# Patient Record
Sex: MALE | Race: WHITE | NOT HISPANIC OR LATINO
[De-identification: names, ages, dates, MRNs, and addresses within clinical notes are randomized per-mention and may not be internally consistent; named-entity substitution may affect disease eponyms.]

---

## 2017-01-26 ENCOUNTER — CHARTING TRANS (OUTPATIENT)
Dept: OTHER | Age: 77
End: 2017-01-26

## 2017-02-07 ENCOUNTER — CHARTING TRANS (OUTPATIENT)
Dept: OTHER | Age: 77
End: 2017-02-07

## 2017-02-24 ENCOUNTER — CHARTING TRANS (OUTPATIENT)
Dept: OTHER | Age: 77
End: 2017-02-24

## 2017-03-24 ENCOUNTER — CHARTING TRANS (OUTPATIENT)
Dept: OTHER | Age: 77
End: 2017-03-24

## 2017-04-12 ENCOUNTER — CHARTING TRANS (OUTPATIENT)
Dept: OTHER | Age: 77
End: 2017-04-12

## 2017-05-12 ENCOUNTER — CHARTING TRANS (OUTPATIENT)
Dept: OTHER | Age: 77
End: 2017-05-12

## 2017-06-12 ENCOUNTER — CHARTING TRANS (OUTPATIENT)
Dept: OTHER | Age: 77
End: 2017-06-12

## 2017-07-12 ENCOUNTER — CHARTING TRANS (OUTPATIENT)
Dept: OTHER | Age: 77
End: 2017-07-12

## 2017-08-14 ENCOUNTER — CHARTING TRANS (OUTPATIENT)
Dept: OTHER | Age: 77
End: 2017-08-14

## 2017-09-18 ENCOUNTER — CHARTING TRANS (OUTPATIENT)
Dept: OTHER | Age: 77
End: 2017-09-18

## 2017-10-24 ENCOUNTER — CHARTING TRANS (OUTPATIENT)
Dept: OTHER | Age: 77
End: 2017-10-24

## 2017-12-01 ENCOUNTER — CHARTING TRANS (OUTPATIENT)
Dept: OTHER | Age: 77
End: 2017-12-01

## 2018-01-04 ENCOUNTER — CHARTING TRANS (OUTPATIENT)
Dept: OTHER | Age: 78
End: 2018-01-04

## 2018-02-05 ENCOUNTER — CHARTING TRANS (OUTPATIENT)
Dept: OTHER | Age: 78
End: 2018-02-05

## 2018-03-06 ENCOUNTER — CHARTING TRANS (OUTPATIENT)
Dept: OTHER | Age: 78
End: 2018-03-06

## 2018-04-16 ENCOUNTER — CHARTING TRANS (OUTPATIENT)
Dept: OTHER | Age: 78
End: 2018-04-16

## 2018-05-04 ENCOUNTER — CHARTING TRANS (OUTPATIENT)
Dept: OTHER | Age: 78
End: 2018-05-04

## 2018-06-06 ENCOUNTER — CHARTING TRANS (OUTPATIENT)
Dept: OTHER | Age: 78
End: 2018-06-06

## 2018-07-05 ENCOUNTER — CHARTING TRANS (OUTPATIENT)
Dept: OTHER | Age: 78
End: 2018-07-05

## 2018-08-02 ENCOUNTER — CHARTING TRANS (OUTPATIENT)
Dept: OTHER | Age: 78
End: 2018-08-02

## 2018-09-06 ENCOUNTER — CHARTING TRANS (OUTPATIENT)
Dept: OTHER | Age: 78
End: 2018-09-06

## 2018-10-04 ENCOUNTER — CHARTING TRANS (OUTPATIENT)
Dept: OTHER | Age: 78
End: 2018-10-04

## 2018-11-01 ENCOUNTER — CHARTING TRANS (OUTPATIENT)
Dept: OTHER | Age: 78
End: 2018-11-01

## 2018-11-05 VITALS
BODY MASS INDEX: 26.88 KG/M2 | RESPIRATION RATE: 19 BRPM | HEIGHT: 71 IN | SYSTOLIC BLOOD PRESSURE: 135 MMHG | WEIGHT: 192 LBS | DIASTOLIC BLOOD PRESSURE: 70 MMHG

## 2018-11-30 ENCOUNTER — CHARTING TRANS (OUTPATIENT)
Dept: OTHER | Age: 78
End: 2018-11-30

## 2018-12-31 ENCOUNTER — NURSE ONLY (OUTPATIENT)
Dept: FAMILY MEDICINE | Age: 78
End: 2018-12-31

## 2018-12-31 DIAGNOSIS — Z23 IMMUNIZATION DUE: Primary | ICD-10-CM

## 2018-12-31 PROCEDURE — 96372 THER/PROPH/DIAG INJ SC/IM: CPT

## 2019-01-01 ENCOUNTER — EXTERNAL RECORD (OUTPATIENT)
Dept: HEALTH INFORMATION MANAGEMENT | Facility: OTHER | Age: 79
End: 2019-01-01

## 2019-01-31 ENCOUNTER — TELEPHONE (OUTPATIENT)
Dept: FAMILY MEDICINE | Age: 79
End: 2019-01-31

## 2019-01-31 ENCOUNTER — APPOINTMENT (OUTPATIENT)
Dept: FAMILY MEDICINE | Age: 79
End: 2019-01-31

## 2019-02-04 ENCOUNTER — NURSE ONLY (OUTPATIENT)
Dept: FAMILY MEDICINE | Age: 79
End: 2019-02-04

## 2019-02-04 DIAGNOSIS — E29.1 MALE HYPOGONADISM: Primary | ICD-10-CM

## 2019-02-04 PROCEDURE — 96372 THER/PROPH/DIAG INJ SC/IM: CPT

## 2019-02-04 RX ORDER — TESTOSTERONE CYPIONATE 200 MG/ML
100 INJECTION, SOLUTION INTRAMUSCULAR ONCE
Status: COMPLETED | OUTPATIENT
Start: 2019-02-04 | End: 2019-02-04

## 2019-02-04 RX ADMIN — TESTOSTERONE CYPIONATE 100 MG: 200 INJECTION, SOLUTION INTRAMUSCULAR at 14:10

## 2019-03-05 ENCOUNTER — OFFICE VISIT (OUTPATIENT)
Dept: FAMILY MEDICINE | Age: 79
End: 2019-03-05

## 2019-03-05 DIAGNOSIS — Z00.00 ENCOUNTER FOR GENERAL ADULT MEDICAL EXAMINATION W/O ABNORMAL FINDINGS: Primary | ICD-10-CM

## 2019-03-05 PROCEDURE — 96372 THER/PROPH/DIAG INJ SC/IM: CPT | Performed by: FAMILY MEDICINE

## 2019-04-05 ENCOUNTER — NURSE ONLY (OUTPATIENT)
Dept: FAMILY MEDICINE | Age: 79
End: 2019-04-05

## 2019-04-05 DIAGNOSIS — E29.1 MALE HYPOGONADISM: Primary | ICD-10-CM

## 2019-04-05 PROCEDURE — 96372 THER/PROPH/DIAG INJ SC/IM: CPT

## 2019-04-05 RX ORDER — TESTOSTERONE CYPIONATE 200 MG/ML
100 INJECTION, SOLUTION INTRAMUSCULAR ONCE
Status: COMPLETED | OUTPATIENT
Start: 2019-04-05 | End: 2019-04-05

## 2019-04-05 RX ADMIN — TESTOSTERONE CYPIONATE 100 MG: 200 INJECTION, SOLUTION INTRAMUSCULAR at 13:46

## 2019-05-03 ENCOUNTER — OFFICE VISIT (OUTPATIENT)
Dept: FAMILY MEDICINE | Age: 79
End: 2019-05-03

## 2019-05-03 DIAGNOSIS — E78.5 HYPERLIPIDEMIA, UNSPECIFIED HYPERLIPIDEMIA TYPE: Primary | ICD-10-CM

## 2019-05-03 PROCEDURE — 96372 THER/PROPH/DIAG INJ SC/IM: CPT | Performed by: FAMILY MEDICINE

## 2019-06-03 ENCOUNTER — NURSE ONLY (OUTPATIENT)
Dept: FAMILY MEDICINE | Age: 79
End: 2019-06-03

## 2019-06-03 DIAGNOSIS — E29.1 MALE HYPOGONADISM: Primary | ICD-10-CM

## 2019-06-03 PROCEDURE — 96372 THER/PROPH/DIAG INJ SC/IM: CPT

## 2019-06-03 RX ORDER — TESTOSTERONE CYPIONATE 200 MG/ML
100 INJECTION, SOLUTION INTRAMUSCULAR ONCE
Status: COMPLETED | OUTPATIENT
Start: 2019-06-03 | End: 2019-06-03

## 2019-06-03 RX ADMIN — TESTOSTERONE CYPIONATE 100 MG: 200 INJECTION, SOLUTION INTRAMUSCULAR at 13:40

## 2019-07-03 ENCOUNTER — OFFICE VISIT (OUTPATIENT)
Dept: FAMILY MEDICINE | Age: 79
End: 2019-07-03

## 2019-07-03 DIAGNOSIS — E29.1 MALE HYPOGONADISM: Primary | ICD-10-CM

## 2019-07-03 PROCEDURE — 96372 THER/PROPH/DIAG INJ SC/IM: CPT

## 2019-07-24 ENCOUNTER — IMAGING SERVICES (OUTPATIENT)
Dept: GENERAL RADIOLOGY | Age: 79
End: 2019-07-24

## 2019-07-24 DIAGNOSIS — D75.1 OTHER ERYTHROCYTOSIS: ICD-10-CM

## 2019-07-24 PROCEDURE — 71046 X-RAY EXAM CHEST 2 VIEWS: CPT | Performed by: PEDIATRICS

## 2019-08-02 ENCOUNTER — NURSE ONLY (OUTPATIENT)
Dept: FAMILY MEDICINE | Age: 79
End: 2019-08-02

## 2019-08-02 DIAGNOSIS — E29.1 MALE HYPOGONADISM: Primary | ICD-10-CM

## 2019-08-02 PROCEDURE — 96372 THER/PROPH/DIAG INJ SC/IM: CPT

## 2019-08-02 RX ORDER — TESTOSTERONE CYPIONATE 200 MG/ML
100 INJECTION, SOLUTION INTRAMUSCULAR ONCE
Status: COMPLETED | OUTPATIENT
Start: 2019-08-02 | End: 2019-08-02

## 2019-08-02 RX ADMIN — TESTOSTERONE CYPIONATE 100 MG: 200 INJECTION, SOLUTION INTRAMUSCULAR at 13:27

## 2019-09-03 ENCOUNTER — NURSE ONLY (OUTPATIENT)
Dept: FAMILY MEDICINE | Age: 79
End: 2019-09-03

## 2019-09-03 DIAGNOSIS — E29.1 MALE HYPOGONADISM: Primary | ICD-10-CM

## 2019-09-03 PROCEDURE — 96372 THER/PROPH/DIAG INJ SC/IM: CPT

## 2019-09-03 RX ORDER — TESTOSTERONE CYPIONATE 200 MG/ML
100 INJECTION, SOLUTION INTRAMUSCULAR ONCE
Status: COMPLETED | OUTPATIENT
Start: 2019-09-03 | End: 2019-09-03

## 2019-09-03 RX ADMIN — TESTOSTERONE CYPIONATE 100 MG: 200 INJECTION, SOLUTION INTRAMUSCULAR at 13:34

## 2019-10-03 ENCOUNTER — NURSE ONLY (OUTPATIENT)
Dept: FAMILY MEDICINE | Age: 79
End: 2019-10-03

## 2019-10-03 DIAGNOSIS — E29.1 MALE HYPOGONADISM: Primary | ICD-10-CM

## 2019-10-03 PROCEDURE — 96372 THER/PROPH/DIAG INJ SC/IM: CPT

## 2019-10-03 RX ORDER — TESTOSTERONE CYPIONATE 200 MG/ML
100 INJECTION, SOLUTION INTRAMUSCULAR ONCE
Status: COMPLETED | OUTPATIENT
Start: 2019-10-03 | End: 2019-10-03

## 2019-10-03 RX ADMIN — TESTOSTERONE CYPIONATE 100 MG: 200 INJECTION, SOLUTION INTRAMUSCULAR at 13:31

## 2019-11-04 ENCOUNTER — NURSE ONLY (OUTPATIENT)
Dept: FAMILY MEDICINE | Age: 79
End: 2019-11-04

## 2019-11-04 DIAGNOSIS — E29.1 MALE HYPOGONADISM: Primary | ICD-10-CM

## 2019-11-04 DIAGNOSIS — Z23 NEED FOR IMMUNIZATION AGAINST INFLUENZA: ICD-10-CM

## 2019-11-04 PROCEDURE — 90662 IIV NO PRSV INCREASED AG IM: CPT

## 2019-11-04 PROCEDURE — 96372 THER/PROPH/DIAG INJ SC/IM: CPT

## 2019-11-04 PROCEDURE — G0008 ADMIN INFLUENZA VIRUS VAC: HCPCS

## 2019-11-04 RX ORDER — TESTOSTERONE CYPIONATE 200 MG/ML
100 INJECTION, SOLUTION INTRAMUSCULAR ONCE
Status: COMPLETED | OUTPATIENT
Start: 2019-11-04 | End: 2019-11-04

## 2019-11-04 RX ADMIN — TESTOSTERONE CYPIONATE 100 MG: 200 INJECTION, SOLUTION INTRAMUSCULAR at 13:35

## 2019-12-04 ENCOUNTER — NURSE ONLY (OUTPATIENT)
Dept: FAMILY MEDICINE | Age: 79
End: 2019-12-04

## 2019-12-04 DIAGNOSIS — E29.1 MALE HYPOGONADISM: ICD-10-CM

## 2019-12-04 PROCEDURE — 96372 THER/PROPH/DIAG INJ SC/IM: CPT | Performed by: NURSE PRACTITIONER

## 2020-01-03 ENCOUNTER — NURSE ONLY (OUTPATIENT)
Dept: FAMILY MEDICINE | Age: 80
End: 2020-01-03

## 2020-01-03 DIAGNOSIS — E29.1 MALE HYPOGONADISM: Primary | ICD-10-CM

## 2020-01-03 PROCEDURE — 96372 THER/PROPH/DIAG INJ SC/IM: CPT | Performed by: FAMILY MEDICINE

## 2020-01-03 RX ORDER — TESTOSTERONE CYPIONATE 200 MG/ML
200 INJECTION, SOLUTION INTRAMUSCULAR ONCE
Status: COMPLETED | OUTPATIENT
Start: 2020-01-03 | End: 2020-01-03

## 2020-01-03 RX ADMIN — TESTOSTERONE CYPIONATE 200 MG: 200 INJECTION, SOLUTION INTRAMUSCULAR at 15:03

## 2020-02-03 ENCOUNTER — NURSE ONLY (OUTPATIENT)
Dept: FAMILY MEDICINE | Age: 80
End: 2020-02-03

## 2020-02-03 DIAGNOSIS — E29.1 MALE HYPOGONADISM: ICD-10-CM

## 2020-02-03 PROCEDURE — 96372 THER/PROPH/DIAG INJ SC/IM: CPT | Performed by: NURSE PRACTITIONER

## 2020-03-03 ENCOUNTER — NURSE ONLY (OUTPATIENT)
Dept: FAMILY MEDICINE | Age: 80
End: 2020-03-03

## 2020-03-03 DIAGNOSIS — E29.1 MALE HYPOGONADISM: Primary | ICD-10-CM

## 2020-03-03 PROCEDURE — 96372 THER/PROPH/DIAG INJ SC/IM: CPT | Performed by: NURSE PRACTITIONER

## 2020-03-03 RX ORDER — TESTOSTERONE CYPIONATE 200 MG/ML
100 INJECTION, SOLUTION INTRAMUSCULAR ONCE
Status: COMPLETED | OUTPATIENT
Start: 2020-03-03 | End: 2020-03-03

## 2020-03-03 RX ADMIN — TESTOSTERONE CYPIONATE 100 MG: 200 INJECTION, SOLUTION INTRAMUSCULAR at 13:35

## 2020-04-03 ENCOUNTER — NURSE ONLY (OUTPATIENT)
Dept: FAMILY MEDICINE | Age: 80
End: 2020-04-03

## 2020-04-03 DIAGNOSIS — E29.1 MALE HYPOGONADISM: Primary | ICD-10-CM

## 2020-04-03 PROCEDURE — 96372 THER/PROPH/DIAG INJ SC/IM: CPT | Performed by: NURSE PRACTITIONER

## 2020-04-03 RX ORDER — TESTOSTERONE CYPIONATE 200 MG/ML
100 INJECTION, SOLUTION INTRAMUSCULAR ONCE
Status: COMPLETED | OUTPATIENT
Start: 2020-04-03 | End: 2020-04-03

## 2020-04-03 RX ADMIN — TESTOSTERONE CYPIONATE 100 MG: 200 INJECTION, SOLUTION INTRAMUSCULAR at 13:34

## 2020-05-04 ENCOUNTER — NURSE ONLY (OUTPATIENT)
Dept: FAMILY MEDICINE | Age: 80
End: 2020-05-04

## 2020-05-04 DIAGNOSIS — E29.1 MALE HYPOGONADISM: Primary | ICD-10-CM

## 2020-05-04 PROCEDURE — 96372 THER/PROPH/DIAG INJ SC/IM: CPT

## 2020-05-04 RX ORDER — TESTOSTERONE CYPIONATE 200 MG/ML
100 INJECTION, SOLUTION INTRAMUSCULAR ONCE
Status: COMPLETED | OUTPATIENT
Start: 2020-05-04 | End: 2020-05-04

## 2020-05-04 RX ADMIN — TESTOSTERONE CYPIONATE 100 MG: 200 INJECTION, SOLUTION INTRAMUSCULAR at 13:34

## 2020-06-04 ENCOUNTER — NURSE ONLY (OUTPATIENT)
Dept: FAMILY MEDICINE | Age: 80
End: 2020-06-04

## 2020-06-04 DIAGNOSIS — E29.1 MALE HYPOGONADISM: Primary | ICD-10-CM

## 2020-06-04 PROCEDURE — 96372 THER/PROPH/DIAG INJ SC/IM: CPT

## 2020-06-04 RX ORDER — TESTOSTERONE CYPIONATE 200 MG/ML
100 INJECTION, SOLUTION INTRAMUSCULAR ONCE
Status: COMPLETED | OUTPATIENT
Start: 2020-06-04 | End: 2020-06-04

## 2020-06-04 RX ADMIN — TESTOSTERONE CYPIONATE 100 MG: 200 INJECTION, SOLUTION INTRAMUSCULAR at 13:26

## 2020-07-02 ENCOUNTER — APPOINTMENT (OUTPATIENT)
Dept: FAMILY MEDICINE | Age: 80
End: 2020-07-02

## 2020-10-02 ENCOUNTER — LAB (OUTPATIENT)
Dept: LAB | Facility: HOSPITAL | Age: 80
End: 2020-10-02

## 2020-10-02 ENCOUNTER — CONSULT (OUTPATIENT)
Dept: ONCOLOGY | Facility: CLINIC | Age: 80
End: 2020-10-02

## 2020-10-02 VITALS
OXYGEN SATURATION: 98 % | TEMPERATURE: 97.9 F | BODY MASS INDEX: 28.63 KG/M2 | SYSTOLIC BLOOD PRESSURE: 116 MMHG | RESPIRATION RATE: 16 BRPM | DIASTOLIC BLOOD PRESSURE: 80 MMHG | HEIGHT: 70 IN | HEART RATE: 64 BPM | WEIGHT: 200 LBS

## 2020-10-02 DIAGNOSIS — D64.9 FATIGUE ASSOCIATED WITH ANEMIA: ICD-10-CM

## 2020-10-02 DIAGNOSIS — D75.1 POLYCYTHEMIA: ICD-10-CM

## 2020-10-02 DIAGNOSIS — D75.1 POLYCYTHEMIA: Primary | ICD-10-CM

## 2020-10-02 LAB
ALBUMIN SERPL-MCNC: 4.5 G/DL (ref 3.5–5.2)
ALBUMIN/GLOB SERPL: 1.6 G/DL
ALP SERPL-CCNC: 120 U/L (ref 39–117)
ALT SERPL W P-5'-P-CCNC: 24 U/L (ref 1–41)
ANION GAP SERPL CALCULATED.3IONS-SCNC: 8 MMOL/L (ref 5–15)
AST SERPL-CCNC: 19 U/L (ref 1–40)
BASOPHILS # BLD AUTO: 0.05 10*3/MM3 (ref 0–0.2)
BASOPHILS NFR BLD AUTO: 0.6 % (ref 0–1.5)
BILIRUB SERPL-MCNC: 0.5 MG/DL (ref 0–1.2)
BUN SERPL-MCNC: 28 MG/DL (ref 8–23)
BUN/CREAT SERPL: 21.2 (ref 7–25)
CALCIUM SPEC-SCNC: 9.8 MG/DL (ref 8.6–10.5)
CHLORIDE SERPL-SCNC: 106 MMOL/L (ref 98–107)
CO2 SERPL-SCNC: 28 MMOL/L (ref 22–29)
CREAT SERPL-MCNC: 1.32 MG/DL (ref 0.76–1.27)
CYTOLOGIST CVX/VAG CYTO: NORMAL
DEPRECATED RDW RBC AUTO: 47.1 FL (ref 37–54)
EOSINOPHIL # BLD AUTO: 0.23 10*3/MM3 (ref 0–0.4)
EOSINOPHIL NFR BLD AUTO: 2.9 % (ref 0.3–6.2)
ERYTHROCYTE [DISTWIDTH] IN BLOOD BY AUTOMATED COUNT: 13 % (ref 12.3–15.4)
FERRITIN SERPL-MCNC: 263.4 NG/ML (ref 30–400)
GFR SERPL CREATININE-BSD FRML MDRD: 52 ML/MIN/1.73
GLOBULIN UR ELPH-MCNC: 2.8 GM/DL
GLUCOSE SERPL-MCNC: 115 MG/DL (ref 65–99)
HCT VFR BLD AUTO: 48.1 % (ref 37.5–51)
HGB BLD-MCNC: 15.7 G/DL (ref 13–17.7)
IMM GRANULOCYTES # BLD AUTO: 0.04 10*3/MM3 (ref 0–0.05)
IMM GRANULOCYTES NFR BLD AUTO: 0.5 % (ref 0–0.5)
IRON 24H UR-MRATE: 80 MCG/DL (ref 59–158)
IRON SATN MFR SERPL: 20 % (ref 20–50)
LYMPHOCYTES # BLD AUTO: 1.76 10*3/MM3 (ref 0.7–3.1)
LYMPHOCYTES NFR BLD AUTO: 22.6 % (ref 19.6–45.3)
MCH RBC QN AUTO: 32.5 PG (ref 26.6–33)
MCHC RBC AUTO-ENTMCNC: 32.6 G/DL (ref 31.5–35.7)
MCV RBC AUTO: 99.6 FL (ref 79–97)
MONOCYTES # BLD AUTO: 0.6 10*3/MM3 (ref 0.1–0.9)
MONOCYTES NFR BLD AUTO: 7.7 % (ref 5–12)
NEUTROPHILS NFR BLD AUTO: 5.12 10*3/MM3 (ref 1.7–7)
NEUTROPHILS NFR BLD AUTO: 65.7 % (ref 42.7–76)
NRBC BLD AUTO-RTO: 0 /100 WBC (ref 0–0.2)
PATH INTERP BLD-IMP: NORMAL
PLATELET # BLD AUTO: 164 10*3/MM3 (ref 140–450)
PMV BLD AUTO: 10.5 FL (ref 6–12)
POTASSIUM SERPL-SCNC: 4 MMOL/L (ref 3.5–5.2)
PROT SERPL-MCNC: 7.3 G/DL (ref 6–8.5)
RBC # BLD AUTO: 4.83 10*6/MM3 (ref 4.14–5.8)
SODIUM SERPL-SCNC: 142 MMOL/L (ref 136–145)
TIBC SERPL-MCNC: 408 MCG/DL (ref 298–536)
TRANSFERRIN SERPL-MCNC: 274 MG/DL (ref 200–360)
WBC # BLD AUTO: 7.8 10*3/MM3 (ref 3.4–10.8)

## 2020-10-02 PROCEDURE — 80053 COMPREHEN METABOLIC PANEL: CPT

## 2020-10-02 PROCEDURE — 99215 OFFICE O/P EST HI 40 MIN: CPT | Performed by: INTERNAL MEDICINE

## 2020-10-02 PROCEDURE — 82728 ASSAY OF FERRITIN: CPT

## 2020-10-02 PROCEDURE — 83540 ASSAY OF IRON: CPT

## 2020-10-02 PROCEDURE — 85025 COMPLETE CBC W/AUTO DIFF WBC: CPT

## 2020-10-02 PROCEDURE — 36415 COLL VENOUS BLD VENIPUNCTURE: CPT

## 2020-10-02 PROCEDURE — 84466 ASSAY OF TRANSFERRIN: CPT

## 2020-10-02 PROCEDURE — 85060 BLOOD SMEAR INTERPRETATION: CPT

## 2020-10-02 RX ORDER — SILDENAFIL 100 MG/1
100 TABLET, FILM COATED ORAL DAILY PRN
COMMUNITY
End: 2020-10-05

## 2020-10-02 RX ORDER — LOSARTAN POTASSIUM 100 MG/1
100 TABLET ORAL DAILY
COMMUNITY
End: 2020-10-05 | Stop reason: SDUPTHER

## 2020-10-02 RX ORDER — ALLOPURINOL 300 MG/1
300 TABLET ORAL DAILY
COMMUNITY
End: 2020-12-11 | Stop reason: SDUPTHER

## 2020-10-02 RX ORDER — HYDROCHLOROTHIAZIDE 25 MG/1
25 TABLET ORAL DAILY
COMMUNITY
End: 2021-01-13 | Stop reason: ALTCHOICE

## 2020-10-02 RX ORDER — ATORVASTATIN CALCIUM 20 MG/1
20 TABLET, FILM COATED ORAL DAILY
COMMUNITY
End: 2020-12-29 | Stop reason: SDUPTHER

## 2020-10-05 ENCOUNTER — OFFICE VISIT (OUTPATIENT)
Dept: FAMILY MEDICINE CLINIC | Facility: CLINIC | Age: 80
End: 2020-10-05

## 2020-10-05 VITALS
OXYGEN SATURATION: 98 % | HEIGHT: 70 IN | DIASTOLIC BLOOD PRESSURE: 82 MMHG | HEART RATE: 74 BPM | RESPIRATION RATE: 16 BRPM | SYSTOLIC BLOOD PRESSURE: 130 MMHG | WEIGHT: 200 LBS | TEMPERATURE: 98.6 F | BODY MASS INDEX: 28.63 KG/M2

## 2020-10-05 DIAGNOSIS — I10 ESSENTIAL HYPERTENSION: ICD-10-CM

## 2020-10-05 DIAGNOSIS — R53.83 FATIGUE, UNSPECIFIED TYPE: Primary | ICD-10-CM

## 2020-10-05 DIAGNOSIS — E78.2 MIXED HYPERLIPIDEMIA: ICD-10-CM

## 2020-10-05 DIAGNOSIS — D45 POLYCYTHEMIA VERA (HCC): ICD-10-CM

## 2020-10-05 DIAGNOSIS — M1A.9XX0 CHRONIC GOUT WITHOUT TOPHUS, UNSPECIFIED CAUSE, UNSPECIFIED SITE: ICD-10-CM

## 2020-10-05 PROCEDURE — 99214 OFFICE O/P EST MOD 30 MIN: CPT | Performed by: FAMILY MEDICINE

## 2020-10-05 RX ORDER — LOSARTAN POTASSIUM 100 MG/1
100 TABLET ORAL DAILY
Qty: 90 TABLET | Refills: 3 | Status: SHIPPED | OUTPATIENT
Start: 2020-10-05 | End: 2021-11-01

## 2020-10-05 RX ORDER — ACETAMINOPHEN 500 MG
500 TABLET ORAL DAILY PRN
COMMUNITY

## 2020-10-06 LAB
FOLATE SERPL-MCNC: 9.01 NG/ML (ref 4.78–24.2)
T4 FREE SERPL-MCNC: 0.98 NG/DL (ref 0.93–1.7)
TSH SERPL DL<=0.005 MIU/L-ACNC: 2.35 UIU/ML (ref 0.27–4.2)
URATE SERPL-MCNC: 3.4 MG/DL (ref 3.4–7)
VIT B12 SERPL-MCNC: 453 PG/ML (ref 211–946)

## 2020-10-09 LAB — REF LAB TEST METHOD: NORMAL

## 2020-10-13 ENCOUNTER — OFFICE VISIT (OUTPATIENT)
Dept: FAMILY MEDICINE CLINIC | Facility: CLINIC | Age: 80
End: 2020-10-13

## 2020-10-13 VITALS
BODY MASS INDEX: 28.35 KG/M2 | SYSTOLIC BLOOD PRESSURE: 130 MMHG | HEART RATE: 69 BPM | RESPIRATION RATE: 16 BRPM | TEMPERATURE: 97.3 F | HEIGHT: 70 IN | OXYGEN SATURATION: 98 % | DIASTOLIC BLOOD PRESSURE: 76 MMHG | WEIGHT: 198 LBS

## 2020-10-13 DIAGNOSIS — R52 PAIN: Primary | ICD-10-CM

## 2020-10-13 DIAGNOSIS — Z00.00 ANNUAL PHYSICAL EXAM: ICD-10-CM

## 2020-10-13 DIAGNOSIS — E78.2 MIXED HYPERLIPIDEMIA: ICD-10-CM

## 2020-10-13 DIAGNOSIS — Z23 NEED FOR PROPHYLACTIC VACCINATION AND INOCULATION AGAINST INFLUENZA: ICD-10-CM

## 2020-10-13 DIAGNOSIS — I10 ESSENTIAL HYPERTENSION: ICD-10-CM

## 2020-10-13 DIAGNOSIS — R53.83 FATIGUE, UNSPECIFIED TYPE: ICD-10-CM

## 2020-10-13 DIAGNOSIS — Z12.12 ENCOUNTER FOR COLORECTAL CANCER SCREENING: ICD-10-CM

## 2020-10-13 DIAGNOSIS — Z12.11 ENCOUNTER FOR COLORECTAL CANCER SCREENING: ICD-10-CM

## 2020-10-13 DIAGNOSIS — Z12.5 SPECIAL SCREENING FOR MALIGNANT NEOPLASM OF PROSTATE: ICD-10-CM

## 2020-10-13 PROBLEM — D45 POLYCYTHEMIA VERA (HCC): Status: ACTIVE | Noted: 2020-10-13

## 2020-10-13 PROBLEM — Z86.0101 HX OF ADENOMATOUS COLONIC POLYPS: Status: ACTIVE | Noted: 2020-10-13

## 2020-10-13 PROBLEM — Z86.010 HX OF ADENOMATOUS COLONIC POLYPS: Status: ACTIVE | Noted: 2020-10-13

## 2020-10-13 LAB
BILIRUB BLD-MCNC: NEGATIVE MG/DL
CLARITY, POC: CLEAR
COLOR UR: NORMAL
GLUCOSE UR STRIP-MCNC: NEGATIVE MG/DL
KETONES UR QL: NEGATIVE
LEUKOCYTE EST, POC: NEGATIVE
NITRITE UR-MCNC: NEGATIVE MG/ML
PH UR: 5 [PH] (ref 5–8)
PROT UR STRIP-MCNC: NEGATIVE MG/DL
RBC # UR STRIP: NEGATIVE /UL
SP GR UR: 1.02 (ref 1–1.03)
UROBILINOGEN UR QL: NORMAL

## 2020-10-13 PROCEDURE — 81003 URINALYSIS AUTO W/O SCOPE: CPT | Performed by: FAMILY MEDICINE

## 2020-10-13 PROCEDURE — 96160 PT-FOCUSED HLTH RISK ASSMT: CPT | Performed by: FAMILY MEDICINE

## 2020-10-13 PROCEDURE — G0008 ADMIN INFLUENZA VIRUS VAC: HCPCS | Performed by: FAMILY MEDICINE

## 2020-10-13 PROCEDURE — G0439 PPPS, SUBSEQ VISIT: HCPCS | Performed by: FAMILY MEDICINE

## 2020-10-13 PROCEDURE — 90694 VACC AIIV4 NO PRSRV 0.5ML IM: CPT | Performed by: FAMILY MEDICINE

## 2020-10-13 PROCEDURE — G0404 EKG TRACING FOR INITIAL PREV: HCPCS | Performed by: FAMILY MEDICINE

## 2020-10-13 PROCEDURE — G0102 PROSTATE CA SCREENING; DRE: HCPCS | Performed by: FAMILY MEDICINE

## 2020-10-13 NOTE — PATIENT INSTRUCTIONS
Exercising to Stay Healthy  To become healthy and stay healthy, it is recommended that you do moderate-intensity and vigorous-intensity exercise. You can tell that you are exercising at a moderate intensity if your heart starts beating faster and you start breathing faster but can still hold a conversation. You can tell that you are exercising at a vigorous intensity if you are breathing much harder and faster and cannot hold a conversation while exercising.  Exercising regularly is important. It has many health benefits, such as:  · Improving overall fitness, flexibility, and endurance.  · Increasing bone density.  · Helping with weight control.  · Decreasing body fat.  · Increasing muscle strength.  · Reducing stress and tension.  · Improving overall health.  How often should I exercise?  Choose an activity that you enjoy, and set realistic goals. Your health care provider can help you make an activity plan that works for you.  Exercise regularly as told by your health care provider. This may include:  · Doing strength training two times a week, such as:  ? Lifting weights.  ? Using resistance bands.  ? Push-ups.  ? Sit-ups.  ? Yoga.  · Doing a certain intensity of exercise for a given amount of time. Choose from these options:  ? A total of 150 minutes of moderate-intensity exercise every week.  ? A total of 75 minutes of vigorous-intensity exercise every week.  ? A mix of moderate-intensity and vigorous-intensity exercise every week.  Children, pregnant women, people who have not exercised regularly, people who are overweight, and older adults may need to talk with a health care provider about what activities are safe to do. If you have a medical condition, be sure to talk with your health care provider before you start a new exercise program.  What are some exercise ideas?  Moderate-intensity exercise ideas include:  · Walking 1 mile (1.6 km) in about 15  minutes.  · Biking.  · Hiking.  · Golfing.  · Dancing.  · Water aerobics.  Vigorous-intensity exercise ideas include:  · Walking 4.5 miles (7.2 km) or more in about 1 hour.  · Jogging or running 5 miles (8 km) in about 1 hour.  · Biking 10 miles (16.1 km) or more in about 1 hour.  · Lap swimming.  · Roller-skating or in-line skating.  · Cross-country skiing.  · Vigorous competitive sports, such as football, basketball, and soccer.  · Jumping rope.  · Aerobic dancing.  What are some everyday activities that can help me to get exercise?  · Yard work, such as:  ? Pushing a .  ? Raking and bagging leaves.  · Washing your car.  · Pushing a stroller.  · Shoveling snow.  · Gardening.  · Washing windows or floors.  How can I be more active in my day-to-day activities?  · Use stairs instead of an elevator.  · Take a walk during your lunch break.  · If you drive, park your car farther away from your work or school.  · If you take public transportation, get off one stop early and walk the rest of the way.  · Stand up or walk around during all of your indoor phone calls.  · Get up, stretch, and walk around every 30 minutes throughout the day.  · Enjoy exercise with a friend. Support to continue exercising will help you keep a regular routine of activity.  What guidelines can I follow while exercising?  · Before you start a new exercise program, talk with your health care provider.  · Do not exercise so much that you hurt yourself, feel dizzy, or get very short of breath.  · Wear comfortable clothes and wear shoes with good support.  · Drink plenty of water while you exercise to prevent dehydration or heat stroke.  · Work out until your breathing and your heartbeat get faster.  Where to find more information  · U.[place]. Department of Health and Human Services: www.hhs.gov  · Centers for Disease Control and Prevention (CDC): www.cdc.gov  Summary  · Exercising regularly is important. It will improve your overall fitness,  flexibility, and endurance.  · Regular exercise also will improve your overall health. It can help you control your weight, reduce stress, and improve your bone density.  · Do not exercise so much that you hurt yourself, feel dizzy, or get very short of breath.  · Before you start a new exercise program, talk with your health care provider.  This information is not intended to replace advice given to you by your health care provider. Make sure you discuss any questions you have with your health care provider.  Document Released: 01/20/2012 Document Revised: 11/30/2018 Document Reviewed: 11/08/2018  Elsevier Patient Education © 2020 [employer]      Fall Prevention in the Home, Adult  Falls can cause injuries and can affect people from all age groups. There are many simple things that you can do to make your home safe and to help prevent falls. Ask for help when making these changes, if needed.  What actions can I take to prevent falls?  General instructions  · Use good lighting in all rooms. Replace any light bulbs that burn out.  · Turn on lights if it is dark. Use night-lights.  · Place frequently used items in easy-to-reach places. Lower the shelves around your home if necessary.  · Set up furniture so that there are clear paths around it. Avoid moving your furniture around.  · Remove throw rugs and other tripping hazards from the floor.  · Avoid walking on wet floors.  · Fix any uneven floor surfaces.  · Add color or contrast paint or tape to grab bars and handrails in your home. Place contrasting color strips on the first and last steps of stairways.  · When you use a stepladder, make sure that it is completely opened and that the sides are firmly locked. Have someone hold the ladder while you are using it. Do not climb a closed stepladder.  · Be aware of any and all pets.  What can I do in the bathroom?         · Keep the floor dry. Immediately clean up any water that spills onto the floor.  · Remove soap  buildup in the tub or shower on a regular basis.  · Use non-skid mats or decals on the floor of the tub or shower.  · Attach bath mats securely with double-sided, non-slip rug tape.  · If you need to sit down while you are in the shower, use a plastic, non-slip stool.  · Install grab bars by the toilet and in the tub and shower. Do not use towel bars as grab bars.  What can I do in the bedroom?  · Make sure that a bedside light is easy to reach.  · Do not use oversized bedding that drapes onto the floor.  · Have a firm chair that has side arms to use for getting dressed.  What can I do in the kitchen?  · Clean up any spills right away.  · If you need to reach for something above you, use a sturdy step stool that has a grab bar.  · Keep electrical cables out of the way.  · Do not use floor polish or wax that makes floors slippery. If you must use wax, make sure that it is non-skid floor wax.  What can I do in the stairways?  · Do not leave any items on the stairs.  · Make sure that you have a light switch at the top of the stairs and the bottom of the stairs. Have them installed if you do not have them.  · Make sure that there are handrails on both sides of the stairs. Fix handrails that are broken or loose. Make sure that handrails are as long as the stairways.  · Install non-slip stair treads on all stairs in your home.  · Avoid having throw rugs at the top or bottom of stairways, or secure the rugs with carpet tape to prevent them from moving.  · Choose a carpet design that does not hide the edge of steps on the stairway.  · Check any carpeting to make sure that it is firmly attached to the stairs. Fix any carpet that is loose or worn.  What can I do on the outside of my home?  · Use bright outdoor lighting.  · Regularly repair the edges of walkways and driveways and fix any cracks.  · Remove high doorway thresholds.  · Trim any shrubbery on the main path into your home.  · Regularly check that handrails are  securely fastened and in good repair. Both sides of any steps should have handrails.  · Install guardrails along the edges of any raised decks or porches.  · Clear walkways of debris and clutter, including tools and rocks.  · Have leaves, snow, and ice cleared regularly.  · Use sand or salt on walkways during winter months.  · In the garage, clean up any spills right away, including grease or oil spills.  What other actions can I take?  · Wear closed-toe shoes that fit well and support your feet. Wear shoes that have rubber soles or low heels.  · Use mobility aids as needed, such as canes, walkers, scooters, and crutches.  · Review your medicines with your health care provider. Some medicines can cause dizziness or changes in blood pressure, which increase your risk of falling.  Talk with your health care provider about other ways that you can decrease your risk of falls. This may include working with a physical therapist or  to improve your strength, balance, and endurance.  Where to find more information  · Centers for Disease Control and Prevention, STEAD[employer]: https://www.cdc.gov  · National [place] on Aging: https://af0cbem.rashel.nih.gov  Contact a health care provider if:  · You are afraid of falling at home.  · You feel weak, drowsy, or dizzy at home.  · You fall at home.  Summary  · There are many simple things that you can do to make your home safe and to help prevent falls.  · Ways to make your home safe include removing tripping hazards and installing grab bars in the bathroom.  · Ask for help when making these changes in your home.  This information is not intended to replace advice given to you by your health care provider. Make sure you discuss any questions you have with your health care provider.  Document Released: 12/08/2003 Document Revised: 11/30/2018 Document Reviewed: 08/02/2018  [employer] Patient Education © 2020 [employer]    Medicare Wellness  Personal Prevention Plan of Service      Date of Office Visit:  10/13/2020  Encounter Provider:  Devon Vazquez MD  Place of Service:  [facility]  Patient Name: Jeff Alatorre  :  1940    As part of the Medicare Wellness portion of your visit today, we are providing you with this personalized preventive plan of services (PPPS). This plan is based upon recommendations of the [place] Preventive Services Task Force (USPSTF) and the Advisory Committee on Immunization Practices (ACIP).    This lists the preventive care services that should be considered, and provides dates of when you are due. Items listed as completed are up-to-date and do not require any further intervention.    Health Maintenance   Topic Date Due   • TDAP/TD VACCINES (1 - Tdap) 1959   • ZOSTER VACCINE (1 of 2) 1990   • Pneumococcal Vaccine 65+ (1 of 1 - PPSV23) 2005   • LIPID PANEL  10/02/2020   • ANNUAL WELLNESS VISIT  10/13/2021   • INFLUENZA VACCINE  Completed       No orders of the defined types were placed in this encounter.      Return in 3 months (on 2021).

## 2020-10-13 NOTE — PROGRESS NOTES
The ABCs of the Annual Wellness Visit  Subsequent Medicare Wellness Visit    Chief Complaint   Patient presents with   • Medicare Wellness-subsequent     Fasting       Subjective   History of Present Illness:  Jeff Alatorre is a 80 y.o. male who presents for a Subsequent Medicare Wellness Visit.    HEALTH RISK ASSESSMENT    Recent Hospitalizations:  No hospitalization(s) within the last year.    Current Medical Providers:  Patient Care Team:  Devon Vazquez MD as PCP - General (Family Medicine)    Smoking Status:  Social History     Tobacco Use   Smoking Status Former Smoker   • Packs/day: 1.00   • Years: 46.00   • Pack years: 46.00   • Types: Cigarettes   • Quit date:    • Years since quittin.7   Smokeless Tobacco Never Used       Alcohol Consumption:  Social History     Substance and Sexual Activity   Alcohol Use Not Currently       Depression Screen:   PHQ-2/PHQ-9 Depression Screening 10/13/2020   Little interest or pleasure in doing things 0   Feeling down, depressed, or hopeless 0   Total Score 0       Fall Risk Screen:  MARIELY Fall Risk Assessment was completed, and patient is at LOW risk for falls.Assessment completed on:10/5/2020    Health Habits and Functional and Cognitive Screening:  Functional & Cognitive Status 10/13/2020   Do you have difficulty preparing food and eating? No   Do you have difficulty bathing yourself, getting dressed or grooming yourself? No   Do you have difficulty using the toilet? No   Do you have difficulty moving around from place to place? No   Do you have trouble with steps or getting out of a bed or a chair? No   Current Diet Well Balanced Diet   Dental Exam Up to date   Eye Exam Up to date   Exercise (times per week) 7 times per week   Current Exercise Activities Include Walking   Do you need help using the phone?  No   Are you deaf or do you have serious difficulty hearing?  No   Do you need help with transportation? No   Do you need help shopping? No   Do  you need help preparing meals?  No   Do you need help with housework?  No   Do you need help with laundry? No   Do you need help taking your medications? No   Do you need help managing money? No   Do you ever drive or ride in a car without wearing a seat belt? No   Have you felt unusual stress, anger or loneliness in the last month? No   Who do you live with? Child   If you need help, do you have trouble finding someone available to you? No   Have you been bothered in the last four weeks by sexual problems? No   Do you have difficulty concentrating, remembering or making decisions? No         Does the patient have evidence of cognitive impairment? Yes    Asprin use counseling:Taking ASA appropriately as indicated    Age-appropriate Screening Schedule:  Refer to the list below for future screening recommendations based on patient's age, sex and/or medical conditions. Orders for these recommended tests are listed in the plan section. The patient has been provided with a written plan.    Health Maintenance   Topic Date Due   • TDAP/TD VACCINES (1 - Tdap) 02/25/1959   • ZOSTER VACCINE (1 of 2) 02/25/1990   • LIPID PANEL  10/02/2020   • COLONOSCOPY  06/26/2021   • INFLUENZA VACCINE  Completed          The following portions of the patient's history were reviewed and updated as appropriate: allergies, current medications, past family history, past medical history, past social history, past surgical history and problem list.    Outpatient Medications Prior to Visit   Medication Sig Dispense Refill   • acetaminophen (TYLENOL) 500 MG tablet Take 500 mg by mouth Daily As Needed for Mild Pain .     • allopurinol (ZYLOPRIM) 300 MG tablet Take 300 mg by mouth Daily.     • atorvastatin (LIPITOR) 20 MG tablet Take 20 mg by mouth Daily.     • hydroCHLOROthiazide (HYDRODIURIL) 25 MG tablet Take 25 mg by mouth Daily.     • losartan (COZAAR) 100 MG tablet Take 1 tablet by mouth Daily. 90 tablet 3   • TESTOSTERONE IM Inject 2,000 mL  "into the appropriate muscle as directed by prescriber.       No facility-administered medications prior to visit.        Patient Active Problem List   Diagnosis   • Hx of adenomatous colonic polyps   • Polycythemia vera (CMS/HCC)       Advanced Care Planning:  ACP discussion was declined by the patient. Patient has an advance directive in EMR which is still valid.     Review of Systems   Respiratory: Negative for shortness of breath.    Cardiovascular: Negative for chest pain and leg swelling.   Gastrointestinal:        History of adenomatous colonic polyps   Musculoskeletal: Positive for arthralgias.   Neurological: Negative for light-headedness and headaches.   All other systems reviewed and are negative.      Compared to one year ago, the patient feels his physical health is worse.  Compared to one year ago, the patient feels his mental health is worse.    Reviewed chart for potential of high risk medication in the elderly: yes  Reviewed chart for potential of harmful drug interactions in the elderly:yes    Objective         Vitals:    10/13/20 0857   BP: 130/76   BP Location: Left arm   Patient Position: Sitting   Cuff Size: Adult   Pulse: 69   Resp: 16   Temp: 97.3 °F (36.3 °C)   TempSrc: Temporal   SpO2: 98%   Weight: 89.8 kg (198 lb)   Height: 177.8 cm (70\")   PainSc: 0-No pain       Body mass index is 28.41 kg/m².  Discussed the patient's BMI with him. The BMI is above average; BMI management plan is completed.    Physical Exam  Vitals signs and nursing note reviewed.   Constitutional:       Appearance: Normal appearance.   HENT:      Right Ear: Tympanic membrane and ear canal normal.      Left Ear: Tympanic membrane and ear canal normal.   Eyes:      Extraocular Movements: Extraocular movements intact.      Pupils: Pupils are equal, round, and reactive to light.   Neck:      Vascular: No carotid bruit.   Cardiovascular:      Rate and Rhythm: Normal rate and regular rhythm.      Pulses: Normal pulses.      " Heart sounds: Normal heart sounds.   Pulmonary:      Effort: Pulmonary effort is normal.      Breath sounds: Normal breath sounds.   Abdominal:      General: Abdomen is flat.      Palpations: Abdomen is soft. There is no mass.      Tenderness: There is no abdominal tenderness.   Genitourinary:     Penis: Normal.       Scrotum/Testes: Normal.      Prostate: Normal.      Rectum: Normal.      Comments: No testicular qviylz-zrjixqbw-xv inguinal hernias or adenopathy-prostate 2+ no nodules  Musculoskeletal:      Right lower leg: No edema.      Left lower leg: No edema.   Lymphadenopathy:      Cervical: No cervical adenopathy.   Skin:     General: Skin is warm and dry.      Capillary Refill: Capillary refill takes less than 2 seconds.   Neurological:      General: No focal deficit present.      Mental Status: He is alert and oriented to person, place, and time.   Psychiatric:         Mood and Affect: Mood normal.         Behavior: Behavior normal.         Thought Content: Thought content normal.         Judgment: Judgment normal.               Assessment/Plan   Medicare Risks and Personalized Health Plan  CMS Preventative Services Quick Reference  Colon Cancer Screening    The above risks/problems have been discussed with the patient.  Pertinent information has been shared with the patient in the After Visit Summary.  Follow up plans and orders are seen below in the Assessment/Plan Section.    Diagnoses and all orders for this visit:    1. Pain (Primary)  -     Cancel: Vitamin B12  -     Cancel: Folate    2. Essential hypertension  -     POC Urinalysis Dipstick, Multipro  -     ECG 12 Lead  -     Ambulatory Referral to Cardiology    3. Fatigue, unspecified type    4. Mixed hyperlipidemia  -     Lipid Panel    5. Need for prophylactic vaccination and inoculation against influenza  -     Fluad Quad >65 years    6. Encounter for colorectal cancer screening  -     Cologuard - Stool, Per Rectum; Future    7. Special screening  for malignant neoplasm of prostate  -     PSA Screen    8. Annual physical exam      Follow Up:  Return in 3 months (on 1/13/2021).     An After Visit Summary and PPPS were given to the patient.       Plan above plus cardiology consult-EKG abnormal over 19 safety

## 2020-10-14 LAB
CHOLEST SERPL-MCNC: 130 MG/DL (ref 0–200)
HDLC SERPL-MCNC: 39 MG/DL (ref 40–60)
LDLC SERPL CALC-MCNC: 65 MG/DL (ref 0–100)
PSA SERPL-MCNC: 1.5 NG/ML (ref 0–4)
TRIGL SERPL-MCNC: 149 MG/DL (ref 0–150)
VLDLC SERPL CALC-MCNC: 26 MG/DL (ref 5–40)

## 2020-12-11 RX ORDER — ALLOPURINOL 300 MG/1
300 TABLET ORAL DAILY
Qty: 90 TABLET | Refills: 2 | Status: SHIPPED | OUTPATIENT
Start: 2020-12-11 | End: 2021-03-11

## 2020-12-11 NOTE — TELEPHONE ENCOUNTER
Caller: Jeff Alatorre    Relationship: Self    Medication needed:   Requested Prescriptions     Pending Prescriptions Disp Refills   • allopurinol (ZYLOPRIM) 300 MG tablet 90 tablet 0     Sig: Take 1 tablet by mouth Daily for 90 doses.       When do you need the refill by: 12/12/2020    Does the patient have less than a 3 day supply:  [] Yes  [x] No    What is the patient's preferred pharmacy:    [facility] [place], [place] - [address] [facility] [phone] [facility] [phone]   [phone]

## 2020-12-29 RX ORDER — ATORVASTATIN CALCIUM 20 MG/1
20 TABLET, FILM COATED ORAL NIGHTLY
Qty: 90 TABLET | Refills: 2 | Status: SHIPPED | OUTPATIENT
Start: 2020-12-29 | End: 2021-10-13

## 2020-12-29 NOTE — TELEPHONE ENCOUNTER
Caller: Jeff Alatorre    Relationship: Self    Best call back number: [phone]     Medication needed:   Requested Prescriptions     Pending Prescriptions Disp Refills   • atorvastatin (LIPITOR) 20 MG tablet        Sig: Take 1 tablet by mouth Daily.       When do you need the refill by: WITHIN THE NEXT 7 DAYS     What details did the patient provide when requesting the medication: WRITTEN BY A DIFFERENT DR.    Does the patient have less than a 3 day supply:  [x] Yes  [] No    What is the patient's preferred pharmacy: [facility] [place], [place] - [address] [facility] [phone] [facility] [phone]          ”

## 2021-01-13 ENCOUNTER — OFFICE VISIT (OUTPATIENT)
Dept: FAMILY MEDICINE CLINIC | Facility: CLINIC | Age: 81
End: 2021-01-13

## 2021-01-13 VITALS
HEART RATE: 58 BPM | HEIGHT: 70 IN | OXYGEN SATURATION: 99 % | TEMPERATURE: 97.9 F | DIASTOLIC BLOOD PRESSURE: 72 MMHG | BODY MASS INDEX: 29.55 KG/M2 | SYSTOLIC BLOOD PRESSURE: 136 MMHG | WEIGHT: 206.4 LBS | RESPIRATION RATE: 16 BRPM

## 2021-01-13 DIAGNOSIS — D45 POLYCYTHEMIA VERA (HCC): ICD-10-CM

## 2021-01-13 DIAGNOSIS — E78.2 MIXED HYPERLIPIDEMIA: Primary | ICD-10-CM

## 2021-01-13 DIAGNOSIS — E29.1 HYPOGONADISM IN MALE: ICD-10-CM

## 2021-01-13 PROCEDURE — 99214 OFFICE O/P EST MOD 30 MIN: CPT | Performed by: FAMILY MEDICINE

## 2021-01-13 RX ORDER — AMLODIPINE BESYLATE 5 MG/1
5 TABLET ORAL DAILY
COMMUNITY
Start: 2020-12-09

## 2021-01-13 RX ORDER — SILDENAFIL 100 MG/1
100 TABLET, FILM COATED ORAL DAILY PRN
COMMUNITY
End: 2021-03-22

## 2021-01-13 NOTE — PROGRESS NOTES
Subjective   Jeff Alatorre is a 80 y.o. male.     80-year-old male with hyperlipidemia and history of hypertension and vascular disease    Hyperlipidemia  This is a chronic problem. The current episode started more than 1 year ago. The problem is controlled. Recent lipid tests were reviewed and are normal. There are no known factors aggravating his hyperlipidemia. Pertinent negatives include no chest pain, myalgias or shortness of breath. Current antihyperlipidemic treatment includes diet change and statins. The current treatment provides moderate improvement of lipids. Compliance problems include adherence to diet.  Risk factors for coronary artery disease include dyslipidemia, family history, male sex and hypertension.     Vitals:    01/13/21 0850   BP: 136/72   Pulse: 58   Resp: 16   Temp: 97.9 °F (36.6 °C)   SpO2: 99%       The following portions of the patient's history were reviewed and updated as appropriate: allergies, current medications, past family history, past medical history, past social history, past surgical history and problem list.    Review of Systems   Respiratory: Negative for shortness of breath.    Cardiovascular: Negative for chest pain and leg swelling.   Genitourinary: Negative for difficulty urinating.   Musculoskeletal: Negative for myalgias.   Hematological:        Secondary polycythemia-has been on testosterone injections in the past by another physician       Objective   Physical Exam  Vitals signs and nursing note reviewed.   Constitutional:       Appearance: Normal appearance.   Neck:      Vascular: No carotid bruit.   Cardiovascular:      Rate and Rhythm: Normal rate and regular rhythm.      Pulses: Normal pulses.      Heart sounds: Normal heart sounds.   Pulmonary:      Effort: Pulmonary effort is normal.      Breath sounds: Normal breath sounds.   Abdominal:      General: Abdomen is flat.      Palpations: Abdomen is soft.   Musculoskeletal:      Right lower leg: No edema.       Left lower leg: No edema.   Neurological:      General: No focal deficit present.      Mental Status: He is alert and oriented to person, place, and time.   Psychiatric:         Mood and Affect: Mood normal.         Behavior: Behavior normal.         Thought Content: Thought content normal.         Judgment: Judgment normal.         Patient's Body mass index is 29.62 kg/m². BMI is above normal parameters. Recommendations include: exercise counseling.      Assessment/Plan   Patient Active Problem List   Diagnosis   • Hx of adenomatous colonic polyps   • Polycythemia vera (CMS/HCC)     Diagnoses and all orders for this visit:    1. Mixed hyperlipidemia (Primary)  -     Lipid Panel  -     Comprehensive Metabolic Panel    2. Polycythemia vera (CMS/HCC)    3. Hypogonadism in male    Other orders  -     Cancel: Testosterone       Return if symptoms worsen or fail to improve, for Annual physical.       Plan-COVID-19 shot-follow-up with hematologist      Electronically signed by Devon Vazquez MD 01/13/2021

## 2021-01-14 LAB
ALBUMIN SERPL-MCNC: 4.4 G/DL (ref 3.5–5.2)
ALBUMIN/GLOB SERPL: 1.8 G/DL
ALP SERPL-CCNC: 119 U/L (ref 39–117)
ALT SERPL-CCNC: 21 U/L (ref 1–41)
AST SERPL-CCNC: 22 U/L (ref 1–40)
BILIRUB SERPL-MCNC: 0.7 MG/DL (ref 0–1.2)
BUN SERPL-MCNC: 26 MG/DL (ref 8–23)
BUN/CREAT SERPL: 20.6 (ref 7–25)
CALCIUM SERPL-MCNC: 9.3 MG/DL (ref 8.6–10.5)
CHLORIDE SERPL-SCNC: 105 MMOL/L (ref 98–107)
CHOLEST SERPL-MCNC: 121 MG/DL (ref 0–200)
CO2 SERPL-SCNC: 24.8 MMOL/L (ref 22–29)
CREAT SERPL-MCNC: 1.26 MG/DL (ref 0.76–1.27)
GLOBULIN SER CALC-MCNC: 2.4 GM/DL
GLUCOSE SERPL-MCNC: 112 MG/DL (ref 65–99)
HDLC SERPL-MCNC: 39 MG/DL (ref 40–60)
LDLC SERPL CALC-MCNC: 60 MG/DL (ref 0–100)
POTASSIUM SERPL-SCNC: 4.4 MMOL/L (ref 3.5–5.2)
PROT SERPL-MCNC: 6.8 G/DL (ref 6–8.5)
SODIUM SERPL-SCNC: 140 MMOL/L (ref 136–145)
TRIGL SERPL-MCNC: 124 MG/DL (ref 0–150)
VLDLC SERPL CALC-MCNC: 22 MG/DL (ref 5–40)

## 2021-03-22 ENCOUNTER — OFFICE VISIT (OUTPATIENT)
Dept: ONCOLOGY | Facility: CLINIC | Age: 81
End: 2021-03-22

## 2021-03-22 ENCOUNTER — LAB (OUTPATIENT)
Dept: LAB | Facility: HOSPITAL | Age: 81
End: 2021-03-22

## 2021-03-22 VITALS
OXYGEN SATURATION: 99 % | WEIGHT: 209.1 LBS | SYSTOLIC BLOOD PRESSURE: 126 MMHG | HEART RATE: 68 BPM | BODY MASS INDEX: 29.94 KG/M2 | TEMPERATURE: 97.7 F | DIASTOLIC BLOOD PRESSURE: 76 MMHG | RESPIRATION RATE: 16 BRPM | HEIGHT: 70 IN

## 2021-03-22 DIAGNOSIS — N19 RENAL FAILURE, UNSPECIFIED CHRONICITY: ICD-10-CM

## 2021-03-22 DIAGNOSIS — D75.1 POLYCYTHEMIA: ICD-10-CM

## 2021-03-22 DIAGNOSIS — N18.9 CHRONIC KIDNEY DISEASE, UNSPECIFIED CKD STAGE: ICD-10-CM

## 2021-03-22 DIAGNOSIS — D75.1 POLYCYTHEMIA: Primary | ICD-10-CM

## 2021-03-22 LAB
ALBUMIN SERPL-MCNC: 4 G/DL (ref 3.5–5.2)
ALBUMIN/GLOB SERPL: 1.5 G/DL
ALP SERPL-CCNC: 136 U/L (ref 39–117)
ALT SERPL W P-5'-P-CCNC: 20 U/L (ref 1–41)
ANION GAP SERPL CALCULATED.3IONS-SCNC: 7 MMOL/L (ref 5–15)
AST SERPL-CCNC: 18 U/L (ref 1–40)
BASOPHILS # BLD AUTO: 0.04 10*3/MM3 (ref 0–0.2)
BASOPHILS NFR BLD AUTO: 0.6 % (ref 0–1.5)
BILIRUB SERPL-MCNC: 0.4 MG/DL (ref 0–1.2)
BUN SERPL-MCNC: 23 MG/DL (ref 8–23)
BUN/CREAT SERPL: 16.1 (ref 7–25)
CALCIUM SPEC-SCNC: 9.2 MG/DL (ref 8.6–10.5)
CHLORIDE SERPL-SCNC: 105 MMOL/L (ref 98–107)
CO2 SERPL-SCNC: 29 MMOL/L (ref 22–29)
CREAT SERPL-MCNC: 1.43 MG/DL (ref 0.76–1.27)
DEPRECATED RDW RBC AUTO: 46 FL (ref 37–54)
EOSINOPHIL # BLD AUTO: 0.18 10*3/MM3 (ref 0–0.4)
EOSINOPHIL NFR BLD AUTO: 2.6 % (ref 0.3–6.2)
ERYTHROCYTE [DISTWIDTH] IN BLOOD BY AUTOMATED COUNT: 12.7 % (ref 12.3–15.4)
FERRITIN SERPL-MCNC: 294.9 NG/ML (ref 30–400)
GFR SERPL CREATININE-BSD FRML MDRD: 47 ML/MIN/1.73
GLOBULIN UR ELPH-MCNC: 2.7 GM/DL
GLUCOSE SERPL-MCNC: 114 MG/DL (ref 65–99)
HCT VFR BLD AUTO: 41.8 % (ref 37.5–51)
HGB BLD-MCNC: 13.8 G/DL (ref 13–17.7)
IMM GRANULOCYTES # BLD AUTO: 0.02 10*3/MM3 (ref 0–0.05)
IMM GRANULOCYTES NFR BLD AUTO: 0.3 % (ref 0–0.5)
IRON 24H UR-MRATE: 86 MCG/DL (ref 59–158)
IRON SATN MFR SERPL: 24 % (ref 20–50)
LYMPHOCYTES # BLD AUTO: 1.43 10*3/MM3 (ref 0.7–3.1)
LYMPHOCYTES NFR BLD AUTO: 20.6 % (ref 19.6–45.3)
MCH RBC QN AUTO: 32.7 PG (ref 26.6–33)
MCHC RBC AUTO-ENTMCNC: 33 G/DL (ref 31.5–35.7)
MCV RBC AUTO: 99.1 FL (ref 79–97)
MONOCYTES # BLD AUTO: 0.63 10*3/MM3 (ref 0.1–0.9)
MONOCYTES NFR BLD AUTO: 9.1 % (ref 5–12)
NEUTROPHILS NFR BLD AUTO: 4.65 10*3/MM3 (ref 1.7–7)
NEUTROPHILS NFR BLD AUTO: 66.8 % (ref 42.7–76)
NRBC BLD AUTO-RTO: 0 /100 WBC (ref 0–0.2)
PLATELET # BLD AUTO: 167 10*3/MM3 (ref 140–450)
PMV BLD AUTO: 10.4 FL (ref 6–12)
POTASSIUM SERPL-SCNC: 4 MMOL/L (ref 3.5–5.2)
PROT SERPL-MCNC: 6.7 G/DL (ref 6–8.5)
RBC # BLD AUTO: 4.22 10*6/MM3 (ref 4.14–5.8)
SODIUM SERPL-SCNC: 141 MMOL/L (ref 136–145)
TIBC SERPL-MCNC: 353 MCG/DL (ref 298–536)
TRANSFERRIN SERPL-MCNC: 237 MG/DL (ref 200–360)
WBC # BLD AUTO: 6.95 10*3/MM3 (ref 3.4–10.8)

## 2021-03-22 PROCEDURE — 80053 COMPREHEN METABOLIC PANEL: CPT

## 2021-03-22 PROCEDURE — 84466 ASSAY OF TRANSFERRIN: CPT

## 2021-03-22 PROCEDURE — 83540 ASSAY OF IRON: CPT

## 2021-03-22 PROCEDURE — 85025 COMPLETE CBC W/AUTO DIFF WBC: CPT

## 2021-03-22 PROCEDURE — 36415 COLL VENOUS BLD VENIPUNCTURE: CPT

## 2021-03-22 PROCEDURE — 99212 OFFICE O/P EST SF 10 MIN: CPT | Performed by: INTERNAL MEDICINE

## 2021-03-22 PROCEDURE — 82728 ASSAY OF FERRITIN: CPT

## 2021-03-22 RX ORDER — ASPIRIN 81 MG/1
81 TABLET ORAL DAILY
COMMUNITY
End: 2023-03-14 | Stop reason: HOSPADM

## 2021-03-22 RX ORDER — CALCIUM CARBONATE/VITAMIN D3 500-10/5ML
LIQUID (ML) ORAL
COMMUNITY
End: 2021-10-15

## 2021-04-12 ENCOUNTER — OFFICE VISIT (OUTPATIENT)
Dept: FAMILY MEDICINE CLINIC | Facility: CLINIC | Age: 81
End: 2021-04-12

## 2021-04-12 VITALS
TEMPERATURE: 97.7 F | DIASTOLIC BLOOD PRESSURE: 82 MMHG | BODY MASS INDEX: 29.78 KG/M2 | RESPIRATION RATE: 16 BRPM | SYSTOLIC BLOOD PRESSURE: 132 MMHG | HEART RATE: 52 BPM | OXYGEN SATURATION: 98 % | HEIGHT: 70 IN | WEIGHT: 208 LBS

## 2021-04-12 DIAGNOSIS — D45 POLYCYTHEMIA VERA (HCC): ICD-10-CM

## 2021-04-12 DIAGNOSIS — R73.9 ELEVATED BLOOD SUGAR: ICD-10-CM

## 2021-04-12 DIAGNOSIS — E78.2 MIXED HYPERLIPIDEMIA: Primary | ICD-10-CM

## 2021-04-12 PROCEDURE — 99214 OFFICE O/P EST MOD 30 MIN: CPT | Performed by: FAMILY MEDICINE

## 2021-04-12 RX ORDER — ALLOPURINOL 300 MG/1
300 TABLET ORAL DAILY
COMMUNITY
Start: 2021-03-13 | End: 2021-09-27 | Stop reason: SDUPTHER

## 2021-04-12 NOTE — PROGRESS NOTES
Subjective   Jeff Alatorre is a 81 y.o. male.     81-year-old male with hyperlipidemia    Hyperlipidemia  This is a chronic problem. The current episode started more than 1 year ago. The problem is controlled. Recent lipid tests were reviewed and are normal. There are no known factors aggravating his hyperlipidemia. Pertinent negatives include no chest pain or shortness of breath. Current antihyperlipidemic treatment includes diet change and statins. The current treatment provides moderate improvement of lipids. Compliance problems include adherence to diet and adherence to exercise.  Risk factors for coronary artery disease include dyslipidemia, hypertension, male sex and a sedentary lifestyle.     Vitals:    04/12/21 0838   BP: 132/82   Pulse: 52   Resp: 16   Temp: 97.7 °F (36.5 °C)   SpO2: 98%       The following portions of the patient's history were reviewed and updated as appropriate: allergies, current medications, past family history, past medical history, past social history, past surgical history and problem list.    Review of Systems   Respiratory: Negative for shortness of breath.    Cardiovascular: Negative for chest pain and leg swelling.   Endocrine: Negative for polydipsia, polyphagia and polyuria.   Musculoskeletal: Positive for arthralgias.        Carpal metacarpal joint pathology-first ray-bilateral-       Objective   Physical Exam  Vitals and nursing note reviewed.   Constitutional:       Appearance: Normal appearance.   Eyes:      Extraocular Movements: Extraocular movements intact.      Pupils: Pupils are equal, round, and reactive to light.   Cardiovascular:      Rate and Rhythm: Normal rate and regular rhythm.      Pulses: Normal pulses.      Heart sounds: Normal heart sounds.   Pulmonary:      Effort: Pulmonary effort is normal.      Breath sounds: Normal breath sounds.   Abdominal:      Palpations: Abdomen is soft.   Musculoskeletal:      Right lower leg: No edema.      Left lower leg: No  edema.   Skin:     General: Skin is warm and dry.   Neurological:      General: No focal deficit present.      Mental Status: He is alert and oriented to person, place, and time.   Psychiatric:         Mood and Affect: Mood normal.         Behavior: Behavior normal.         Thought Content: Thought content normal.         Judgment: Judgment normal.         Patient's Body mass index is 29.84 kg/m². BMI is above normal parameters. Recommendations include: exercise counseling.      No Known Allergies     Current Outpatient Medications on File Prior to Visit   Medication Sig Dispense Refill   • acetaminophen (TYLENOL) 500 MG tablet Take 500 mg by mouth Daily As Needed for Mild Pain .     • allopurinol (ZYLOPRIM) 300 MG tablet Take 300 mg by mouth Daily.     • amLODIPine (NORVASC) 5 MG tablet Take 5 mg by mouth Daily.     • aspirin 81 MG EC tablet Take 81 mg by mouth Daily.     • atorvastatin (LIPITOR) 20 MG tablet Take 1 tablet by mouth Every Night. 90 tablet 2   • losartan (COZAAR) 100 MG tablet Take 1 tablet by mouth Daily. 90 tablet 3   • vitamin D3 125 MCG (5000 UT) capsule capsule Take 5,000 Units by mouth Daily.     • Zinc 30 MG capsule Take  by mouth.       No current facility-administered medications on file prior to visit.          Assessment/Plan   Patient Active Problem List   Diagnosis   • Hx of adenomatous colonic polyps   • Polycythemia vera (CMS/HCC)     Diagnoses and all orders for this visit:    1. Mixed hyperlipidemia (Primary)  -     Comprehensive Metabolic Panel  -     Lipid Panel    2. Polycythemia vera (CMS/HCC)  -     CBC & Differential    3. Elevated blood sugar  -     Hemoglobin A1c       Return if symptoms worsen or fail to improve, for Annual physical.         Plan above-had Covid shots-some splints and tape        Electronically signed by Devon Vazquez MD 04/12/2021

## 2021-04-13 LAB
ALBUMIN SERPL-MCNC: 4.5 G/DL (ref 3.5–5.2)
ALBUMIN/GLOB SERPL: 2.1 G/DL
ALP SERPL-CCNC: 135 U/L (ref 39–117)
ALT SERPL-CCNC: 24 U/L (ref 1–41)
AST SERPL-CCNC: 23 U/L (ref 1–40)
BASOPHILS # BLD AUTO: 0.06 10*3/MM3 (ref 0–0.2)
BASOPHILS NFR BLD AUTO: 0.8 % (ref 0–1.5)
BILIRUB SERPL-MCNC: 0.5 MG/DL (ref 0–1.2)
BUN SERPL-MCNC: 27 MG/DL (ref 8–23)
BUN/CREAT SERPL: 22.7 (ref 7–25)
CALCIUM SERPL-MCNC: 9.9 MG/DL (ref 8.6–10.5)
CHLORIDE SERPL-SCNC: 107 MMOL/L (ref 98–107)
CHOLEST SERPL-MCNC: 104 MG/DL (ref 0–200)
CO2 SERPL-SCNC: 26.6 MMOL/L (ref 22–29)
CREAT SERPL-MCNC: 1.19 MG/DL (ref 0.76–1.27)
EOSINOPHIL # BLD AUTO: 0.25 10*3/MM3 (ref 0–0.4)
EOSINOPHIL NFR BLD AUTO: 3.5 % (ref 0.3–6.2)
ERYTHROCYTE [DISTWIDTH] IN BLOOD BY AUTOMATED COUNT: 12.6 % (ref 12.3–15.4)
GLOBULIN SER CALC-MCNC: 2.1 GM/DL
GLUCOSE SERPL-MCNC: 117 MG/DL (ref 65–99)
HBA1C MFR BLD: 6.4 % (ref 4.8–5.6)
HCT VFR BLD AUTO: 44.3 % (ref 37.5–51)
HDLC SERPL-MCNC: 38 MG/DL (ref 40–60)
HGB BLD-MCNC: 14.6 G/DL (ref 13–17.7)
IMM GRANULOCYTES # BLD AUTO: 0.03 10*3/MM3 (ref 0–0.05)
IMM GRANULOCYTES NFR BLD AUTO: 0.4 % (ref 0–0.5)
LDLC SERPL CALC-MCNC: 48 MG/DL (ref 0–100)
LYMPHOCYTES # BLD AUTO: 1.58 10*3/MM3 (ref 0.7–3.1)
LYMPHOCYTES NFR BLD AUTO: 22.3 % (ref 19.6–45.3)
MCH RBC QN AUTO: 32.4 PG (ref 26.6–33)
MCHC RBC AUTO-ENTMCNC: 33 G/DL (ref 31.5–35.7)
MCV RBC AUTO: 98.4 FL (ref 79–97)
MONOCYTES # BLD AUTO: 0.57 10*3/MM3 (ref 0.1–0.9)
MONOCYTES NFR BLD AUTO: 8.1 % (ref 5–12)
NEUTROPHILS # BLD AUTO: 4.58 10*3/MM3 (ref 1.7–7)
NEUTROPHILS NFR BLD AUTO: 64.9 % (ref 42.7–76)
NRBC BLD AUTO-RTO: 0 /100 WBC (ref 0–0.2)
PLATELET # BLD AUTO: 192 10*3/MM3 (ref 140–450)
POTASSIUM SERPL-SCNC: 5.2 MMOL/L (ref 3.5–5.2)
PROT SERPL-MCNC: 6.6 G/DL (ref 6–8.5)
RBC # BLD AUTO: 4.5 10*6/MM3 (ref 4.14–5.8)
SODIUM SERPL-SCNC: 143 MMOL/L (ref 136–145)
TRIGL SERPL-MCNC: 90 MG/DL (ref 0–150)
VLDLC SERPL CALC-MCNC: 18 MG/DL (ref 5–40)
WBC # BLD AUTO: 7.07 10*3/MM3 (ref 3.4–10.8)

## 2021-06-07 ENCOUNTER — TELEPHONE (OUTPATIENT)
Dept: FAMILY MEDICINE CLINIC | Facility: CLINIC | Age: 81
End: 2021-06-07

## 2021-06-07 DIAGNOSIS — Z12.11 ENCOUNTER FOR SCREENING COLONOSCOPY: Primary | ICD-10-CM

## 2021-06-07 NOTE — TELEPHONE ENCOUNTER
PT RECEIVED LETTER IN MAIL ADVISING HE IS DUE COLONOSCOPY.  OK FOR REFERRAL OR WAIT AND ADDRESS DURING CPE APPT THIS October? IF OK, FOR REFERRAL,  DO YOU HAVE A PHYSICIAN PREFERENCE OR WHO TO REFER TO?  MENTIONED [facility] OR WOULD BE OK FOR [facility].  PT STATES HE IS NO HURRY :)

## 2021-07-06 NOTE — H&P (VIEW-ONLY)
Chief Complaint   Patient presents with   • Colon Cancer Screening     last colon done in [place]-- 06/26/2018- no polyps   • GI Problem     pt has questions about a cologuard test he had done about 4 months ago       PCP: Devon Vazquez MD  REFER: Devon Vazquez,*    Subjective     HPI    Jeff Alatorre is a 81 y.o. male who presents to office for preventative maintenance.  There is  a personal history of colon polyps.  There is not a history of colon cancer.  He does not have complaints of nausea/vomiting, change in bowels, weight loss, no BRBPR, no melena.  There is not a family history of colon cancer.  There is not a family history of colon polyps.  His last colonoscopy-2013 .  Bowels do move on regular basis.  Negative cologuard 10/2020 testing.     CScope (Dr Alatorre) 2018-no polyps   CScope (Dr Alatorre) 2013- 5 tubular adenoma polyps removed         ADDENDUM 7/16/21  Records received from previous GI  Pathology from colonoscopy 2018 showed tubular adenoma polyp removed.      Past Medical History:   Diagnosis Date   • Cancer (CMS/HCC)    • Chronic kidney disease    • Erectile dysfunction    • Gout    • HTN (hypertension)    • Hx of adenomatous colonic polyps 10/13/2020   • Hypercholesteremia    • Low testosterone    • Polycythemia vera (CMS/HCC) 10/13/2020     Past Surgical History:   Procedure Laterality Date   • CAROTID ENDARTERECTOMY Right    • CATARACT EXTRACTION, BILATERAL     • HEMORROIDECTOMY       Outpatient Medications Marked as Taking for the 7/7/21 encounter (Office Visit) with Brandon Mcnamara APRN   Medication Sig Dispense Refill   • acetaminophen (TYLENOL) 500 MG tablet Take 500 mg by mouth Daily As Needed for Mild Pain .     • allopurinol (ZYLOPRIM) 300 MG tablet Take 300 mg by mouth Daily.     • amLODIPine (NORVASC) 5 MG tablet Take 5 mg by mouth Daily.     • aspirin 81 MG EC tablet Take 81 mg by mouth Daily.     • atorvastatin (LIPITOR) 20 MG tablet Take 1 tablet by mouth  Every Night. 90 tablet 2   • losartan (COZAAR) 100 MG tablet Take 1 tablet by mouth Daily. 90 tablet 3   • vitamin D3 125 MCG (5000 UT) capsule capsule Take 5,000 Units by mouth Daily.     • Zinc 30 MG capsule Take  by mouth.       No Known Allergies  Social History     Socioeconomic History   • Marital status:      Spouse name: Not on file   • Number of children: Not on file   • Years of education: Not on file   • Highest education level: Not on file   Tobacco Use   • Smoking status: Former Smoker     Packs/day: 1.00     Years: 46.00     Pack years: 46.00     Types: Cigarettes     Quit date:      Years since quittin.5   • Smokeless tobacco: Never Used   Substance and Sexual Activity   • Alcohol use: Not Currently   • Drug use: Not Currently   • Sexual activity: Defer     Review of Systems   Constitutional: Negative for unexpected weight change.   Respiratory: Negative for shortness of breath.    Cardiovascular: Negative for chest pain.   Gastrointestinal: Negative for abdominal pain and anal bleeding.     Objective   Vitals:    21 0929   BP: 122/60   Pulse: 70   Temp: 97.3 °F (36.3 °C)   SpO2: 98%     Physical Exam  Constitutional:       Appearance: Normal appearance. He is well-developed.   Eyes:      General: No scleral icterus.  Cardiovascular:      Rate and Rhythm: Regular rhythm.      Heart sounds: Normal heart sounds. No murmur heard.     Pulmonary:      Effort: Pulmonary effort is normal. No accessory muscle usage.      Breath sounds: Normal breath sounds.   Abdominal:      General: Bowel sounds are normal. There is no distension.      Palpations: Abdomen is soft. There is no mass.      Tenderness: There is no abdominal tenderness. There is no guarding or rebound.   Skin:     General: Skin is warm and dry.      Coloration: Skin is not jaundiced.   Neurological:      Mental Status: He is alert.   Psychiatric:         Behavior: Behavior is cooperative.       Imaging Results (Most Recent)      None        Body mass index is 29.99 kg/m².    Assessment/Plan   Diagnoses and all orders for this visit:    1. History of adenomatous polyp of colon (Primary)      * Surgery not found *    No plans on colonoscopy due to negative cologuard and negative colonoscopy 2018 as well as lack of GI related symptoms.  Due to multiple tubular adenoma polyps on colonoscopy from 2013 would recommend repeat colonoscopy in 2023.  If he wishes to pursue colonoscopy will verify with insurance procedure will be covered due to negative cologuard testing less than one year ago.  I did explain cologuard testing is not 100% accurate.   Advised some polyps can develop into a cancer and these could be removed during colonoscopy. Pt voiced understanding and continued to decline evaluation.  We will obtain colonoscopy from 2018 for review.          Brandon Mcnamara, BRYANT  07/07/21        There are no Patient Instructions on file for this visit.

## 2021-07-06 NOTE — PROGRESS NOTES
Chief Complaint   Patient presents with   • Colon Cancer Screening     last colon done in [place]-- 06/26/2018- no polyps   • GI Problem     pt has questions about a cologuard test he had done about 4 months ago       PCP: Devon Vazquez MD  REFER: Devon Vazquez,*    Subjective     HPI    Jeff Alatorre is a 81 y.o. male who presents to office for preventative maintenance.  There is  a personal history of colon polyps.  There is not a history of colon cancer.  He does not have complaints of nausea/vomiting, change in bowels, weight loss, no BRBPR, no melena.  There is not a family history of colon cancer.  There is not a family history of colon polyps.  His last colonoscopy-2013 .  Bowels do move on regular basis.  Negative cologuard 10/2020 testing.     CScope (Dr Alatorre) 2018-no polyps   CScope (Dr Alatorre) 2013- 5 tubular adenoma polyps removed         ADDENDUM 7/16/21  Records received from previous GI  Pathology from colonoscopy 2018 showed tubular adenoma polyp removed.      Past Medical History:   Diagnosis Date   • Cancer (CMS/HCC)    • Chronic kidney disease    • Erectile dysfunction    • Gout    • HTN (hypertension)    • Hx of adenomatous colonic polyps 10/13/2020   • Hypercholesteremia    • Low testosterone    • Polycythemia vera (CMS/HCC) 10/13/2020     Past Surgical History:   Procedure Laterality Date   • CAROTID ENDARTERECTOMY Right    • CATARACT EXTRACTION, BILATERAL     • HEMORROIDECTOMY       Outpatient Medications Marked as Taking for the 7/7/21 encounter (Office Visit) with Brandon Mcnamara APRN   Medication Sig Dispense Refill   • acetaminophen (TYLENOL) 500 MG tablet Take 500 mg by mouth Daily As Needed for Mild Pain .     • allopurinol (ZYLOPRIM) 300 MG tablet Take 300 mg by mouth Daily.     • amLODIPine (NORVASC) 5 MG tablet Take 5 mg by mouth Daily.     • aspirin 81 MG EC tablet Take 81 mg by mouth Daily.     • atorvastatin (LIPITOR) 20 MG tablet Take 1 tablet by mouth  Every Night. 90 tablet 2   • losartan (COZAAR) 100 MG tablet Take 1 tablet by mouth Daily. 90 tablet 3   • vitamin D3 125 MCG (5000 UT) capsule capsule Take 5,000 Units by mouth Daily.     • Zinc 30 MG capsule Take  by mouth.       No Known Allergies  Social History     Socioeconomic History   • Marital status:      Spouse name: Not on file   • Number of children: Not on file   • Years of education: Not on file   • Highest education level: Not on file   Tobacco Use   • Smoking status: Former Smoker     Packs/day: 1.00     Years: 46.00     Pack years: 46.00     Types: Cigarettes     Quit date:      Years since quittin.5   • Smokeless tobacco: Never Used   Substance and Sexual Activity   • Alcohol use: Not Currently   • Drug use: Not Currently   • Sexual activity: Defer     Review of Systems   Constitutional: Negative for unexpected weight change.   Respiratory: Negative for shortness of breath.    Cardiovascular: Negative for chest pain.   Gastrointestinal: Negative for abdominal pain and anal bleeding.     Objective   Vitals:    21 0929   BP: 122/60   Pulse: 70   Temp: 97.3 °F (36.3 °C)   SpO2: 98%     Physical Exam  Constitutional:       Appearance: Normal appearance. He is well-developed.   Eyes:      General: No scleral icterus.  Cardiovascular:      Rate and Rhythm: Regular rhythm.      Heart sounds: Normal heart sounds. No murmur heard.     Pulmonary:      Effort: Pulmonary effort is normal. No accessory muscle usage.      Breath sounds: Normal breath sounds.   Abdominal:      General: Bowel sounds are normal. There is no distension.      Palpations: Abdomen is soft. There is no mass.      Tenderness: There is no abdominal tenderness. There is no guarding or rebound.   Skin:     General: Skin is warm and dry.      Coloration: Skin is not jaundiced.   Neurological:      Mental Status: He is alert.   Psychiatric:         Behavior: Behavior is cooperative.       Imaging Results (Most Recent)      None        Body mass index is 29.99 kg/m².    Assessment/Plan   Diagnoses and all orders for this visit:    1. History of adenomatous polyp of colon (Primary)      * Surgery not found *    No plans on colonoscopy due to negative cologuard and negative colonoscopy 2018 as well as lack of GI related symptoms.  Due to multiple tubular adenoma polyps on colonoscopy from 2013 would recommend repeat colonoscopy in 2023.  If he wishes to pursue colonoscopy will verify with insurance procedure will be covered due to negative cologuard testing less than one year ago.  I did explain cologuard testing is not 100% accurate.   Advised some polyps can develop into a cancer and these could be removed during colonoscopy. Pt voiced understanding and continued to decline evaluation.  We will obtain colonoscopy from 2018 for review.          Brandon Mcnaamra, BRYANT  07/07/21        There are no Patient Instructions on file for this visit.

## 2021-07-07 ENCOUNTER — TELEPHONE (OUTPATIENT)
Dept: GASTROENTEROLOGY | Facility: CLINIC | Age: 81
End: 2021-07-07

## 2021-07-07 ENCOUNTER — OFFICE VISIT (OUTPATIENT)
Dept: GASTROENTEROLOGY | Facility: CLINIC | Age: 81
End: 2021-07-07

## 2021-07-07 VITALS
OXYGEN SATURATION: 98 % | WEIGHT: 209 LBS | SYSTOLIC BLOOD PRESSURE: 122 MMHG | HEART RATE: 70 BPM | BODY MASS INDEX: 29.92 KG/M2 | HEIGHT: 70 IN | TEMPERATURE: 97.3 F | DIASTOLIC BLOOD PRESSURE: 60 MMHG

## 2021-07-07 DIAGNOSIS — Z86.010 HISTORY OF ADENOMATOUS POLYP OF COLON: Primary | ICD-10-CM

## 2021-07-07 PROCEDURE — S0260 H&P FOR SURGERY: HCPCS | Performed by: NURSE PRACTITIONER

## 2021-07-15 ENCOUNTER — TELEPHONE (OUTPATIENT)
Dept: GASTROENTEROLOGY | Facility: CLINIC | Age: 81
End: 2021-07-15

## 2021-07-15 ENCOUNTER — PREP FOR SURGERY (OUTPATIENT)
Dept: OTHER | Facility: HOSPITAL | Age: 81
End: 2021-07-15

## 2021-07-15 DIAGNOSIS — D36.9 ADENOMATOUS POLYPS: Primary | ICD-10-CM

## 2021-07-15 RX ORDER — SODIUM, POTASSIUM,MAG SULFATES 17.5-3.13G
SOLUTION, RECONSTITUTED, ORAL ORAL
Qty: 177 ML | Refills: 0 | Status: ON HOLD | OUTPATIENT
Start: 2021-07-15 | End: 2021-07-27

## 2021-07-15 NOTE — TELEPHONE ENCOUNTER
Talked to him just now  Due to having 2 polyps back in 6/2018 he is due for another c-scope  Please call  and schedule

## 2021-07-16 ENCOUNTER — TELEPHONE (OUTPATIENT)
Dept: GASTROENTEROLOGY | Facility: CLINIC | Age: 81
End: 2021-07-16

## 2021-07-16 PROBLEM — D36.9 ADENOMATOUS POLYPS: Status: ACTIVE | Noted: 2021-07-16

## 2021-07-16 NOTE — TELEPHONE ENCOUNTER
Dr Alatorre reviewed path from colonoscopy 2018    Despite negative cologard Dr Alatorre recommends proceeding with colonoscopy due to strong history of polyps    He discussed this with Jeff Alatorre on phone, Jeff Alatorre agreeable to proceed with colonoscopy       Order has been entered

## 2021-07-27 ENCOUNTER — TELEPHONE (OUTPATIENT)
Dept: GASTROENTEROLOGY | Facility: CLINIC | Age: 81
End: 2021-07-27

## 2021-07-27 ENCOUNTER — ANESTHESIA EVENT (OUTPATIENT)
Dept: GASTROENTEROLOGY | Facility: HOSPITAL | Age: 81
End: 2021-07-27

## 2021-07-27 ENCOUNTER — HOSPITAL ENCOUNTER (OUTPATIENT)
Facility: HOSPITAL | Age: 81
Setting detail: HOSPITAL OUTPATIENT SURGERY
Discharge: HOME | End: 2021-07-27
Attending: INTERNAL MEDICINE | Admitting: INTERNAL MEDICINE

## 2021-07-27 ENCOUNTER — ANESTHESIA (OUTPATIENT)
Dept: GASTROENTEROLOGY | Facility: HOSPITAL | Age: 81
End: 2021-07-27

## 2021-07-27 VITALS
WEIGHT: 206 LBS | OXYGEN SATURATION: 95 % | TEMPERATURE: 97.6 F | HEIGHT: 70 IN | RESPIRATION RATE: 19 BRPM | SYSTOLIC BLOOD PRESSURE: 125 MMHG | BODY MASS INDEX: 29.49 KG/M2 | DIASTOLIC BLOOD PRESSURE: 67 MMHG | HEART RATE: 70 BPM

## 2021-07-27 PROCEDURE — 25010000002 PROPOFOL 10 MG/ML EMULSION: Performed by: NURSE ANESTHETIST, CERTIFIED REGISTERED

## 2021-07-27 PROCEDURE — G0105 COLORECTAL SCRN; HI RISK IND: HCPCS | Performed by: INTERNAL MEDICINE

## 2021-07-27 RX ORDER — PROPOFOL 10 MG/ML
VIAL (ML) INTRAVENOUS AS NEEDED
Status: DISCONTINUED | OUTPATIENT
Start: 2021-07-27 | End: 2021-07-27 | Stop reason: SURG

## 2021-07-27 RX ORDER — SODIUM CHLORIDE 0.9 % (FLUSH) 0.9 %
10 SYRINGE (ML) INJECTION AS NEEDED
Status: DISCONTINUED | OUTPATIENT
Start: 2021-07-27 | End: 2021-07-27 | Stop reason: HOSPADM

## 2021-07-27 RX ORDER — LIDOCAINE HYDROCHLORIDE 10 MG/ML
0.5 INJECTION, SOLUTION EPIDURAL; INFILTRATION; INTRACAUDAL; PERINEURAL ONCE AS NEEDED
Status: DISCONTINUED | OUTPATIENT
Start: 2021-07-27 | End: 2021-07-27 | Stop reason: HOSPADM

## 2021-07-27 RX ORDER — LIDOCAINE HYDROCHLORIDE 20 MG/ML
INJECTION, SOLUTION EPIDURAL; INFILTRATION; INTRACAUDAL; PERINEURAL AS NEEDED
Status: DISCONTINUED | OUTPATIENT
Start: 2021-07-27 | End: 2021-07-27 | Stop reason: SURG

## 2021-07-27 RX ORDER — SODIUM CHLORIDE 9 MG/ML
500 INJECTION, SOLUTION INTRAVENOUS CONTINUOUS PRN
Status: DISCONTINUED | OUTPATIENT
Start: 2021-07-27 | End: 2021-07-27 | Stop reason: HOSPADM

## 2021-07-27 RX ADMIN — LIDOCAINE HYDROCHLORIDE 60 MG: 20 INJECTION, SOLUTION EPIDURAL; INFILTRATION; INTRACAUDAL; PERINEURAL at 11:58

## 2021-07-27 RX ADMIN — PROPOFOL 100 MG: 10 INJECTION, EMULSION INTRAVENOUS at 11:58

## 2021-07-27 RX ADMIN — SODIUM CHLORIDE 500 ML: 9 INJECTION, SOLUTION INTRAVENOUS at 10:09

## 2021-07-27 NOTE — ANESTHESIA PREPROCEDURE EVALUATION
Anesthesia Evaluation     Patient summary reviewed   no history of anesthetic complications:  NPO Solid Status: > 8 hours             Airway   Mallampati: II  Dental      Pulmonary - negative pulmonary ROS   Cardiovascular   Exercise tolerance: excellent (>7 METS)    (+) hypertension, hyperlipidemia,       Neuro/Psych- negative ROS  GI/Hepatic/Renal/Endo - negative ROS     Musculoskeletal     Abdominal    Substance History      OB/GYN          Other                        Anesthesia Plan    ASA 2     MAC       Anesthetic plan, all risks, benefits, and alternatives have been provided, discussed and informed consent has been obtained with: patient.

## 2021-07-27 NOTE — ANESTHESIA POSTPROCEDURE EVALUATION
"Patient: Jeff Alatorre    Procedure Summary     Date: 07/27/21 Room / Location: [facility] ENDOSCOPY 4 / [facility]    Anesthesia Start: 1155 Anesthesia Stop: 1210    Procedure: COLONOSCOPY WITH ANESTHESIA (N/A ) Diagnosis:       Adenomatous polyps      (Adenomatous polyps [D36.9])    Surgeons: Luciano Alatorre DO Provider: Brandon George CRNA    Anesthesia Type: MAC ASA Status: 2          Anesthesia Type: MAC    Vitals  Vitals Value Taken Time   /76 07/27/21 1225   Temp     Pulse 61 07/27/21 1226   Resp 19 07/27/21 1220   SpO2 96 % 07/27/21 1226   Vitals shown include unvalidated device data.        Post Anesthesia Care and Evaluation    Patient location during evaluation: PACU  Patient participation: complete - patient participated  Level of consciousness: awake and alert  Pain management: adequate  Airway patency: patent  Anesthetic complications: No anesthetic complications    Cardiovascular status: acceptable  Respiratory status: acceptable  Hydration status: acceptable    Comments: Blood pressure 125/67, pulse 70, temperature 97.6 °F (36.4 °C), temperature source Temporal, resp. rate 19, height 177.8 cm (70\"), weight 93.4 kg (206 lb), SpO2 95 %.    Pt discharged from PACU based on rc score >8      "

## 2021-09-27 ENCOUNTER — TELEPHONE (OUTPATIENT)
Dept: FAMILY MEDICINE CLINIC | Facility: CLINIC | Age: 81
End: 2021-09-27

## 2021-09-27 RX ORDER — ALLOPURINOL 300 MG/1
300 TABLET ORAL DAILY
Qty: 90 TABLET | Refills: 0 | Status: SHIPPED | OUTPATIENT
Start: 2021-09-27 | End: 2022-03-28

## 2021-09-27 NOTE — TELEPHONE ENCOUNTER
Caller: Jeff Alatorre    Relationship: Self      Medication requested (name and dosage):     allopurinol (ZYLOPRIM) 300 MG tablet    Pharmacy where request should be sent:     [facility] - [address] [phone] [facility] [phone]      Additional details provided by patient:    OUT    Best call back number:     [phone]    Does the patient have less than a 3 day supply:  [x] Yes  [] No    Ratna Dominguez Rep   09/27/21 10:45 CDT

## 2021-09-27 NOTE — TELEPHONE ENCOUNTER
Rx Refill Note  Requested Prescriptions     Pending Prescriptions Disp Refills   • allopurinol (ZYLOPRIM) 300 MG tablet       Sig: Take 1 tablet by mouth Daily.      Last office visit with prescribing clinician: 4/12/2021      Next office visit with prescribing clinician: 10/15/2021   CPE done 10/2020    Is Refill Pharmacy correct?: Yes    Kristina Ornelas MA  09/27/21, 10:51 CDT

## 2021-10-13 RX ORDER — ATORVASTATIN CALCIUM 20 MG/1
TABLET, FILM COATED ORAL
Qty: 90 TABLET | Refills: 0 | Status: SHIPPED | OUTPATIENT
Start: 2021-10-13 | End: 2022-01-20

## 2021-10-13 NOTE — TELEPHONE ENCOUNTER
Rx Refill Note  Requested Prescriptions     Pending Prescriptions Disp Refills   • atorvastatin (LIPITOR) 20 MG tablet [Pharmacy Med Name: ATORVASTATIN CALCIUM 20MG TABS] 90 tablet 2     Sig: TAKE ONE TABLET BY MOUTH NIGHTLY      Last office visit with prescribing clinician: 4/12/21  Next office visit with prescribing clinician: 10/15/2021          {TIP  Is Refill Pharmacy correct?:23}  Kristina Coombs MA  10/13/21, 11:40 CDT

## 2021-10-15 ENCOUNTER — OFFICE VISIT (OUTPATIENT)
Dept: FAMILY MEDICINE CLINIC | Facility: CLINIC | Age: 81
End: 2021-10-15

## 2021-10-15 VITALS
OXYGEN SATURATION: 98 % | HEART RATE: 65 BPM | DIASTOLIC BLOOD PRESSURE: 80 MMHG | WEIGHT: 211 LBS | BODY MASS INDEX: 31.25 KG/M2 | TEMPERATURE: 97.5 F | SYSTOLIC BLOOD PRESSURE: 131 MMHG | HEIGHT: 69 IN

## 2021-10-15 DIAGNOSIS — R73.9 ELEVATED BLOOD SUGAR: ICD-10-CM

## 2021-10-15 DIAGNOSIS — E55.9 VITAMIN D DEFICIENCY: ICD-10-CM

## 2021-10-15 DIAGNOSIS — Z00.00 MEDICARE ANNUAL WELLNESS VISIT, SUBSEQUENT: Primary | ICD-10-CM

## 2021-10-15 DIAGNOSIS — Z23 NEED FOR INFLUENZA VACCINATION: ICD-10-CM

## 2021-10-15 DIAGNOSIS — D45 POLYCYTHEMIA VERA (HCC): ICD-10-CM

## 2021-10-15 DIAGNOSIS — Z13.220 LIPID SCREENING: ICD-10-CM

## 2021-10-15 DIAGNOSIS — E78.2 MIXED HYPERLIPIDEMIA: ICD-10-CM

## 2021-10-15 DIAGNOSIS — Z12.5 PROSTATE CANCER SCREENING: ICD-10-CM

## 2021-10-15 DIAGNOSIS — D36.9 ADENOMATOUS POLYPS: ICD-10-CM

## 2021-10-15 DIAGNOSIS — R53.83 FATIGUE, UNSPECIFIED TYPE: ICD-10-CM

## 2021-10-15 DIAGNOSIS — M10.09 IDIOPATHIC GOUT OF MULTIPLE SITES, UNSPECIFIED CHRONICITY: ICD-10-CM

## 2021-10-15 LAB
BILIRUB BLD-MCNC: NEGATIVE MG/DL
CLARITY, POC: CLEAR
COLOR UR: YELLOW
GLUCOSE UR STRIP-MCNC: NEGATIVE MG/DL
KETONES UR QL: NEGATIVE
LEUKOCYTE EST, POC: NEGATIVE
NITRITE UR-MCNC: NEGATIVE MG/ML
PH UR: 5.5 [PH] (ref 5–8)
PROT UR STRIP-MCNC: NEGATIVE MG/DL
RBC # UR STRIP: NEGATIVE /UL
SP GR UR: 1.02 (ref 1–1.03)
UROBILINOGEN UR QL: NORMAL

## 2021-10-15 PROCEDURE — 81003 URINALYSIS AUTO W/O SCOPE: CPT | Performed by: NURSE PRACTITIONER

## 2021-10-15 PROCEDURE — 1170F FXNL STATUS ASSESSED: CPT | Performed by: NURSE PRACTITIONER

## 2021-10-15 PROCEDURE — 96160 PT-FOCUSED HLTH RISK ASSMT: CPT | Performed by: NURSE PRACTITIONER

## 2021-10-15 PROCEDURE — 1126F AMNT PAIN NOTED NONE PRSNT: CPT | Performed by: NURSE PRACTITIONER

## 2021-10-15 PROCEDURE — G0008 ADMIN INFLUENZA VIRUS VAC: HCPCS | Performed by: NURSE PRACTITIONER

## 2021-10-15 PROCEDURE — 90662 IIV NO PRSV INCREASED AG IM: CPT | Performed by: NURSE PRACTITIONER

## 2021-10-15 PROCEDURE — G0439 PPPS, SUBSEQ VISIT: HCPCS | Performed by: NURSE PRACTITIONER

## 2021-10-15 PROCEDURE — 1160F RVW MEDS BY RX/DR IN RCRD: CPT | Performed by: NURSE PRACTITIONER

## 2021-10-15 NOTE — PATIENT INSTRUCTIONS
Medicare Wellness  Personal Prevention Plan of Service     Date of Office Visit:  10/15/2021  Encounter Provider:  BRYANT Ravi  Place of Service:  [facility]  Patient Name: Jeff Alatorre  :  1940    As part of the Medicare Wellness portion of your visit today, we are providing you with this personalized preventive plan of services (PPPS). This plan is based upon recommendations of the [place] Preventive Services Task Force (USPSTF) and the Advisory Committee on Immunization Practices (ACIP).    This lists the preventive care services that should be considered, and provides dates of when you are due. Items listed as completed are up-to-date and do not require any further intervention.    Health Maintenance   Topic Date Due   • URINE MICROALBUMIN  Never done   • Pneumococcal Vaccine 65+ (1 of 2 - PPSV23) Never done   • DIABETIC EYE EXAM  Never done   • INFLUENZA VACCINE  2021   • HEMOGLOBIN A1C  10/12/2021   • ZOSTER VACCINE (1 of 2) 2022 (Originally 1990)   • TDAP/TD VACCINES (1 - Tdap) 2022 (Originally 1959)   • LIPID PANEL  2022   • ANNUAL WELLNESS VISIT  10/15/2022   • COLORECTAL CANCER SCREENING  2024   • COVID-19 Vaccine  Completed       Orders Placed This Encounter   Procedures   • Fluzone High-Dose 65+yrs (5100-0279)   • Microalbumin / Creatinine Urine Ratio - Urine, Clean Catch     Order Specific Question:   Release to patient     Answer:   Immediate   • Uric Acid     Order Specific Question:   Release to patient     Answer:   Immediate   • TSH     Order Specific Question:   Release to patient     Answer:   Immediate   • T4, free     Order Specific Question:   Release to patient     Answer:   Immediate   • Comprehensive Metabolic Panel     Order Specific Question:   Release to patient     Answer:   Immediate   • Lipid Panel   • Vitamin D 25 Hydroxy     Order Specific Question:   Release to patient     Answer:    Immediate   • PSA Screen     Order Specific Question:   Release to patient     Answer:   Immediate   • Hemoglobin A1c     Order Specific Question:   Release to patient     Answer:   Immediate   • POC Urinalysis Dipstick, Multipro     Order Specific Question:   Release to patient     Answer:   Immediate   • CBC & Differential     Order Specific Question:   Manual Differential     Answer:   No       No follow-ups on file.      Advance Care Planning and Advance Directives     You make decisions on a daily basis - decisions about where you want to live, your career, your home, your life. Perhaps one of the most important decisions you face is your choice for future medical care. Take time to talk with your family and your healthcare team and start planning today.  Advance Care Planning is a process that can help you:  · Understand possible future healthcare decisions in light of your own experiences  · Reflect on those decision in light of your goals and values  · Discuss your decisions with those closest to you and the healthcare professionals that care for you  · Make a plan by creating a document that reflects your wishes    Surrogate Decision Maker  In the event of a medical emergency, which has left you unable to communicate or to make your own decisions, you would need someone to make decisions for you.  It is important to discuss your preferences for medical treatment with this person while you are in good health.     Qualities of a surrogate decision maker:  • Willing to take on this role and responsibility  • Knows what you want for future medical care  • Willing to follow your wishes even if they don't agree with them  • Able to make difficult medical decisions under stressful circumstances    Advance Directives  These are legal documents you can create that will guide your healthcare team and decision maker(s) when needed. These documents can be stored in the electronic medical record.    · Living Will - a  legal document to guide your care if you have a terminal condition or a serious illness and are unable to communicate. States vary by statute in document names/types, but most forms may include one or more of the following:        -  Directions regarding life-prolonging treatments        -  Directions regarding artificially provided nutrition/hydration        -  Choosing a healthcare decision maker        -  Direction regarding organ/tissue donation    · Durable Power of  for Healthcare - this document names an -in-fact to make medical decisions for you, but it may also allow this person to make personal and financial decisions for you. Please seek the advice of an  if you need this type of document.    **Advance Directives are not required and no one may discriminate against you if you do not sign one.    Medical Orders  Many states allow specific forms/orders signed by your physician to record your wishes for medical treatment in your current state of health. This form, signed in personal communication with your physician, addresses resuscitation and other medical interventions that you may or may not want.      For more information or to schedule a time with a [facility] Advance Care Planning Facilitator contact: [facility]/ACP or call [phone] and someone will contact you directly.    Obesity, Adult  Obesity is having too much body fat. Being obese means that your weight is more than what is healthy for you.  BMI is a number that explains how much body fat you have. If you have a BMI of 30 or more, you are obese. Obesity is often caused by eating or drinking more calories than your body uses. Changing your lifestyle can help you lose weight.  Obesity can cause serious health problems, such as:  · Stroke.  · Coronary artery disease (CAD).  · Type 2 diabetes.  · Some types of cancer, including cancers of the colon, breast, uterus, and gallbladder.  · Osteoarthritis.  · High  blood pressure (hypertension).  · High cholesterol.  · Sleep apnea.  · Gallbladder stones.  · Infertility problems.  What are the causes?  · Eating meals each day that are high in calories, sugar, and fat.  · Being born with genes that may make you more likely to become obese.  · Having a medical condition that causes obesity.  · Taking certain medicines.  · Sitting a lot (having a sedentary lifestyle).  · Not getting enough sleep.  · Drinking a lot of drinks that have sugar in them.  What increases the risk?  · Having a family history of obesity.  · Being an  woman.  · Being a  man.  · Living in an area with limited access to:  ? [place], recreation centers, or sidewalks.  ? Healthy food choices, such as grocery stores and farmers' markets.  What are the signs or symptoms?  The main sign is having too much body fat.  How is this treated?  · Treatment for this condition often includes changing your lifestyle. Treatment may include:  ? Changing your diet. This may include making a healthy meal plan.  ? Exercise. This may include activity that causes your heart to beat faster (aerobic exercise) and strength training. Work with your doctor to design a program that works for you.  ? Medicine to help you lose weight. This may be used if you are not able to lose 1 pound a week after 6 weeks of healthy eating and more exercise.  ? Treating conditions that cause the obesity.  ? Surgery. Options may include gastric banding and gastric bypass. This may be done if:  § Other treatments have not helped to improve your condition.  § You have a BMI of 40 or higher.  § You have life-threatening health problems related to obesity.  Follow these instructions at home:  Eating and drinking    · Follow advice from your doctor about what to eat and drink. Your doctor may tell you to:  ? Limit fast food, sweets, and processed snack foods.  ? Choose low-fat options. For example, choose low-fat milk instead of whole  milk.  ? Eat 5 or more servings of fruits or vegetables each day.  ? Eat at home more often. This gives you more control over what you eat.  ? Choose healthy foods when you eat out.  ? Learn to read food labels. This will help you learn how much food is in 1 serving.  ? Keep low-fat snacks available.  ? Avoid drinks that have a lot of sugar in them. These include soda, fruit juice, iced tea with sugar, and flavored milk.  · Drink enough water to keep your pee (urine) pale yellow.  · Do not go on fad diets.    Physical activity  · Exercise often, as told by your doctor. Most adults should get up to 150 minutes of moderate-intensity exercise every week.Ask your doctor:  ? What types of exercise are safe for you.  ? How often you should exercise.  · Warm up and stretch before being active.  · Do slow stretching after being active (cool down).  · Rest between times of being active.  Lifestyle  · Work with your doctor and a food expert (dietitian) to set a weight-loss goal that is best for you.  · Limit your screen time.  · Find ways to reward yourself that do not involve food.  · Do not drink alcohol if:  ? Your doctor tells you not to drink.  ? You are pregnant, may be pregnant, or are planning to become pregnant.  · If you drink alcohol:  ? Limit how much you use to:  § 0-1 drink a day for women.  § 0-2 drinks a day for men.  ? Be aware of how much alcohol is in your drink. In the [place] one drink equals one 12 oz bottle of beer (355 mL), one 5 oz glass of wine (148 mL), or one 1½ oz glass of hard liquor (44 mL).  General instructions  · Keep a weight-loss journal. This can help you keep track of:  ? The food that you eat.  ? How much exercise you get.  · Take over-the-counter and prescription medicines only as told by your doctor.  · Take vitamins and supplements only as told by your doctor.  · Think about joining a support group.  · Keep all follow-up visits as told by your doctor. This is important.  Contact a  doctor if:  · You cannot meet your weight loss goal after you have changed your diet and lifestyle for 6 weeks.  Get help right away if you:  · Are having trouble breathing.  · Are having thoughts of harming yourself.  Summary  · Obesity is having too much body fat.  · Being obese means that your weight is more than what is healthy for you.  · Work with your doctor to set a weight-loss goal.  · Get regular exercise as told by your doctor.  This information is not intended to replace advice given to you by your health care provider. Make sure you discuss any questions you have with your health care provider.  Document Revised: 08/22/2019 Document Reviewed: 08/22/2019  Elsevier Patient Education © 2021 [employer]

## 2021-10-15 NOTE — PROGRESS NOTES
The ABCs of the Annual Wellness Visit  Subsequent Medicare Wellness Visit    Chief Complaint   Patient presents with   • Medicare Wellness-subsequent     fasting      Subjective    History of Present Illness:  Jeff Alatorre is a 81 y.o. male who presents for a Subsequent Medicare Wellness Visit.    The following portions of the patient's history were reviewed and   updated as appropriate: allergies, current medications, past family history, past medical history, past social history, past surgical history and problem list.    Compared to one year ago, the patient feels his physical   health is the same.    Compared to one year ago, the patient feels his mental   health is the same.    Recent Hospitalizations:  This patient has had a [facility] admission record on file within the last 365 days.    Current Medical Providers:  Patient Care Team:  Devon Vazquez MD as PCP - General (Family Medicine)    Outpatient Medications Prior to Visit   Medication Sig Dispense Refill   • acetaminophen (TYLENOL) 500 MG tablet Take 500 mg by mouth Daily As Needed for Mild Pain .     • allopurinol (ZYLOPRIM) 300 MG tablet Take 1 tablet by mouth Daily. 90 tablet 0   • amLODIPine (NORVASC) 5 MG tablet Take 5 mg by mouth Daily.     • aspirin 81 MG EC tablet Take 81 mg by mouth Daily.     • atorvastatin (LIPITOR) 20 MG tablet TAKE ONE TABLET BY MOUTH NIGHTLY 90 tablet 0   • losartan (COZAAR) 100 MG tablet Take 1 tablet by mouth Daily. 90 tablet 3   • vitamin D3 125 MCG (5000 UT) capsule capsule Take 5,000 Units by mouth Daily.     • Zinc 30 MG capsule Take  by mouth.       No facility-administered medications prior to visit.       No opioid medication identified on active medication list. I have reviewed chart for other potential  high risk medication/s and harmful drug interactions in the elderly.          Aspirin is on active medication list. Aspirin use is indicated based on review of current medical condition/s. Pros  "and cons of this therapy have been discussed today. Benefits of this medication outweigh potential harm.  Patient has been encouraged to continue taking this medication.  .      Patient Active Problem List   Diagnosis   • Hx of adenomatous colonic polyps   • Polycythemia vera (HCC)   • Adenomatous polyps   • Idiopathic gout of multiple sites     Advance Care Planning  Advance Directive is not on file.  ACP discussion was held with the patient during this visit. Patient has an advance directive (not in EMR), copy requested.    Review of Systems   Constitutional: Negative for fever.        Decreased stamina   Respiratory: Negative for shortness of breath.    Cardiovascular: Negative for chest pain.   Gastrointestinal: Negative for constipation, diarrhea, nausea and vomiting.   Musculoskeletal: Positive for myalgias.   Neurological: Negative for dizziness and light-headedness.        Objective    Vitals:    10/15/21 0821   BP: 131/80   BP Location: Left arm   Patient Position: Sitting   Cuff Size: Adult   Pulse: 65   Temp: 97.5 °F (36.4 °C)   TempSrc: Temporal   SpO2: 98%   Weight: 95.7 kg (211 lb)   Height: 175.3 cm (69\")   PainSc: 0-No pain     BMI Readings from Last 1 Encounters:   10/15/21 31.16 kg/m²   BMI is above normal parameters. Recommendations include: educational material    Does the patient have evidence of cognitive impairment? No, None    Physical Exam  Vitals reviewed.   Constitutional:       Appearance: He is well-developed.   HENT:      Right Ear: Tympanic membrane, ear canal and external ear normal.      Left Ear: Tympanic membrane, ear canal and external ear normal.   Neck:      Vascular: No carotid bruit.   Cardiovascular:      Rate and Rhythm: Normal rate and regular rhythm.      Heart sounds: Normal heart sounds.   Pulmonary:      Effort: Pulmonary effort is normal.      Breath sounds: Normal breath sounds.   Abdominal:      General: Abdomen is flat. Bowel sounds are normal.      Palpations: " Abdomen is soft.   Genitourinary:     Penis: Normal.       Testes: Normal.      Prostate: Normal.      Rectum: Normal.   Skin:     Comments: Suspicious skin lesion on the top of his head that has been present for several months.  Lesion is erythematous and scabbed.     Neurological:      Mental Status: He is alert and oriented to person, place, and time.   Psychiatric:         Behavior: Behavior normal.                 HEALTH RISK ASSESSMENT    Smoking Status:  Social History     Tobacco Use   Smoking Status Former Smoker   • Packs/day: 1.00   • Years: 46.00   • Pack years: 46.00   • Types: Cigarettes   • Quit date:    • Years since quittin.8   Smokeless Tobacco Never Used     Alcohol Consumption:  Social History     Substance and Sexual Activity   Alcohol Use Not Currently   • Alcohol/week: 0.0 standard drinks     Fall Risk Screen:    MARIELY Fall Risk Assessment was completed, and patient is at LOW risk for falls.Assessment completed on:10/15/2021    Depression Screening:  PHQ-2/PHQ-9 Depression Screening 10/15/2021   Little interest or pleasure in doing things 0   Feeling down, depressed, or hopeless 0   Total Score 0       Health Habits and Functional and Cognitive Screening:  Functional & Cognitive Status 10/15/2021   Do you have difficulty preparing food and eating? No   Do you have difficulty bathing yourself, getting dressed or grooming yourself? No   Do you have difficulty using the toilet? No   Do you have difficulty moving around from place to place? No   Do you have trouble with steps or getting out of a bed or a chair? No   Current Diet Unhealthy Diet   Dental Exam Not up to date   Eye Exam Not up to date   Exercise (times per week) 7 times per week   Current Exercises Include Other        Exercise Comment golf, working around gun range   Current Exercise Activities Include -   Do you need help using the phone?  No   Are you deaf or do you have serious difficulty hearing?  No   Do you need help  with transportation? No   Do you need help shopping? No   Do you need help preparing meals?  No   Do you need help with housework?  No   Do you need help with laundry? No   Do you need help taking your medications? No   Do you need help managing money? No   Do you ever drive or ride in a car without wearing a seat belt? No   Have you felt unusual stress, anger or loneliness in the last month? No   Who do you live with? Child   If you need help, do you have trouble finding someone available to you? No   Have you been bothered in the last four weeks by sexual problems? No   Do you have difficulty concentrating, remembering or making decisions? Yes       Age-appropriate Screening Schedule:  Refer to the list below for future screening recommendations based on patient's age, sex and/or medical conditions. Orders for these recommended tests are listed in the plan section. The patient has been provided with a written plan.    Health Maintenance   Topic Date Due   • URINE MICROALBUMIN  Never done   • DIABETIC EYE EXAM  Never done   • HEMOGLOBIN A1C  10/12/2021   • ZOSTER VACCINE (1 of 2) 04/12/2022 (Originally 2/25/1990)   • TDAP/TD VACCINES (1 - Tdap) 04/18/2022 (Originally [birth year]/1959)   • LIPID PANEL  04/12/2022   • INFLUENZA VACCINE  Completed              Assessment/Plan   CMS Preventative Services Quick Reference  Risk Factors Identified During Encounter  Cardiovascular Disease  Immunizations Discussed/Encouraged (specific Immunizations; Influenza  Obesity/Overweight   The above risks/problems have been discussed with the patient.  Follow up actions/plans if indicated are seen below in the Assessment/Plan Section.  Pertinent information has been shared with the patient in the After Visit Summary.    Diagnoses and all orders for this visit:    1. Medicare annual wellness visit, subsequent (Primary)  -     Microalbumin / Creatinine Urine Ratio - Urine, Clean Catch  -     Fluzone High-Dose 65+yrs (4127-6182)  -     Uric  Acid  -     CBC & Differential  -     TSH  -     T4, free  -     Comprehensive Metabolic Panel  -     Lipid Panel  -     POC Urinalysis Dipstick, Multipro  -     Vitamin D 25 Hydroxy  -     PSA Screen  -     Hemoglobin A1c    2. Elevated blood sugar  -     Microalbumin / Creatinine Urine Ratio - Urine, Clean Catch  -     Comprehensive Metabolic Panel  -     Lipid Panel  -     Hemoglobin A1c    3. Need for influenza vaccination  -     Fluzone High-Dose 65+yrs (1347-4553)    4. Polycythemia vera (HCC)  -     CBC & Differential  -     Comprehensive Metabolic Panel    5. Adenomatous polyps    6. Fatigue, unspecified type  -     CBC & Differential  -     TSH  -     T4, free  -     Comprehensive Metabolic Panel  -     POC Urinalysis Dipstick, Multipro    7. Vitamin D deficiency  -     Vitamin D 25 Hydroxy    8. Idiopathic gout of multiple sites, unspecified chronicity  -     Uric Acid  -     Comprehensive Metabolic Panel    9. Prostate cancer screening  -     PSA Screen    10. Lipid screening  -     Lipid Panel    11. Mixed hyperlipidemia  -     Lipid Panel        Follow Up:   Return in about 1 week (around 10/22/2021) for skin lesion removal.     An After Visit Summary and PPPS were made available to the patient.      Caitie Isidro, APRN 10/15/21

## 2021-10-16 LAB
25(OH)D3+25(OH)D2 SERPL-MCNC: 65.9 NG/ML
ALBUMIN SERPL-MCNC: 4.5 G/DL (ref 3.5–5.2)
ALBUMIN/CREAT UR: 12 MG/G CREAT (ref 0–29)
ALBUMIN/GLOB SERPL: 2.1 G/DL
ALP SERPL-CCNC: 131 U/L (ref 39–117)
ALT SERPL-CCNC: 17 U/L (ref 1–41)
AST SERPL-CCNC: 16 U/L (ref 1–40)
BASOPHILS # BLD AUTO: 0.07 10*3/MM3 (ref 0–0.2)
BASOPHILS NFR BLD AUTO: 1.1 % (ref 0–1.5)
BILIRUB SERPL-MCNC: 0.5 MG/DL (ref 0–1.2)
BUN SERPL-MCNC: 21 MG/DL (ref 8–23)
BUN/CREAT SERPL: 17.4 (ref 7–25)
CALCIUM SERPL-MCNC: 9.6 MG/DL (ref 8.6–10.5)
CHLORIDE SERPL-SCNC: 105 MMOL/L (ref 98–107)
CHOLEST SERPL-MCNC: 112 MG/DL (ref 0–200)
CO2 SERPL-SCNC: 26.2 MMOL/L (ref 22–29)
CREAT SERPL-MCNC: 1.21 MG/DL (ref 0.76–1.27)
CREAT UR-MCNC: 166.6 MG/DL
EOSINOPHIL # BLD AUTO: 0.25 10*3/MM3 (ref 0–0.4)
EOSINOPHIL NFR BLD AUTO: 3.9 % (ref 0.3–6.2)
ERYTHROCYTE [DISTWIDTH] IN BLOOD BY AUTOMATED COUNT: 12.9 % (ref 12.3–15.4)
GLOBULIN SER CALC-MCNC: 2.1 GM/DL
GLUCOSE SERPL-MCNC: 122 MG/DL (ref 65–99)
HBA1C MFR BLD: 6.3 % (ref 4.8–5.6)
HCT VFR BLD AUTO: 41.7 % (ref 37.5–51)
HDLC SERPL-MCNC: 34 MG/DL (ref 40–60)
HGB BLD-MCNC: 14.2 G/DL (ref 13–17.7)
IMM GRANULOCYTES # BLD AUTO: 0.03 10*3/MM3 (ref 0–0.05)
IMM GRANULOCYTES NFR BLD AUTO: 0.5 % (ref 0–0.5)
LDLC SERPL CALC-MCNC: 61 MG/DL (ref 0–100)
LYMPHOCYTES # BLD AUTO: 1.6 10*3/MM3 (ref 0.7–3.1)
LYMPHOCYTES NFR BLD AUTO: 24.8 % (ref 19.6–45.3)
MCH RBC QN AUTO: 33.5 PG (ref 26.6–33)
MCHC RBC AUTO-ENTMCNC: 34.1 G/DL (ref 31.5–35.7)
MCV RBC AUTO: 98.3 FL (ref 79–97)
MICROALBUMIN UR-MCNC: 19.4 UG/ML
MONOCYTES # BLD AUTO: 0.62 10*3/MM3 (ref 0.1–0.9)
MONOCYTES NFR BLD AUTO: 9.6 % (ref 5–12)
NEUTROPHILS # BLD AUTO: 3.89 10*3/MM3 (ref 1.7–7)
NEUTROPHILS NFR BLD AUTO: 60.1 % (ref 42.7–76)
NRBC BLD AUTO-RTO: 0 /100 WBC (ref 0–0.2)
PLATELET # BLD AUTO: 184 10*3/MM3 (ref 140–450)
POTASSIUM SERPL-SCNC: 4.5 MMOL/L (ref 3.5–5.2)
PROT SERPL-MCNC: 6.6 G/DL (ref 6–8.5)
PSA SERPL-MCNC: 1.24 NG/ML (ref 0–4)
RBC # BLD AUTO: 4.24 10*6/MM3 (ref 4.14–5.8)
SODIUM SERPL-SCNC: 142 MMOL/L (ref 136–145)
T4 FREE SERPL-MCNC: 1.09 NG/DL (ref 0.93–1.7)
TRIGL SERPL-MCNC: 87 MG/DL (ref 0–150)
TSH SERPL DL<=0.005 MIU/L-ACNC: 2.02 UIU/ML (ref 0.27–4.2)
URATE SERPL-MCNC: 3.4 MG/DL (ref 3.4–7)
VLDLC SERPL CALC-MCNC: 17 MG/DL (ref 5–40)
WBC # BLD AUTO: 6.46 10*3/MM3 (ref 3.4–10.8)

## 2021-10-18 ENCOUNTER — OFFICE VISIT (OUTPATIENT)
Dept: FAMILY MEDICINE CLINIC | Facility: CLINIC | Age: 81
End: 2021-10-18

## 2021-10-18 VITALS
BODY MASS INDEX: 32.49 KG/M2 | DIASTOLIC BLOOD PRESSURE: 79 MMHG | SYSTOLIC BLOOD PRESSURE: 124 MMHG | HEIGHT: 68 IN | WEIGHT: 214.4 LBS | OXYGEN SATURATION: 98 % | HEART RATE: 63 BPM | TEMPERATURE: 98.6 F

## 2021-10-18 DIAGNOSIS — L98.9 SKIN LESION OF SCALP: Primary | ICD-10-CM

## 2021-10-18 PROCEDURE — 17000 DESTRUCT PREMALG LESION: CPT | Performed by: NURSE PRACTITIONER

## 2021-10-25 DIAGNOSIS — C44.42 SQUAMOUS CELL CARCINOMA OF SCALP: Primary | ICD-10-CM

## 2021-10-25 LAB
CONV .: NORMAL
DX ICD CODE: NORMAL
PATH REPORT.FINAL DX SPEC: NORMAL
PATH REPORT.GROSS SPEC: NORMAL
PATH REPORT.SITE OF ORIGIN SPEC: NORMAL
PATHOLOGIST NAME: NORMAL
PAYMENT PROCEDURE: NORMAL

## 2021-10-25 NOTE — PROGRESS NOTES
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 He needs to follow up with dermatology as this type sometimes requires more extensive removal and can spread to other areas.

## 2021-10-26 ENCOUNTER — LAB (OUTPATIENT)
Dept: LAB | Facility: HOSPITAL | Age: 81
End: 2021-10-26

## 2021-10-26 ENCOUNTER — OFFICE VISIT (OUTPATIENT)
Dept: ONCOLOGY | Facility: CLINIC | Age: 81
End: 2021-10-26

## 2021-10-26 VITALS
TEMPERATURE: 97.5 F | HEIGHT: 68 IN | RESPIRATION RATE: 16 BRPM | BODY MASS INDEX: 31.72 KG/M2 | WEIGHT: 209.3 LBS | OXYGEN SATURATION: 98 % | HEART RATE: 59 BPM | DIASTOLIC BLOOD PRESSURE: 78 MMHG | SYSTOLIC BLOOD PRESSURE: 130 MMHG

## 2021-10-26 DIAGNOSIS — D75.1 POLYCYTHEMIA: Primary | ICD-10-CM

## 2021-10-26 DIAGNOSIS — D64.9 FATIGUE ASSOCIATED WITH ANEMIA: ICD-10-CM

## 2021-10-26 DIAGNOSIS — N18.9 CHRONIC KIDNEY DISEASE, UNSPECIFIED CKD STAGE: ICD-10-CM

## 2021-10-26 DIAGNOSIS — D75.1 POLYCYTHEMIA: ICD-10-CM

## 2021-10-26 LAB
ALBUMIN SERPL-MCNC: 4.5 G/DL (ref 3.5–5.2)
ALBUMIN/GLOB SERPL: 1.7 G/DL
ALP SERPL-CCNC: 127 U/L (ref 39–117)
ALT SERPL W P-5'-P-CCNC: 21 U/L (ref 1–41)
ANION GAP SERPL CALCULATED.3IONS-SCNC: 8 MMOL/L (ref 5–15)
AST SERPL-CCNC: 20 U/L (ref 1–40)
BASOPHILS # BLD AUTO: 0.05 10*3/MM3 (ref 0–0.2)
BASOPHILS NFR BLD AUTO: 0.7 % (ref 0–1.5)
BILIRUB SERPL-MCNC: 0.5 MG/DL (ref 0–1.2)
BUN SERPL-MCNC: 29 MG/DL (ref 8–23)
BUN/CREAT SERPL: 22 (ref 7–25)
CALCIUM SPEC-SCNC: 9.6 MG/DL (ref 8.6–10.5)
CHLORIDE SERPL-SCNC: 105 MMOL/L (ref 98–107)
CO2 SERPL-SCNC: 26 MMOL/L (ref 22–29)
CREAT SERPL-MCNC: 1.32 MG/DL (ref 0.76–1.27)
DEPRECATED RDW RBC AUTO: 46.5 FL (ref 37–54)
EOSINOPHIL # BLD AUTO: 0.2 10*3/MM3 (ref 0–0.4)
EOSINOPHIL NFR BLD AUTO: 2.7 % (ref 0.3–6.2)
ERYTHROCYTE [DISTWIDTH] IN BLOOD BY AUTOMATED COUNT: 13 % (ref 12.3–15.4)
FERRITIN SERPL-MCNC: 308.6 NG/ML (ref 30–400)
GFR SERPL CREATININE-BSD FRML MDRD: 52 ML/MIN/1.73
GLOBULIN UR ELPH-MCNC: 2.7 GM/DL
GLUCOSE SERPL-MCNC: 129 MG/DL (ref 65–99)
HCT VFR BLD AUTO: 44.5 % (ref 37.5–51)
HGB BLD-MCNC: 14.8 G/DL (ref 13–17.7)
IMM GRANULOCYTES # BLD AUTO: 0.03 10*3/MM3 (ref 0–0.05)
IMM GRANULOCYTES NFR BLD AUTO: 0.4 % (ref 0–0.5)
IRON 24H UR-MRATE: 94 MCG/DL (ref 59–158)
IRON SATN MFR SERPL: 25 % (ref 20–50)
LYMPHOCYTES # BLD AUTO: 1.51 10*3/MM3 (ref 0.7–3.1)
LYMPHOCYTES NFR BLD AUTO: 20.3 % (ref 19.6–45.3)
MCH RBC QN AUTO: 32.8 PG (ref 26.6–33)
MCHC RBC AUTO-ENTMCNC: 33.3 G/DL (ref 31.5–35.7)
MCV RBC AUTO: 98.7 FL (ref 79–97)
MONOCYTES # BLD AUTO: 0.66 10*3/MM3 (ref 0.1–0.9)
MONOCYTES NFR BLD AUTO: 8.9 % (ref 5–12)
NEUTROPHILS NFR BLD AUTO: 4.99 10*3/MM3 (ref 1.7–7)
NEUTROPHILS NFR BLD AUTO: 67 % (ref 42.7–76)
NRBC BLD AUTO-RTO: 0 /100 WBC (ref 0–0.2)
PLATELET # BLD AUTO: 188 10*3/MM3 (ref 140–450)
PMV BLD AUTO: 9.9 FL (ref 6–12)
POTASSIUM SERPL-SCNC: 4.4 MMOL/L (ref 3.5–5.2)
PROT SERPL-MCNC: 7.2 G/DL (ref 6–8.5)
RBC # BLD AUTO: 4.51 10*6/MM3 (ref 4.14–5.8)
SODIUM SERPL-SCNC: 139 MMOL/L (ref 136–145)
TIBC SERPL-MCNC: 378 MCG/DL (ref 298–536)
TRANSFERRIN SERPL-MCNC: 254 MG/DL (ref 200–360)
WBC # BLD AUTO: 7.44 10*3/MM3 (ref 3.4–10.8)

## 2021-10-26 PROCEDURE — 82668 ASSAY OF ERYTHROPOIETIN: CPT

## 2021-10-26 PROCEDURE — 84466 ASSAY OF TRANSFERRIN: CPT

## 2021-10-26 PROCEDURE — 85025 COMPLETE CBC W/AUTO DIFF WBC: CPT

## 2021-10-26 PROCEDURE — 36415 COLL VENOUS BLD VENIPUNCTURE: CPT

## 2021-10-26 PROCEDURE — 99213 OFFICE O/P EST LOW 20 MIN: CPT | Performed by: INTERNAL MEDICINE

## 2021-10-26 PROCEDURE — 80053 COMPREHEN METABOLIC PANEL: CPT

## 2021-10-26 PROCEDURE — 83540 ASSAY OF IRON: CPT

## 2021-10-26 PROCEDURE — 82728 ASSAY OF FERRITIN: CPT

## 2021-10-27 LAB — EPO SERPL-ACNC: 12.2 MIU/ML (ref 2.6–18.5)

## 2021-11-01 RX ORDER — LOSARTAN POTASSIUM 100 MG/1
TABLET ORAL
Qty: 90 TABLET | Refills: 3 | Status: SHIPPED | OUTPATIENT
Start: 2021-11-01 | End: 2022-11-14

## 2021-11-01 NOTE — TELEPHONE ENCOUNTER
Rx Refill Note  Requested Prescriptions     Pending Prescriptions Disp Refills   • losartan (COZAAR) 100 MG tablet [Pharmacy Med Name: LOSARTAN POTASSIUM 100MG TABS] 90 tablet 3     Sig: TAKE ONE TABLET BY MOUTH EVERY DAY      Last office visit with prescribing clinician: 10/15/21      Next office visit with prescribing clinician: Visit date not found            Kristina Coombs MA  11/01/21, 07:36 CDT

## 2021-12-02 ENCOUNTER — TELEPHONE (OUTPATIENT)
Dept: FAMILY MEDICINE CLINIC | Facility: CLINIC | Age: 81
End: 2021-12-02

## 2021-12-02 NOTE — TELEPHONE ENCOUNTER
Caller: Jeff Alatorre    Relationship: Self    Best call back number: [phone]       Who are you requesting to speak with (clinical staff, provider,  specific staff member): CLINICAL STAFF    Do you know the name of the person who called: PATIENT    What was the call regarding: PATIENT WENT TO [facility] ON 11/22/21; TO SEE REFERRAL DERMATOLOGIST,  DR DOROTHY MORAES--DR TOLD PATIENT THEY DIDN'T HAVE HIS REFERRAL INFORMATION. PATIENT WAS EXPECTING A CALL BACK FROM THEIR OFFICE REGARDING REFERRAL PAPERWORK--HE HAS NEVER BEEN CALLED AND PAPERWORK WAS SENT 10/25/21.    PATIENT WOULD APPRECIATE SEEING ANOTHER DERMATOLOGIST - PREFERABLY NOT IN [place]; AND CLOSER TO [place].    [facility] DIDN'T TREAT THIS PATIENT.    Do you require a callback: YES-DOES PATIENT NEED TO SEE DR RAIN BEFORE THE NEW DERMATOLOGY APPOINTMENT CAN BE MADE?    [facility] - [place], [place] - [address] - [phone] [facility] [phone] FX

## 2022-01-20 RX ORDER — ATORVASTATIN CALCIUM 20 MG/1
TABLET, FILM COATED ORAL
Qty: 90 TABLET | Refills: 3 | Status: SHIPPED | OUTPATIENT
Start: 2022-01-20 | End: 2023-02-13

## 2022-01-20 NOTE — TELEPHONE ENCOUNTER
Rx Refill Note  Requested Prescriptions     Pending Prescriptions Disp Refills   • atorvastatin (LIPITOR) 20 MG tablet [Pharmacy Med Name: ATORVASTATIN CALCIUM 20MG TABS] 90 tablet 0     Sig: TAKE ONE TABLET BY MOUTH NIGHTLY      Last office visit with prescribing clinician: 10/18/2021      Next office visit with prescribing clinician: Visit date not found            Kristina Coombs MA  01/20/22, 10:56 CST

## 2022-03-14 ENCOUNTER — OFFICE VISIT (OUTPATIENT)
Dept: FAMILY MEDICINE CLINIC | Facility: CLINIC | Age: 82
End: 2022-03-14

## 2022-03-14 VITALS
TEMPERATURE: 97.8 F | HEIGHT: 68 IN | WEIGHT: 213.4 LBS | HEART RATE: 68 BPM | BODY MASS INDEX: 32.34 KG/M2 | DIASTOLIC BLOOD PRESSURE: 76 MMHG | SYSTOLIC BLOOD PRESSURE: 135 MMHG | OXYGEN SATURATION: 99 %

## 2022-03-14 DIAGNOSIS — R40.0 DAYTIME SOMNOLENCE: ICD-10-CM

## 2022-03-14 DIAGNOSIS — F51.01 PRIMARY INSOMNIA: ICD-10-CM

## 2022-03-14 DIAGNOSIS — R29.818 SUSPECTED SLEEP APNEA: ICD-10-CM

## 2022-03-14 DIAGNOSIS — R41.3 MEMORY LOSS: Primary | ICD-10-CM

## 2022-03-14 DIAGNOSIS — M77.9 TENDONITIS: ICD-10-CM

## 2022-03-14 PROCEDURE — 99214 OFFICE O/P EST MOD 30 MIN: CPT | Performed by: NURSE PRACTITIONER

## 2022-03-14 PROCEDURE — 96372 THER/PROPH/DIAG INJ SC/IM: CPT | Performed by: NURSE PRACTITIONER

## 2022-03-14 RX ORDER — PREDNISONE 10 MG/1
TABLET ORAL
Qty: 21 TABLET | Refills: 0 | Status: SHIPPED | OUTPATIENT
Start: 2022-03-14 | End: 2022-04-26

## 2022-03-14 RX ORDER — TRIAMCINOLONE ACETONIDE 40 MG/ML
40 INJECTION, SUSPENSION INTRA-ARTICULAR; INTRAMUSCULAR ONCE
Status: COMPLETED | OUTPATIENT
Start: 2022-03-14 | End: 2022-03-14

## 2022-03-14 RX ADMIN — TRIAMCINOLONE ACETONIDE 40 MG: 40 INJECTION, SUSPENSION INTRA-ARTICULAR; INTRAMUSCULAR at 11:15

## 2022-03-14 NOTE — PROGRESS NOTES
CC: right arm pain, memory loss, insomnia    History:  Jeff Alatorre is a 82 y.o. male who presents today for evaluation of the above problems.      Hit 25-30 golf balls at driving range. Shoulder started hurting after this in the muscle area of the lateral aspect of his right arm. Riding his motorcycle made the pain worse.       Has noticed short term memory loss - left faucet running.   Difficulty with quick recall on numbers at shooting range.   Noticed 2-3 months ago.  History of polycythemia vera and has always been told he can not take b12.    Wakes up at midnight and then is unable to go back to sleep. Having nightmares. This has been going for about 6 months.         HPI  ROS:  Review of Systems   Constitutional: Positive for fatigue.   Musculoskeletal: Positive for arthralgias and myalgias.   Neurological:        Short term memory loss       No Known Allergies  Past Medical History:   Diagnosis Date   • Chronic kidney disease    • Erectile dysfunction    • Gout    • HTN (hypertension)    • Hx of adenomatous colonic polyps 10/13/2020   • Hypercholesteremia    • Low testosterone    • Polycythemia vera (HCC) 10/13/2020   • Squamous cell carcinoma of skin 10/2021    top of head     Past Surgical History:   Procedure Laterality Date   • CAROTID ENDARTERECTOMY Right    • CATARACT EXTRACTION, BILATERAL     • COLON SURGERY     • COLONOSCOPY     • COLONOSCOPY N/A 7/27/2021    Procedure: COLONOSCOPY WITH ANESTHESIA;  Surgeon: Luciano Alatorre DO;  [facility];  Service: Gastroenterology;  Laterality: N/A;  preop; hx of polyps  postop; diverticulosis   PCP Devon Moore    • HEMORROIDECTOMY       Family History   Problem Relation Age of Onset   • No Known Problems Mother    • Heart attack Father    • No Known Problems Maternal Grandmother    • No Known Problems Maternal Grandfather    • No Known Problems Paternal Grandmother    • No Known Problems Paternal Grandfather    • No Known Problems Son    •  "No Known Problems Daughter    • Parkinsonism Brother    • Colon cancer Neg Hx    • Colon polyps Neg Hx    • Esophageal cancer Neg Hx       reports that he quit smoking about 22 years ago. His smoking use included cigarettes. He has a 46.00 pack-year smoking history. He has never used smokeless tobacco. He reports previous alcohol use. He reports previous drug use.      Current Outpatient Medications:   •  acetaminophen (TYLENOL) 500 MG tablet, Take 500 mg by mouth Daily As Needed for Mild Pain ., Disp: , Rfl:   •  allopurinol (ZYLOPRIM) 300 MG tablet, Take 1 tablet by mouth Daily., Disp: 90 tablet, Rfl: 0  •  amLODIPine (NORVASC) 5 MG tablet, Take 5 mg by mouth Daily., Disp: , Rfl:   •  aspirin 81 MG EC tablet, Take 81 mg by mouth Daily., Disp: , Rfl:   •  atorvastatin (LIPITOR) 20 MG tablet, TAKE ONE TABLET BY MOUTH NIGHTLY, Disp: 90 tablet, Rfl: 3  •  losartan (COZAAR) 100 MG tablet, TAKE ONE TABLET BY MOUTH EVERY DAY, Disp: 90 tablet, Rfl: 3  •  vitamin D3 125 MCG (5000 UT) capsule capsule, Take 5,000 Units by mouth Daily., Disp: , Rfl:   •  predniSONE (DELTASONE) 10 MG (21) dose pack, Use as directed on package, Disp: 21 tablet, Rfl: 0    Current Facility-Administered Medications:   •  triamcinolone acetonide (KENALOG-40) injection 40 mg, 40 mg, Intramuscular, Once, Caitie Isidro, APRN    OBJECTIVE:  /76 (BP Location: Left arm, Patient Position: Sitting, Cuff Size: Large Adult)   Pulse 68   Temp 97.8 °F (36.6 °C) (Temporal)   Ht 172.7 cm (68\")   Wt 96.8 kg (213 lb 6.4 oz)   SpO2 99%   BMI 32.45 kg/m²    Physical Exam  Vitals reviewed.   Constitutional:       Appearance: He is well-developed.   Cardiovascular:      Rate and Rhythm: Normal rate.   Pulmonary:      Effort: Pulmonary effort is normal.   Musculoskeletal:      Comments: Negative empty can test and no pain with resistance in right shoulder   Neurological:      Mental Status: He is alert and oriented to person, place, and time. "   Psychiatric:         Behavior: Behavior normal.         Assessment/Plan    Diagnoses and all orders for this visit:    1. Memory loss (Primary)  -     CBC & Differential  -     Comprehensive Metabolic Panel  -     Vitamin B12  -     Folate  -     TSH  -     Ambulatory Referral to Sleep Medicine    2. Tendonitis  -     triamcinolone acetonide (KENALOG-40) injection 40 mg  -     predniSONE (DELTASONE) 10 MG (21) dose pack; Use as directed on package  Dispense: 21 tablet; Refill: 0    3. Primary insomnia  -     TSH  -     Ambulatory Referral to Sleep Medicine    4. Daytime somnolence  -     Ambulatory Referral to Sleep Medicine    5. Suspected sleep apnea  -     Ambulatory Referral to Sleep Medicine        An After Visit Summary was printed and given to the patient at discharge.  Return if symptoms worsen or fail to improve, for Next scheduled follow up.       BRYANT Camacho 3/14/22    Electronically signed.

## 2022-03-15 ENCOUNTER — TELEPHONE (OUTPATIENT)
Dept: FAMILY MEDICINE CLINIC | Facility: CLINIC | Age: 82
End: 2022-03-15

## 2022-03-15 DIAGNOSIS — R41.3 MEMORY LOSS: Primary | ICD-10-CM

## 2022-03-15 LAB
ALBUMIN SERPL-MCNC: 4.5 G/DL (ref 3.5–5.2)
ALBUMIN/GLOB SERPL: 2 G/DL
ALP SERPL-CCNC: 118 U/L (ref 39–117)
ALT SERPL-CCNC: 22 U/L (ref 1–41)
AST SERPL-CCNC: 18 U/L (ref 1–40)
BASOPHILS # BLD AUTO: 0.06 10*3/MM3 (ref 0–0.2)
BASOPHILS NFR BLD AUTO: 0.8 % (ref 0–1.5)
BILIRUB SERPL-MCNC: 0.4 MG/DL (ref 0–1.2)
BUN SERPL-MCNC: 28 MG/DL (ref 8–23)
BUN/CREAT SERPL: 20.6 (ref 7–25)
CALCIUM SERPL-MCNC: 9.6 MG/DL (ref 8.6–10.5)
CHLORIDE SERPL-SCNC: 104 MMOL/L (ref 98–107)
CO2 SERPL-SCNC: 23.4 MMOL/L (ref 22–29)
CREAT SERPL-MCNC: 1.36 MG/DL (ref 0.76–1.27)
EGFR GENE MUT ANL BLD/T: 52 ML/MIN/1.73
EOSINOPHIL # BLD AUTO: 0.16 10*3/MM3 (ref 0–0.4)
EOSINOPHIL NFR BLD AUTO: 2 % (ref 0.3–6.2)
ERYTHROCYTE [DISTWIDTH] IN BLOOD BY AUTOMATED COUNT: 12.8 % (ref 12.3–15.4)
FOLATE SERPL-MCNC: 13.4 NG/ML (ref 4.78–24.2)
GLOBULIN SER CALC-MCNC: 2.3 GM/DL
GLUCOSE SERPL-MCNC: 112 MG/DL (ref 65–99)
HCT VFR BLD AUTO: 43.7 % (ref 37.5–51)
HGB BLD-MCNC: 14.7 G/DL (ref 13–17.7)
IMM GRANULOCYTES # BLD AUTO: 0.05 10*3/MM3 (ref 0–0.05)
IMM GRANULOCYTES NFR BLD AUTO: 0.6 % (ref 0–0.5)
LYMPHOCYTES # BLD AUTO: 1.57 10*3/MM3 (ref 0.7–3.1)
LYMPHOCYTES NFR BLD AUTO: 20.1 % (ref 19.6–45.3)
MCH RBC QN AUTO: 33.6 PG (ref 26.6–33)
MCHC RBC AUTO-ENTMCNC: 33.6 G/DL (ref 31.5–35.7)
MCV RBC AUTO: 99.8 FL (ref 79–97)
MONOCYTES # BLD AUTO: 0.66 10*3/MM3 (ref 0.1–0.9)
MONOCYTES NFR BLD AUTO: 8.4 % (ref 5–12)
NEUTROPHILS # BLD AUTO: 5.33 10*3/MM3 (ref 1.7–7)
NEUTROPHILS NFR BLD AUTO: 68.1 % (ref 42.7–76)
NRBC BLD AUTO-RTO: 0 /100 WBC (ref 0–0.2)
PLATELET # BLD AUTO: 209 10*3/MM3 (ref 140–450)
POTASSIUM SERPL-SCNC: 4.3 MMOL/L (ref 3.5–5.2)
PROT SERPL-MCNC: 6.8 G/DL (ref 6–8.5)
RBC # BLD AUTO: 4.38 10*6/MM3 (ref 4.14–5.8)
SODIUM SERPL-SCNC: 141 MMOL/L (ref 136–145)
TSH SERPL DL<=0.005 MIU/L-ACNC: 2.09 UIU/ML (ref 0.27–4.2)
VIT B12 SERPL-MCNC: 493 PG/ML (ref 211–946)
WBC # BLD AUTO: 7.83 10*3/MM3 (ref 3.4–10.8)

## 2022-03-15 RX ORDER — NAPROXEN 500 MG/1
500 TABLET ORAL 2 TIMES DAILY WITH MEALS
Qty: 28 TABLET | Refills: 0 | Status: SHIPPED | OUTPATIENT
Start: 2022-03-15 | End: 2022-03-29

## 2022-03-15 NOTE — TELEPHONE ENCOUNTER
Caller: Jeff Alatorre    Relationship: Self    Best call back number: [phone]    What was the call regarding: Prescription for prednisone stated that he is to take 6 tablets the first day, 5 the second, 4 tablets on the third, etc.    Error needs to be clarified. Prescription needs to be resent.

## 2022-03-15 NOTE — TELEPHONE ENCOUNTER
Patient called saying he was confused about his prednisone instructions because he thought he was only suppose to take 1 daily then he asked what kind of medicine it was and when I told him it was a steroid he said he can't take those because he has polycythemia vera and it throw his blood count way off.

## 2022-03-15 NOTE — PROGRESS NOTES
Labs from yesterday look fine. If he would like to have the short term memory loss evaluated thoroughly I will be glad to refer to neurology for this.

## 2022-03-28 RX ORDER — ALLOPURINOL 300 MG/1
TABLET ORAL
Qty: 90 TABLET | Refills: 1 | Status: SHIPPED | OUTPATIENT
Start: 2022-03-28 | End: 2022-10-03

## 2022-03-28 NOTE — TELEPHONE ENCOUNTER
Rx Refill Note  Requested Prescriptions     Pending Prescriptions Disp Refills   • allopurinol (ZYLOPRIM) 300 MG tablet [Pharmacy Med Name: ALLOPURINOL 300MG TABS] 90 tablet 0     Sig: TAKE ONE TABLET BY MOUTH EVERY DAY      Last office visit with prescribing clinician: 4/12/2021      Next office visit with prescribing clinician: 10/17/2022       {TIP  Please add Last Relevant Lab 10/15/21    Kristina Coombs MA  03/28/22, 07:33 CDT

## 2022-04-26 ENCOUNTER — LAB (OUTPATIENT)
Dept: LAB | Facility: HOSPITAL | Age: 82
End: 2022-04-26

## 2022-04-26 ENCOUNTER — OFFICE VISIT (OUTPATIENT)
Dept: ONCOLOGY | Facility: CLINIC | Age: 82
End: 2022-04-26

## 2022-04-26 VITALS
BODY MASS INDEX: 32.13 KG/M2 | DIASTOLIC BLOOD PRESSURE: 82 MMHG | OXYGEN SATURATION: 98 % | TEMPERATURE: 97.2 F | WEIGHT: 212 LBS | HEART RATE: 60 BPM | HEIGHT: 68 IN | SYSTOLIC BLOOD PRESSURE: 118 MMHG | RESPIRATION RATE: 16 BRPM

## 2022-04-26 DIAGNOSIS — N18.9 CHRONIC KIDNEY DISEASE, UNSPECIFIED CKD STAGE: Primary | ICD-10-CM

## 2022-04-26 DIAGNOSIS — D75.1 POLYCYTHEMIA: ICD-10-CM

## 2022-04-26 DIAGNOSIS — D64.9 FATIGUE ASSOCIATED WITH ANEMIA: ICD-10-CM

## 2022-04-26 LAB
ALBUMIN SERPL-MCNC: 4.5 G/DL (ref 3.5–5.2)
ALBUMIN/GLOB SERPL: 1.9 G/DL
ALP SERPL-CCNC: 119 U/L (ref 39–117)
ALT SERPL W P-5'-P-CCNC: 22 U/L (ref 1–41)
ANION GAP SERPL CALCULATED.3IONS-SCNC: 11 MMOL/L (ref 5–15)
AST SERPL-CCNC: 19 U/L (ref 1–40)
BILIRUB SERPL-MCNC: 0.4 MG/DL (ref 0–1.2)
BUN SERPL-MCNC: 24 MG/DL (ref 8–23)
BUN/CREAT SERPL: 19.8 (ref 7–25)
CALCIUM SPEC-SCNC: 9.7 MG/DL (ref 8.6–10.5)
CHLORIDE SERPL-SCNC: 106 MMOL/L (ref 98–107)
CO2 SERPL-SCNC: 24 MMOL/L (ref 22–29)
CREAT SERPL-MCNC: 1.21 MG/DL (ref 0.76–1.27)
DEPRECATED RDW RBC AUTO: 49.2 FL (ref 37–54)
EGFRCR SERPLBLD CKD-EPI 2021: 59.8 ML/MIN/1.73
ERYTHROCYTE [DISTWIDTH] IN BLOOD BY AUTOMATED COUNT: 13.3 % (ref 12.3–15.4)
FERRITIN SERPL-MCNC: 274.1 NG/ML (ref 30–400)
GLOBULIN UR ELPH-MCNC: 2.4 GM/DL
GLUCOSE SERPL-MCNC: 127 MG/DL (ref 65–99)
HCT VFR BLD AUTO: 45.4 % (ref 37.5–51)
HGB BLD-MCNC: 15 G/DL (ref 13–17.7)
HOLD SPECIMEN: NORMAL
IRON 24H UR-MRATE: 102 MCG/DL (ref 59–158)
IRON SATN MFR SERPL: 26 % (ref 20–50)
MCH RBC QN AUTO: 33.3 PG (ref 26.6–33)
MCHC RBC AUTO-ENTMCNC: 33 G/DL (ref 31.5–35.7)
MCV RBC AUTO: 100.9 FL (ref 79–97)
PLATELET # BLD AUTO: 186 10*3/MM3 (ref 140–450)
PMV BLD AUTO: 10.3 FL (ref 6–12)
POTASSIUM SERPL-SCNC: 4.1 MMOL/L (ref 3.5–5.2)
PROT SERPL-MCNC: 6.9 G/DL (ref 6–8.5)
RBC # BLD AUTO: 4.5 10*6/MM3 (ref 4.14–5.8)
SODIUM SERPL-SCNC: 141 MMOL/L (ref 136–145)
TIBC SERPL-MCNC: 389 MCG/DL (ref 298–536)
TRANSFERRIN SERPL-MCNC: 261 MG/DL (ref 200–360)
WBC NRBC COR # BLD: 7.39 10*3/MM3 (ref 3.4–10.8)

## 2022-04-26 PROCEDURE — 80053 COMPREHEN METABOLIC PANEL: CPT

## 2022-04-26 PROCEDURE — 36415 COLL VENOUS BLD VENIPUNCTURE: CPT

## 2022-04-26 PROCEDURE — 84466 ASSAY OF TRANSFERRIN: CPT

## 2022-04-26 PROCEDURE — 83540 ASSAY OF IRON: CPT

## 2022-04-26 PROCEDURE — 82728 ASSAY OF FERRITIN: CPT

## 2022-04-26 PROCEDURE — 99212 OFFICE O/P EST SF 10 MIN: CPT | Performed by: INTERNAL MEDICINE

## 2022-04-26 PROCEDURE — 85027 COMPLETE CBC AUTOMATED: CPT

## 2022-10-03 RX ORDER — ALLOPURINOL 300 MG/1
TABLET ORAL
Qty: 90 TABLET | Refills: 3 | Status: SHIPPED | OUTPATIENT
Start: 2022-10-03

## 2022-10-03 NOTE — TELEPHONE ENCOUNTER
Rx Refill Note  Requested Prescriptions     Pending Prescriptions Disp Refills   • allopurinol (ZYLOPRIM) 300 MG tablet [Pharmacy Med Name: ALLOPURINOL 300MG TABS] 90 tablet 1     Sig: TAKE ONE TABLET BY MOUTH EVERY DAY      Last office visit with prescribing clinician: 4/12/2021      Next office visit with prescribing clinician: 10/17/2022       {TIP  Please add Last Relevant Lab 10/15/21    Kristina Coombs MA  10/03/22, 07:40 CDT

## 2022-10-17 ENCOUNTER — OFFICE VISIT (OUTPATIENT)
Dept: FAMILY MEDICINE CLINIC | Facility: CLINIC | Age: 82
End: 2022-10-17

## 2022-10-17 VITALS
TEMPERATURE: 98.5 F | HEART RATE: 60 BPM | RESPIRATION RATE: 16 BRPM | HEIGHT: 68 IN | BODY MASS INDEX: 32.13 KG/M2 | WEIGHT: 212 LBS | OXYGEN SATURATION: 96 % | SYSTOLIC BLOOD PRESSURE: 130 MMHG | DIASTOLIC BLOOD PRESSURE: 72 MMHG

## 2022-10-17 DIAGNOSIS — E55.9 VITAMIN D DEFICIENCY: ICD-10-CM

## 2022-10-17 DIAGNOSIS — R41.3 MEMORY LOSS: ICD-10-CM

## 2022-10-17 DIAGNOSIS — R53.83 FATIGUE, UNSPECIFIED TYPE: ICD-10-CM

## 2022-10-17 DIAGNOSIS — R73.9 ELEVATED BLOOD SUGAR: Primary | ICD-10-CM

## 2022-10-17 DIAGNOSIS — E78.2 MIXED HYPERLIPIDEMIA: ICD-10-CM

## 2022-10-17 DIAGNOSIS — Z00.00 MEDICARE ANNUAL WELLNESS VISIT, SUBSEQUENT: ICD-10-CM

## 2022-10-17 DIAGNOSIS — Z23 NEED FOR PNEUMOCOCCAL VACCINATION: ICD-10-CM

## 2022-10-17 DIAGNOSIS — Z23 NEED FOR INFLUENZA VACCINATION: ICD-10-CM

## 2022-10-17 LAB
BILIRUB BLD-MCNC: NEGATIVE MG/DL
CLARITY, POC: CLEAR
COLOR UR: YELLOW
GLUCOSE UR STRIP-MCNC: NEGATIVE MG/DL
KETONES UR QL: NEGATIVE
LEUKOCYTE EST, POC: NEGATIVE
NITRITE UR-MCNC: NEGATIVE MG/ML
PH UR: 5.5 [PH] (ref 5–8)
PROT UR STRIP-MCNC: NEGATIVE MG/DL
RBC # UR STRIP: NEGATIVE /UL
SP GR UR: 1.03 (ref 1–1.03)
UROBILINOGEN UR QL: NORMAL

## 2022-10-17 PROCEDURE — G0008 ADMIN INFLUENZA VIRUS VAC: HCPCS | Performed by: FAMILY MEDICINE

## 2022-10-17 PROCEDURE — 1170F FXNL STATUS ASSESSED: CPT | Performed by: FAMILY MEDICINE

## 2022-10-17 PROCEDURE — 90677 PCV20 VACCINE IM: CPT | Performed by: FAMILY MEDICINE

## 2022-10-17 PROCEDURE — 90662 IIV NO PRSV INCREASED AG IM: CPT | Performed by: FAMILY MEDICINE

## 2022-10-17 PROCEDURE — 81003 URINALYSIS AUTO W/O SCOPE: CPT | Performed by: FAMILY MEDICINE

## 2022-10-17 PROCEDURE — G0439 PPPS, SUBSEQ VISIT: HCPCS | Performed by: FAMILY MEDICINE

## 2022-10-17 PROCEDURE — G0009 ADMIN PNEUMOCOCCAL VACCINE: HCPCS | Performed by: FAMILY MEDICINE

## 2022-10-17 PROCEDURE — 96160 PT-FOCUSED HLTH RISK ASSMT: CPT | Performed by: FAMILY MEDICINE

## 2022-10-17 PROCEDURE — 1126F AMNT PAIN NOTED NONE PRSNT: CPT | Performed by: FAMILY MEDICINE

## 2022-10-17 PROCEDURE — 1159F MED LIST DOCD IN RCRD: CPT | Performed by: FAMILY MEDICINE

## 2022-10-17 NOTE — PATIENT INSTRUCTIONS
Medicare Wellness  Personal Prevention Plan of Service     Date of Office Visit:    Encounter Provider:  Devon Vazquez MD  Place of Service:  [facility]  Patient Name: Jeff Alatorre  :  1940    As part of the Medicare Wellness portion of your visit today, we are providing you with this personalized preventive plan of services (PPPS). This plan is based upon recommendations of the [place] Preventive Services Task Force (USPSTF) and the Advisory Committee on Immunization Practices (ACIP).    This lists the preventive care services that should be considered, and provides dates of when you are due. Items listed as completed are up-to-date and do not require any further intervention.    Health Maintenance   Topic Date Due   • TDAP/TD VACCINES (1 - Tdap) Never done   • ZOSTER VACCINE (1 of 2) Never done   • Pneumococcal Vaccine 65+ (1 - PCV) Never done   • COVID-19 Vaccine (4 - Booster for Moderna series) 2022   • INFLUENZA VACCINE  2022   • ANNUAL WELLNESS VISIT  10/15/2022   • LIPID PANEL  10/15/2022   • COLORECTAL CANCER SCREENING  2024       No orders of the defined types were placed in this encounter.      No follow-ups on file.

## 2022-10-18 DIAGNOSIS — R73.03 PREDIABETES: Primary | ICD-10-CM

## 2022-10-18 LAB
25(OH)D3+25(OH)D2 SERPL-MCNC: 82 NG/ML (ref 30–100)
ALBUMIN SERPL-MCNC: 4.8 G/DL (ref 3.5–5.2)
ALBUMIN/GLOB SERPL: 2.2 G/DL
ALP SERPL-CCNC: 136 U/L (ref 39–117)
ALT SERPL-CCNC: 19 U/L (ref 1–41)
AST SERPL-CCNC: 19 U/L (ref 1–40)
BASOPHILS # BLD AUTO: 0.07 10*3/MM3 (ref 0–0.2)
BASOPHILS NFR BLD AUTO: 1 % (ref 0–1.5)
BILIRUB SERPL-MCNC: 0.4 MG/DL (ref 0–1.2)
BUN SERPL-MCNC: 28 MG/DL (ref 8–23)
BUN/CREAT SERPL: 22.6 (ref 7–25)
CALCIUM SERPL-MCNC: 9.7 MG/DL (ref 8.6–10.5)
CHLORIDE SERPL-SCNC: 104 MMOL/L (ref 98–107)
CHOLEST SERPL-MCNC: 125 MG/DL (ref 0–200)
CO2 SERPL-SCNC: 25 MMOL/L (ref 22–29)
CREAT SERPL-MCNC: 1.24 MG/DL (ref 0.76–1.27)
EGFRCR SERPLBLD CKD-EPI 2021: 58 ML/MIN/1.73
EOSINOPHIL # BLD AUTO: 0.25 10*3/MM3 (ref 0–0.4)
EOSINOPHIL NFR BLD AUTO: 3.6 % (ref 0.3–6.2)
ERYTHROCYTE [DISTWIDTH] IN BLOOD BY AUTOMATED COUNT: 12.6 % (ref 12.3–15.4)
FOLATE SERPL-MCNC: 7.8 NG/ML (ref 4.78–24.2)
GLOBULIN SER CALC-MCNC: 2.2 GM/DL
GLUCOSE SERPL-MCNC: 127 MG/DL (ref 65–99)
HBA1C MFR BLD: 6.6 % (ref 4.8–5.6)
HCT VFR BLD AUTO: 45.4 % (ref 37.5–51)
HDLC SERPL-MCNC: 42 MG/DL (ref 40–60)
HGB BLD-MCNC: 15.2 G/DL (ref 13–17.7)
IMM GRANULOCYTES # BLD AUTO: 0.04 10*3/MM3 (ref 0–0.05)
IMM GRANULOCYTES NFR BLD AUTO: 0.6 % (ref 0–0.5)
LDLC SERPL CALC-MCNC: 64 MG/DL (ref 0–100)
LYMPHOCYTES # BLD AUTO: 1.39 10*3/MM3 (ref 0.7–3.1)
LYMPHOCYTES NFR BLD AUTO: 19.8 % (ref 19.6–45.3)
MCH RBC QN AUTO: 33.5 PG (ref 26.6–33)
MCHC RBC AUTO-ENTMCNC: 33.5 G/DL (ref 31.5–35.7)
MCV RBC AUTO: 100 FL (ref 79–97)
MONOCYTES # BLD AUTO: 0.56 10*3/MM3 (ref 0.1–0.9)
MONOCYTES NFR BLD AUTO: 8 % (ref 5–12)
NEUTROPHILS # BLD AUTO: 4.7 10*3/MM3 (ref 1.7–7)
NEUTROPHILS NFR BLD AUTO: 67 % (ref 42.7–76)
NRBC BLD AUTO-RTO: 0 /100 WBC (ref 0–0.2)
PLATELET # BLD AUTO: 188 10*3/MM3 (ref 140–450)
POTASSIUM SERPL-SCNC: 4.4 MMOL/L (ref 3.5–5.2)
PROT SERPL-MCNC: 7 G/DL (ref 6–8.5)
RBC # BLD AUTO: 4.54 10*6/MM3 (ref 4.14–5.8)
SODIUM SERPL-SCNC: 142 MMOL/L (ref 136–145)
T4 FREE SERPL-MCNC: 1.12 NG/DL (ref 0.93–1.7)
TRIGL SERPL-MCNC: 104 MG/DL (ref 0–150)
TSH SERPL DL<=0.005 MIU/L-ACNC: 2.35 UIU/ML (ref 0.27–4.2)
VIT B12 SERPL-MCNC: 437 PG/ML (ref 211–946)
VLDLC SERPL CALC-MCNC: 19 MG/DL (ref 5–40)
WBC # BLD AUTO: 7.01 10*3/MM3 (ref 3.4–10.8)

## 2022-10-19 ENCOUNTER — TELEPHONE (OUTPATIENT)
Dept: FAMILY MEDICINE CLINIC | Facility: CLINIC | Age: 82
End: 2022-10-19

## 2022-10-19 NOTE — TELEPHONE ENCOUNTER
Caller: Jeff Alatorre    Relationship: Self    Best call back number: [phone]    What is the best time to reach you: ANY    Who are you requesting to speak with (clinical staff, provider,  specific staff member): CLINICAL    What was the call regarding:     PATIENT STATES HE WAS PRESCRIBED A NEW MEDICATION AND THE NURSE TOLD HIM TO KEEP AN EYE ON HIS SUGAR INTAKE. PATIENT HAS QUESTIONS REGARDING THESE INSTRUCTIONS. PLEASE ADVISE.     Do you require a callback: YES

## 2022-10-19 NOTE — TELEPHONE ENCOUNTER
Spoke with patient regarding recent lab results.  Patient asking what lab test monitored his sugar.  Patient was advised.      Patient asking how many grams of sugar he can have daily?  davidase advise.

## 2022-10-21 ENCOUNTER — PATIENT MESSAGE (OUTPATIENT)
Dept: FAMILY MEDICINE CLINIC | Facility: CLINIC | Age: 82
End: 2022-10-21

## 2022-10-21 ENCOUNTER — EDUCATION (OUTPATIENT)
Dept: FAMILY MEDICINE CLINIC | Facility: CLINIC | Age: 82
End: 2022-10-21

## 2022-10-21 NOTE — TELEPHONE ENCOUNTER
Per Dr. Vazquez give patient diabetic diet.    HUB to read      Left voice mail for patient advising will be on my chart and if he needs printed copy to come by the office.

## 2022-11-08 ENCOUNTER — TELEPHONE (OUTPATIENT)
Dept: FAMILY MEDICINE CLINIC | Facility: CLINIC | Age: 82
End: 2022-11-08

## 2022-11-08 ENCOUNTER — IMMUNIZATION (OUTPATIENT)
Dept: FAMILY MEDICINE CLINIC | Facility: CLINIC | Age: 82
End: 2022-11-08

## 2022-11-08 DIAGNOSIS — Z23 NEED FOR VACCINATION: Primary | ICD-10-CM

## 2022-11-08 DIAGNOSIS — I25.9 ISCHEMIC HEART DISEASE: Primary | ICD-10-CM

## 2022-11-08 PROCEDURE — 0124A COVID-19 (PFIZER) BIVALENT BOOSTER 12+YRS: CPT | Performed by: FAMILY MEDICINE

## 2022-11-08 PROCEDURE — 91312 COVID-19 (PFIZER) BIVALENT BOOSTER 12+YRS: CPT | Performed by: FAMILY MEDICINE

## 2022-11-08 NOTE — TELEPHONE ENCOUNTER
Pt was here for COVID booster and wanted to speak with you regarding his recent visit.  Can you call him to discuss his lab results, medications and to why he is now taking so many and discuss weakness.  Says he used to could walk a mile and now is he unable.

## 2022-11-09 ENCOUNTER — TELEPHONE (OUTPATIENT)
Dept: FAMILY MEDICINE CLINIC | Facility: CLINIC | Age: 82
End: 2022-11-09

## 2022-11-09 NOTE — TELEPHONE ENCOUNTER
Caller: Jeff Alatorre    Relationship: Self    Best call back number: [phone]    What is the best time to reach you: ANYTIME    Who are you requesting to speak with (clinical staff, provider,  specific staff member): CLINICAL    What was the call regarding: PATIENT WAS LOOKING AT CHART AND NOTICED THAT HIS ZOSTERY SHINGLES VACCINE WASN'T ON HIS CHART. HE DID GET THAT VACCINE ON 02.19.2016 AND WANTED TO ADD THAT TO HIS CHART.    Do you require a callback: NO

## 2022-11-14 RX ORDER — LOSARTAN POTASSIUM 100 MG/1
TABLET ORAL
Qty: 90 TABLET | Refills: 3 | Status: SHIPPED | OUTPATIENT
Start: 2022-11-14

## 2022-11-14 NOTE — TELEPHONE ENCOUNTER
Rx Refill Note  Requested Prescriptions     Pending Prescriptions Disp Refills   • losartan (COZAAR) 100 MG tablet [Pharmacy Med Name: LOSARTAN POTASSIUM 100MG TABS] 90 tablet 3     Sig: TAKE ONE TABLET BY MOUTH EVERY DAY      Last office visit with prescribing clinician: 10/17/22  Next office visit with prescribing clinician: 4/17/23      {TIP  Please add Last Relevant Lab 10/17/22    Kristina Coombs MA  11/14/22, 08:33 CST

## 2022-12-01 ENCOUNTER — OFFICE VISIT (OUTPATIENT)
Dept: CARDIOLOGY | Facility: CLINIC | Age: 82
End: 2022-12-01

## 2022-12-01 VITALS
DIASTOLIC BLOOD PRESSURE: 62 MMHG | OXYGEN SATURATION: 98 % | BODY MASS INDEX: 31.07 KG/M2 | SYSTOLIC BLOOD PRESSURE: 130 MMHG | HEART RATE: 58 BPM | HEIGHT: 68 IN | WEIGHT: 205 LBS

## 2022-12-01 DIAGNOSIS — I10 PRIMARY HYPERTENSION: ICD-10-CM

## 2022-12-01 DIAGNOSIS — E78.5 HYPERLIPIDEMIA LDL GOAL <100: ICD-10-CM

## 2022-12-01 DIAGNOSIS — E11.9 TYPE 2 DIABETES MELLITUS WITHOUT COMPLICATION, WITHOUT LONG-TERM CURRENT USE OF INSULIN: ICD-10-CM

## 2022-12-01 DIAGNOSIS — Z91.89 AT INCREASED RISK FOR CARDIOVASCULAR DISEASE: Primary | ICD-10-CM

## 2022-12-01 PROCEDURE — 93000 ELECTROCARDIOGRAM COMPLETE: CPT | Performed by: INTERNAL MEDICINE

## 2022-12-01 PROCEDURE — 99204 OFFICE O/P NEW MOD 45 MIN: CPT | Performed by: INTERNAL MEDICINE

## 2022-12-01 NOTE — PROGRESS NOTES
"    [facility] - CARDIOLOGY  New Patient Initial Outpatient Evaluation    Primary Care Physician: Devon Vazquez MD    Subjective     Chief Complaint: \"Ischemic heart disease\"    History of Present Illness    Mr. Alatorre was referred to me for evaluation by Dr. Vazquez. The referral states it was for ischemic heart disease. Available records include an echocardiogram performed in 07/2011, which was reported to exhibit normal function. There is a preoperative risk assessment consult done by a cardiologist (prior to a cataract surgery) from that same time that does not list any cardiac diagnoses other than a right bundle branch block. More recently, I also have a report of an echocardiogram performed in 02/2015 in [place], [place]. The prior history listed in this states \"the patient has no history of cardiovascular disease.\" The echocardiogram itself reports normal size and function of all chambers, and no significant valvular disease.    Mr. Alatorre comes to the clinic today stating that he is doing well. He reports that he had a stress test performed 2 years ago at [place], which was ordered by Dr. Vazquez. He states that he had no symptoms at that time, but did have a stress test performed \"just to check it out.\" The patient reports that he used to be able to walk around the block, but cannot anymore due to hip pain. He denies shortness of breath, palpitations, or any chest tightness, heaviness, or pressure. The patient reports that he was told that he had heart disease, but is not sure where that diagnosis came from or why he was told that.. He reports that he had carotid surgery in the past, and was told that he had carotid artery stenosis. He reports that he quit smoking long ago. He states he has always been very active.    Review of Systems   Constitutional: Negative for malaise/fatigue.   Cardiovascular: Negative for chest pain, claudication, dyspnea on exertion, leg swelling, near-syncope, orthopnea, " palpitations, paroxysmal nocturnal dyspnea and syncope.   Respiratory: Negative for shortness of breath.    Hematologic/Lymphatic: Does not bruise/bleed easily.   Musculoskeletal:        Hip pain        Otherwise complete ROS reviewed and negative except as mentioned in the HPI.      Past Medical History:   Past Medical History:   Diagnosis Date   • Chronic kidney disease    • Erectile dysfunction    • Gout    • HTN (hypertension)    • Hx of adenomatous colonic polyps 10/13/2020   • Hypercholesteremia    • Low testosterone    • Polycythemia vera (HCC) 10/13/2020   • Squamous cell carcinoma of skin 10/2021    top of head   • Type 2 diabetes mellitus without complication, without long-term current use of insulin (HCC) 12/1/2022       Past Surgical History:  Past Surgical History:   Procedure Laterality Date   • CAROTID ENDARTERECTOMY Right    • CATARACT EXTRACTION, BILATERAL     • COLON SURGERY     • COLONOSCOPY     • COLONOSCOPY N/A 07/27/2021    Procedure: COLONOSCOPY WITH ANESTHESIA;  Surgeon: Luciano Alatorre DO;  Location: [facility] ENDOSCOPY;  Service: Gastroenterology;  Laterality: N/A;  preop; hx of polyps  postop; diverticulosis   PCP Devon Moore    • HEMORROIDECTOMY         Family History: family history includes Heart attack in his father; No Known Problems in his daughter, maternal grandfather, maternal grandmother, mother, paternal grandfather, paternal grandmother, and son; Parkinsonism in his brother.    Social History:  reports that he quit smoking about 22 years ago. His smoking use included cigarettes. He has a 46.00 pack-year smoking history. He has been exposed to tobacco smoke. He has never used smokeless tobacco. He reports that he does not currently use alcohol. He reports that he does not currently use drugs.    Medications:  Prior to Admission medications    Medication Sig Start Date End Date Taking? Authorizing Provider   acetaminophen (TYLENOL) 500 MG tablet Take 500 mg by mouth Daily As  "Needed for Mild Pain .   Yes Blair Meng MD   allopurinol (ZYLOPRIM) 300 MG tablet TAKE ONE TABLET BY MOUTH EVERY DAY 10/3/22  Yes Devon Vazquez MD   amLODIPine (NORVASC) 5 MG tablet Take 5 mg by mouth Daily. 12/9/20  Yes Blair Meng MD   aspirin 81 MG EC tablet Take 81 mg by mouth Daily.   Yes Blair Meng MD   atorvastatin (LIPITOR) 20 MG tablet TAKE ONE TABLET BY MOUTH NIGHTLY 1/20/22  Yes Caiite Isidro APRN   diphenhydrAMINE-APAP, sleep, (TYLENOL PM EXTRA STRENGTH PO) Take  by mouth Every Night.   Yes Blair Meng MD   losartan (COZAAR) 100 MG tablet TAKE ONE TABLET BY MOUTH EVERY DAY 11/14/22  Yes Caitie Isidro APRN   metFORMIN (Glucophage) 500 MG tablet Take 1 tablet by mouth 2 (Two) Times a Day With Meals. 10/18/22  Yes Devon Vazquez MD   vitamin D3 125 MCG (5000 UT) capsule capsule Take 5,000 Units by mouth Daily.   Yes Blair Meng MD     Allergies:  No Known Allergies    Objective     Vital Signs: /62   Pulse 58   Ht 172.7 cm (68\")   Wt 93 kg (205 lb)   SpO2 98%   BMI 31.17 kg/m²     Vitals and nursing note reviewed.   Constitutional:       General: Not in acute distress.     Appearance: Not in distress.   Neck:      Vascular: No JVD or JVR. JVD normal.   Pulmonary:      Effort: Pulmonary effort is normal.      Breath sounds: Normal breath sounds.   Cardiovascular:      Normal rate. Regular rhythm.      Murmurs: There is no murmur.      No gallop. No rub.   Pulses:     Intact distal pulses.   Edema:     Peripheral edema absent.   Skin:     General: Skin is warm and dry.   Neurological:      Mental Status: Alert, oriented to person, place, and time and oriented to person, place and time.         Results Reviewed:    Old records reviewed as per HPI included a consultation note, and an echocardiogram from 2011 as well as another echocardiogram from 2015.      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    Clinical impression: abnormal EKG          No results found for: CHOL  Lab Results   Component Value Date    TRIG 104 10/17/2022    TRIG 87 10/15/2021    TRIG 90 04/12/2021     Lab Results   Component Value Date    HDL 42 10/17/2022    HDL 34 (L) 10/15/2021    HDL 38 (L) 04/12/2021     Lab Results   Component Value Date    LDL 64 10/17/2022    LDL 61 10/15/2021    LDL 48 04/12/2021     Lab Results   Component Value Date    VLDL 19 10/17/2022    VLDL 17 10/15/2021    VLDL 18 04/12/2021     No results found for: LDLHDL  Well-controlled    Lab Results   Component Value Date    TRIG 104 10/17/2022    HDL 42 10/17/2022    VLDL 19 10/17/2022     Lab Results   Component Value Date    HGBA1C 6.60 (H) 10/17/2022       Results for orders placed in visit on 02/16/15    SCANNED - ECHOCARDIOGRAM      Assessment / Plan        Problem List Items Addressed This Visit (all new to me, but established problems)       Cardiac and Vasculature    Primary hypertension: Well-controlled    Hyperlipidemia LDL goal <100: Well-controlled       Endocrine and Metabolic    Type 2 diabetes mellitus without complication, without long-term current use of insulin (HCC): Well-controlled   Other Visit Diagnoses     At increased risk for cardiovascular disease    -  Primary.  Cannot use ACC/AHA 10-year risk calculator to provide risk assessment, as it does not apply to patients greater than 79 years old          Recommendations/plans:  Mr. Alatorre was referred to me reportedly for ischemic heart disease. The patient is adamant that he has never had any heart troubles he is aware of other than a stress test performed 2 years ago for no symptoms, the results of which he never received. However, he again adamantly states he had no symptoms before that  test, and has had no change or development of any new symptoms afterwards.     However, as I did explain to him, he does have diabetes mellitus, which is considered a coronary equivalent, and he also has known vascular disease (had carotid endarterectomy previously).     We spent the bulk of the visit today discussing the nature of vascular disease, and the fact that it is not usually localized to one vascular bed. His diabetes mellitus certainly places him at risk for vascular disease, along with other medical risk factors including his age.  Prior CEA in fact confirms that he has vascular disease.     Therefore, it is undoubtedly present to some degree in his coronary arteries as well, but there is nothing further to be gained from further assessment of his coronary arteries when he has no symptoms. This is particularly in light of the fact that he is already optimally protected from a risk factor management and reduction standpoint, as he is currently on statin, aspirin, and other medications for blood pressure and glycemic control.     Given all the above, there are no further tests recommended at this point in time, and I encouraged him to continue following closely with Dr. Vazquez for management of his risk factors, as they all do seem to be well-controlled and he is in excellent health at present.     I did advise him to call us back at any point in the future should he have any exertional limiting symptoms that are not orthopedic in nature, such as any shortness of breath with activity, development of chest discomfort, etc. At this point, I would have a very low threshold to do an ischemic evaluation with another stress test. However, since he currently has none of those as stated above, there is no reason to consider that at the present time.    Thank you for the referral.    Transcribed from ambient dictation for Zaid Contreras MD by Brooke Rondon.  12/01/22   13:15 CST    Patient or patient  representative verbalized consent to the visit recording.   I Zaid Contreras MD have personally performed the services described in this document as scribed by the above individual, and it is both accurate and complete.   I have edited each component as needed.    Zaid Contreras MD  12/1/2022  23:37 CST

## 2023-01-06 ENCOUNTER — OFFICE VISIT (OUTPATIENT)
Dept: FAMILY MEDICINE CLINIC | Facility: CLINIC | Age: 83
End: 2023-01-06
Payer: MEDICARE

## 2023-01-06 VITALS
DIASTOLIC BLOOD PRESSURE: 77 MMHG | RESPIRATION RATE: 16 BRPM | WEIGHT: 198.8 LBS | TEMPERATURE: 97.8 F | BODY MASS INDEX: 30.13 KG/M2 | SYSTOLIC BLOOD PRESSURE: 122 MMHG | OXYGEN SATURATION: 97 % | HEART RATE: 89 BPM | HEIGHT: 68 IN

## 2023-01-06 DIAGNOSIS — R73.03 PREDIABETES: Primary | ICD-10-CM

## 2023-01-06 PROCEDURE — 99213 OFFICE O/P EST LOW 20 MIN: CPT | Performed by: FAMILY MEDICINE

## 2023-01-06 NOTE — PROGRESS NOTES
Subjective   Jeff Alatorre is a 82 y.o. male.     History of Present Illness  82-year-old male with pre diabetes and ED      The following portions of the patient's history were reviewed and updated as appropriate: allergies, current medications, past family history, past medical history, past social history, past surgical history and problem list.    Review of Systems   Endocrine: Negative for polydipsia, polyphagia and polyuria.   Genitourinary:        ED-denied Viagra-suggested penile prosthesis   Musculoskeletal:        Exercise losing weight etc.       Objective   Physical Exam  Vitals and nursing note reviewed.   Constitutional:       Appearance: Normal appearance.   Eyes:      Extraocular Movements: Extraocular movements intact.      Pupils: Pupils are equal, round, and reactive to light.   Cardiovascular:      Rate and Rhythm: Normal rate and regular rhythm.   Pulmonary:      Effort: Pulmonary effort is normal.      Breath sounds: Normal breath sounds.   Musculoskeletal:      Right lower leg: No edema.      Left lower leg: No edema.   Skin:     General: Skin is warm and dry.   Neurological:      General: No focal deficit present.      Mental Status: He is alert and oriented to person, place, and time.   Psychiatric:         Mood and Affect: Mood normal.         Behavior: Behavior normal.         Thought Content: Thought content normal.         Judgment: Judgment normal.         Assessment & Plan   Diagnoses and all orders for this visit:    1. Prediabetes (Primary)  -     Hemoglobin A1c  -     Basic Metabolic Panel         Plan suggested penile prosthesis- continue metformin Mediterranean diet and exercise       Answers for HPI/ROS submitted by the patient on 1/5/2023  What is the primary reason for your visit?: Other  Please describe your symptoms.: urinary tract problems E D  Have you had these symptoms before?: Yes  How long have you been having these symptoms?: Greater than 2 weeks  Please list any  medications you are currently taking for this condition.: None want to see if my current meds might be causing problems if not how to fix the problem.  Please describe any probable cause for these symptoms. : Age, small blood veins, other medications

## 2023-01-07 LAB
BUN SERPL-MCNC: 19 MG/DL (ref 8–23)
BUN/CREAT SERPL: 14.3 (ref 7–25)
CALCIUM SERPL-MCNC: 9.7 MG/DL (ref 8.6–10.5)
CHLORIDE SERPL-SCNC: 102 MMOL/L (ref 98–107)
CO2 SERPL-SCNC: 26.2 MMOL/L (ref 22–29)
CREAT SERPL-MCNC: 1.33 MG/DL (ref 0.76–1.27)
EGFRCR SERPLBLD CKD-EPI 2021: 53.4 ML/MIN/1.73
GLUCOSE SERPL-MCNC: 117 MG/DL (ref 65–99)
HBA1C MFR BLD: 6.2 % (ref 4.8–5.6)
POTASSIUM SERPL-SCNC: 4.5 MMOL/L (ref 3.5–5.2)
SODIUM SERPL-SCNC: 139 MMOL/L (ref 136–145)

## 2023-01-12 ENCOUNTER — TELEPHONE (OUTPATIENT)
Dept: FAMILY MEDICINE CLINIC | Facility: CLINIC | Age: 83
End: 2023-01-12
Payer: MEDICARE

## 2023-01-12 DIAGNOSIS — N52.9 ERECTILE DYSFUNCTION, UNSPECIFIED ERECTILE DYSFUNCTION TYPE: Primary | ICD-10-CM

## 2023-01-12 NOTE — TELEPHONE ENCOUNTER
Caller: BRUNA GIL     Relationship: SELF     Best call back number: [phone]    What is the medical concern/diagnosis: STATES ED     What specialty or service is being requested: UROLOGY     What is the provider, practice or medical service name: DID NOT STATE     What is the office location: [place] IN [place]     What is the office phone number: DID NOT STATE     Any additional details: STATES DR RAIN ASKED HIM TO CALL BACK IF HE DECIDED TO GO TO UROLOGY

## 2023-01-23 NOTE — PROGRESS NOTES
Subjective    Mr. Alatorre is 82 y.o. male    Chief Complaint: Erectile Dysfunction    History of Present Illness  82-year-old male new patient with hypertension, hyperlipidemia, and diabetes referred by Dr. 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  Previous Viagra also gave him terrible heartburn.  He is now unable to obtain any erections at all for several years.  He denies bothersome LUTS, hematuria, or dysuria.    The following portions of the patient's history were reviewed and updated as appropriate: allergies, current medications, past family history, past medical history, past social history, past surgical history and problem list.    Review of Systems      Current Outpatient Medications:   •  acetaminophen (TYLENOL) 500 MG tablet, Take 500 mg by mouth Daily As Needed for Mild Pain ., Disp: , Rfl:   •  allopurinol (ZYLOPRIM) 300 MG tablet, TAKE ONE TABLET BY MOUTH EVERY DAY, Disp: 90 tablet, Rfl: 3  •  amLODIPine (NORVASC) 5 MG tablet, Take 5 mg by mouth Daily., Disp: , Rfl:   •  aspirin 81 MG EC tablet, Take 81 mg by mouth Daily., Disp: , Rfl:   •  atorvastatin (LIPITOR) 20 MG tablet, TAKE ONE TABLET BY MOUTH NIGHTLY, Disp: 90 tablet, Rfl: 3  •  diphenhydrAMINE-APAP, sleep, (TYLENOL PM EXTRA STRENGTH PO), Take  by mouth Every Night., Disp: , Rfl:   •  losartan (COZAAR) 100 MG tablet, TAKE ONE TABLET BY MOUTH EVERY DAY, Disp: 90 tablet, Rfl: 3  •  metFORMIN (Glucophage) 500 MG tablet, Take 1 tablet by mouth 2 (Two) Times a Day With Meals., Disp: 180 tablet, Rfl: 3  •  vitamin D3 125 MCG (5000 UT) capsule capsule, Take 5,000 Units by mouth Daily., Disp: , Rfl:     Past Medical History:   Diagnosis Date   • Chronic kidney disease    • Erectile dysfunction    • Gout    • HTN (hypertension)    • Hx of adenomatous colonic polyps 10/13/2020   • Hypercholesteremia    • Low testosterone    • Polycythemia vera (HCC) 10/13/2020   • Squamous cell carcinoma of skin  "10/2021    top of head   • Type 2 diabetes mellitus without complication, without long-term current use of insulin (HCC) 2022       Past Surgical History:   Procedure Laterality Date   • CAROTID ENDARTERECTOMY Right    • CATARACT EXTRACTION, BILATERAL     • COLON SURGERY     • COLONOSCOPY     • COLONOSCOPY N/A 2021    Procedure: COLONOSCOPY WITH ANESTHESIA;  Surgeon: Luciano Alatorre DO;  Location: [facility] ENDOSCOPY;  Service: Gastroenterology;  Laterality: N/A;  preop; hx of polyps  postop; diverticulosis   PCP Devon Moore    • HEMORROIDECTOMY         Social History     Socioeconomic History   • Marital status:    Tobacco Use   • Smoking status: Former     Packs/day: 1.00     Years: 46.00     Pack years: 46.00     Types: Cigarettes     Quit date: 2000     Years since quittin.0     Passive exposure: Past   • Smokeless tobacco: Never   Vaping Use   • Vaping Use: Never used   Substance and Sexual Activity   • Alcohol use: Not Currently   • Drug use: Not Currently   • Sexual activity: Defer       Family History   Problem Relation Age of Onset   • No Known Problems Mother    • Heart attack Father    • No Known Problems Maternal Grandmother    • No Known Problems Maternal Grandfather    • No Known Problems Paternal Grandmother    • No Known Problems Paternal Grandfather    • No Known Problems Son    • No Known Problems Daughter    • Parkinsonism Brother    • Colon cancer Neg Hx    • Colon polyps Neg Hx    • Esophageal cancer Neg Hx        Objective    Temp 97.3 °F (36.3 °C)   Ht 177.8 cm (70\")   Wt 89.2 kg (196 lb 9.6 oz)   BMI 28.21 kg/m²     Physical Exam  Penis and testicles normal.  No inguinal hernia appreciable.      Results for orders placed or performed in visit on 23   POC Urinalysis Dipstick, Multipro    Specimen: Urine   Result Value Ref Range    Color Yellow Yellow, Straw, Dark Yellow, Pearl    Clarity, UA Clear Clear    Glucose, UA Negative Negative mg/dL    Bilirubin " Negative Negative    Ketones, UA Trace (A) Negative    Specific Gravity  1.025 1.005 - 1.030    Blood, UA Negative Negative    pH, Urine 5.0 5.0 - 8.0    Protein, POC Trace (A) Negative mg/dL    Urobilinogen, UA Normal Normal, 0.2 E.U./dL    Nitrite, UA Negative Negative    Leukocytes Negative Negative     Assessment and Plan    Diagnoses and all orders for this visit:    1. Erectile dysfunction due to arterial insufficiency (Primary)  -     POC Urinalysis Dipstick, Multipro  -     Case Request; Standing  -     CBC (No Diff); Future  -     Basic Metabolic Panel; Future  -     gentamicin (GARAMYCIN) 510 mg in sodium chloride 0.9 % 100 mL IVPB  -     Case Request    2. Primary hypertension    3. Type 2 diabetes mellitus without complication, without long-term current use of insulin (HCC)    Other orders  -     Follow Anesthesia Guidelines / Protocol; Future  -     Obtain Informed Consent; Future  -     Provide NPO Instructions to Patient; Future  -     Chlorhexidine Skin Prep; Future  -     Follow Anesthesia Guidelines / Protocol; Standing  -     Verify / Perform Chlorhexidine Skin Prep; Standing  -     Verify / Perform Chlorhexidine Skin Prep if Indicated (If Not Already Completed); Standing  -     ECG 12 Lead Pre-Op / Pre-Procedure; Standing      Worsening organic erectile dysfunction refractory to PDE inhibitor therapy.  We had a long discussion regarding other treatment options including penile injections, intraurethral medications, ADAM, and penile implant.  Patient would like to proceed with three-piece inflatable penile implant.  We discussed risks of the procedure including but not limited to infection, bleeding, need for additional procedures, injury to urethra and/or adjacent structures, mechanical malfunction, device migration, chronic pain, complications of anesthesia.  We also discussed that penile length with an implant would be no longer than current flaccid stretched penile length.  Patient voices  understanding and provided informed consent to proceed with Coloplast Titan touch 3-piece inflatable penile implant on 2/7/2023.           This document has been signed by SUNSHINE Santana MD on January 27, 2023 15:24 CST

## 2023-01-27 ENCOUNTER — OFFICE VISIT (OUTPATIENT)
Dept: UROLOGY | Facility: CLINIC | Age: 83
End: 2023-01-27
Payer: MEDICARE

## 2023-01-27 ENCOUNTER — TELEPHONE (OUTPATIENT)
Dept: UROLOGY | Facility: CLINIC | Age: 83
End: 2023-01-27
Payer: MEDICARE

## 2023-01-27 VITALS — BODY MASS INDEX: 28.15 KG/M2 | TEMPERATURE: 97.3 F | WEIGHT: 196.6 LBS | HEIGHT: 70 IN

## 2023-01-27 DIAGNOSIS — E11.9 TYPE 2 DIABETES MELLITUS WITHOUT COMPLICATION, WITHOUT LONG-TERM CURRENT USE OF INSULIN: ICD-10-CM

## 2023-01-27 DIAGNOSIS — I10 PRIMARY HYPERTENSION: ICD-10-CM

## 2023-01-27 DIAGNOSIS — N52.01 ERECTILE DYSFUNCTION DUE TO ARTERIAL INSUFFICIENCY: Primary | ICD-10-CM

## 2023-01-27 LAB
BILIRUB BLD-MCNC: NEGATIVE MG/DL
CLARITY, POC: CLEAR
COLOR UR: YELLOW
GLUCOSE UR STRIP-MCNC: NEGATIVE MG/DL
KETONES UR QL: ABNORMAL
LEUKOCYTE EST, POC: NEGATIVE
NITRITE UR-MCNC: NEGATIVE MG/ML
PH UR: 5 [PH] (ref 5–8)
PROT UR STRIP-MCNC: ABNORMAL MG/DL
RBC # UR STRIP: NEGATIVE /UL
SP GR UR: 1.02 (ref 1–1.03)
UROBILINOGEN UR QL: NORMAL

## 2023-01-27 PROCEDURE — 81003 URINALYSIS AUTO W/O SCOPE: CPT | Performed by: UROLOGY

## 2023-01-27 PROCEDURE — 99204 OFFICE O/P NEW MOD 45 MIN: CPT | Performed by: UROLOGY

## 2023-01-27 NOTE — LETTER
January 27, 2023     Devon Vazquez MD  [address] [facility] [id number]    Patient: Jeff Alatorre   YOB: 1940   Date of Visit: 1/27/2023     Dear Dr. George MD:    Thank you for referring Jeff Alatorre to me for evaluation. Below are the relevant portions of my assessment and plan of care.    If you have questions, please do not hesitate to call me. I look forward to following Jeff along with you.         Sincerely,        Garcia Santana MD        CC: No Recipients    Progress Notes:  Subjective    Mr. Alatorre is 82 y.o. 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  Previous Viagra also gave him terrible heartburn.  He is now unable to obtain any erections at all for several years.  He denies bothersome LUTS, hematuria, or dysuria.    The following portions of the patient's history were reviewed and updated as appropriate: allergies, current medications, past family history, past medical history, past social history, past surgical history and problem list.    Review of Systems      Current Outpatient Medications:   •  acetaminophen (TYLENOL) 500 MG tablet, Take 500 mg by mouth Daily As Needed for Mild Pain ., Disp: , Rfl:   •  allopurinol (ZYLOPRIM) 300 MG tablet, TAKE ONE TABLET BY MOUTH EVERY DAY, Disp: 90 tablet, Rfl: 3  •  amLODIPine (NORVASC) 5 MG tablet, Take 5 mg by mouth Daily., Disp: , Rfl:   •  aspirin 81 MG EC tablet, Take 81 mg by mouth Daily., Disp: , Rfl:   •  atorvastatin (LIPITOR) 20 MG tablet, TAKE ONE TABLET BY MOUTH NIGHTLY, Disp: 90 tablet, Rfl: 3  •  diphenhydrAMINE-APAP, sleep, (TYLENOL PM EXTRA STRENGTH PO), Take  by mouth Every Night., Disp: , Rfl:   •  losartan (COZAAR) 100 MG tablet, TAKE ONE TABLET BY MOUTH EVERY DAY, Disp: 90 tablet, Rfl: 3  •   metFORMIN (Glucophage) 500 MG tablet, Take 1 tablet by mouth 2 (Two) Times a Day With Meals., Disp: 180 tablet, Rfl: 3  •  vitamin D3 125 MCG (5000 UT) capsule capsule, Take 5,000 Units by mouth Daily., Disp: , Rfl:     Past Medical History:   Diagnosis Date   • Chronic kidney disease    • Erectile dysfunction    • Gout    • HTN (hypertension)    • Hx of adenomatous colonic polyps 10/13/2020   • Hypercholesteremia    • Low testosterone    • Polycythemia vera (HCC) 10/13/2020   • Squamous cell carcinoma of skin 10/2021    top of head   • Type 2 diabetes mellitus without complication, without long-term current use of insulin (HCC) 2022       Past Surgical History:   Procedure Laterality Date   • CAROTID ENDARTERECTOMY Right    • CATARACT EXTRACTION, BILATERAL     • COLON SURGERY     • COLONOSCOPY     • COLONOSCOPY N/A 2021    Procedure: COLONOSCOPY WITH ANESTHESIA;  Surgeon: Luciano Alatorre DO;  Location: [facility] ENDOSCOPY;  Service: Gastroenterology;  Laterality: N/A;  preop; hx of polyps  postop; diverticulosis   PCP Devon Moore    • HEMORROIDECTOMY         Social History     Socioeconomic History   • Marital status:    Tobacco Use   • Smoking status: Former     Packs/day: 1.00     Years: 46.00     Pack years: 46.00     Types: Cigarettes     Quit date: 2000     Years since quittin.0     Passive exposure: Past   • Smokeless tobacco: Never   Vaping Use   • Vaping Use: Never used   Substance and Sexual Activity   • Alcohol use: Not Currently   • Drug use: Not Currently   • Sexual activity: Defer       Family History   Problem Relation Age of Onset   • No Known Problems Mother    • Heart attack Father    • No Known Problems Maternal Grandmother    • No Known Problems Maternal Grandfather    • No Known Problems Paternal Grandmother    • No Known Problems Paternal Grandfather    • No Known Problems Son    • No Known Problems Daughter    • Parkinsonism Brother    • Colon cancer Neg Hx    •  "Colon polyps Neg Hx    • Esophageal cancer Neg Hx        Objective    Temp 97.3 °F (36.3 °C)   Ht 177.8 cm (70\")   Wt 89.2 kg (196 lb 9.6 oz)   BMI 28.21 kg/m²     Physical Exam  Penis and testicles normal.  No inguinal hernia appreciable.      Results for orders placed or performed in visit on 01/27/23   POC Urinalysis Dipstick, Multipro    Specimen: Urine   Result Value Ref Range    Color Yellow Yellow, Straw, Dark Yellow, Pearl    Clarity, UA Clear Clear    Glucose, UA Negative Negative mg/dL    Bilirubin Negative Negative    Ketones, UA Trace (A) Negative    Specific Gravity  1.025 1.005 - 1.030    Blood, UA Negative Negative    pH, Urine 5.0 5.0 - 8.0    Protein, POC Trace (A) Negative mg/dL    Urobilinogen, UA Normal Normal, 0.2 E.U./dL    Nitrite, UA Negative Negative    Leukocytes Negative Negative     Assessment and Plan    Diagnoses and all orders for this visit:    1. Erectile dysfunction due to arterial insufficiency (Primary)  -     POC Urinalysis Dipstick, Multipro  -     Case Request; Standing  -     CBC (No Diff); Future  -     Basic Metabolic Panel; Future  -     gentamicin (GARAMYCIN) 510 mg in sodium chloride 0.9 % 100 mL IVPB  -     Case Request    2. Primary hypertension    3. Type 2 diabetes mellitus without complication, without long-term current use of insulin (HCC)    Other orders  -     Follow Anesthesia Guidelines / Protocol; Future  -     Obtain Informed Consent; Future  -     Provide NPO Instructions to Patient; Future  -     Chlorhexidine Skin Prep; Future  -     Follow Anesthesia Guidelines / Protocol; Standing  -     Verify / Perform Chlorhexidine Skin Prep; Standing  -     Verify / Perform Chlorhexidine Skin Prep if Indicated (If Not Already Completed); Standing  -     ECG 12 Lead Pre-Op / Pre-Procedure; Standing      Worsening organic erectile dysfunction refractory to PDE inhibitor therapy.  We had a long discussion regarding other treatment options including penile injections, " intraurethral medications, ADAM, and penile implant.  Patient would like to proceed with three-piece inflatable penile implant.  We discussed risks of the procedure including but not limited to infection, bleeding, need for additional procedures, injury to urethra and/or adjacent structures, mechanical malfunction, device migration, chronic pain, complications of anesthesia.  We also discussed that penile length with an implant would be no longer than current flaccid stretched penile length.  Patient voices understanding and provided informed consent to proceed with Coloplast Titan touch 3-piece inflatable penile implant on 2/7/2023.           This document has been signed by SUNSHINE Santana MD on January 27, 2023 15:24 CST

## 2023-01-27 NOTE — TELEPHONE ENCOUNTER
Patient called requesting to speak to hollis- I informed him she wasn't at the desk and he left a message for her.

## 2023-02-01 ENCOUNTER — PRE-ADMISSION TESTING (OUTPATIENT)
Dept: PREADMISSION TESTING | Facility: HOSPITAL | Age: 83
End: 2023-02-01
Payer: MEDICARE

## 2023-02-01 VITALS
DIASTOLIC BLOOD PRESSURE: 72 MMHG | SYSTOLIC BLOOD PRESSURE: 149 MMHG | BODY MASS INDEX: 29.49 KG/M2 | HEART RATE: 72 BPM | RESPIRATION RATE: 18 BRPM | WEIGHT: 199.08 LBS | OXYGEN SATURATION: 99 % | HEIGHT: 69 IN

## 2023-02-01 PROCEDURE — 85027 COMPLETE CBC AUTOMATED: CPT | Performed by: UROLOGY

## 2023-02-01 PROCEDURE — 80048 BASIC METABOLIC PNL TOTAL CA: CPT | Performed by: UROLOGY

## 2023-02-01 PROCEDURE — 93005 ELECTROCARDIOGRAM TRACING: CPT | Performed by: UROLOGY

## 2023-02-01 PROCEDURE — 93010 ELECTROCARDIOGRAM REPORT: CPT | Performed by: STUDENT IN AN ORGANIZED HEALTH CARE EDUCATION/TRAINING PROGRAM

## 2023-02-01 NOTE — DISCHARGE INSTRUCTIONS
Before you come to the hospital        Arrival time: AS DIRECTED BY OFFICE     YOU MAY TAKE THE FOLLOWING MEDICATION(S) THE MORNING OF SURGERY WITH A SIP OF WATER: ***           DO NOT TAKE YOUR LOSARTAN 24 HOURS PRIOR TO SURGERY          ALL OTHER HOME MEDICATION CHECK WITH YOUR PHYSICIAN (especially if   you are taking diabetes medicines or blood thinners)    Do not take any Erectile Dysfunction medications (EX: CIALIS, VIAGRA) 24 hours prior to surgery.      If you were given and instructed to use a germ- killing soap, use as directed the night before surgery and again the morning of surgery or as directed by your surgeon. (Use one-half of the bottle with each shower.)   See attached information for How to Use Chlorhexidine for Bathing if applicable.            Eating and drinking restrictions prior to scheduled arrival time    2 Hours before arrival time STOP   Drinking Clear liquids (water, apple juice-no pulp)     6 Hours before arrival time STOP   Milk or drinks that contain milk, full liquids    6 Hours before arrival time STOP   Light meals or foods, such as toast or cereal    8 Hours before arrival time STOP   Heavy foods, such as meat, fried foods, or fatty foods    (It is extremely important that you follow these guidelines to prevent delay or cancelation of your procedure)     Clear Liquids  Water and flavored water                                                                      Clear Fruit juices, such as cranberry juice and apple juice.  Black coffee (NO cream of any kind, including powdered).  Plain tea  Clear bouillon or broth.  Flavored gelatin.  Soda.  Gatorade or Powerade.  Full liquid examples  Juices that have pulp.  Frozen ice pops that contain fruit pieces.  Coffee with creamer  Milk.  Yogurt.                MANAGING PAIN AFTER SURGERY    We know you are probably wondering what your pain will be like after surgery.  Following surgery it is unrealistic to expect you will not have pain.    Pain is how our bodies let us know that something is wrong or cautions us to be careful.  That said, our goal is to make your pain tolerable.    Methods we may use to treat your pain include (oral or IV medications, PCAs, epidurals, nerve blocks, etc.)   While some procedures require IV pain medications for a short time after surgery, transitioning to pain medications by mouth allows for better management of pain.   Your nurse will encourage you to take oral pain medications whenever possible.  IV medications work almost immediately, but only last a short while.  Taking medications by mouth allows for a more constant level of medication in your blood stream for a longer period of time.      Once your pain is out of control it is harder to get back under control.  It is important you are aware when your next dose of pain medication is due.  If you are admitted, your nurse may write the time of your next dose on the white board in your room to help you remember.      We are interested in your pain and encourage you to inform us about aggravating factors during your visit.   Many times a simple repositioning every few hours can make a big difference.    If your physician says it is okay, do not let your pain prevent you from getting out of bed. Be sure to call your nurse for assistance prior to getting up so you do not fall.      Before surgery, please decide your tolerable pain goal.  These faces help describe the pain ratings we use on a 0-10 scale.   Be prepared to tell us your goal and whether or not you take pain or anxiety medications at home.          Preparing for Surgery  Preparing for surgery is an important part of your care. It can make things go more smoothly and help you avoid complications. The steps leading up to surgery may vary among hospitals. Follow all instructions given to you by your health care providers. Ask questions if you do not understand something. Talk about any concerns that you  have.  Here are some questions to consider asking before your surgery:  If my surgery is not an emergency (is elective), when would be the best time to have the surgery?  What arrangements do I need to make for work, home, or school?  What will my recovery be like? How long will it be before I can return to normal activities?  Will I need to prepare my home? Will I need to arrange care for me or my children?  Should I expect to have pain after surgery? What are my pain management options? Are there nonmedical options that I can try for pain?  Tell a health care provider about:  Any allergies you have.  All medicines you are taking, including vitamins, herbs, eye drops, creams, and over-the-counter medicines.  Any problems you or family members have had with anesthetic medicines.  Any blood disorders you have.  Any surgeries you have had.  Any medical conditions you have.  Whether you are pregnant or may be pregnant.  What are the risks?  The risks and complications of surgery depend on the specific procedure that you have. Discuss all the risks with your health care providers before your surgery. Ask about common surgical complications, which may include:  Infection.  Bleeding or a need for blood replacement (transfusion).  Allergic reactions to medicines.  Damage to surrounding nerves, tissues, or structures.  A blood clot.  Scarring.  Failure of the surgery to correct the problem.  Follow these instructions before the procedure:  Several days or weeks before your procedure  You may have a physical exam by your primary health care provider to make sure it is safe for you to have surgery.  You may have testing. This may include a chest X-ray, blood and urine tests, electrocardiogram (ECG), or other testing.  Ask your health care provider about:  Changing or stopping your regular medicines. This is especially important if you are taking diabetes medicines or blood thinners.  Taking medicines such as aspirin and  ibuprofen. These medicines can thin your blood. Do not take these medicines unless your health care provider tells you to take them.  Taking over-the-counter medicines, vitamins, herbs, and supplements.  Do not use any products that contain nicotine or tobacco, such as cigarettes and e-cigarettes. If you need help quitting, ask your health care provider.  Avoid alcohol.  Ask your health care provider if there are exercises you can do to prepare for surgery.  Eat a healthy diet.   Plan to have someone take you home from the hospital or clinic.  Plan to have a responsible adult care for you for at least 24 hours after you leave the hospital or clinic. This is important.  The day before your procedure  You may be given antibiotic medicine to take by mouth to help prevent infection. Take it as told by your health care provider.  You may be asked to shower with a germ-killing soap.  Follow instructions from your health care provider about eating and drinking restrictions. This includes gum, mints and hard candy.  Pack comfortable clothes according to your procedure.   The day of your procedure  You may need to take another shower with a germ-killing soap before you leave home in the morning.  With a small sip of water, take only the medicines that you are told to take.  Remove all jewelry including rings.   Leave anything you consider valuable at home except hearing aids if needed.  You do not need to bring your home medications into the hospital.   Do not wear any makeup, nail polish, powder, deodorant, lotion, hair accessories, or anything on your skin or body except your clothes.  If you will be staying in the hospital, bring a case to hold your glasses, contacts, or dentures. You may also want to bring your robe and non-skid footwear.       (Do not use denture adhesives since you will be asked to remove them during  surgery).   If you wear oxygen at home, bring it with you the day of surgery.  If instructed by your  health care provider, bring your sleep apnea device with you on the day of your surgery (if this applies to you).  You may want to leave your suitcase and sleep apnea device in the car until after surgery.   Arrive at the hospital as scheduled.  Bring a friend or family member with you who can help to answer questions and be present while you meet with your health care provider.  At the hospital  When you arrive at the hospital:  Go to registration located at the main entrance of the hospital. You will be registered and given a beeper and a sticker sheet. Take the stickers to the Outpatient nurses desk and place in the black tray. This is to notify staff that you have arrived. Then return to the lobby to wait.   When your beeper lights up and vibrates proceed through the double doors, under the stairs, and a member of the Outpatient Surgery staff will escort you to your preoperative room.  You may have to wear compression sleeves. These help to prevent blood clots and reduce swelling in your legs.  An IV may be inserted into one of your veins.              In the operating room, you may be given one or more of the following:        A medicine to help you relax (sedative).        A medicine to numb the area (local anesthetic).        A medicine to make you fall asleep (general anesthetic).        A medicine that is injected into an area of your body to numb everything below the                      injection site (regional anesthetic).  You may be given an antibiotic through your IV to help prevent infection.  Your surgical site will be marked or identified.    Contact a health care provider if you:  Develop a fever of more than 100.4°F (38°C) or other feelings of illness during the 48 hours before your surgery.  Have symptoms that get worse.  Have questions or concerns about your surgery.  Summary  Preparing for surgery can make the procedure go more smoothly and lower your risk of complications.  Before surgery,  make a list of questions and concerns to discuss with your surgeon. Ask about the risks and possible complications.  In the days or weeks before your surgery, follow all instructions from your health care provider. You may need to stop smoking, avoid alcohol, follow eating restrictions, and change or stop your regular medicines.  Contact your surgeon if you develop a fever or other signs of illness during the few days before your surgery.  This information is not intended to replace advice given to you by your health care provider. Make sure you discuss any questions you have with your health care provider.  Document Revised: 12/21/2018 Document Reviewed: 10/23/2018  [employer]vier Patient Education © 2021 [employer]

## 2023-02-03 ENCOUNTER — TELEPHONE (OUTPATIENT)
Dept: UROLOGY | Facility: CLINIC | Age: 83
End: 2023-02-03
Payer: MEDICARE

## 2023-02-03 ENCOUNTER — OFFICE VISIT (OUTPATIENT)
Dept: FAMILY MEDICINE CLINIC | Facility: CLINIC | Age: 83
End: 2023-02-03
Payer: MEDICARE

## 2023-02-03 VITALS
RESPIRATION RATE: 18 BRPM | OXYGEN SATURATION: 97 % | DIASTOLIC BLOOD PRESSURE: 70 MMHG | WEIGHT: 197 LBS | TEMPERATURE: 97.4 F | HEIGHT: 69 IN | SYSTOLIC BLOOD PRESSURE: 124 MMHG | BODY MASS INDEX: 29.18 KG/M2 | HEART RATE: 77 BPM

## 2023-02-03 DIAGNOSIS — K57.92 DIVERTICULITIS: Primary | ICD-10-CM

## 2023-02-03 PROCEDURE — 99213 OFFICE O/P EST LOW 20 MIN: CPT | Performed by: FAMILY MEDICINE

## 2023-02-03 PROCEDURE — 96372 THER/PROPH/DIAG INJ SC/IM: CPT | Performed by: FAMILY MEDICINE

## 2023-02-03 RX ORDER — AMOXICILLIN 500 MG/1
500 CAPSULE ORAL 3 TIMES DAILY
Qty: 21 CAPSULE | Refills: 0 | Status: SHIPPED | OUTPATIENT
Start: 2023-02-03 | End: 2023-02-10

## 2023-02-03 RX ORDER — CEFTRIAXONE SODIUM 250 MG/1
500 INJECTION, POWDER, FOR SOLUTION INTRAMUSCULAR; INTRAVENOUS ONCE
Status: COMPLETED | OUTPATIENT
Start: 2023-02-03 | End: 2023-02-03

## 2023-02-03 RX ADMIN — CEFTRIAXONE SODIUM 500 MG: 250 INJECTION, POWDER, FOR SOLUTION INTRAMUSCULAR; INTRAVENOUS at 11:16

## 2023-02-03 NOTE — PROGRESS NOTES
Subjective   Jeff Alatorre is a 82 y.o. male.     History of Present Illness  82-year-old male with left lower quadrant pain and history of diverticulitis      The following portions of the patient's history were reviewed and updated as appropriate: allergies, current medications, past family history, past medical history, past social history, past surgical history and problem list.    Review of Systems   Constitutional: Negative for chills and fever.   Cardiovascular: Negative for chest pain and leg swelling.   Gastrointestinal: Positive for constipation.        Colonoscopy 18 months ago normal except for diverticular disease       Objective   Physical Exam  Vitals and nursing note reviewed.   Constitutional:       Appearance: Normal appearance.   Eyes:      Extraocular Movements: Extraocular movements intact.      Pupils: Pupils are equal, round, and reactive to light.   Cardiovascular:      Rate and Rhythm: Normal rate and regular rhythm.   Pulmonary:      Effort: Pulmonary effort is normal.      Breath sounds: Normal breath sounds.   Abdominal:      General: Bowel sounds are normal.      Palpations: There is no mass.      Tenderness: There is abdominal tenderness. There is no guarding or rebound.      Comments: Left lower quadrant tenderness-no rebound   Musculoskeletal:      Right lower leg: No edema.      Left lower leg: No edema.   Skin:     General: Skin is warm and dry.   Neurological:      General: No focal deficit present.      Mental Status: He is alert and oriented to person, place, and time.   Psychiatric:         Mood and Affect: Mood normal.         Behavior: Behavior normal.         Thought Content: Thought content normal.         Judgment: Judgment normal.         Assessment & Plan   Diagnoses and all orders for this visit:    1. Diverticulitis (Primary)  -     cefTRIAXone (ROCEPHIN) injection 500 mg    Other orders  -     amoxicillin (AMOXIL) 500 MG capsule; Take 1 capsule by mouth 3 (Three)  Times a Day for 7 days.  Dispense: 21 capsule; Refill: 0         Plan-above sleep removed 48 hours then low fiber diet for couple weeks and go back to high-fiber diet

## 2023-02-03 NOTE — TELEPHONE ENCOUNTER
Called patient to remind them to arrive at patient registration on 2-7-23 at 700am for the procedure with Dr. Santana. Left message with patient. Told patient if they had any questions to please contact our office at [phone].

## 2023-02-03 NOTE — TELEPHONE ENCOUNTER
Patient called back. Advised pt of date and arrival time for his procedure on 2/7/23. Pt voiced his understanding

## 2023-02-04 LAB
QT INTERVAL: 412 MS
QTC INTERVAL: 441 MS

## 2023-02-06 ENCOUNTER — TELEPHONE (OUTPATIENT)
Dept: FAMILY MEDICINE CLINIC | Facility: CLINIC | Age: 83
End: 2023-02-06
Payer: MEDICARE

## 2023-02-06 ENCOUNTER — TELEPHONE (OUTPATIENT)
Dept: UROLOGY | Facility: CLINIC | Age: 83
End: 2023-02-06
Payer: MEDICARE

## 2023-02-06 DIAGNOSIS — K57.92 DIVERTICULITIS: Primary | ICD-10-CM

## 2023-02-06 NOTE — TELEPHONE ENCOUNTER
Patient is taking prescribed medication.  Patient stated that he was told to call back if he was not improving. Patient states he is not feeling any beter.

## 2023-02-06 NOTE — TELEPHONE ENCOUNTER
Called pt and let him know the surgery was canceled and he stated he would call back to reschedule

## 2023-02-06 NOTE — TELEPHONE ENCOUNTER
Continue the medicine-need to get a non-contrasted CT of abdomen and pelvis-have results called-clinical info left lower quadrant pain

## 2023-02-06 NOTE — TELEPHONE ENCOUNTER
Caller: Jeff Alatorre    Relationship to patient: Self    Best call back number: [phone]    Patient is needing: PT WOULD LIKE TO CANCEL SURGICAL PROCEDURE FOR 02_07_2023. HE WILL CALL BACK WHEN HE CAN RESCHEDULE. PT HAS LOW FEVER. THANK YOU.    UNABLE TO WARM TRANSFER

## 2023-02-07 RX ORDER — METRONIDAZOLE 500 MG/1
500 TABLET ORAL 2 TIMES DAILY
Qty: 14 TABLET | Refills: 0 | Status: SHIPPED | OUTPATIENT
Start: 2023-02-07 | End: 2023-02-14

## 2023-02-07 RX ORDER — CIPROFLOXACIN 500 MG/1
500 TABLET, FILM COATED ORAL 2 TIMES DAILY
Qty: 14 TABLET | Refills: 0 | Status: SHIPPED | OUTPATIENT
Start: 2023-02-07 | End: 2023-02-14

## 2023-02-11 DIAGNOSIS — E78.2 MIXED HYPERLIPIDEMIA: Primary | ICD-10-CM

## 2023-02-13 ENCOUNTER — TELEPHONE (OUTPATIENT)
Dept: UROLOGY | Facility: CLINIC | Age: 83
End: 2023-02-13
Payer: MEDICARE

## 2023-02-13 RX ORDER — ATORVASTATIN CALCIUM 20 MG/1
TABLET, FILM COATED ORAL
Qty: 90 TABLET | Refills: 3 | Status: SHIPPED | OUTPATIENT
Start: 2023-02-13

## 2023-02-13 NOTE — TELEPHONE ENCOUNTER
Rx Refill Note  Requested Prescriptions     Pending Prescriptions Disp Refills   • atorvastatin (LIPITOR) 20 MG tablet [Pharmacy Med Name: ATORVASTATIN CALCIUM 20MG TABS] 90 tablet 3     Sig: TAKE ONE TABLET BY MOUTH NIGHTLY      Last office visit with prescribing clinician: 2/3/23  Last telemedicine visit with prescribing clinician: 4/17/2023   Next office visit with prescribing clinician: Visit date not found       {TIP  Please add Last Relevant Lab 10/17/22                 Would you like a call back once the refill request has been completed: [] Yes [] No    If the office needs to give you a call back, can they leave a voicemail: [] Yes [] No    Kristina Coombs MA  02/13/23, 07:49 CST

## 2023-02-13 NOTE — TELEPHONE ENCOUNTER
Caller: Jeff Alatorre    Relationship to patient: SELF    Best call back number: [phone]    Patient is needing: PT CALLED AND IS WANTING TO RESCHEDULE HIS CANCELLED PROCEDURE FOR PENILE IMPLANT. PLEASE GIVE PT A CALL TO SCHEDULE          [FreeTextEntry1] : Patient has been diagnosed with left  otitis externa. Patient was advised to keep ears dry for 3-4 days. Patient is to apply Ciprodex otic drops 4 drops b.i.d. to the effected ear / ears  for  one week.\par Continue with observation. If pain persisted may need reevaluation in the office in 3-4 days.\par Discussed viral illness causing other sx, recommend sx relief as needed. Patient appears to be improving.

## 2023-03-07 ENCOUNTER — PRE-ADMISSION TESTING (OUTPATIENT)
Dept: PREADMISSION TESTING | Facility: HOSPITAL | Age: 83
End: 2023-03-07
Payer: MEDICARE

## 2023-03-07 VITALS
HEIGHT: 69 IN | WEIGHT: 193.56 LBS | SYSTOLIC BLOOD PRESSURE: 118 MMHG | BODY MASS INDEX: 28.67 KG/M2 | RESPIRATION RATE: 18 BRPM | DIASTOLIC BLOOD PRESSURE: 67 MMHG | OXYGEN SATURATION: 97 % | HEART RATE: 78 BPM

## 2023-03-07 LAB
ANION GAP SERPL CALCULATED.3IONS-SCNC: 13 MMOL/L (ref 5–15)
BUN SERPL-MCNC: 18 MG/DL (ref 8–23)
BUN/CREAT SERPL: 18.2 (ref 7–25)
CALCIUM SPEC-SCNC: 9.7 MG/DL (ref 8.6–10.5)
CHLORIDE SERPL-SCNC: 105 MMOL/L (ref 98–107)
CO2 SERPL-SCNC: 25 MMOL/L (ref 22–29)
CREAT SERPL-MCNC: 0.99 MG/DL (ref 0.76–1.27)
DEPRECATED RDW RBC AUTO: 49.9 FL (ref 37–54)
EGFRCR SERPLBLD CKD-EPI 2021: 75.6 ML/MIN/1.73
ERYTHROCYTE [DISTWIDTH] IN BLOOD BY AUTOMATED COUNT: 13.6 % (ref 12.3–15.4)
GLUCOSE SERPL-MCNC: 119 MG/DL (ref 65–99)
HCT VFR BLD AUTO: 39.2 % (ref 37.5–51)
HGB BLD-MCNC: 12.4 G/DL (ref 13–17.7)
MCH RBC QN AUTO: 32 PG (ref 26.6–33)
MCHC RBC AUTO-ENTMCNC: 31.6 G/DL (ref 31.5–35.7)
MCV RBC AUTO: 101.3 FL (ref 79–97)
PLATELET # BLD AUTO: 185 10*3/MM3 (ref 140–450)
PMV BLD AUTO: 10.4 FL (ref 6–12)
POTASSIUM SERPL-SCNC: 3.9 MMOL/L (ref 3.5–5.2)
RBC # BLD AUTO: 3.87 10*6/MM3 (ref 4.14–5.8)
SODIUM SERPL-SCNC: 143 MMOL/L (ref 136–145)
WBC NRBC COR # BLD: 5.95 10*3/MM3 (ref 3.4–10.8)

## 2023-03-07 PROCEDURE — 36415 COLL VENOUS BLD VENIPUNCTURE: CPT

## 2023-03-07 PROCEDURE — 85027 COMPLETE CBC AUTOMATED: CPT

## 2023-03-07 PROCEDURE — 80048 BASIC METABOLIC PNL TOTAL CA: CPT

## 2023-03-07 NOTE — DISCHARGE INSTRUCTIONS
Before you come to the hospital        Arrival time: AS DIRECTED BY OFFICE     YOU MAY TAKE THE FOLLOWING MEDICATION(S) THE MORNING OF SURGERY WITH A SIP OF WATER: ***  NONE    HOLD LOSARTAN FOR 24 HOURS PRIOR TO SURGERY           DO NOT TAKE YOUR LOSARTAN 24 HOURS PRIOR SURGERY          ALL OTHER HOME MEDICATION CHECK WITH YOUR PHYSICIAN (especially if   you are taking diabetes medicines or blood thinners)    Do not take any Erectile Dysfunction medications (EX: CIALIS, VIAGRA) 24 hours prior to surgery.      If you were given and instructed to use a germ- killing soap, use as directed the night before surgery and again the morning of surgery or as directed by your surgeon. (Use one-half of the bottle with each shower.)   See attached information for How to Use Chlorhexidine for Bathing if applicable.            Eating and drinking restrictions prior to scheduled arrival time    2 Hours before arrival time STOP   Drinking Clear liquids (water, apple juice-no pulp)     6 Hours before arrival time STOP   Milk or drinks that contain milk, full liquids    6 Hours before arrival time STOP   Light meals or foods, such as toast or cereal    8 Hours before arrival time STOP   Heavy foods, such as meat, fried foods, or fatty foods    (It is extremely important that you follow these guidelines to prevent delay or cancelation of your procedure)     Clear Liquids  Water and flavored water                                                                      Clear Fruit juices, such as cranberry juice and apple juice.  Black coffee (NO cream of any kind, including powdered).  Plain tea  Clear bouillon or broth.  Flavored gelatin.  Soda.  Gatorade or Powerade.  Full liquid examples  Juices that have pulp.  Frozen ice pops that contain fruit pieces.  Coffee with creamer  Milk.  Yogurt.                MANAGING PAIN AFTER SURGERY    We know you are probably wondering what your pain will be like after surgery.  Following surgery it  is unrealistic to expect you will not have pain.   Pain is how our bodies let us know that something is wrong or cautions us to be careful.  That said, our goal is to make your pain tolerable.    Methods we may use to treat your pain include (oral or IV medications, PCAs, epidurals, nerve blocks, etc.)   While some procedures require IV pain medications for a short time after surgery, transitioning to pain medications by mouth allows for better management of pain.   Your nurse will encourage you to take oral pain medications whenever possible.  IV medications work almost immediately, but only last a short while.  Taking medications by mouth allows for a more constant level of medication in your blood stream for a longer period of time.      Once your pain is out of control it is harder to get back under control.  It is important you are aware when your next dose of pain medication is due.  If you are admitted, your nurse may write the time of your next dose on the white board in your room to help you remember.      We are interested in your pain and encourage you to inform us about aggravating factors during your visit.   Many times a simple repositioning every few hours can make a big difference.    If your physician says it is okay, do not let your pain prevent you from getting out of bed. Be sure to call your nurse for assistance prior to getting up so you do not fall.      Before surgery, please decide your tolerable pain goal.  These faces help describe the pain ratings we use on a 0-10 scale.   Be prepared to tell us your goal and whether or not you take pain or anxiety medications at home.          Preparing for Surgery  Preparing for surgery is an important part of your care. It can make things go more smoothly and help you avoid complications. The steps leading up to surgery may vary among hospitals. Follow all instructions given to you by your health care providers. Ask questions if you do not understand  something. Talk about any concerns that you have.  Here are some questions to consider asking before your surgery:  If my surgery is not an emergency (is elective), when would be the best time to have the surgery?  What arrangements do I need to make for work, home, or school?  What will my recovery be like? How long will it be before I can return to normal activities?  Will I need to prepare my home? Will I need to arrange care for me or my children?  Should I expect to have pain after surgery? What are my pain management options? Are there nonmedical options that I can try for pain?  Tell a health care provider about:  Any allergies you have.  All medicines you are taking, including vitamins, herbs, eye drops, creams, and over-the-counter medicines.  Any problems you or family members have had with anesthetic medicines.  Any blood disorders you have.  Any surgeries you have had.  Any medical conditions you have.  Whether you are pregnant or may be pregnant.  What are the risks?  The risks and complications of surgery depend on the specific procedure that you have. Discuss all the risks with your health care providers before your surgery. Ask about common surgical complications, which may include:  Infection.  Bleeding or a need for blood replacement (transfusion).  Allergic reactions to medicines.  Damage to surrounding nerves, tissues, or structures.  A blood clot.  Scarring.  Failure of the surgery to correct the problem.  Follow these instructions before the procedure:  Several days or weeks before your procedure  You may have a physical exam by your primary health care provider to make sure it is safe for you to have surgery.  You may have testing. This may include a chest X-ray, blood and urine tests, electrocardiogram (ECG), or other testing.  Ask your health care provider about:  Changing or stopping your regular medicines. This is especially important if you are taking diabetes medicines or blood  thinners.  Taking medicines such as aspirin and ibuprofen. These medicines can thin your blood. Do not take these medicines unless your health care provider tells you to take them.  Taking over-the-counter medicines, vitamins, herbs, and supplements.  Do not use any products that contain nicotine or tobacco, such as cigarettes and e-cigarettes. If you need help quitting, ask your health care provider.  Avoid alcohol.  Ask your health care provider if there are exercises you can do to prepare for surgery.  Eat a healthy diet.   Plan to have someone take you home from the hospital or clinic.  Plan to have a responsible adult care for you for at least 24 hours after you leave the hospital or clinic. This is important.  The day before your procedure  You may be given antibiotic medicine to take by mouth to help prevent infection. Take it as told by your health care provider.  You may be asked to shower with a germ-killing soap.  Follow instructions from your health care provider about eating and drinking restrictions. This includes gum, mints and hard candy.  Pack comfortable clothes according to your procedure.   The day of your procedure  You may need to take another shower with a germ-killing soap before you leave home in the morning.  With a small sip of water, take only the medicines that you are told to take.  Remove all jewelry including rings.   Leave anything you consider valuable at home except hearing aids if needed.  You do not need to bring your home medications into the hospital.   Do not wear any makeup, nail polish, powder, deodorant, lotion, hair accessories, or anything on your skin or body except your clothes.  If you will be staying in the hospital, bring a case to hold your glasses, contacts, or dentures. You may also want to bring your robe and non-skid footwear.       (Do not use denture adhesives since you will be asked to remove them during  surgery).   If you wear oxygen at home, bring it with  you the day of surgery.  If instructed by your health care provider, bring your sleep apnea device with you on the day of your surgery (if this applies to you).  You may want to leave your suitcase and sleep apnea device in the car until after surgery.   Arrive at the hospital as scheduled.  Bring a friend or family member with you who can help to answer questions and be present while you meet with your health care provider.  At the hospital  When you arrive at the hospital:  Go to registration located at the main entrance of the hospital. You will be registered and given a beeper and a sticker sheet. Take the stickers to the Outpatient nurses desk and place in the black tray. This is to notify staff that you have arrived. Then return to the lobby to wait.   When your beeper lights up and vibrates proceed through the double doors, under the stairs, and a member of the Outpatient Surgery staff will escort you to your preoperative room.  You may have to wear compression sleeves. These help to prevent blood clots and reduce swelling in your legs.  An IV may be inserted into one of your veins.              In the operating room, you may be given one or more of the following:        A medicine to help you relax (sedative).        A medicine to numb the area (local anesthetic).        A medicine to make you fall asleep (general anesthetic).        A medicine that is injected into an area of your body to numb everything below the                      injection site (regional anesthetic).  You may be given an antibiotic through your IV to help prevent infection.  Your surgical site will be marked or identified.    Contact a health care provider if you:  Develop a fever of more than 100.4°F (38°C) or other feelings of illness during the 48 hours before your surgery.  Have symptoms that get worse.  Have questions or concerns about your surgery.  Summary  Preparing for surgery can make the procedure go more smoothly and lower  your risk of complications.  Before surgery, make a list of questions and concerns to discuss with your surgeon. Ask about the risks and possible complications.  In the days or weeks before your surgery, follow all instructions from your health care provider. You may need to stop smoking, avoid alcohol, follow eating restrictions, and change or stop your regular medicines.  Contact your surgeon if you develop a fever or other signs of illness during the few days before your surgery.  This information is not intended to replace advice given to you by your health care provider. Make sure you discuss any questions you have with your health care provider.  Document Revised: 12/21/2018 Document Reviewed: 10/23/2018  Elsevier Patient Education © 2021 [employer]

## 2023-03-13 ENCOUNTER — TELEPHONE (OUTPATIENT)
Dept: UROLOGY | Facility: CLINIC | Age: 83
End: 2023-03-13
Payer: MEDICARE

## 2023-03-13 PROCEDURE — S0260 H&P FOR SURGERY: HCPCS | Performed by: UROLOGY

## 2023-03-13 NOTE — H&P
Mr. Alatorre is 83 y.o. male     Chief Complaint: Erectile Dysfunction     History of Present Illness  83-year-old male new patient with hypertension, hyperlipidemia, and diabetes referred by Dr. 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  Previous Viagra also gave him terrible heartburn.  He is now unable to obtain any erections at all for several years.  He denies bothersome LUTS, hematuria, or dysuria.     The following portions of the patient's history were reviewed and updated as appropriate: allergies, current medications, past family history, past medical history, past social history, past surgical history and problem list.     Review of Systems        Current Outpatient Medications:   •  acetaminophen (TYLENOL) 500 MG tablet, Take 500 mg by mouth Daily As Needed for Mild Pain ., Disp: , Rfl:   •  allopurinol (ZYLOPRIM) 300 MG tablet, TAKE ONE TABLET BY MOUTH EVERY DAY, Disp: 90 tablet, Rfl: 3  •  amLODIPine (NORVASC) 5 MG tablet, Take 5 mg by mouth Daily., Disp: , Rfl:   •  aspirin 81 MG EC tablet, Take 81 mg by mouth Daily., Disp: , Rfl:   •  atorvastatin (LIPITOR) 20 MG tablet, TAKE ONE TABLET BY MOUTH NIGHTLY, Disp: 90 tablet, Rfl: 3  •  diphenhydrAMINE-APAP, sleep, (TYLENOL PM EXTRA STRENGTH PO), Take  by mouth Every Night., Disp: , Rfl:   •  losartan (COZAAR) 100 MG tablet, TAKE ONE TABLET BY MOUTH EVERY DAY, Disp: 90 tablet, Rfl: 3  •  metFORMIN (Glucophage) 500 MG tablet, Take 1 tablet by mouth 2 (Two) Times a Day With Meals., Disp: 180 tablet, Rfl: 3  •  vitamin D3 125 MCG (5000 UT) capsule capsule, Take 5,000 Units by mouth Daily., Disp: , Rfl:      Medical History        Past Medical History:   Diagnosis Date   • Chronic kidney disease     • Erectile dysfunction     • Gout     • HTN (hypertension)     • Hx of adenomatous colonic polyps 10/13/2020   • Hypercholesteremia     • Low testosterone     • Polycythemia vera (HCC) 10/13/2020   • Squamous  "cell carcinoma of skin 10/2021     top of head   • Type 2 diabetes mellitus without complication, without long-term current use of insulin (HCC) 2022            Surgical History         Past Surgical History:   Procedure Laterality Date   • CAROTID ENDARTERECTOMY Right     • CATARACT EXTRACTION, BILATERAL       • COLON SURGERY       • COLONOSCOPY       • COLONOSCOPY N/A 2021     Procedure: COLONOSCOPY WITH ANESTHESIA;  Surgeon: Luciano Alatorre DO;  Location: [facility] ENDOSCOPY;  Service: Gastroenterology;  Laterality: N/A;  preop; hx of polyps  postop; diverticulosis   PCP Devon Moore    • HEMORROIDECTOMY                Social History   Social History            Socioeconomic History   • Marital status:    Tobacco Use   • Smoking status: Former       Packs/day: 1.00       Years: 46.00       Pack years: 46.00       Types: Cigarettes       Quit date: 2000       Years since quittin.0       Passive exposure: Past   • Smokeless tobacco: Never   Vaping Use   • Vaping Use: Never used   Substance and Sexual Activity   • Alcohol use: Not Currently   • Drug use: Not Currently   • Sexual activity: Defer                  Family History   Problem Relation Age of Onset   • No Known Problems Mother     • Heart attack Father     • No Known Problems Maternal Grandmother     • No Known Problems Maternal Grandfather     • No Known Problems Paternal Grandmother     • No Known Problems Paternal Grandfather     • No Known Problems Son     • No Known Problems Daughter     • Parkinsonism Brother     • Colon cancer Neg Hx     • Colon polyps Neg Hx     • Esophageal cancer Neg Hx           Objective     Temp 97.3 °F (36.3 °C)   Ht 177.8 cm (70\")   Wt 89.2 kg (196 lb 9.6 oz)   BMI 28.21 kg/m²      Physical Exam  Penis and testicles normal.  No inguinal hernia appreciable.              Results for orders placed or performed in visit on 23   POC Urinalysis Dipstick, Multipro     Specimen: Urine   Result " Value Ref Range     Color Yellow Yellow, Straw, Dark Yellow, Pearl     Clarity, UA Clear Clear     Glucose, UA Negative Negative mg/dL     Bilirubin Negative Negative     Ketones, UA Trace (A) Negative     Specific Gravity  1.025 1.005 - 1.030     Blood, UA Negative Negative     pH, Urine 5.0 5.0 - 8.0     Protein, POC Trace (A) Negative mg/dL     Urobilinogen, UA Normal Normal, 0.2 E.U./dL     Nitrite, UA Negative Negative     Leukocytes Negative Negative      Assessment and Plan     Diagnoses and all orders for this visit:     1. Erectile dysfunction due to arterial insufficiency (Primary)  -     POC Urinalysis Dipstick, Multipro  -     Case Request; Standing  -     CBC (No Diff); Future  -     Basic Metabolic Panel; Future  -     gentamicin (GARAMYCIN) 510 mg in sodium chloride 0.9 % 100 mL IVPB  -     Case Request     2. Primary hypertension     3. Type 2 diabetes mellitus without complication, without long-term current use of insulin (HCC)     Other orders  -     Follow Anesthesia Guidelines / Protocol; Future  -     Obtain Informed Consent; Future  -     Provide NPO Instructions to Patient; Future  -     Chlorhexidine Skin Prep; Future  -     Follow Anesthesia Guidelines / Protocol; Standing  -     Verify / Perform Chlorhexidine Skin Prep; Standing  -     Verify / Perform Chlorhexidine Skin Prep if Indicated (If Not Already Completed); Standing  -     ECG 12 Lead Pre-Op / Pre-Procedure; Standing        Worsening organic erectile dysfunction refractory to PDE inhibitor therapy.  We had a long discussion regarding other treatment options including penile injections, intraurethral medications, ADAM, and penile implant.  Patient would like to proceed with three-piece inflatable penile implant.  We discussed risks of the procedure including but not limited to infection, bleeding, need for additional procedures, injury to urethra and/or adjacent structures, mechanical malfunction, device migration, chronic pain,  complications of anesthesia.  We also discussed that penile length with an implant would be no longer than current flaccid stretched penile length.  Patient voices understanding and provided informed consent to proceed with Coloplast Titan touch 3-piece inflatable penile implant.

## 2023-03-13 NOTE — TELEPHONE ENCOUNTER
Called patient to remind them to arrive at patient registration on 3-14-23 at 10am for the procedure with Dr. Santana. Spoke with patient. Told patient if they had any questions to please contact our office at [phone].

## 2023-03-14 ENCOUNTER — HOSPITAL ENCOUNTER (OUTPATIENT)
Facility: HOSPITAL | Age: 83
Setting detail: HOSPITAL OUTPATIENT SURGERY
Discharge: HOME | End: 2023-03-14
Attending: UROLOGY | Admitting: UROLOGY
Payer: MEDICARE

## 2023-03-14 ENCOUNTER — ANESTHESIA (OUTPATIENT)
Dept: PERIOP | Facility: HOSPITAL | Age: 83
End: 2023-03-14
Payer: MEDICARE

## 2023-03-14 ENCOUNTER — ANESTHESIA EVENT (OUTPATIENT)
Dept: PERIOP | Facility: HOSPITAL | Age: 83
End: 2023-03-14
Payer: MEDICARE

## 2023-03-14 VITALS
SYSTOLIC BLOOD PRESSURE: 132 MMHG | TEMPERATURE: 97.1 F | HEART RATE: 59 BPM | DIASTOLIC BLOOD PRESSURE: 57 MMHG | OXYGEN SATURATION: 97 % | RESPIRATION RATE: 16 BRPM

## 2023-03-14 DIAGNOSIS — N52.01 ERECTILE DYSFUNCTION DUE TO ARTERIAL INSUFFICIENCY: ICD-10-CM

## 2023-03-14 LAB
GLUCOSE BLDC GLUCOMTR-MCNC: 111 MG/DL (ref 70–130)
GLUCOSE BLDC GLUCOMTR-MCNC: 113 MG/DL (ref 70–130)

## 2023-03-14 PROCEDURE — 25010000002 FENTANYL CITRATE (PF) 100 MCG/2ML SOLUTION

## 2023-03-14 PROCEDURE — C1813 PROSTHESIS, PENILE, INFLATAB: HCPCS | Performed by: UROLOGY

## 2023-03-14 PROCEDURE — 82962 GLUCOSE BLOOD TEST: CPT

## 2023-03-14 PROCEDURE — 25010000002 CEFAZOLIN PER 500 MG

## 2023-03-14 PROCEDURE — 25010000002 KETOROLAC TROMETHAMINE PER 15 MG

## 2023-03-14 PROCEDURE — 25010000002 GENTAMICIN PER 80 MG: Performed by: UROLOGY

## 2023-03-14 PROCEDURE — 25010000002 ONDANSETRON PER 1 MG

## 2023-03-14 PROCEDURE — 25010000002 PROPOFOL 10 MG/ML EMULSION

## 2023-03-14 PROCEDURE — 25010000002 VANCOMYCIN 1 G RECONSTITUTED SOLUTION 1 EACH VIAL: Performed by: UROLOGY

## 2023-03-14 PROCEDURE — 54405 INSERT MULTI-COMP PENIS PROS: CPT | Performed by: UROLOGY

## 2023-03-14 DEVICE — CL RESERVOIR
Type: IMPLANTABLE DEVICE | Site: PENIS | Status: FUNCTIONAL
Brand: TITAN

## 2023-03-14 DEVICE — ASSEMBLY KIT
Type: IMPLANTABLE DEVICE | Site: PENIS | Status: FUNCTIONAL
Brand: TITAN

## 2023-03-14 DEVICE — TITAN® TOUCH SCROTAL ZERO DEGREE ANGLE CYLINDER SET WITH PUMP
Type: IMPLANTABLE DEVICE | Site: PENIS | Status: FUNCTIONAL
Brand: TITAN

## 2023-03-14 RX ORDER — BUPIVACAINE HYDROCHLORIDE 5 MG/ML
INJECTION, SOLUTION PERINEURAL AS NEEDED
Status: DISCONTINUED | OUTPATIENT
Start: 2023-03-14 | End: 2023-03-14 | Stop reason: HOSPADM

## 2023-03-14 RX ORDER — SULFAMETHOXAZOLE AND TRIMETHOPRIM 800; 160 MG/1; MG/1
1 TABLET ORAL 2 TIMES DAILY
Qty: 28 TABLET | Refills: 0 | Status: SHIPPED | OUTPATIENT
Start: 2023-03-14 | End: 2023-03-28

## 2023-03-14 RX ORDER — OXYCODONE AND ACETAMINOPHEN 10; 325 MG/1; MG/1
1 TABLET ORAL ONCE AS NEEDED
Status: DISCONTINUED | OUTPATIENT
Start: 2023-03-14 | End: 2023-03-14 | Stop reason: HOSPADM

## 2023-03-14 RX ORDER — FLUMAZENIL 0.1 MG/ML
0.2 INJECTION INTRAVENOUS AS NEEDED
Status: DISCONTINUED | OUTPATIENT
Start: 2023-03-14 | End: 2023-03-14 | Stop reason: HOSPADM

## 2023-03-14 RX ORDER — NALOXONE HCL 0.4 MG/ML
0.4 VIAL (ML) INJECTION AS NEEDED
Status: DISCONTINUED | OUTPATIENT
Start: 2023-03-14 | End: 2023-03-14 | Stop reason: HOSPADM

## 2023-03-14 RX ORDER — DROPERIDOL 2.5 MG/ML
0.62 INJECTION, SOLUTION INTRAMUSCULAR; INTRAVENOUS ONCE AS NEEDED
Status: DISCONTINUED | OUTPATIENT
Start: 2023-03-14 | End: 2023-03-14 | Stop reason: HOSPADM

## 2023-03-14 RX ORDER — SODIUM CHLORIDE 0.9 % (FLUSH) 0.9 %
3 SYRINGE (ML) INJECTION EVERY 12 HOURS SCHEDULED
Status: DISCONTINUED | OUTPATIENT
Start: 2023-03-14 | End: 2023-03-14 | Stop reason: HOSPADM

## 2023-03-14 RX ORDER — KETOROLAC TROMETHAMINE 30 MG/ML
INJECTION, SOLUTION INTRAMUSCULAR; INTRAVENOUS AS NEEDED
Status: DISCONTINUED | OUTPATIENT
Start: 2023-03-14 | End: 2023-03-14 | Stop reason: SURG

## 2023-03-14 RX ORDER — NEOSTIGMINE METHYLSULFATE 5 MG/5 ML
SYRINGE (ML) INTRAVENOUS AS NEEDED
Status: DISCONTINUED | OUTPATIENT
Start: 2023-03-14 | End: 2023-03-14 | Stop reason: SURG

## 2023-03-14 RX ORDER — FENTANYL CITRATE 50 UG/ML
25 INJECTION, SOLUTION INTRAMUSCULAR; INTRAVENOUS
Status: DISCONTINUED | OUTPATIENT
Start: 2023-03-14 | End: 2023-03-14 | Stop reason: HOSPADM

## 2023-03-14 RX ORDER — DOCUSATE SODIUM 100 MG/1
100 CAPSULE, LIQUID FILLED ORAL 2 TIMES DAILY
Qty: 60 CAPSULE | Refills: 1 | Status: SHIPPED | OUTPATIENT
Start: 2023-03-14

## 2023-03-14 RX ORDER — ONDANSETRON 2 MG/ML
4 INJECTION INTRAMUSCULAR; INTRAVENOUS ONCE AS NEEDED
Status: DISCONTINUED | OUTPATIENT
Start: 2023-03-14 | End: 2023-03-14 | Stop reason: HOSPADM

## 2023-03-14 RX ORDER — CEFAZOLIN SODIUM 1 G/3ML
INJECTION, POWDER, FOR SOLUTION INTRAMUSCULAR; INTRAVENOUS AS NEEDED
Status: DISCONTINUED | OUTPATIENT
Start: 2023-03-14 | End: 2023-03-14 | Stop reason: SURG

## 2023-03-14 RX ORDER — BACITRACIN ZINC 500 [USP'U]/G
OINTMENT TOPICAL AS NEEDED
Status: DISCONTINUED | OUTPATIENT
Start: 2023-03-14 | End: 2023-03-14 | Stop reason: HOSPADM

## 2023-03-14 RX ORDER — ONDANSETRON 2 MG/ML
4 INJECTION INTRAMUSCULAR; INTRAVENOUS ONCE AS NEEDED
Status: DISCONTINUED | OUTPATIENT
Start: 2023-03-14 | End: 2023-03-14 | Stop reason: SDUPTHER

## 2023-03-14 RX ORDER — PROPOFOL 10 MG/ML
VIAL (ML) INTRAVENOUS AS NEEDED
Status: DISCONTINUED | OUTPATIENT
Start: 2023-03-14 | End: 2023-03-14 | Stop reason: SURG

## 2023-03-14 RX ORDER — GLYCOPYRROLATE 0.2 MG/ML
INJECTION INTRAMUSCULAR; INTRAVENOUS AS NEEDED
Status: DISCONTINUED | OUTPATIENT
Start: 2023-03-14 | End: 2023-03-14 | Stop reason: SURG

## 2023-03-14 RX ORDER — LIDOCAINE HYDROCHLORIDE 10 MG/ML
0.5 INJECTION, SOLUTION EPIDURAL; INFILTRATION; INTRACAUDAL; PERINEURAL ONCE AS NEEDED
Status: DISCONTINUED | OUTPATIENT
Start: 2023-03-14 | End: 2023-03-14 | Stop reason: SDUPTHER

## 2023-03-14 RX ORDER — IBUPROFEN 600 MG/1
600 TABLET ORAL ONCE AS NEEDED
Status: DISCONTINUED | OUTPATIENT
Start: 2023-03-14 | End: 2023-03-14 | Stop reason: HOSPADM

## 2023-03-14 RX ORDER — LIDOCAINE HYDROCHLORIDE 10 MG/ML
0.5 INJECTION, SOLUTION EPIDURAL; INFILTRATION; INTRACAUDAL; PERINEURAL ONCE AS NEEDED
Status: DISCONTINUED | OUTPATIENT
Start: 2023-03-14 | End: 2023-03-14 | Stop reason: HOSPADM

## 2023-03-14 RX ORDER — HYDROCODONE BITARTRATE AND ACETAMINOPHEN 5; 325 MG/1; MG/1
1 TABLET ORAL EVERY 6 HOURS PRN
Qty: 18 TABLET | Refills: 0 | Status: SHIPPED | OUTPATIENT
Start: 2023-03-14

## 2023-03-14 RX ORDER — ONDANSETRON 2 MG/ML
INJECTION INTRAMUSCULAR; INTRAVENOUS AS NEEDED
Status: DISCONTINUED | OUTPATIENT
Start: 2023-03-14 | End: 2023-03-14 | Stop reason: SURG

## 2023-03-14 RX ORDER — HYDROCODONE BITARTRATE AND ACETAMINOPHEN 5; 325 MG/1; MG/1
1 TABLET ORAL ONCE AS NEEDED
Status: DISCONTINUED | OUTPATIENT
Start: 2023-03-14 | End: 2023-03-14 | Stop reason: HOSPADM

## 2023-03-14 RX ORDER — SODIUM CHLORIDE, SODIUM LACTATE, POTASSIUM CHLORIDE, CALCIUM CHLORIDE 600; 310; 30; 20 MG/100ML; MG/100ML; MG/100ML; MG/100ML
100 INJECTION, SOLUTION INTRAVENOUS CONTINUOUS
Status: DISCONTINUED | OUTPATIENT
Start: 2023-03-14 | End: 2023-03-14 | Stop reason: HOSPADM

## 2023-03-14 RX ORDER — MAGNESIUM HYDROXIDE 1200 MG/15ML
LIQUID ORAL AS NEEDED
Status: DISCONTINUED | OUTPATIENT
Start: 2023-03-14 | End: 2023-03-14 | Stop reason: HOSPADM

## 2023-03-14 RX ORDER — NAPROXEN 250 MG/1
250 TABLET ORAL 2 TIMES DAILY WITH MEALS
Qty: 60 TABLET | Refills: 0 | Status: SHIPPED | OUTPATIENT
Start: 2023-03-14

## 2023-03-14 RX ORDER — ACETAMINOPHEN 500 MG
1000 TABLET ORAL ONCE
Status: COMPLETED | OUTPATIENT
Start: 2023-03-14 | End: 2023-03-14

## 2023-03-14 RX ORDER — ROCURONIUM BROMIDE 10 MG/ML
INJECTION, SOLUTION INTRAVENOUS AS NEEDED
Status: DISCONTINUED | OUTPATIENT
Start: 2023-03-14 | End: 2023-03-14 | Stop reason: SURG

## 2023-03-14 RX ORDER — LABETALOL HYDROCHLORIDE 5 MG/ML
5 INJECTION, SOLUTION INTRAVENOUS
Status: DISCONTINUED | OUTPATIENT
Start: 2023-03-14 | End: 2023-03-14 | Stop reason: HOSPADM

## 2023-03-14 RX ORDER — OXYCODONE AND ACETAMINOPHEN 7.5; 325 MG/1; MG/1
2 TABLET ORAL EVERY 4 HOURS PRN
Status: DISCONTINUED | OUTPATIENT
Start: 2023-03-14 | End: 2023-03-14 | Stop reason: HOSPADM

## 2023-03-14 RX ORDER — FENTANYL CITRATE 50 UG/ML
INJECTION, SOLUTION INTRAMUSCULAR; INTRAVENOUS AS NEEDED
Status: DISCONTINUED | OUTPATIENT
Start: 2023-03-14 | End: 2023-03-14 | Stop reason: SURG

## 2023-03-14 RX ORDER — BUPIVACAINE HCL/0.9 % NACL/PF 0.125 %
PLASTIC BAG, INJECTION (ML) EPIDURAL AS NEEDED
Status: DISCONTINUED | OUTPATIENT
Start: 2023-03-14 | End: 2023-03-14 | Stop reason: SURG

## 2023-03-14 RX ORDER — ONDANSETRON 4 MG/1
4 TABLET, ORALLY DISINTEGRATING ORAL EVERY 6 HOURS PRN
Qty: 6 TABLET | Refills: 1 | Status: SHIPPED | OUTPATIENT
Start: 2023-03-14

## 2023-03-14 RX ORDER — SODIUM CHLORIDE 0.9 % (FLUSH) 0.9 %
3-10 SYRINGE (ML) INJECTION AS NEEDED
Status: DISCONTINUED | OUTPATIENT
Start: 2023-03-14 | End: 2023-03-14 | Stop reason: HOSPADM

## 2023-03-14 RX ORDER — SODIUM CHLORIDE 0.9 % (FLUSH) 0.9 %
3 SYRINGE (ML) INJECTION AS NEEDED
Status: DISCONTINUED | OUTPATIENT
Start: 2023-03-14 | End: 2023-03-14 | Stop reason: HOSPADM

## 2023-03-14 RX ORDER — SODIUM CHLORIDE, SODIUM LACTATE, POTASSIUM CHLORIDE, CALCIUM CHLORIDE 600; 310; 30; 20 MG/100ML; MG/100ML; MG/100ML; MG/100ML
1000 INJECTION, SOLUTION INTRAVENOUS CONTINUOUS
Status: DISCONTINUED | OUTPATIENT
Start: 2023-03-14 | End: 2023-03-14 | Stop reason: HOSPADM

## 2023-03-14 RX ORDER — SODIUM CHLORIDE 9 MG/ML
40 INJECTION, SOLUTION INTRAVENOUS AS NEEDED
Status: DISCONTINUED | OUTPATIENT
Start: 2023-03-14 | End: 2023-03-14 | Stop reason: HOSPADM

## 2023-03-14 RX ORDER — LIDOCAINE HYDROCHLORIDE 20 MG/ML
INJECTION, SOLUTION EPIDURAL; INFILTRATION; INTRACAUDAL; PERINEURAL AS NEEDED
Status: DISCONTINUED | OUTPATIENT
Start: 2023-03-14 | End: 2023-03-14 | Stop reason: SURG

## 2023-03-14 RX ORDER — MIDAZOLAM HYDROCHLORIDE 1 MG/ML
0.5 INJECTION INTRAMUSCULAR; INTRAVENOUS
Status: DISCONTINUED | OUTPATIENT
Start: 2023-03-14 | End: 2023-03-14 | Stop reason: HOSPADM

## 2023-03-14 RX ADMIN — Medication 100 MCG: at 13:06

## 2023-03-14 RX ADMIN — SODIUM CHLORIDE, POTASSIUM CHLORIDE, SODIUM LACTATE AND CALCIUM CHLORIDE 1000 ML: 600; 310; 30; 20 INJECTION, SOLUTION INTRAVENOUS at 09:40

## 2023-03-14 RX ADMIN — KETOROLAC TROMETHAMINE 15 MG: 30 INJECTION, SOLUTION INTRAMUSCULAR; INTRAVENOUS at 13:56

## 2023-03-14 RX ADMIN — LIDOCAINE HYDROCHLORIDE 80 MG: 20 INJECTION, SOLUTION EPIDURAL; INFILTRATION; INTRACAUDAL; PERINEURAL at 12:59

## 2023-03-14 RX ADMIN — PROPOFOL 150 MG: 10 INJECTION, EMULSION INTRAVENOUS at 12:59

## 2023-03-14 RX ADMIN — GENTAMICIN SULFATE 510 MG: 40 INJECTION, SOLUTION INTRAMUSCULAR; INTRAVENOUS at 11:53

## 2023-03-14 RX ADMIN — Medication 4 MG: at 13:46

## 2023-03-14 RX ADMIN — CEFAZOLIN SODIUM 2 G: 1 INJECTION, POWDER, FOR SOLUTION INTRAMUSCULAR; INTRAVENOUS at 13:05

## 2023-03-14 RX ADMIN — ROCURONIUM BROMIDE 50 MG: 10 INJECTION, SOLUTION INTRAVENOUS at 12:59

## 2023-03-14 RX ADMIN — ACETAMINOPHEN 1000 MG: 500 TABLET, FILM COATED ORAL at 12:02

## 2023-03-14 RX ADMIN — GLYCOPYRROLATE 0.4 MCG: 0.2 INJECTION INTRAMUSCULAR; INTRAVENOUS at 13:45

## 2023-03-14 RX ADMIN — FENTANYL CITRATE 100 MCG: 50 INJECTION INTRAMUSCULAR; INTRAVENOUS at 12:59

## 2023-03-14 RX ADMIN — ONDANSETRON 4 MG: 2 INJECTION INTRAMUSCULAR; INTRAVENOUS at 13:35

## 2023-03-14 NOTE — ANESTHESIA PREPROCEDURE EVALUATION
Anesthesia Evaluation     no history of anesthetic complications:  NPO Solid Status: > 8 hours  NPO Liquid Status: > 8 hours           Airway   Mallampati: I  TM distance: >3 FB  Neck ROM: full  No difficulty expected  Dental      Pulmonary    (-) sleep apnea, not a smoker  Cardiovascular   Exercise tolerance: good (4-7 METS)    (+) hypertension, hyperlipidemia,   (-) CAD      Neuro/Psych  (-) seizures, TIA, CVA  GI/Hepatic/Renal/Endo    (+)   renal disease CRI, diabetes mellitus type 2,   (-) liver disease    Musculoskeletal     Abdominal    Substance History      OB/GYN          Other   arthritis, blood dyscrasia (polycythemia vera, monitoring),                     Anesthesia Plan    ASA 2     general     intravenous induction     Anesthetic plan, risks, benefits, and alternatives have been provided, discussed and informed consent has been obtained with: patient.        CODE STATUS:

## 2023-03-14 NOTE — NURSING NOTE
Pt denies pain or nausea at this time. Pt complains of feeling cold. Warm blankets applied to pt. Pt tolerating sips of water.

## 2023-03-14 NOTE — OP NOTE
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 [N52.01]    Procedure/CPT® Codes:  3 piece inflatable penile prosthesis placement    Procedure(s):  3-PIECE INFLATABLE PENILE PROSTHESIS PLACEMENT (COLOPLAST TITAN TOUCH)    Surgeon(s):  Garcia Santana MD    Anesthesia: General    Staff:   Circulator: Sary Simms RN; Manas Blanchard RN  Scrub Person: Adam Lin; Zuleyma Cheatham  Vendor Representative: Adelso Cary    Indications for procedure:  83-year-old male with 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  After adequate induction of general anesthesia, he is placed into the supine frog-leg position and his genitalia were sterilely prepped and draped.  A final timeout was performed.  I placed a Miller catheter.  I made a high transverse scrotal incision and a retractor was placed.  Corpora cavernosa were identified bilaterally.  Stay sutures of 2-0 PDS were placed vertically between which corporotomies were made with a knife and sequentially dilated starting with Metzenbaum scissors followed by Carpenter dilators 8 mm to 12 mm.  Measurements were taken both in the left and right side.  The left and right corpora measured 19 cm.  a Corpora were irrigated and there was no evidence of urethral injury.  Placed 30 cc of 0.5% bupivacaine for bilateral corporal and penile blocks. I then used the 125 cc reservoir.  This was prepared in the standard fashion dipped in antibiotic solution.  I placed the reservoir in the left retropubic space via the penoscrotal incision on the  left.  Transversalis fascia was pierced and a space behind the pubic bone was created bluntly.  The reservoir was placed in the space and filled with 90 cc of filling solution.  The Coloplast Titan touch penoscrotal preconnect 18 cm cylinders were opened on the field.  A 1 cm rear-tip extender was used on the left and a 1 cm rear-tip extender on the right.  Components were prepared in the standard fashion all air bubbles were removed.  The components were dipped in antibiotic solution.  I placed the cylinders in the left corpora followed by the right corpora in the standard fashion using the Noa insertion tool.  A dartos pouch was created for the scrotal pump in the anterior scrotum.  The cylinders were seated properly and a test inflation was performed showing excellent positioning and good straight rigidity.  Cylinders were deflated and corporotomies were closed using the previously placed stay sutures of 2-0 PDS.  Tubing connections were made using the tubing connectors.  The pump was placed in the scrotal dartos pouch.  The wound was copiously irrigated.  Dartos was closed in 2 layers using running 3-0 Vicryl.  Skin was closed using running 3-0 Monocryl.  The bladder is drained.  Sterile dressing consisting of bacitracin, Telfa, and Kerlix mummy wrap dressing with compressive tape was applied.  All sponge and needle counts were correct.  The Miller catheter was removed and the patient was taken to the recovery room in stable condition.      Estimated Blood Loss: 100ml    Specimens:                None      Drains: * No LDAs found *    Complications: none

## 2023-03-14 NOTE — ANESTHESIA PROCEDURE NOTES
Airway  Urgency: elective    Airway not difficult    General Information and Staff    Patient location during procedure: OR  CRNA/CAA: Wayne Kahn CRNA    Indications and Patient Condition  Indications for airway management: airway protection    Preoxygenated: yes  Mask difficulty assessment: 1 - vent by mask    Final Airway Details  Final airway type: endotracheal airway      Successful airway: ETT  Cuffed: yes   Successful intubation technique: direct laryngoscopy  Facilitating devices/methods: intubating stylet  Blade: Bocnaegra  Blade size: 2  ETT size (mm): 7.5  Cormack-Lehane Classification: grade I - full view of glottis  Placement verified by: chest auscultation and capnometry   Measured from: teeth  ETT/EBT  to teeth (cm): 23  Number of attempts at approach: 1  Assessment: lips, teeth, and gum same as pre-op and atraumatic intubation

## 2023-03-14 NOTE — ANESTHESIA POSTPROCEDURE EVALUATION
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 [N52.01])    Surgeons: Garcia Santana MD Provider: Wayne Kahn CRNA    Anesthesia Type: general ASA Status: 2          Anesthesia Type: general    Vitals  Vitals Value Taken Time   /50 03/14/23 1434   Temp 97.1 °F (36.2 °C) 03/14/23 1358   Pulse 56 03/14/23 1434   Resp 16 03/14/23 1434   SpO2 95 % 03/14/23 1434           Post Anesthesia Care and Evaluation    Patient location during evaluation: PACU  Patient participation: complete - patient participated  Level of consciousness: awake and alert  Pain management: adequate    Airway patency: patent  Anesthetic complications: No anesthetic complications    Cardiovascular status: acceptable  Respiratory status: acceptable  Hydration status: acceptable    Comments: Blood pressure 132/57, pulse 59, temperature 97.1 °F (36.2 °C), temperature source Temporal, resp. rate 16, SpO2 97 %.    Pt discharged from PACU based on rc score >8

## 2023-03-17 NOTE — PROGRESS NOTES
Subjective    Mr. Alatorre is 83 y.o. male    Chief Complaint: Postoperative visit    History of Present Illness    83-year-old male established patient with hypertension, hyperlipidemia, and diabetes follow-up after placement of Coloplast Titan touch three-piece inflatable penile implant 3/14/2023.      The following portions of the patient's history were reviewed and updated as appropriate: allergies, current medications, past family history, past medical history, past social history, past surgical history and problem list.    Review of Systems      Current Outpatient Medications:   •  acetaminophen (TYLENOL) 500 MG tablet, Take 1 tablet by mouth Daily As Needed for Mild Pain., Disp: , Rfl:   •  allopurinol (ZYLOPRIM) 300 MG tablet, TAKE ONE TABLET BY MOUTH EVERY DAY, Disp: 90 tablet, Rfl: 3  •  amLODIPine (NORVASC) 5 MG tablet, Take 1 tablet by mouth Daily., Disp: , Rfl:   •  atorvastatin (LIPITOR) 20 MG tablet, TAKE ONE TABLET BY MOUTH NIGHTLY, Disp: 90 tablet, Rfl: 3  •  diphenhydrAMINE-APAP, sleep, (TYLENOL PM EXTRA STRENGTH PO), Take  by mouth Every Night., Disp: , Rfl:   •  docusate sodium (Colace) 100 MG capsule, Take 1 capsule by mouth 2 (Two) Times a Day., Disp: 60 capsule, Rfl: 1  •  HYDROcodone-acetaminophen (NORCO) 5-325 MG per tablet, Take 1 tablet by mouth Every 6 (Six) Hours As Needed for Severe Pain (postop pain)., Disp: 18 tablet, Rfl: 0  •  losartan (COZAAR) 100 MG tablet, TAKE ONE TABLET BY MOUTH EVERY DAY, Disp: 90 tablet, Rfl: 3  •  metFORMIN (Glucophage) 500 MG tablet, Take 1 tablet by mouth 2 (Two) Times a Day With Meals., Disp: 180 tablet, Rfl: 3  •  naproxen (NAPROSYN) 250 MG tablet, Take 1 tablet by mouth 2 (Two) Times a Day With Meals., Disp: 60 tablet, Rfl: 0  •  ondansetron ODT (ZOFRAN-ODT) 4 MG disintegrating tablet, Place 1 tablet on the tongue Every 6 (Six) Hours As Needed for Nausea., Disp: 6 tablet, Rfl: 1  •  sulfamethoxazole-trimethoprim (BACTRIM DS,SEPTRA DS) 800-160 MG per  tablet, Take 1 tablet by mouth 2 (Two) Times a Day for 14 days., Disp: 28 tablet, Rfl: 0  •  vitamin D3 125 MCG (5000 UT) capsule capsule, Take 1 capsule by mouth Daily., Disp: , Rfl:     Past Medical History:   Diagnosis Date   • Arthritis    • Chronic kidney disease    • Erectile dysfunction    • Gout    • History of diverticulitis    • HTN (hypertension)    • Hx of adenomatous colonic polyps 10/13/2020   • Hypercholesteremia    • Low testosterone    • Polycythemia vera (HCC) 10/13/2020   • Prostate cancer (HCC)    • Squamous cell carcinoma of skin 10/2021    top of head   • Type 2 diabetes mellitus without complication, without long-term current use of insulin (HCC) 2022       Past Surgical History:   Procedure Laterality Date   • CAROTID ENDARTERECTOMY Left    • CATARACT EXTRACTION, BILATERAL     • COLON SURGERY     • COLONOSCOPY     • COLONOSCOPY N/A 2021    Procedure: COLONOSCOPY WITH ANESTHESIA;  Surgeon: Luciano Alatorre DO;  Location:  [facility];  Service: Gastroenterology;  Laterality: N/A;  preop; hx of polyps  postop; diverticulosis   PCP Devon Moore    • HEMORROIDECTOMY     • HERNIA REPAIR     • PENILE PROSTHESIS IMPLANT N/A 2023    Procedure: 3-PIECE INFLATABLE PENILE PROSTHESIS PLACEMENT;  Surgeon: Garcia Santana MD;  [facility]: [facility];  Service: Urology;  Laterality: N/A;   • VASECTOMY         Social History     Socioeconomic History   • Marital status:    Tobacco Use   • Smoking status: Former     Packs/day: 1.00     Years: 46.00     Pack years: 46.00     Types: Cigarettes     Quit date: 1980     Years since quittin.7     Passive exposure: Past   • Smokeless tobacco: Never   • Tobacco comments:     N/A   Vaping Use   • Vaping Use: Never used   Substance and Sexual Activity   • Alcohol use: Not Currently     Comment: Quite 20+ yrs ago   • Drug use: Never   • Sexual activity: Yes     Partners: Female     Birth control/protection: Vasectomy  "      Family History   Problem Relation Age of Onset   • No Known Problems Mother    • Heart attack Father    • No Known Problems Maternal Grandmother    • No Known Problems Maternal Grandfather    • No Known Problems Paternal Grandmother    • No Known Problems Paternal Grandfather    • No Known Problems Son    • No Known Problems Daughter    • Parkinsonism Brother    • Colon cancer Neg Hx    • Colon polyps Neg Hx    • Esophageal cancer Neg Hx        Objective    Temp 97.2 °F (36.2 °C)   Ht 177.8 cm (70\")   Wt 84.7 kg (186 lb 12.8 oz)   BMI 26.80 kg/m²     Physical Exam  Penile implant in excellent position with cylinder tips at mid glans.  Incision clean dry and intact, no signs of infection.  His implant was deflated today.      Results for orders placed or performed during the hospital encounter of 03/14/23   POC Glucose Once    Specimen: Blood   Result Value Ref Range    Glucose 113 70 - 130 mg/dL   POC Glucose Once    Specimen: Blood   Result Value Ref Range    Glucose 111 70 - 130 mg/dL     Assessment and Plan    Diagnoses and all orders for this visit:    1. Postoperative visit (Primary)      Healing well after three-piece inflatable penile implant.  He will follow-up with me in 4 to 6 weeks for device teaching.  Complete antibiotics.      This document has been signed by SUNSHINE Santana MD on March 24, 2023 10:12 CDT              "

## 2023-03-22 ENCOUNTER — OFFICE VISIT (OUTPATIENT)
Dept: UROLOGY | Facility: CLINIC | Age: 83
End: 2023-03-22
Payer: MEDICARE

## 2023-03-22 VITALS — WEIGHT: 186.8 LBS | TEMPERATURE: 97.2 F | HEIGHT: 70 IN | BODY MASS INDEX: 26.74 KG/M2

## 2023-03-22 DIAGNOSIS — Z48.89 POSTOPERATIVE VISIT: Primary | ICD-10-CM

## 2023-03-22 PROCEDURE — 1159F MED LIST DOCD IN RCRD: CPT | Performed by: UROLOGY

## 2023-03-22 PROCEDURE — 1160F RVW MEDS BY RX/DR IN RCRD: CPT | Performed by: UROLOGY

## 2023-03-22 PROCEDURE — 99024 POSTOP FOLLOW-UP VISIT: CPT | Performed by: UROLOGY

## 2023-04-03 ENCOUNTER — TELEPHONE (OUTPATIENT)
Dept: UROLOGY | Facility: CLINIC | Age: 83
End: 2023-04-03
Payer: MEDICARE

## 2023-04-03 NOTE — TELEPHONE ENCOUNTER
----- Message from Nilsa German sent at 4/3/2023 10:14 AM CDT -----  Regarding: patient question  Patient is 4 week post op for penile implant.  He is wanting to know if he can mow his lawn with a riding ?

## 2023-04-14 NOTE — PROGRESS NOTES
Subjective    Mr. Alatorre is 83 y.o. male    Chief Complaint: Postoperative visit    History of Present Illness    83-year-old male established patient with hypertension, hyperlipidemia, and diabetes follow-up after placement of Coloplast Titan touch three-piece inflatable penile implant 3/14/2023    The following portions of the patient's history were reviewed and updated as appropriate: allergies, current medications, past family history, past medical history, past social history, past surgical history and problem list.    Review of Systems      Current Outpatient Medications:   •  acetaminophen (TYLENOL) 500 MG tablet, Take 1 tablet by mouth Daily As Needed for Mild Pain., Disp: , Rfl:   •  allopurinol (ZYLOPRIM) 300 MG tablet, TAKE ONE TABLET BY MOUTH EVERY DAY, Disp: 90 tablet, Rfl: 3  •  amLODIPine (NORVASC) 5 MG tablet, Take 1 tablet by mouth Daily., Disp: , Rfl:   •  atorvastatin (LIPITOR) 20 MG tablet, TAKE ONE TABLET BY MOUTH NIGHTLY, Disp: 90 tablet, Rfl: 3  •  diphenhydrAMINE-APAP, sleep, (TYLENOL PM EXTRA STRENGTH PO), Take  by mouth Every Night., Disp: , Rfl:   •  docusate sodium (Colace) 100 MG capsule, Take 1 capsule by mouth 2 (Two) Times a Day., Disp: 60 capsule, Rfl: 1  •  HYDROcodone-acetaminophen (NORCO) 5-325 MG per tablet, Take 1 tablet by mouth Every 6 (Six) Hours As Needed for Severe Pain (postop pain)., Disp: 18 tablet, Rfl: 0  •  losartan (COZAAR) 100 MG tablet, TAKE ONE TABLET BY MOUTH EVERY DAY, Disp: 90 tablet, Rfl: 3  •  metFORMIN (Glucophage) 500 MG tablet, Take 1 tablet by mouth 2 (Two) Times a Day With Meals., Disp: 180 tablet, Rfl: 3  •  naproxen (NAPROSYN) 250 MG tablet, Take 1 tablet by mouth 2 (Two) Times a Day With Meals., Disp: 60 tablet, Rfl: 0  •  ondansetron ODT (ZOFRAN-ODT) 4 MG disintegrating tablet, Place 1 tablet on the tongue Every 6 (Six) Hours As Needed for Nausea., Disp: 6 tablet, Rfl: 1  •  vitamin D3 125 MCG (5000 UT) capsule capsule, Take 1 capsule by mouth  Daily., Disp: , Rfl:     Past Medical History:   Diagnosis Date   • Arthritis    • Chronic kidney disease    • Erectile dysfunction    • Gout    • History of diverticulitis    • HTN (hypertension)    • Hx of adenomatous colonic polyps 10/13/2020   • Hypercholesteremia    • Low testosterone    • Polycythemia vera 10/13/2020   • Prostate cancer    • Squamous cell carcinoma of skin 10/2021    top of head   • Type 2 diabetes mellitus without complication, without long-term current use of insulin 2022       Past Surgical History:   Procedure Laterality Date   • CAROTID ENDARTERECTOMY Left    • CATARACT EXTRACTION, BILATERAL     • COLON SURGERY     • COLONOSCOPY     • COLONOSCOPY N/A 2021    Procedure: COLONOSCOPY WITH ANESTHESIA;  Surgeon: Luciano Alatorre DO;  Location: [facility] ENDOSCOPY;  Service: Gastroenterology;  Laterality: N/A;  preop; hx of polyps  postop; diverticulosis   PCP Devon Moore    • HEMORROIDECTOMY     • HERNIA REPAIR     • PENILE PROSTHESIS IMPLANT N/A 2023    Procedure: 3-PIECE INFLATABLE PENILE PROSTHESIS PLACEMENT;  Surgeon: Garcia Santana MD;  Location:  [facility] OR;  Service: Urology;  Laterality: N/A;   • VASECTOMY         Social History     Socioeconomic History   • Marital status:    Tobacco Use   • Smoking status: Former     Packs/day: 1.00     Years: 46.00     Pack years: 46.00     Types: Cigarettes     Quit date: 1980     Years since quittin.8     Passive exposure: Past   • Smokeless tobacco: Never   • Tobacco comments:     N/A   Vaping Use   • Vaping Use: Never used   Substance and Sexual Activity   • Alcohol use: Not Currently     Comment: Quite 20+ yrs ago   • Drug use: Never   • Sexual activity: Yes     Partners: Female     Birth control/protection: Vasectomy       Family History   Problem Relation Age of Onset   • No Known Problems Mother    • Heart attack Father    • No Known Problems Maternal Grandmother    • No Known Problems Maternal  Grandfather    • No Known Problems Paternal Grandmother    • No Known Problems Paternal Grandfather    • No Known Problems Son    • No Known Problems Daughter    • Parkinsonism Brother    • Colon cancer Neg Hx    • Colon polyps Neg Hx    • Esophageal cancer Neg Hx        Objective    There were no vitals taken for this visit.    Physical Exam  Incision well-healed, no signs of infection.  Implant in appropriate position and inflates and deflates normally with good result.  He does have some residual tenderness underneath the skin in the scrotum preventing full inflation today.      Results for orders placed or performed during the hospital encounter of 03/14/23   POC Glucose Once    Specimen: Blood   Result Value Ref Range    Glucose 113 70 - 130 mg/dL   POC Glucose Once    Specimen: Blood   Result Value Ref Range    Glucose 111 70 - 130 mg/dL     Assessment and Plan    Diagnoses and all orders for this visit:    1. Postoperative visit (Primary)      Healing well after three-piece inflatable penile implant.  We discussed that his residual tenderness will improve over the next several weeks.  He was instructed on inflation and deflation today. He will follow-up with me on an as-needed basis.      This document has been signed by SUNSHINE Santana MD on April 19, 2023 12:49 CDT

## 2023-04-17 ENCOUNTER — OFFICE VISIT (OUTPATIENT)
Dept: FAMILY MEDICINE CLINIC | Facility: CLINIC | Age: 83
End: 2023-04-17
Payer: MEDICARE

## 2023-04-17 VITALS
HEIGHT: 70 IN | DIASTOLIC BLOOD PRESSURE: 70 MMHG | BODY MASS INDEX: 27.26 KG/M2 | SYSTOLIC BLOOD PRESSURE: 131 MMHG | WEIGHT: 190.4 LBS | OXYGEN SATURATION: 100 % | HEART RATE: 79 BPM | TEMPERATURE: 97.1 F

## 2023-04-17 DIAGNOSIS — R53.83 FATIGUE, UNSPECIFIED TYPE: ICD-10-CM

## 2023-04-17 DIAGNOSIS — E78.2 MIXED HYPERLIPIDEMIA: Primary | ICD-10-CM

## 2023-04-17 DIAGNOSIS — R73.9 ELEVATED BLOOD SUGAR: ICD-10-CM

## 2023-04-18 DIAGNOSIS — D64.9 LOW HEMOGLOBIN: Primary | ICD-10-CM

## 2023-04-18 LAB
ALBUMIN SERPL-MCNC: 4 G/DL (ref 3.5–5.2)
ALBUMIN/GLOB SERPL: 1.8 G/DL
ALP SERPL-CCNC: 103 U/L (ref 39–117)
ALT SERPL-CCNC: 8 U/L (ref 1–41)
AST SERPL-CCNC: 12 U/L (ref 1–40)
BASOPHILS # BLD AUTO: 0.06 10*3/MM3 (ref 0–0.2)
BASOPHILS NFR BLD AUTO: 0.7 % (ref 0–1.5)
BILIRUB SERPL-MCNC: 0.3 MG/DL (ref 0–1.2)
BUN SERPL-MCNC: 62 MG/DL (ref 8–23)
BUN/CREAT SERPL: 41.3 (ref 7–25)
CALCIUM SERPL-MCNC: 10.1 MG/DL (ref 8.6–10.5)
CHLORIDE SERPL-SCNC: 107 MMOL/L (ref 98–107)
CHOLEST SERPL-MCNC: 117 MG/DL (ref 0–200)
CO2 SERPL-SCNC: 22.5 MMOL/L (ref 22–29)
CREAT SERPL-MCNC: 1.5 MG/DL (ref 0.76–1.27)
EGFRCR SERPLBLD CKD-EPI 2021: 45.9 ML/MIN/1.73
EOSINOPHIL # BLD AUTO: 0.21 10*3/MM3 (ref 0–0.4)
EOSINOPHIL NFR BLD AUTO: 2.3 % (ref 0.3–6.2)
ERYTHROCYTE [DISTWIDTH] IN BLOOD BY AUTOMATED COUNT: 12.9 % (ref 12.3–15.4)
GLOBULIN SER CALC-MCNC: 2.2 GM/DL
GLUCOSE SERPL-MCNC: 112 MG/DL (ref 65–99)
HBA1C MFR BLD: 6.1 % (ref 4.8–5.6)
HCT VFR BLD AUTO: 29.6 % (ref 37.5–51)
HDLC SERPL-MCNC: 37 MG/DL (ref 40–60)
HGB BLD-MCNC: 9.9 G/DL (ref 13–17.7)
IMM GRANULOCYTES # BLD AUTO: 0.02 10*3/MM3 (ref 0–0.05)
IMM GRANULOCYTES NFR BLD AUTO: 0.2 % (ref 0–0.5)
LDLC SERPL CALC-MCNC: 59 MG/DL (ref 0–100)
LYMPHOCYTES # BLD AUTO: 1.57 10*3/MM3 (ref 0.7–3.1)
LYMPHOCYTES NFR BLD AUTO: 17.2 % (ref 19.6–45.3)
MCH RBC QN AUTO: 33.3 PG (ref 26.6–33)
MCHC RBC AUTO-ENTMCNC: 33.4 G/DL (ref 31.5–35.7)
MCV RBC AUTO: 99.7 FL (ref 79–97)
MONOCYTES # BLD AUTO: 0.59 10*3/MM3 (ref 0.1–0.9)
MONOCYTES NFR BLD AUTO: 6.5 % (ref 5–12)
NEUTROPHILS # BLD AUTO: 6.66 10*3/MM3 (ref 1.7–7)
NEUTROPHILS NFR BLD AUTO: 73.1 % (ref 42.7–76)
NRBC BLD AUTO-RTO: 0 /100 WBC (ref 0–0.2)
PLATELET # BLD AUTO: 195 10*3/MM3 (ref 140–450)
POTASSIUM SERPL-SCNC: 5.1 MMOL/L (ref 3.5–5.2)
PROT SERPL-MCNC: 6.2 G/DL (ref 6–8.5)
RBC # BLD AUTO: 2.97 10*6/MM3 (ref 4.14–5.8)
SODIUM SERPL-SCNC: 141 MMOL/L (ref 136–145)
TRIGL SERPL-MCNC: 116 MG/DL (ref 0–150)
VLDLC SERPL CALC-MCNC: 21 MG/DL (ref 5–40)
WBC # BLD AUTO: 9.11 10*3/MM3 (ref 3.4–10.8)

## 2023-04-18 RX ORDER — FERROUS SULFATE 325(65) MG
325 TABLET ORAL
Qty: 30 TABLET | Refills: 3 | Status: SHIPPED | OUTPATIENT
Start: 2023-04-18

## 2023-04-19 ENCOUNTER — OFFICE VISIT (OUTPATIENT)
Dept: UROLOGY | Facility: CLINIC | Age: 83
End: 2023-04-19
Payer: MEDICARE

## 2023-04-19 DIAGNOSIS — Z48.89 POSTOPERATIVE VISIT: Primary | ICD-10-CM

## 2023-04-19 PROCEDURE — 1160F RVW MEDS BY RX/DR IN RCRD: CPT | Performed by: UROLOGY

## 2023-04-19 PROCEDURE — 99024 POSTOP FOLLOW-UP VISIT: CPT | Performed by: UROLOGY

## 2023-04-19 PROCEDURE — 1159F MED LIST DOCD IN RCRD: CPT | Performed by: UROLOGY

## 2023-04-19 NOTE — LETTER
April 19, 2023     Devon Vazquez MD  [address] [facility] [address]    Patient: Jeff Alatorre   YOB: 1940   Date of Visit: 4/19/2023     Dear Dr. George MD:    Thank you for referring Jeff Alatorre to me for evaluation. Below are the relevant portions of my assessment and plan of care.    If you have questions, please do not hesitate to call me. I look forward to following Jeff along with you.         Sincerely,        Garcia Santana MD        CC: No Recipients      Progress Notes:  Subjective    Mr. Alatorre is 83 y.o. male    Chief Complaint: Postoperative visit    History of Present Illness    83-year-old male established patient with hypertension, hyperlipidemia, and diabetes follow-up after placement of Coloplast Titan touch three-piece inflatable penile implant 3/14/2023    The following portions of the patient's history were reviewed and updated as appropriate: allergies, current medications, past family history, past medical history, past social history, past surgical history and problem list.    Review of Systems      Current Outpatient Medications:   •  acetaminophen (TYLENOL) 500 MG tablet, Take 1 tablet by mouth Daily As Needed for Mild Pain., Disp: , Rfl:   •  allopurinol (ZYLOPRIM) 300 MG tablet, TAKE ONE TABLET BY MOUTH EVERY DAY, Disp: 90 tablet, Rfl: 3  •  amLODIPine (NORVASC) 5 MG tablet, Take 1 tablet by mouth Daily., Disp: , Rfl:   •  atorvastatin (LIPITOR) 20 MG tablet, TAKE ONE TABLET BY MOUTH NIGHTLY, Disp: 90 tablet, Rfl: 3  •  diphenhydrAMINE-APAP, sleep, (TYLENOL PM EXTRA STRENGTH PO), Take  by mouth Every Night., Disp: , Rfl:   •  docusate sodium (Colace) 100 MG capsule, Take 1 capsule by mouth 2 (Two) Times a Day., Disp: 60 capsule, Rfl: 1  •  HYDROcodone-acetaminophen (NORCO) 5-325 MG per tablet, Take 1 tablet by mouth Every 6 (Six) Hours As Needed for Severe Pain (postop pain)., Disp: 18 tablet, Rfl: 0  •  losartan (COZAAR) 100 MG tablet, TAKE ONE  TABLET BY MOUTH EVERY DAY, Disp: 90 tablet, Rfl: 3  •  metFORMIN (Glucophage) 500 MG tablet, Take 1 tablet by mouth 2 (Two) Times a Day With Meals., Disp: 180 tablet, Rfl: 3  •  naproxen (NAPROSYN) 250 MG tablet, Take 1 tablet by mouth 2 (Two) Times a Day With Meals., Disp: 60 tablet, Rfl: 0  •  ondansetron ODT (ZOFRAN-ODT) 4 MG disintegrating tablet, Place 1 tablet on the tongue Every 6 (Six) Hours As Needed for Nausea., Disp: 6 tablet, Rfl: 1  •  vitamin D3 125 MCG (5000 UT) capsule capsule, Take 1 capsule by mouth Daily., Disp: , Rfl:     Past Medical History:   Diagnosis Date   • Arthritis    • Chronic kidney disease    • Erectile dysfunction    • Gout    • History of diverticulitis    • HTN (hypertension)    • Hx of adenomatous colonic polyps 10/13/2020   • Hypercholesteremia    • Low testosterone    • Polycythemia vera 10/13/2020   • Prostate cancer    • Squamous cell carcinoma of skin 10/2021    top of head   • Type 2 diabetes mellitus without complication, without long-term current use of insulin 12/01/2022       Past Surgical History:   Procedure Laterality Date   • CAROTID ENDARTERECTOMY Left    • CATARACT EXTRACTION, BILATERAL     • COLON SURGERY     • COLONOSCOPY     • COLONOSCOPY N/A 07/27/2021    Procedure: COLONOSCOPY WITH ANESTHESIA;  Surgeon: Luciano Alatorre DO;  Location: [facility] ENDOSCOPY;  Service: Gastroenterology;  Laterality: N/A;  preop; hx of polyps  postop; diverticulosis   PCP Devon Brayanlte    • HEMORROIDECTOMY     • HERNIA REPAIR     • PENILE PROSTHESIS IMPLANT N/A 03/14/2023    Procedure: 3-PIECE INFLATABLE PENILE PROSTHESIS PLACEMENT;  Surgeon: Garcia Santana MD;  Location: [facility] OR;  Service: Urology;  Laterality: N/A;   • VASECTOMY         Social History     Socioeconomic History   • Marital status:    Tobacco Use   • Smoking status: Former     Packs/day: 1.00     Years: 46.00     Pack years: 46.00     Types: Cigarettes     Quit date: 7/4/1980     Years since quitting:  42.8     Passive exposure: Past   • Smokeless tobacco: Never   • Tobacco comments:     N/A   Vaping Use   • Vaping Use: Never used   Substance and Sexual Activity   • Alcohol use: Not Currently     Comment: Quite 20+ yrs ago   • Drug use: Never   • Sexual activity: Yes     Partners: Female     Birth control/protection: Vasectomy       Family History   Problem Relation Age of Onset   • No Known Problems Mother    • Heart attack Father    • No Known Problems Maternal Grandmother    • No Known Problems Maternal Grandfather    • No Known Problems Paternal Grandmother    • No Known Problems Paternal Grandfather    • No Known Problems Son    • No Known Problems Daughter    • Parkinsonism Brother    • Colon cancer Neg Hx    • Colon polyps Neg Hx    • Esophageal cancer Neg Hx        Objective    There were no vitals taken for this visit.    Physical Exam  Incision well-healed, no signs of infection.  Implant in appropriate position and inflates and deflates normally with good result.  He does have some residual tenderness underneath the skin in the scrotum preventing full inflation today.      Results for orders placed or performed during the hospital encounter of 03/14/23   POC Glucose Once    Specimen: Blood   Result Value Ref Range    Glucose 113 70 - 130 mg/dL   POC Glucose Once    Specimen: Blood   Result Value Ref Range    Glucose 111 70 - 130 mg/dL     Assessment and Plan    Diagnoses and all orders for this visit:    1. Postoperative visit (Primary)      Healing well after three-piece inflatable penile implant.  We discussed that his residual tenderness will improve over the next several weeks.  He was instructed on inflation and deflation today. He will follow-up with me on an as-needed basis.      This document has been signed by SUNHSINE Santana MD on April 19, 2023 12:49 CDT

## 2023-04-20 ENCOUNTER — TELEPHONE (OUTPATIENT)
Dept: ONCOLOGY | Facility: CLINIC | Age: 83
End: 2023-04-20

## 2023-04-20 NOTE — TELEPHONE ENCOUNTER
Caller: Jeff Alatorre    Relationship: Self    Best call back number: [phone]    What is the best time to reach you: ANY    Who are you requesting to speak with (clinical staff, provider,  specific staff member): CLINICAL     What was the call regarding: JEFF IS CALLING WANTING ALONZO CHAN TO BE AWARE OF HIS LABS THAT HE HAD ON 4-17.    HE HAS AN APPT WITH ALONZO ON 4-26, BUT JEFF STATES HE IS DIZZY AND NOT FEELING WELL.     HE WOULD LIKE TO GET A SOONER APPT    Do you require a callback: YES

## 2023-04-21 DIAGNOSIS — D75.1 POLYCYTHEMIA: ICD-10-CM

## 2023-04-21 DIAGNOSIS — D75.1 POLYCYTHEMIA: Primary | ICD-10-CM

## 2023-04-21 DIAGNOSIS — N18.9 CHRONIC KIDNEY DISEASE, UNSPECIFIED CKD STAGE: Primary | ICD-10-CM

## 2023-04-21 DIAGNOSIS — N18.9 CHRONIC KIDNEY DISEASE, UNSPECIFIED CKD STAGE: ICD-10-CM

## 2023-04-25 NOTE — PROGRESS NOTES
[facility] HEMATOLOGY & ONCOLOGY [place]  [address]  [facility] [place] [facility] [address] [phone]    Patient Name: Jeff Alatorre  Encounter Date: 04/26/2023   YOB: 1940  Patient Number: [id number]    Hematology / Oncology Progress Note      HPI / REASON FOR VISIT: Jeff Alatorre is a 83 y.o. male who is followed by this office for polycythemia which was diagnosed many years ago and believed to be related to testosterone injections.  Bone marrow biopsy in either 2012 or 2013 which showed 55-65% cellularity, prominent erythroid hyperplasia with no simultaneous increases that are dyspoietic myeloid and megakaryocytic series (there was an isolated erythroid hyperplasia).  The flow cytometry and cytogenetics were unrevealing.  This appeared to be more of a reactive erythrocytosis rather than a myeloproliferative process.     INTERVAL HISTORY     Pt presents to clinic today for continued follow up.  He was last seen in the office April 2022.  He states that he had operation on 3/14/23 and penile implant was placed.  He has recovered well from that surgery but has had some anemia recently.  Hgb on 03/07 was 12.4.  On 04/17/23, Hgb was 9.9.  Records indicate that patient did not loose any significant amount of blood during his surgery.  He was started on oral iron on April 17.  Stool was normal prior to iron but now is dark which is an expected findings.  Colonoscopy was good 2021.  He denies blood in stool or urine.      He had labs drawn and results were reviewed with him in office.     LABS    Lab Results - Last 18 Months   Lab Units 04/26/23  1053 04/17/23  0710 03/07/23  1138 02/01/23  1046 10/17/22  0736 04/26/22  1016 03/14/22  1018   HEMOGLOBIN g/dL 8.1* 9.9* 12.4* 13.3 15.2 15.0 14.7   HEMATOCRIT % 26.8* 29.6* 39.2 42.0 45.4 45.4 43.7   MCV fL 108.1* 99.7* 101.3* 101.7* 100.0* 100.9* 99.8*   WBC 10*3/mm3 6.98 9.11 5.95 8.62 7.01 7.39 7.83    RDW % 15.9* 12.9 13.6 13.2 12.6 13.3 12.8   MPV fL 9.8  --  10.4 10.7  --  10.3  --    PLATELETS 10*3/mm3 237 195 185 186 188 186 209   NEUTROS ABS 10*3/mm3 5.10 6.66  --   --  4.70  --  5.33   LYMPHS ABS 10*3/mm3  --  1.57  --   --  1.39  --  1.57   MONOS ABS 10*3/mm3  --  0.59  --   --  0.56  --  0.66   EOS ABS 10*3/mm3 0.07 0.21  --   --  0.25  --  0.16   BASOS ABS 10*3/mm3 0.14 0.06  --   --  0.07  --  0.06   NRBC /100 WBC  --  0.0  --   --  0.0  --  0.0   NEUTROPHIL % % 73.0  --   --   --   --   --   --    MONOCYTES % % 2.0*  --   --   --   --   --   --    BASOPHIL % % 2.0*  --   --   --   --   --   --    ANISOCYTOSIS  Slight/1+  --   --   --   --   --   --        Lab Results - Last 18 Months   Lab Units 04/26/23  1054 04/26/23  1053 04/17/23  0710 03/07/23  1138 02/01/23  1046 01/06/23  1335 10/17/22  0736 04/26/22  1016 03/14/22  1018   GLUCOSE mg/dL  --  118* 112* 119* 118* 117* 127* 127* 112*   SODIUM mmol/L  --  141 141 143 141 139 142 141 141   POTASSIUM mmol/L  --  4.9 5.1 3.9 3.9 4.5 4.4 4.1 4.3   TOTAL CO2 mmol/L  --   --  22.5  --   --  26.2 25.0  --  23.4   CO2 mmol/L  --  22.0  --  25.0 26.0  --   --  24.0  --    CHLORIDE mmol/L  --  108* 107 105 105 102 104 106 104   ANION GAP mmol/L  --  11.0  --  13.0 10.0  --   --  11.0  --    CREATININE mg/dL  --  1.24 1.50* 0.99 1.06 1.33* 1.24 1.21 1.36*   BUN mg/dL  --  31* 62* 18 24* 19 28* 24* 28*   BUN / CREAT RATIO   --  25.0 41.3* 18.2 22.6 14.3 22.6 19.8 20.6   CALCIUM mg/dL  --  9.6 10.1 9.7 9.0 9.7 9.7 9.7 9.6   ALK PHOS U/L  --  118* 103  --   --   --  136* 119* 118*   TOTAL PROTEIN g/dL  --  6.5  --   --   --   --   --  6.9  --    ALT (SGPT) U/L  --  14 8  --   --   --  19 22 22   AST (SGOT) U/L  --  15 12  --   --   --  19 19 18   BILIRUBIN mg/dL  --  0.2 0.3  --   --   --  0.4 0.4 0.4   ALBUMIN g/dL 2.8* 4.1 4.0  --   --   --  4.80 4.50 4.50   GLOBULIN gm/dL  --  2.4  --   --   --   --   --  2.4  --        Lab Results - Last 18 Months   Lab  Units 04/26/23  1054 04/26/23  1053   M-SPIKE g/dL Not Observed  --    KAPPA/LAMBDA RATIO, S  1.11  --    FREE LAMBDA LIGHT CHAINS mg/L 25.3  --    IG KAPPA FREE LIGHT CHAIN mg/L 28.1*  --    REFERENCE LAB REPORT   --  See Attached Report       Lab Results - Last 18 Months   Lab Units 04/26/23  1053 10/17/22  0736 04/26/22  1016 03/14/22  1018   IRON mcg/dL 91  --  102  --    TIBC mcg/dL 375  --  389  --    IRON SATURATION % 24  --  26  --    FERRITIN ng/mL 244.90  --  274.10  --    TSH uIU/mL  --  2.350  --  2.090   FOLATE ng/mL  --  7.80  --  13.40         PAST MEDICAL HISTORY:  ALLERGIES:  No Known Allergies  CURRENT MEDICATIONS:  Outpatient Encounter Medications as of 4/26/2023   Medication Sig Dispense Refill   • acetaminophen (TYLENOL) 500 MG tablet Take 1 tablet by mouth Daily As Needed for Mild Pain.     • allopurinol (ZYLOPRIM) 300 MG tablet TAKE ONE TABLET BY MOUTH EVERY DAY 90 tablet 3   • amLODIPine (NORVASC) 5 MG tablet Take 1 tablet by mouth Daily.     • atorvastatin (LIPITOR) 20 MG tablet TAKE ONE TABLET BY MOUTH NIGHTLY 90 tablet 3   • ferrous sulfate (FerrouSul) 325 (65 FE) MG tablet Take 1 tablet by mouth Daily With Breakfast. (Patient not taking: Reported on 4/26/2023) 30 tablet 3   • losartan (COZAAR) 100 MG tablet TAKE ONE TABLET BY MOUTH EVERY DAY 90 tablet 3   • metFORMIN (Glucophage) 500 MG tablet Take 1 tablet by mouth 2 (Two) Times a Day With Meals. 180 tablet 3   • naproxen (NAPROSYN) 250 MG tablet Take 1 tablet by mouth 2 (Two) Times a Day With Meals. 60 tablet 0   • vitamin D3 125 MCG (5000 UT) capsule capsule Take 1 capsule by mouth Daily. (Patient not taking: Reported on 4/26/2023)       No facility-administered encounter medications on file as of 4/26/2023.     ADULT ILLNESSES:  Patient Active Problem List   Diagnosis Code   • Hx of adenomatous colonic polyps Z86.010   • Polycythemia vera D45   • Adenomatous polyps D36.9   • Idiopathic gout of multiple sites M10.09   • Primary  hypertension I10   • Type 2 diabetes mellitus without complication, without long-term current use of insulin E11.9   • Hyperlipidemia LDL goal <100 E78.5   • Erectile dysfunction due to arterial insufficiency N52.01   • Anemia D64.9     SURGERIES:  Past Surgical History:   Procedure Laterality Date   • CAROTID ENDARTERECTOMY Left    • CATARACT EXTRACTION, BILATERAL     • COLON SURGERY     • COLONOSCOPY     • COLONOSCOPY N/A 07/27/2021    Procedure: COLONOSCOPY WITH ANESTHESIA;  Surgeon: Luciano Alatorre DO;  Location:  [facility];  Service: Gastroenterology;  Laterality: N/A;  preop; hx of polyps  postop; diverticulosis   PCP Devon Moore    • HEMORROIDECTOMY     • HERNIA REPAIR     • PENILE PROSTHESIS IMPLANT N/A 03/14/2023    Procedure: 3-PIECE INFLATABLE PENILE PROSTHESIS PLACEMENT;  Surgeon: Garcia Santana MD;  Location[facility];  Service: Urology;  Laterality: N/A;   • VASECTOMY       HEALTH MAINTENANCE ITEMS:  Health Maintenance Due   Topic Date Due   • ZOSTER VACCINE (2 of 3) 04/05/2016   • DIABETIC EYE EXAM  Never done   • URINE MICROALBUMIN  10/15/2022       <no information>  Last Completed Colonoscopy          COLORECTAL CANCER SCREENING (COLONOSCOPY - Every 3 Years) Next due on 7/27/2024 07/27/2021  Surgical Procedure: COLONOSCOPY    07/27/2021  COLONOSCOPY    10/23/2020  Cologuard - Stool, Per Rectum    10/16/2020  Cologuard - ,    06/26/2018  COLONOSCOPY (Done)    Only the first 5 history entries have been loaded, but more history exists.              Immunization History   Administered Date(s) Administered   • COVID-19 (MODERNA) 1st,2nd,3rd Dose Monovalent 02/24/2021, 03/24/2021, 01/05/2022   • COVID-19 (PFIZER) BIVALENT 12+YRS 11/08/2022   • Flu Vaccine Split Quad 12/14/2015   • FluLaval/Fluzone >6mos 12/14/2015   • Fluad Quad 65+ 10/13/2020   • Fluzone High Dose =>65 Years ([employer] ONLY) 11/04/2019   • Fluzone High-Dose 65+yrs 10/15/2021, 10/17/2022   • Pneumococcal Conjugate  20-Valent (PCV20) 10/17/2022   • Zostavax 2016     Last Completed Mammogram     This patient has no relevant Health Maintenance data.            FAMILY HISTORY:  Family History   Problem Relation Age of Onset   • No Known Problems Mother    • Heart attack Father    • No Known Problems Maternal Grandmother    • No Known Problems Maternal Grandfather    • No Known Problems Paternal Grandmother    • No Known Problems Paternal Grandfather    • No Known Problems Son    • No Known Problems Daughter    • Parkinsonism Brother    • Colon cancer Neg Hx    • Colon polyps Neg Hx    • Esophageal cancer Neg Hx      SOCIAL HISTORY:  Social History     Socioeconomic History   • Marital status:    Tobacco Use   • Smoking status: Former     Packs/day: 1.00     Years: 46.00     Pack years: 46.00     Types: Cigarettes     Quit date: 1980     Years since quittin.8     Passive exposure: Past   • Smokeless tobacco: Never   • Tobacco comments:     N/A   Vaping Use   • Vaping Use: Never used   Substance and Sexual Activity   • Alcohol use: Not Currently     Comment: Quite 20+ yrs ago   • Drug use: Never   • Sexual activity: Yes     Partners: Female     Birth control/protection: Vasectomy       REVIEW OF SYSTEMS:  Review of Systems   Constitutional: Positive for fatigue. Negative for activity change, appetite change, fever, unexpected weight gain and unexpected weight loss.   HENT: Negative for dental problem, facial swelling, swollen glands and trouble swallowing.    Eyes: Negative for double vision and discharge.   Respiratory: Negative for cough, shortness of breath and wheezing.    Cardiovascular: Negative for chest pain, palpitations and leg swelling.   Gastrointestinal: Negative for abdominal pain, blood in stool, nausea and vomiting.   Endocrine: Negative.    Genitourinary: Negative for dysuria and hematuria.        Had penile implant 2023   Musculoskeletal: Positive for arthralgias. Negative for myalgias.  "  Skin: Negative for rash, skin lesions and wound.   Allergic/Immunologic: Negative for immunocompromised state.   Neurological: Negative for speech difficulty, light-headedness, headache, memory problem and confusion.   Hematological: Negative for adenopathy.   Psychiatric/Behavioral: Negative for self-injury, suicidal ideas and depressed mood. The patient is not nervous/anxious.        /78   Pulse 55   Temp 97.7 °F (36.5 °C)   Resp 16   Ht 177.8 cm (70\")   Wt 86.7 kg (191 lb 1.6 oz)   SpO2 96%   BMI 27.42 kg/m²  Body surface area is 2.05 meters squared.    Pain Score    04/26/23 1109   PainSc: 0-No pain       Physical Exam:  Physical Exam  Constitutional:       Appearance: Normal appearance.   HENT:      Head: Normocephalic and atraumatic.   Cardiovascular:      Rate and Rhythm: Normal rate and regular rhythm.   Pulmonary:      Effort: Pulmonary effort is normal.      Breath sounds: Normal breath sounds.   Abdominal:      General: Bowel sounds are normal.      Palpations: Abdomen is soft.   Musculoskeletal:      Right lower leg: No edema.      Left lower leg: No edema.   Skin:     General: Skin is warm and dry.   Neurological:      Mental Status: He is alert and oriented to person, place, and time.   Psychiatric:         Attention and Perception: Attention normal.         Mood and Affect: Mood normal.         Judgment: Judgment normal.         Jeff Alatorre reports a pain score of 0.  Given his pain assessment as noted, treatment options were discussed and the following options were decided upon as a follow-up plan to address the patient's pain: No intervention indicated.           ASSESSMENT / PLAN    ASSESSMENT:   1. Stage 3a chronic kidney disease    2. Anemia, unspecified type      Secondary Polycythemia is no longer a concern.       1.  Stage 3a CKD  2.  Anemia  -BUN 31, Creatinine 1.24, GFR 57.7  -Iron 91, Ferritin 244, Sat 24%, TIBC 375  -Hgb 8.1, Hct 26.8  -Erythropoietin 48.7  -SPEP showed " no M J Luis and no monoclonality   -CRISSY with KL 28.1, LLC 25.3, Ratio 1.11  -Beta 2 Microglobulin  2.9  -FISH for MDS negative.   -No s/s of bleeding nor iron deficiency.  Acute anemia appears secondary to CKD.  -Will start patient on Retacrit today with 40,000 units SQ  -Will continue Retacrit Biweekly for Hgb < 11 or Hct < 33                 PLAN:  Start Retacrit today and continue biweekly for Hgb < 11 or Hct < 33  Return to office in 4 weeks   Patient will have preoffice labs for CBC, CMP  Continue current medications, treatment plans and follow up with PCP and any other providers.  Care discussed with patient.  Understanding expressed.  Patient agreeable with plan.        ACP discussion was held with the patient during this visit. Patient has an advance directive in EMR which is still valid.     I spent 40 minutes caring for Jeff on this date of service (30 mins face to face. This time includes time spent by me in the following activities: preparing for the visit, reviewing tests, performing a medically appropriate examination and/or evaluation, counseling and educating the patient/family/caregiver, ordering medications, tests, or procedures, documenting information in the medical record and care coordination.       Enriqueta Rao, APRN  04/26/2023

## 2023-04-26 ENCOUNTER — OFFICE VISIT (OUTPATIENT)
Dept: ONCOLOGY | Facility: CLINIC | Age: 83
End: 2023-04-26
Payer: MEDICARE

## 2023-04-26 ENCOUNTER — INFUSION (OUTPATIENT)
Dept: ONCOLOGY | Facility: HOSPITAL | Age: 83
End: 2023-04-26
Payer: MEDICARE

## 2023-04-26 ENCOUNTER — LAB (OUTPATIENT)
Dept: LAB | Facility: HOSPITAL | Age: 83
End: 2023-04-26
Payer: MEDICARE

## 2023-04-26 VITALS
HEART RATE: 55 BPM | DIASTOLIC BLOOD PRESSURE: 78 MMHG | OXYGEN SATURATION: 96 % | HEIGHT: 70 IN | SYSTOLIC BLOOD PRESSURE: 138 MMHG | WEIGHT: 191.1 LBS | RESPIRATION RATE: 16 BRPM | BODY MASS INDEX: 27.36 KG/M2 | TEMPERATURE: 97.7 F

## 2023-04-26 VITALS
DIASTOLIC BLOOD PRESSURE: 46 MMHG | RESPIRATION RATE: 16 BRPM | HEART RATE: 79 BPM | WEIGHT: 191 LBS | TEMPERATURE: 97.1 F | OXYGEN SATURATION: 100 % | BODY MASS INDEX: 27.35 KG/M2 | HEIGHT: 70 IN | SYSTOLIC BLOOD PRESSURE: 139 MMHG

## 2023-04-26 DIAGNOSIS — D64.9 ANEMIA, UNSPECIFIED TYPE: ICD-10-CM

## 2023-04-26 DIAGNOSIS — D64.9 ANEMIA, UNSPECIFIED TYPE: Primary | ICD-10-CM

## 2023-04-26 DIAGNOSIS — N18.31 STAGE 3A CHRONIC KIDNEY DISEASE: Primary | ICD-10-CM

## 2023-04-26 DIAGNOSIS — N18.31 STAGE 3A CHRONIC KIDNEY DISEASE: ICD-10-CM

## 2023-04-26 DIAGNOSIS — D75.1 POLYCYTHEMIA: ICD-10-CM

## 2023-04-26 DIAGNOSIS — N18.9 CHRONIC KIDNEY DISEASE, UNSPECIFIED CKD STAGE: Primary | ICD-10-CM

## 2023-04-26 LAB
ALBUMIN SERPL-MCNC: 4.1 G/DL (ref 3.5–5.2)
ALBUMIN/GLOB SERPL: 1.7 G/DL
ALP SERPL-CCNC: 118 U/L (ref 39–117)
ALT SERPL W P-5'-P-CCNC: 14 U/L (ref 1–41)
ANION GAP SERPL CALCULATED.3IONS-SCNC: 11 MMOL/L (ref 5–15)
ANISOCYTOSIS BLD QL: ABNORMAL
AST SERPL-CCNC: 15 U/L (ref 1–40)
B2 MICROGLOB SERPL-MCNC: 2.9 MG/L (ref 0.8–2.2)
BASOPHILS # BLD MANUAL: 0.14 10*3/MM3 (ref 0–0.2)
BASOPHILS NFR BLD MANUAL: 2 % (ref 0–1.5)
BILIRUB SERPL-MCNC: 0.2 MG/DL (ref 0–1.2)
BUN SERPL-MCNC: 31 MG/DL (ref 8–23)
BUN/CREAT SERPL: 25 (ref 7–25)
CALCIUM SPEC-SCNC: 9.6 MG/DL (ref 8.6–10.5)
CHLORIDE SERPL-SCNC: 108 MMOL/L (ref 98–107)
CO2 SERPL-SCNC: 22 MMOL/L (ref 22–29)
CREAT SERPL-MCNC: 1.24 MG/DL (ref 0.76–1.27)
DEPRECATED RDW RBC AUTO: 62.9 FL (ref 37–54)
EGFRCR SERPLBLD CKD-EPI 2021: 57.7 ML/MIN/1.73
EOSINOPHIL # BLD MANUAL: 0.07 10*3/MM3 (ref 0–0.4)
EOSINOPHIL NFR BLD MANUAL: 1 % (ref 0.3–6.2)
ERYTHROCYTE [DISTWIDTH] IN BLOOD BY AUTOMATED COUNT: 15.9 % (ref 12.3–15.4)
FERRITIN SERPL-MCNC: 244.9 NG/ML (ref 30–400)
GLOBULIN UR ELPH-MCNC: 2.4 GM/DL
GLUCOSE SERPL-MCNC: 118 MG/DL (ref 65–99)
HCT VFR BLD AUTO: 26.8 % (ref 37.5–51)
HGB BLD-MCNC: 8.1 G/DL (ref 13–17.7)
HOLD SPECIMEN: NORMAL
HOLD SPECIMEN: NORMAL
IRON 24H UR-MRATE: 91 MCG/DL (ref 59–158)
IRON SATN MFR SERPL: 24 % (ref 20–50)
LYMPHOCYTES # BLD MANUAL: 1.54 10*3/MM3 (ref 0.7–3.1)
LYMPHOCYTES NFR BLD MANUAL: 2 % (ref 5–12)
MACROCYTES BLD QL SMEAR: ABNORMAL
MCH RBC QN AUTO: 32.7 PG (ref 26.6–33)
MCHC RBC AUTO-ENTMCNC: 30.2 G/DL (ref 31.5–35.7)
MCV RBC AUTO: 108.1 FL (ref 79–97)
MONOCYTES # BLD: 0.14 10*3/MM3 (ref 0.1–0.9)
NEUTROPHILS # BLD AUTO: 5.1 10*3/MM3 (ref 1.7–7)
NEUTROPHILS NFR BLD MANUAL: 73 % (ref 42.7–76)
PLAT MORPH BLD: NORMAL
PLATELET # BLD AUTO: 237 10*3/MM3 (ref 140–450)
PMV BLD AUTO: 9.8 FL (ref 6–12)
POLYCHROMASIA BLD QL SMEAR: ABNORMAL
POTASSIUM SERPL-SCNC: 4.9 MMOL/L (ref 3.5–5.2)
PROT SERPL-MCNC: 6.5 G/DL (ref 6–8.5)
RBC # BLD AUTO: 2.48 10*6/MM3 (ref 4.14–5.8)
SODIUM SERPL-SCNC: 141 MMOL/L (ref 136–145)
TIBC SERPL-MCNC: 375 MCG/DL (ref 298–536)
TRANSFERRIN SERPL-MCNC: 252 MG/DL (ref 200–360)
VARIANT LYMPHS NFR BLD MANUAL: 22 % (ref 19.6–45.3)
WBC MORPH BLD: NORMAL
WBC NRBC COR # BLD: 6.98 10*3/MM3 (ref 3.4–10.8)

## 2023-04-26 PROCEDURE — 82728 ASSAY OF FERRITIN: CPT

## 2023-04-26 PROCEDURE — 82232 ASSAY OF BETA-2 PROTEIN: CPT

## 2023-04-26 PROCEDURE — 83521 IG LIGHT CHAINS FREE EACH: CPT

## 2023-04-26 PROCEDURE — 96372 THER/PROPH/DIAG INJ SC/IM: CPT

## 2023-04-26 PROCEDURE — 85007 BL SMEAR W/DIFF WBC COUNT: CPT

## 2023-04-26 PROCEDURE — 84466 ASSAY OF TRANSFERRIN: CPT

## 2023-04-26 PROCEDURE — 83540 ASSAY OF IRON: CPT

## 2023-04-26 PROCEDURE — 82668 ASSAY OF ERYTHROPOIETIN: CPT

## 2023-04-26 PROCEDURE — 84165 PROTEIN E-PHORESIS SERUM: CPT

## 2023-04-26 PROCEDURE — 36415 COLL VENOUS BLD VENIPUNCTURE: CPT

## 2023-04-26 PROCEDURE — 82784 ASSAY IGA/IGD/IGG/IGM EACH: CPT

## 2023-04-26 PROCEDURE — 25010000002 EPOETIN ALFA-EPBX 40000 UNIT/ML SOLUTION: Performed by: NURSE PRACTITIONER

## 2023-04-26 PROCEDURE — 85025 COMPLETE CBC W/AUTO DIFF WBC: CPT

## 2023-04-26 PROCEDURE — 80053 COMPREHEN METABOLIC PANEL: CPT

## 2023-04-26 PROCEDURE — 86334 IMMUNOFIX E-PHORESIS SERUM: CPT

## 2023-04-26 RX ADMIN — EPOETIN ALFA-EPBX 40000 UNITS: 40000 INJECTION, SOLUTION INTRAVENOUS; SUBCUTANEOUS at 12:27

## 2023-04-26 NOTE — PATIENT INSTRUCTIONS
His hemoglobin (blood count) is 8.1 today.  It should be near 13.  This is considered anemia.   Reason for anemia include iron deficiency, kidney disease or bone marrow is not producing cells effectively (MDS or Myelodysplastic Syndrome).     Iron Deficiency    -His iron is normal and this is not the cause of his anemia     2. Chronic Kidney disease  -The kidneys produce a hormone called Erythropoietin (Epo) that stimulates the bone marrow to produce hemoglobin (red blood cells).  When the kidneys aren't functioning properly, the bone marrow sometimes does not get that signal.  He does have some element of chronic kidney disease which has been present since at least 2021. This is evidenced by GFR < 60.  Today his GFR is 57.7    3.  Myelodysplastic Syndrome (MDS)  -This is when the bone marrow is not producing cells as it should.  I have ordered a blood test to check for any    Gene mutations that may indicate MDS.  If  there are none, we may have to consider a bone marrow aspiration and biopsy in the future.    Because your kidney function is low (GFR < 60) and your iron is normal (Ferritin > 100) AND your Hgb is < 10 (Hgb 8.1), we can start the Epo injection.  There are multiple names for this. You will be receiving Retacrit today and every two weeks if your Hgb is < 11 OR Hct is < 33.      I will see you again in 4 weeks to see how this is working for you.      Please let me know if you have any questions.

## 2023-04-26 NOTE — LETTER
May 2, 2023       No Recipients    Patient: Jeff Alatorre   YOB: 1940   Date of Visit: 4/26/2023       Dear Dr. Preciado Recipients:    Thank you for referring Jeff Alatorre to me for evaluation. Below are the relevant portions of my assessment and plan of care.    If you have questions, please do not hesitate to call me. I look forward to following Jeff along with you.         Sincerely,        BRYANT Nix        CC:   No Recipients    Enriqueta Rao APRN  05/02/23 1850  Signed  [facility] HEMATOLOGY & [facility]COL[facility] [place]  [address]  [place] [id number] [phone]    Patient Name: Jeff Alatorre  Encounter Date: 04/26/2023   YOB: 1940  Patient Number: [id number]    Hematology / Oncology Progress Note      HPI / REASON FOR VISIT: Jeff Alatorre is a 83 y.o. male who is followed by this office for polycythemia which was diagnosed many years ago and believed to be related to testosterone injections.  Bone marrow biopsy in either 2012 or 2013 which showed 55-65% cellularity, prominent erythroid hyperplasia with no simultaneous increases that are dyspoietic myeloid and megakaryocytic series (there was an isolated erythroid hyperplasia).  The flow cytometry and cytogenetics were unrevealing.  This appeared to be more of a reactive erythrocytosis rather than a myeloproliferative process.     INTERVAL HISTORY     Pt presents to clinic today for continued follow up.  He was last seen in the office April 2022.  He states that he had operation on 3/14/23 and penile implant was placed.  He has recovered well from that surgery but has had some anemia recently.  Hgb on 03/07 was 12.4.  On 04/17/23, Hgb was 9.9.  Records indicate that patient did not loose any significant amount of blood during his surgery.  He was started on oral iron on April 17.  Stool was normal prior to iron but now is dark which is an expected  findings.  Colonoscopy was good 2021.  He denies blood in stool or urine.      He had labs drawn and results were reviewed with him in office.     LABS    Lab Results - Last 18 Months   Lab Units 04/26/23  1053 04/17/23  0710 03/07/23  1138 02/01/23  1046 10/17/22  0736 04/26/22  1016 03/14/22  1018   HEMOGLOBIN g/dL 8.1* 9.9* 12.4* 13.3 15.2 15.0 14.7   HEMATOCRIT % 26.8* 29.6* 39.2 42.0 45.4 45.4 43.7   MCV fL 108.1* 99.7* 101.3* 101.7* 100.0* 100.9* 99.8*   WBC 10*3/mm3 6.98 9.11 5.95 8.62 7.01 7.39 7.83   RDW % 15.9* 12.9 13.6 13.2 12.6 13.3 12.8   MPV fL 9.8  --  10.4 10.7  --  10.3  --    PLATELETS 10*3/mm3 237 195 185 186 188 186 209   NEUTROS ABS 10*3/mm3 5.10 6.66  --   --  4.70  --  5.33   LYMPHS ABS 10*3/mm3  --  1.57  --   --  1.39  --  1.57   MONOS ABS 10*3/mm3  --  0.59  --   --  0.56  --  0.66   EOS ABS 10*3/mm3 0.07 0.21  --   --  0.25  --  0.16   BASOS ABS 10*3/mm3 0.14 0.06  --   --  0.07  --  0.06   NRBC /100 WBC  --  0.0  --   --  0.0  --  0.0   NEUTROPHIL % % 73.0  --   --   --   --   --   --    MONOCYTES % % 2.0*  --   --   --   --   --   --    BASOPHIL % % 2.0*  --   --   --   --   --   --    ANISOCYTOSIS  Slight/1+  --   --   --   --   --   --        Lab Results - Last 18 Months   Lab Units 04/26/23  1054 04/26/23  1053 04/17/23  0710 03/07/23  1138 02/01/23  1046 01/06/23  1335 10/17/22  0736 04/26/22  1016 03/14/22  1018   GLUCOSE mg/dL  --  118* 112* 119* 118* 117* 127* 127* 112*   SODIUM mmol/L  --  141 141 143 141 139 142 141 141   POTASSIUM mmol/L  --  4.9 5.1 3.9 3.9 4.5 4.4 4.1 4.3   TOTAL CO2 mmol/L  --   --  22.5  --   --  26.2 25.0  --  23.4   CO2 mmol/L  --  22.0  --  25.0 26.0  --   --  24.0  --    CHLORIDE mmol/L  --  108* 107 105 105 102 104 106 104   ANION GAP mmol/L  --  11.0  --  13.0 10.0  --   --  11.0  --    CREATININE mg/dL  --  1.24 1.50* 0.99 1.06 1.33* 1.24 1.21 1.36*   BUN mg/dL  --  31* 62* 18 24* 19 28* 24* 28*   BUN / CREAT RATIO   --  25.0 41.3* 18.2 22.6 14.3  22.6 19.8 20.6   CALCIUM mg/dL  --  9.6 10.1 9.7 9.0 9.7 9.7 9.7 9.6   ALK PHOS U/L  --  118* 103  --   --   --  136* 119* 118*   TOTAL PROTEIN g/dL  --  6.5  --   --   --   --   --  6.9  --    ALT (SGPT) U/L  --  14 8  --   --   --  19 22 22   AST (SGOT) U/L  --  15 12  --   --   --  19 19 18   BILIRUBIN mg/dL  --  0.2 0.3  --   --   --  0.4 0.4 0.4   ALBUMIN g/dL 2.8* 4.1 4.0  --   --   --  4.80 4.50 4.50   GLOBULIN gm/dL  --  2.4  --   --   --   --   --  2.4  --        Lab Results - Last 18 Months   Lab Units 04/26/23  1054 04/26/23  1053   M-SPIKE g/dL Not Observed  --    KAPPA/LAMBDA RATIO, S  1.11  --    FREE LAMBDA LIGHT CHAINS mg/L 25.3  --    IG KAPPA FREE LIGHT CHAIN mg/L 28.1*  --    REFERENCE LAB REPORT   --  See Attached Report       Lab Results - Last 18 Months   Lab Units 04/26/23  1053 10/17/22  0736 04/26/22  1016 03/14/22  1018   IRON mcg/dL 91  --  102  --    TIBC mcg/dL 375  --  389  --    IRON SATURATION % 24  --  26  --    FERRITIN ng/mL 244.90  --  274.10  --    TSH uIU/mL  --  2.350  --  2.090   FOLATE ng/mL  --  7.80  --  13.40         PAST MEDICAL HISTORY:  ALLERGIES:  No Known Allergies  CURRENT MEDICATIONS:  Outpatient Encounter Medications as of 4/26/2023   Medication Sig Dispense Refill   • acetaminophen (TYLENOL) 500 MG tablet Take 1 tablet by mouth Daily As Needed for Mild Pain.     • allopurinol (ZYLOPRIM) 300 MG tablet TAKE ONE TABLET BY MOUTH EVERY DAY 90 tablet 3   • amLODIPine (NORVASC) 5 MG tablet Take 1 tablet by mouth Daily.     • atorvastatin (LIPITOR) 20 MG tablet TAKE ONE TABLET BY MOUTH NIGHTLY 90 tablet 3   • ferrous sulfate (FerrouSul) 325 (65 FE) MG tablet Take 1 tablet by mouth Daily With Breakfast. (Patient not taking: Reported on 4/26/2023) 30 tablet 3   • losartan (COZAAR) 100 MG tablet TAKE ONE TABLET BY MOUTH EVERY DAY 90 tablet 3   • metFORMIN (Glucophage) 500 MG tablet Take 1 tablet by mouth 2 (Two) Times a Day With Meals. 180 tablet 3   • naproxen (NAPROSYN)  250 MG tablet Take 1 tablet by mouth 2 (Two) Times a Day With Meals. 60 tablet 0   • vitamin D3 125 MCG (5000 UT) capsule capsule Take 1 capsule by mouth Daily. (Patient not taking: Reported on 4/26/2023)       No facility-administered encounter medications on file as of 4/26/2023.     ADULT ILLNESSES:  Patient Active Problem List   Diagnosis Code   • Hx of adenomatous colonic polyps Z86.010   • Polycythemia vera D45   • Adenomatous polyps D36.9   • Idiopathic gout of multiple sites M10.09   • Primary hypertension I10   • Type 2 diabetes mellitus without complication, without long-term current use of insulin E11.9   • Hyperlipidemia LDL goal <100 E78.5   • Erectile dysfunction due to arterial insufficiency N52.01   • Anemia D64.9     SURGERIES:  Past Surgical History:   Procedure Laterality Date   • CAROTID ENDARTERECTOMY Left    • CATARACT EXTRACTION, BILATERAL     • COLON SURGERY     • COLONOSCOPY     • COLONOSCOPY N/A 07/27/2021    Procedure: COLONOSCOPY WITH ANESTHESIA;  Surgeon: Luciano Alatorre DO;  Location:  [facility];  Service: Gastroenterology;  Laterality: N/A;  preop; hx of polyps  postop; diverticulosis   PCP Devon Moore    • HEMORROIDECTOMY     • HERNIA REPAIR     • PENILE PROSTHESIS IMPLANT N/A 03/14/2023    Procedure: 3-PIECE INFLATABLE PENILE PROSTHESIS PLACEMENT;  Surgeon: Garcia Santana MD;  [facility];  Service: Urology;  Laterality: N/A;   • VASECTOMY       HEALTH MAINTENANCE ITEMS:  Health Maintenance Due   Topic Date Due   • ZOSTER VACCINE (2 of 3) 04/05/2016   • DIABETIC EYE EXAM  Never done   • URINE MICROALBUMIN  10/15/2022       <no information>  Last Completed Colonoscopy          COLORECTAL CANCER SCREENING (COLONOSCOPY - Every 3 Years) Next due on 7/27/2024 07/27/2021  Surgical Procedure: COLONOSCOPY    07/27/2021  COLONOSCOPY    10/23/2020  Cologuard - Stool, Per Rectum    10/16/2020  Cologuard - ,    06/26/2018  COLONOSCOPY (Done)    Only the first 5  history entries have been loaded, but more history exists.              Immunization History   Administered Date(s) Administered   • COVID-19 (MODERNA) 1st,2nd,3rd Dose Monovalent 2021, 2021, 2022   • COVID-19 (PFIZER) BIVALENT 12+YRS 2022   • Flu Vaccine Split Quad 2015   • FluLaval/Fluzone >6mos 2015   • Fluad Quad 65+ 10/13/2020   • Fluzone High Dose =>65 Years ([employer] ONLY) 2019   • Fluzone High-Dose 65+yrs 10/15/2021, 10/17/2022   • Pneumococcal Conjugate 20-Valent (PCV20) 10/17/2022   • Zostavax 2016     Last Completed Mammogram     This patient has no relevant Health Maintenance data.            FAMILY HISTORY:  Family History   Problem Relation Age of Onset   • No Known Problems Mother    • Heart attack Father    • No Known Problems Maternal Grandmother    • No Known Problems Maternal Grandfather    • No Known Problems Paternal Grandmother    • No Known Problems Paternal Grandfather    • No Known Problems Son    • No Known Problems Daughter    • Parkinsonism Brother    • Colon cancer Neg Hx    • Colon polyps Neg Hx    • Esophageal cancer Neg Hx      SOCIAL HISTORY:  Social History     Socioeconomic History   • Marital status:    Tobacco Use   • Smoking status: Former     Packs/day: 1.00     Years: 46.00     Pack years: 46.00     Types: Cigarettes     Quit date: 1980     Years since quittin.8     Passive exposure: Past   • Smokeless tobacco: Never   • Tobacco comments:     N/A   Vaping Use   • Vaping Use: Never used   Substance and Sexual Activity   • Alcohol use: Not Currently     Comment: Quite 20+ yrs ago   • Drug use: Never   • Sexual activity: Yes     Partners: Female     Birth control/protection: Vasectomy       REVIEW OF SYSTEMS:  Review of Systems   Constitutional: Positive for fatigue. Negative for activity change, appetite change, fever, unexpected weight gain and unexpected weight loss.   HENT: Negative for dental problem, facial  "swelling, swollen glands and trouble swallowing.    Eyes: Negative for double vision and discharge.   Respiratory: Negative for cough, shortness of breath and wheezing.    Cardiovascular: Negative for chest pain, palpitations and leg swelling.   Gastrointestinal: Negative for abdominal pain, blood in stool, nausea and vomiting.   Endocrine: Negative.    Genitourinary: Negative for dysuria and hematuria.        Had penile implant 03/14/2023   Musculoskeletal: Positive for arthralgias. Negative for myalgias.   Skin: Negative for rash, skin lesions and wound.   Allergic/Immunologic: Negative for immunocompromised state.   Neurological: Negative for speech difficulty, light-headedness, headache, memory problem and confusion.   Hematological: Negative for adenopathy.   Psychiatric/Behavioral: Negative for self-injury, suicidal ideas and depressed mood. The patient is not nervous/anxious.        /78   Pulse 55   Temp 97.7 °F (36.5 °C)   Resp 16   Ht 177.8 cm (70\")   Wt 86.7 kg (191 lb 1.6 oz)   SpO2 96%   BMI 27.42 kg/m²  Body surface area is 2.05 meters squared.    Pain Score    04/26/23 1109   PainSc: 0-No pain       Physical Exam:  Physical Exam  Constitutional:       Appearance: Normal appearance.   HENT:      Head: Normocephalic and atraumatic.   Cardiovascular:      Rate and Rhythm: Normal rate and regular rhythm.   Pulmonary:      Effort: Pulmonary effort is normal.      Breath sounds: Normal breath sounds.   Abdominal:      General: Bowel sounds are normal.      Palpations: Abdomen is soft.   Musculoskeletal:      Right lower leg: No edema.      Left lower leg: No edema.   Skin:     General: Skin is warm and dry.   Neurological:      Mental Status: He is alert and oriented to person, place, and time.   Psychiatric:         Attention and Perception: Attention normal.         Mood and Affect: Mood normal.         Judgment: Judgment normal.         Jeff Alatorre reports a pain score of 0.  Given his " pain assessment as noted, treatment options were discussed and the following options were decided upon as a follow-up plan to address the patient's pain: No intervention indicated.           ASSESSMENT / PLAN    ASSESSMENT:   1. Stage 3a chronic kidney disease    2. Anemia, unspecified type      Secondary Polycythemia is no longer a concern.       1.  Stage 3a CKD  2.  Anemia  -BUN 31, Creatinine 1.24, GFR 57.7  -Iron 91, Ferritin 244, Sat 24%, TIBC 375  -Hgb 8.1, Hct 26.8  -Erythropoietin 48.7  -SPEP showed no M J Luis and no monoclonality   -CRISSY with KLC 28.1, LLC 25.3, Ratio 1.11  -Beta 2 Microglobulin  2.9  -FISH for MDS negative.   -No s/s of bleeding nor iron deficiency.  Acute anemia appears secondary to CKD.  -Will start patient on Retacrit today with 40,000 units SQ  -Will continue Retacrit Biweekly for Hgb < 11 or Hct < 33                 PLAN:  Start Retacrit today and continue biweekly for Hgb < 11 or Hct < 33  Return to office in 4 weeks   Patient will have preoffice labs for CBC, CMP  Continue current medications, treatment plans and follow up with PCP and any other providers.  Care discussed with patient.  Understanding expressed.  Patient agreeable with plan.        ACP discussion was held with the patient during this visit. Patient has an advance directive in EMR which is still valid.     I spent 40 minutes caring for Jeff on this date of service (30 mins face to face. This time includes time spent by me in the following activities: preparing for the visit, reviewing tests, performing a medically appropriate examination and/or evaluation, counseling and educating the patient/family/caregiver, ordering medications, tests, or procedures, documenting information in the medical record and care coordination.       Enriqueta Rao, APRN  04/26/2023

## 2023-04-27 LAB
ALBUMIN SERPL ELPH-MCNC: 2.8 G/DL (ref 2.9–4.4)
ALBUMIN/GLOB SERPL: 1.3 {RATIO} (ref 0.7–1.7)
ALPHA1 GLOB SERPL ELPH-MCNC: 0.2 G/DL (ref 0–0.4)
ALPHA2 GLOB SERPL ELPH-MCNC: 0.6 G/DL (ref 0.4–1)
B-GLOBULIN SERPL ELPH-MCNC: 0.7 G/DL (ref 0.7–1.3)
EPO SERPL-ACNC: 48.7 MIU/ML (ref 2.6–18.5)
GAMMA GLOB SERPL ELPH-MCNC: 0.6 G/DL (ref 0.4–1.8)
GLOBULIN SER-MCNC: 2.2 G/DL (ref 2.2–3.9)
IGA SERPL-MCNC: 123 MG/DL (ref 61–437)
IGG SERPL-MCNC: 753 MG/DL (ref 603–1613)
IGM SERPL-MCNC: 35 MG/DL (ref 15–143)
INTERPRETATION SERPL IEP-IMP: ABNORMAL
KAPPA LC FREE SER-MCNC: 28.1 MG/L (ref 3.3–19.4)
KAPPA LC FREE/LAMBDA FREE SER: 1.11 {RATIO} (ref 0.26–1.65)
LABORATORY COMMENT REPORT: ABNORMAL
LAMBDA LC FREE SERPL-MCNC: 25.3 MG/L (ref 5.7–26.3)
M PROTEIN SERPL ELPH-MCNC: ABNORMAL G/DL
PROT SERPL-MCNC: 5 G/DL (ref 6–8.5)

## 2023-05-02 LAB — REF LAB TEST METHOD: NORMAL

## 2023-05-10 ENCOUNTER — INFUSION (OUTPATIENT)
Dept: ONCOLOGY | Facility: HOSPITAL | Age: 83
End: 2023-05-10
Payer: MEDICARE

## 2023-05-10 ENCOUNTER — LAB (OUTPATIENT)
Dept: LAB | Facility: HOSPITAL | Age: 83
End: 2023-05-10
Payer: MEDICARE

## 2023-05-10 DIAGNOSIS — D64.9 ANEMIA, UNSPECIFIED TYPE: Primary | ICD-10-CM

## 2023-05-10 LAB
BASOPHILS # BLD AUTO: 0.04 10*3/MM3 (ref 0–0.2)
BASOPHILS NFR BLD AUTO: 0.7 % (ref 0–1.5)
DEPRECATED RDW RBC AUTO: 57.8 FL (ref 37–54)
EOSINOPHIL # BLD AUTO: 0.16 10*3/MM3 (ref 0–0.4)
EOSINOPHIL NFR BLD AUTO: 2.6 % (ref 0.3–6.2)
ERYTHROCYTE [DISTWIDTH] IN BLOOD BY AUTOMATED COUNT: 14.9 % (ref 12.3–15.4)
HCT VFR BLD AUTO: 33.7 % (ref 37.5–51)
HGB BLD-MCNC: 10.2 G/DL (ref 13–17.7)
HOLD SPECIMEN: NORMAL
LYMPHOCYTES # BLD AUTO: 1.15 10*3/MM3 (ref 0.7–3.1)
LYMPHOCYTES NFR BLD AUTO: 18.8 % (ref 19.6–45.3)
MCH RBC QN AUTO: 32.4 PG (ref 26.6–33)
MCHC RBC AUTO-ENTMCNC: 30.3 G/DL (ref 31.5–35.7)
MCV RBC AUTO: 107 FL (ref 79–97)
MONOCYTES # BLD AUTO: 0.6 10*3/MM3 (ref 0.1–0.9)
MONOCYTES NFR BLD AUTO: 9.8 % (ref 5–12)
NEUTROPHILS NFR BLD AUTO: 4.16 10*3/MM3 (ref 1.7–7)
NEUTROPHILS NFR BLD AUTO: 67.8 % (ref 42.7–76)
PLATELET # BLD AUTO: 218 10*3/MM3 (ref 140–450)
PMV BLD AUTO: 9.8 FL (ref 6–12)
RBC # BLD AUTO: 3.15 10*6/MM3 (ref 4.14–5.8)
WBC NRBC COR # BLD: 6.13 10*3/MM3 (ref 3.4–10.8)

## 2023-05-10 PROCEDURE — 85025 COMPLETE CBC W/AUTO DIFF WBC: CPT

## 2023-05-10 PROCEDURE — 36415 COLL VENOUS BLD VENIPUNCTURE: CPT

## 2023-05-10 PROCEDURE — 25010000002 EPOETIN ALFA-EPBX 40000 UNIT/ML SOLUTION: Performed by: NURSE PRACTITIONER

## 2023-05-10 PROCEDURE — 96372 THER/PROPH/DIAG INJ SC/IM: CPT

## 2023-05-10 RX ADMIN — EPOETIN ALFA-EPBX 40000 UNITS: 40000 INJECTION, SOLUTION INTRAVENOUS; SUBCUTANEOUS at 10:53

## 2023-05-24 ENCOUNTER — OFFICE VISIT (OUTPATIENT)
Dept: ONCOLOGY | Facility: CLINIC | Age: 83
End: 2023-05-24
Payer: MEDICARE

## 2023-05-24 ENCOUNTER — LAB (OUTPATIENT)
Dept: LAB | Facility: HOSPITAL | Age: 83
End: 2023-05-24
Payer: MEDICARE

## 2023-05-24 VITALS
RESPIRATION RATE: 16 BRPM | HEART RATE: 58 BPM | WEIGHT: 198.2 LBS | OXYGEN SATURATION: 99 % | SYSTOLIC BLOOD PRESSURE: 138 MMHG | TEMPERATURE: 97.5 F | DIASTOLIC BLOOD PRESSURE: 76 MMHG | BODY MASS INDEX: 28.37 KG/M2 | HEIGHT: 70 IN

## 2023-05-24 DIAGNOSIS — N18.31 STAGE 3A CHRONIC KIDNEY DISEASE: ICD-10-CM

## 2023-05-24 DIAGNOSIS — D64.9 ANEMIA, UNSPECIFIED TYPE: ICD-10-CM

## 2023-05-24 DIAGNOSIS — N18.31 STAGE 3A CHRONIC KIDNEY DISEASE: Primary | ICD-10-CM

## 2023-05-24 DIAGNOSIS — D64.9 ANEMIA, UNSPECIFIED TYPE: Primary | ICD-10-CM

## 2023-05-24 LAB
ALBUMIN SERPL-MCNC: 4.3 G/DL (ref 3.5–5.2)
ALBUMIN/GLOB SERPL: 1.7 G/DL
ALP SERPL-CCNC: 137 U/L (ref 39–117)
ALT SERPL W P-5'-P-CCNC: 11 U/L (ref 1–41)
ANION GAP SERPL CALCULATED.3IONS-SCNC: 11 MMOL/L (ref 5–15)
AST SERPL-CCNC: 15 U/L (ref 1–40)
BASOPHILS # BLD AUTO: 0.06 10*3/MM3 (ref 0–0.2)
BASOPHILS NFR BLD AUTO: 1 % (ref 0–1.5)
BILIRUB SERPL-MCNC: 0.2 MG/DL (ref 0–1.2)
BUN SERPL-MCNC: 26 MG/DL (ref 8–23)
BUN/CREAT SERPL: 21.1 (ref 7–25)
CALCIUM SPEC-SCNC: 9.4 MG/DL (ref 8.6–10.5)
CHLORIDE SERPL-SCNC: 108 MMOL/L (ref 98–107)
CO2 SERPL-SCNC: 24 MMOL/L (ref 22–29)
CREAT SERPL-MCNC: 1.23 MG/DL (ref 0.76–1.27)
DEPRECATED RDW RBC AUTO: 54.4 FL (ref 37–54)
EGFRCR SERPLBLD CKD-EPI 2021: 58.3 ML/MIN/1.73
EOSINOPHIL # BLD AUTO: 0.16 10*3/MM3 (ref 0–0.4)
EOSINOPHIL NFR BLD AUTO: 2.7 % (ref 0.3–6.2)
ERYTHROCYTE [DISTWIDTH] IN BLOOD BY AUTOMATED COUNT: 14 % (ref 12.3–15.4)
FOLATE SERPL-MCNC: 6.45 NG/ML (ref 4.78–24.2)
GLOBULIN UR ELPH-MCNC: 2.6 GM/DL
GLUCOSE SERPL-MCNC: 95 MG/DL (ref 65–99)
HCT VFR BLD AUTO: 37.8 % (ref 37.5–51)
HGB BLD-MCNC: 11.2 G/DL (ref 13–17.7)
HOLD SPECIMEN: NORMAL
LYMPHOCYTES # BLD AUTO: 1.11 10*3/MM3 (ref 0.7–3.1)
LYMPHOCYTES NFR BLD AUTO: 18.9 % (ref 19.6–45.3)
MCH RBC QN AUTO: 31.3 PG (ref 26.6–33)
MCHC RBC AUTO-ENTMCNC: 29.6 G/DL (ref 31.5–35.7)
MCV RBC AUTO: 105.6 FL (ref 79–97)
MONOCYTES # BLD AUTO: 0.56 10*3/MM3 (ref 0.1–0.9)
MONOCYTES NFR BLD AUTO: 9.5 % (ref 5–12)
NEUTROPHILS NFR BLD AUTO: 3.97 10*3/MM3 (ref 1.7–7)
NEUTROPHILS NFR BLD AUTO: 67.6 % (ref 42.7–76)
PLATELET # BLD AUTO: 232 10*3/MM3 (ref 140–450)
PMV BLD AUTO: 9.9 FL (ref 6–12)
POTASSIUM SERPL-SCNC: 4.1 MMOL/L (ref 3.5–5.2)
PROT SERPL-MCNC: 6.9 G/DL (ref 6–8.5)
RBC # BLD AUTO: 3.58 10*6/MM3 (ref 4.14–5.8)
SODIUM SERPL-SCNC: 143 MMOL/L (ref 136–145)
VIT B12 BLD-MCNC: 320 PG/ML (ref 211–946)
WBC NRBC COR # BLD: 5.88 10*3/MM3 (ref 3.4–10.8)

## 2023-05-24 PROCEDURE — 85025 COMPLETE CBC W/AUTO DIFF WBC: CPT

## 2023-05-24 PROCEDURE — 82607 VITAMIN B-12: CPT

## 2023-05-24 PROCEDURE — 82746 ASSAY OF FOLIC ACID SERUM: CPT

## 2023-05-24 PROCEDURE — 80053 COMPREHEN METABOLIC PANEL: CPT

## 2023-05-24 PROCEDURE — 36415 COLL VENOUS BLD VENIPUNCTURE: CPT

## 2023-05-24 RX ORDER — SYRINGE W-NEEDLE,DISPOSAB,3 ML 25GX5/8"
SYRINGE, EMPTY DISPOSABLE MISCELLANEOUS
Qty: 6 EACH | Refills: 0 | Status: SHIPPED | OUTPATIENT
Start: 2023-05-24

## 2023-05-24 NOTE — PROGRESS NOTES
[facility] HEMATOLOGY & ONCOLOGY [place]  [address]  [facility] [address] [phone]    Patient Name: Jeff Alatorre  Encounter Date: 05/24/2023   YOB: 1940  Patient Number: [id number]    Hematology / Oncology Progress Note      HPI / REASON FOR VISIT: Jeff Alatorre is a 83 y.o. male who is followed by this office for polycythemia which was diagnosed many years ago and believed to be related to testosterone injections.  Bone marrow biopsy in either 2012 or 2013 which showed 55-65% cellularity, prominent erythroid hyperplasia with no simultaneous increases that are dyspoietic myeloid and megakaryocytic series (there was an isolated erythroid hyperplasia).  The flow cytometry and cytogenetics were unrevealing.  This appeared to be more of a reactive erythrocytosis rather than a myeloproliferative process.     He lost follow up from April 26, 2022 and returned on April 26. 2023.   He states that he had operation on 3/14/23 and penile implant was placed.  He has recovered well from that surgery but has had some anemia recently.  Hgb on 03/07 was 12.4.  On 04/17/23, Hgb was 9.9.  Records indicate that patient did not loose any significant amount of blood during his surgery.  He was started on oral iron on April 17.  Stool was normal prior to iron but then turned dark which is an expected findings.  Colonoscopy was good 2021.  He denies blood in stool or urine.      INTERVAL HISTORY     Pt presents to clinic today for continued follow up.      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  He received a second injection on May 10, 2023.  He tolerated those injections well.      He complains of persistent fatigue despite improved Hgb.  Labs were drawn and results were reviewed with him in office.     LABS    Lab Results - Last 18 Months   Lab Units 05/24/23  0834 05/10/23  1017 04/26/23  1053  04/17/23  0710 03/07/23  1138 02/01/23  1046 10/17/22  0736 04/26/22  1016 03/14/22  1018 03/14/22  1018   HEMOGLOBIN g/dL 11.2* 10.2* 8.1* 9.9* 12.4* 13.3 15.2 15.0   < > 14.7   HEMATOCRIT % 37.8 33.7* 26.8* 29.6* 39.2 42.0 45.4 45.4   < > 43.7   MCV fL 105.6* 107.0* 108.1* 99.7* 101.3* 101.7* 100.0* 100.9*   < > 99.8*   WBC 10*3/mm3 5.88 6.13 6.98 9.11 5.95 8.62 7.01 7.39   < > 7.83   RDW % 14.0 14.9 15.9* 12.9 13.6 13.2 12.6 13.3   < > 12.8   MPV fL 9.9 9.8 9.8  --  10.4 10.7  --  10.3  --   --    PLATELETS 10*3/mm3 232 218 237 195 185 186 188 186   < > 209   NEUTROS ABS 10*3/mm3 3.97 4.16 5.10 6.66  --   --  4.70  --   --  5.33   LYMPHS ABS 10*3/mm3 1.11 1.15  --  1.57  --   --  1.39  --   --  1.57   MONOS ABS 10*3/mm3 0.56 0.60  --  0.59  --   --  0.56  --   --  0.66   EOS ABS 10*3/mm3 0.16 0.16 0.07 0.21  --   --  0.25  --   --  0.16   BASOS ABS 10*3/mm3 0.06 0.04 0.14 0.06  --   --  0.07  --   --  0.06   NRBC /100 WBC  --   --   --  0.0  --   --  0.0  --   --  0.0   NEUTROPHIL % %  --   --  73.0  --   --   --   --   --   --   --    MONOCYTES % %  --   --  2.0*  --   --   --   --   --   --   --    BASOPHIL % %  --   --  2.0*  --   --   --   --   --   --   --    ANISOCYTOSIS   --   --  Slight/1+  --   --   --   --   --   --   --     < > = values in this interval not displayed.       Lab Results - Last 18 Months   Lab Units 05/24/23  0834 04/26/23  1054 04/26/23  1053 04/17/23  0710 03/07/23  1138 02/01/23  1046 01/06/23  1335 10/17/22  0736 04/26/22  1016 03/14/22  1018   GLUCOSE mg/dL 95  --  118* 112* 119* 118* 117* 127* 127* 112*   SODIUM mmol/L 143  --  141 141 143 141 139 142 141 141   POTASSIUM mmol/L 4.1  --  4.9 5.1 3.9 3.9 4.5 4.4 4.1 4.3   CO2 mmol/L 24.0  --  22.0 22.5 25.0 26.0 26.2 25.0 24.0 23.4   CHLORIDE mmol/L 108*  --  108* 107 105 105 102 104 106 104   ANION GAP mmol/L 11.0  --  11.0  --  13.0 10.0  --   --  11.0  --    CREATININE mg/dL 1.23  --  1.24 1.50* 0.99 1.06 1.33* 1.24 1.21 1.36*    BUN mg/dL 26*  --  31* 62* 18 24* 19 28* 24* 28*   BUN / CREAT RATIO  21.1  --  25.0 41.3* 18.2 22.6 14.3 22.6 19.8 20.6   CALCIUM mg/dL 9.4  --  9.6 10.1 9.7 9.0 9.7 9.7 9.7 9.6   ALK PHOS U/L 137*  --  118* 103  --   --   --  136* 119* 118*   TOTAL PROTEIN g/dL 6.9  --  6.5  --   --   --   --   --  6.9  --    ALT (SGPT) U/L 11  --  14 8  --   --   --  19 22 22   AST (SGOT) U/L 15  --  15 12  --   --   --  19 19 18   BILIRUBIN mg/dL 0.2  --  0.2 0.3  --   --   --  0.4 0.4 0.4   ALBUMIN g/dL 4.3 2.8* 4.1 4.0  --   --   --  4.80 4.50 4.50   GLOBULIN gm/dL 2.6  --  2.4  --   --   --   --   --  2.4  --        Lab Results - Last 18 Months   Lab Units 04/26/23  1054 04/26/23  1053   M-SPIKE g/dL Not Observed  --    KAPPA/LAMBDA RATIO, S  1.11  --    FREE LAMBDA LIGHT CHAINS mg/L 25.3  --    IG KAPPA FREE LIGHT CHAIN mg/L 28.1*  --    REFERENCE LAB REPORT   --  See Attached Report       Lab Results - Last 18 Months   Lab Units 04/26/23  1053 10/17/22  0736 04/26/22  1016 03/14/22  1018   IRON mcg/dL 91  --  102  --    TIBC mcg/dL 375  --  389  --    IRON SATURATION % 24  --  26  --    FERRITIN ng/mL 244.90  --  274.10  --    TSH uIU/mL  --  2.350  --  2.090   FOLATE ng/mL  --  7.80  --  13.40         PAST MEDICAL HISTORY:  ALLERGIES:  No Known Allergies  CURRENT MEDICATIONS:  Outpatient Encounter Medications as of 5/24/2023   Medication Sig Dispense Refill    acetaminophen (TYLENOL) 500 MG tablet Take 1 tablet by mouth Daily As Needed for Mild Pain.      allopurinol (ZYLOPRIM) 300 MG tablet TAKE ONE TABLET BY MOUTH EVERY DAY 90 tablet 3    amLODIPine (NORVASC) 5 MG tablet Take 1 tablet by mouth Daily.      atorvastatin (LIPITOR) 20 MG tablet TAKE ONE TABLET BY MOUTH NIGHTLY 90 tablet 3    losartan (COZAAR) 100 MG tablet TAKE ONE TABLET BY MOUTH EVERY DAY 90 tablet 3    metFORMIN (Glucophage) 500 MG tablet Take 1 tablet by mouth 2 (Two) Times a Day With Meals. 180 tablet 3    [DISCONTINUED] ferrous sulfate (FerrouSul)  325 (65 FE) MG tablet Take 1 tablet by mouth Daily With Breakfast. (Patient not taking: Reported on 4/26/2023) 30 tablet 3    [DISCONTINUED] naproxen (NAPROSYN) 250 MG tablet Take 1 tablet by mouth 2 (Two) Times a Day With Meals. (Patient not taking: Reported on 5/24/2023) 60 tablet 0    [DISCONTINUED] vitamin D3 125 MCG (5000 UT) capsule capsule Take 1 capsule by mouth Daily. (Patient not taking: Reported on 4/26/2023)       No facility-administered encounter medications on file as of 5/24/2023.     ADULT ILLNESSES:  Patient Active Problem List   Diagnosis Code    Hx of adenomatous colonic polyps Z86.010    Polycythemia vera D45    Adenomatous polyps D36.9    Idiopathic gout of multiple sites M10.09    Primary hypertension I10    Type 2 diabetes mellitus without complication, without long-term current use of insulin E11.9    Hyperlipidemia LDL goal <100 E78.5    Erectile dysfunction due to arterial insufficiency N52.01    Anemia D64.9     SURGERIES:  Past Surgical History:   Procedure Laterality Date    CAROTID ENDARTERECTOMY Left     CATARACT EXTRACTION, BILATERAL      COLON SURGERY      COLONOSCOPY      COLONOSCOPY N/A 07/27/2021    Procedure: COLONOSCOPY WITH ANESTHESIA;  Surgeon: Luciano Alatorre DO;  Location:  [facility];  Service: Gastroenterology;  Laterality: N/A;  preop; hx of polyps  postop; diverticulosis   PCP Devon Moore     HEMORROIDECTOMY      HERNIA REPAIR      PENILE PROSTHESIS IMPLANT N/A 03/14/2023    Procedure: 3-PIECE INFLATABLE PENILE PROSTHESIS PLACEMENT;  Surgeon: Garcia Santana MD;  Location:  [facility] OR;  Service: Urology;  Laterality: N/A;    VASECTOMY       HEALTH MAINTENANCE ITEMS:  Health Maintenance Due   Topic Date Due    ZOSTER VACCINE (2 of 3) 04/05/2016    DIABETIC EYE EXAM  Never done    URINE MICROALBUMIN  10/15/2022       <no information>  Last Completed Colonoscopy            COLORECTAL CANCER SCREENING (COLONOSCOPY - Every 3 Years) Next due on 7/27/2024       2021  Surgical Procedure: COLONOSCOPY    2021  COLONOSCOPY    10/23/2020  Cologuard - Stool, Per Rectum    10/16/2020  Cologuard - ,    2018  COLONOSCOPY (Done)    Only the first 5 history entries have been loaded, but more history exists.                  Immunization History   Administered Date(s) Administered    COVID-19 (MODERNA) 1st,2nd,3rd Dose Monovalent 2021, 2021, 2022    COVID-19 (PFIZER) BIVALENT 12+YRS 2022    Flu Vaccine Split Quad 2015    FluLaval/Fluzone >6mos 2015    Fluad Quad 65+ 10/13/2020    Fluzone High Dose =>65 Years ([employer] ONLY) 2019    Fluzone High-Dose 65+yrs 10/15/2021, 10/17/2022    Pneumococcal Conjugate 20-Valent (PCV20) 10/17/2022    Zostavax 2016     Last Completed Mammogram       This patient has no relevant Health Maintenance data.              FAMILY HISTORY:  Family History   Problem Relation Age of Onset    No Known Problems Mother     Heart attack Father     No Known Problems Maternal Grandmother     No Known Problems Maternal Grandfather     No Known Problems Paternal Grandmother     No Known Problems Paternal Grandfather     No Known Problems Son     No Known Problems Daughter     Parkinsonism Brother     Colon cancer Neg Hx     Colon polyps Neg Hx     Esophageal cancer Neg Hx      SOCIAL HISTORY:  Social History     Socioeconomic History    Marital status:    Tobacco Use    Smoking status: Former     Packs/day: 1.00     Years: 46.00     Pack years: 46.00     Types: Cigarettes     Quit date: 1980     Years since quittin.9     Passive exposure: Past    Smokeless tobacco: Never    Tobacco comments:     N/A   Vaping Use    Vaping Use: Never used   Substance and Sexual Activity    Alcohol use: Not Currently     Comment: Quite 20+ yrs ago    Drug use: Never    Sexual activity: Yes     Partners: Female     Birth control/protection: Vasectomy       REVIEW OF SYSTEMS:  Review of Systems  "  Constitutional:  Positive for fatigue. Negative for activity change, appetite change, fever, unexpected weight gain and unexpected weight loss.   HENT:  Negative for dental problem, facial swelling, swollen glands and trouble swallowing.    Eyes:  Negative for double vision and discharge.   Respiratory:  Negative for cough, shortness of breath and wheezing.    Cardiovascular:  Negative for chest pain, palpitations and leg swelling.   Gastrointestinal:  Negative for abdominal pain, blood in stool, nausea and vomiting.   Endocrine: Negative.    Genitourinary:  Negative for dysuria and hematuria.        Had penile implant 03/14/2023   Musculoskeletal:  Positive for arthralgias. Negative for myalgias.   Skin:  Negative for rash, skin lesions and wound.   Allergic/Immunologic: Negative for immunocompromised state.   Neurological:  Negative for speech difficulty, light-headedness, headache, memory problem and confusion.   Hematological:  Negative for adenopathy.   Psychiatric/Behavioral:  Negative for self-injury, suicidal ideas and depressed mood. The patient is not nervous/anxious.      /76   Pulse 58   Temp 97.5 °F (36.4 °C)   Resp 16   Ht 177.8 cm (70\")   Wt 89.9 kg (198 lb 3.2 oz)   SpO2 99%   BMI 28.44 kg/m²  Body surface area is 2.08 meters squared.    Pain Score    05/24/23 0848   PainSc: 0-No pain         Physical Exam  Constitutional:       Appearance: Normal appearance.   HENT:      Head: Normocephalic and atraumatic.   Cardiovascular:      Rate and Rhythm: Normal rate and regular rhythm.   Pulmonary:      Effort: Pulmonary effort is normal.      Breath sounds: Normal breath sounds.   Abdominal:      General: Bowel sounds are normal.      Palpations: Abdomen is soft.   Musculoskeletal:      Right lower leg: No edema.      Left lower leg: No edema.   Skin:     General: Skin is warm and dry.   Neurological:      Mental Status: He is alert and oriented to person, place, and time.   Psychiatric:    "      Attention and Perception: Attention normal.         Mood and Affect: Mood normal.         Judgment: Judgment normal.       Jeff Alatorre reports a pain score of 0.  Given his pain assessment as noted, treatment options were discussed and the following options were decided upon as a follow-up plan to address the patient's pain:  No intervention indicated .           ASSESSMENT / PLAN    ASSESSMENT:   1. Anemia, unspecified type    2. Stage 3a chronic kidney disease      Secondary Polycythemia is no longer a concern.       1.  Stage 3a CKD  2.  Anemia  -Received Retacrit April 26 and May 10, 2023  -SPEP showed no M J Luis and no monoclonality   -CRISSY with KLC 28.1, LLC 25.3, Ratio 1.11  -Beta 2 Microglobulin  2.9  -FISH for MDS negative.   -04/26/23 anemia profile with serum iron 91, ferritin 244, sat 24%, TIBC 375  -Labs today with Hgb 11.2, Hct 37.8, BUN 26, Creatinine 1.23, GFR 58.3    Pt complains of fatigue. Will Check B12 and Folate.    Will start supplements if indicated.     PLAN:  No Retacrit indicated today.   Continue Retacrit  biweekly for Hgb < 11 or Hct < 33 if GFR < 60.  RTC in 3 months   Patient will have preoffice labs for CBC, CMP  Continue current medications, treatment plans and follow up with PCP and any other providers.  Care discussed with patient.  Understanding expressed.  Patient agreeable with plan.          Enriqueta Rao, BRYANT  05/24/2023

## 2023-05-26 ENCOUNTER — TELEPHONE (OUTPATIENT)
Dept: ONCOLOGY | Facility: CLINIC | Age: 83
End: 2023-05-26
Payer: MEDICARE

## 2023-05-26 PROBLEM — E53.8 B12 DEFICIENCY: Status: ACTIVE | Noted: 2023-05-26

## 2023-05-26 RX ORDER — CYANOCOBALAMIN 1000 UG/ML
1000 INJECTION, SOLUTION INTRAMUSCULAR; SUBCUTANEOUS ONCE
OUTPATIENT
Start: 2023-05-26

## 2023-05-26 NOTE — TELEPHONE ENCOUNTER
Caller: Jeff Alatorre    Relationship: Self    Best call back number: [phone]    What was the call regarding: PATIENT NEEDS ORDER FAXED TO [facility] FOR THEM TO GIVE HIM HIS B12 INJECTIONS. PATIENT STATES THEY HAVE BEEN TRYING SINCE YESTERDAY TO GET THIS     [phone]      Do you require a callback: YES

## 2023-06-01 ENCOUNTER — CLINICAL SUPPORT (OUTPATIENT)
Dept: FAMILY MEDICINE CLINIC | Facility: CLINIC | Age: 83
End: 2023-06-01

## 2023-06-01 DIAGNOSIS — E53.8 B12 DEFICIENCY: Primary | ICD-10-CM

## 2023-06-01 NOTE — PROGRESS NOTES
Patient presented himself to the office today 6/1/23 for a B-12 injection. Patient provided medication B-12 1000mcg/1ML with his own vial of B-12,needles and syringes. Patient tolerated injection well without issues. Injection was given on left deltoid.

## 2023-06-08 ENCOUNTER — LAB (OUTPATIENT)
Dept: LAB | Facility: HOSPITAL | Age: 83
End: 2023-06-08
Payer: MEDICARE

## 2023-06-08 ENCOUNTER — INFUSION (OUTPATIENT)
Dept: ONCOLOGY | Facility: HOSPITAL | Age: 83
End: 2023-06-08
Payer: MEDICARE

## 2023-06-08 VITALS
HEART RATE: 56 BPM | RESPIRATION RATE: 17 BRPM | BODY MASS INDEX: 27.06 KG/M2 | TEMPERATURE: 97.1 F | SYSTOLIC BLOOD PRESSURE: 111 MMHG | HEIGHT: 70 IN | DIASTOLIC BLOOD PRESSURE: 62 MMHG | OXYGEN SATURATION: 98 % | WEIGHT: 189 LBS

## 2023-06-08 DIAGNOSIS — N18.31 STAGE 3A CHRONIC KIDNEY DISEASE: ICD-10-CM

## 2023-06-08 DIAGNOSIS — D64.9 ANEMIA, UNSPECIFIED TYPE: Primary | ICD-10-CM

## 2023-06-08 DIAGNOSIS — D64.9 ANEMIA, UNSPECIFIED TYPE: ICD-10-CM

## 2023-06-08 LAB
ALBUMIN SERPL-MCNC: 4.2 G/DL (ref 3.5–5.2)
ALBUMIN/GLOB SERPL: 1.6 G/DL
ALP SERPL-CCNC: 124 U/L (ref 39–117)
ALT SERPL W P-5'-P-CCNC: 12 U/L (ref 1–41)
ANION GAP SERPL CALCULATED.3IONS-SCNC: 10 MMOL/L (ref 5–15)
AST SERPL-CCNC: 15 U/L (ref 1–40)
BASOPHILS # BLD AUTO: 0.04 10*3/MM3 (ref 0–0.2)
BASOPHILS NFR BLD AUTO: 0.6 % (ref 0–1.5)
BILIRUB SERPL-MCNC: 0.3 MG/DL (ref 0–1.2)
BUN SERPL-MCNC: 26 MG/DL (ref 8–23)
BUN/CREAT SERPL: 18.8 (ref 7–25)
CALCIUM SPEC-SCNC: 9.7 MG/DL (ref 8.6–10.5)
CHLORIDE SERPL-SCNC: 105 MMOL/L (ref 98–107)
CO2 SERPL-SCNC: 25 MMOL/L (ref 22–29)
CREAT SERPL-MCNC: 1.38 MG/DL (ref 0.76–1.27)
DEPRECATED RDW RBC AUTO: 51.9 FL (ref 37–54)
EGFRCR SERPLBLD CKD-EPI 2021: 50.7 ML/MIN/1.73
EOSINOPHIL # BLD AUTO: 0.23 10*3/MM3 (ref 0–0.4)
EOSINOPHIL NFR BLD AUTO: 3.3 % (ref 0.3–6.2)
ERYTHROCYTE [DISTWIDTH] IN BLOOD BY AUTOMATED COUNT: 13.6 % (ref 12.3–15.4)
FERRITIN SERPL-MCNC: 77.46 NG/ML (ref 30–400)
GLOBULIN UR ELPH-MCNC: 2.7 GM/DL
GLUCOSE SERPL-MCNC: 102 MG/DL (ref 65–99)
HCT VFR BLD AUTO: 39.4 % (ref 37.5–51)
HGB BLD-MCNC: 12 G/DL (ref 13–17.7)
IMM GRANULOCYTES # BLD AUTO: 0.02 10*3/MM3 (ref 0–0.05)
IMM GRANULOCYTES NFR BLD AUTO: 0.3 % (ref 0–0.5)
LYMPHOCYTES # BLD AUTO: 1.31 10*3/MM3 (ref 0.7–3.1)
LYMPHOCYTES NFR BLD AUTO: 19 % (ref 19.6–45.3)
MCH RBC QN AUTO: 31.3 PG (ref 26.6–33)
MCHC RBC AUTO-ENTMCNC: 30.5 G/DL (ref 31.5–35.7)
MCV RBC AUTO: 102.9 FL (ref 79–97)
MONOCYTES # BLD AUTO: 0.62 10*3/MM3 (ref 0.1–0.9)
MONOCYTES NFR BLD AUTO: 9 % (ref 5–12)
NEUTROPHILS NFR BLD AUTO: 4.68 10*3/MM3 (ref 1.7–7)
NEUTROPHILS NFR BLD AUTO: 67.8 % (ref 42.7–76)
NRBC BLD AUTO-RTO: 0 /100 WBC (ref 0–0.2)
PLATELET # BLD AUTO: 198 10*3/MM3 (ref 140–450)
PMV BLD AUTO: 10.3 FL (ref 6–12)
POTASSIUM SERPL-SCNC: 4.7 MMOL/L (ref 3.5–5.2)
PROT SERPL-MCNC: 6.9 G/DL (ref 6–8.5)
RBC # BLD AUTO: 3.83 10*6/MM3 (ref 4.14–5.8)
SODIUM SERPL-SCNC: 140 MMOL/L (ref 136–145)
WBC NRBC COR # BLD: 6.9 10*3/MM3 (ref 3.4–10.8)

## 2023-06-08 PROCEDURE — 85025 COMPLETE CBC W/AUTO DIFF WBC: CPT

## 2023-06-08 PROCEDURE — 80053 COMPREHEN METABOLIC PANEL: CPT

## 2023-06-08 PROCEDURE — 36415 COLL VENOUS BLD VENIPUNCTURE: CPT

## 2023-06-08 PROCEDURE — 82728 ASSAY OF FERRITIN: CPT

## 2023-06-08 PROCEDURE — G0463 HOSPITAL OUTPT CLINIC VISIT: HCPCS

## 2023-06-13 RX ORDER — AMLODIPINE BESYLATE 5 MG/1
5 TABLET ORAL DAILY
Qty: 90 TABLET | Refills: 1 | Status: SHIPPED | OUTPATIENT
Start: 2023-06-13

## 2023-06-13 NOTE — TELEPHONE ENCOUNTER
Rx Refill Note  Requested Prescriptions     Pending Prescriptions Disp Refills    amLODIPine (NORVASC) 5 MG tablet 90 tablet 1     Sig: Take 1 tablet by mouth Daily.      Last office visit with prescribing clinician: 4/17/2023   Last telemedicine visit with prescribing clinician: Visit date not found   Next office visit with prescribing clinician: 10/19/2023                         Would you like a call back once the refill request has been completed: [] Yes [] No    If the office needs to give you a call back, can they leave a voicemail: [] Yes [] No    Jenna Bills, PCT  06/13/23, 13:57 CDT

## 2023-07-28 ENCOUNTER — CLINICAL SUPPORT (OUTPATIENT)
Dept: FAMILY MEDICINE CLINIC | Facility: CLINIC | Age: 83
End: 2023-07-28
Payer: MEDICARE

## 2023-07-28 DIAGNOSIS — E53.8 B12 DEFICIENCY: Primary | ICD-10-CM

## 2023-07-28 RX ORDER — CYANOCOBALAMIN 1000 UG/ML
1000 INJECTION, SOLUTION INTRAMUSCULAR; SUBCUTANEOUS ONCE
OUTPATIENT
Start: 2023-08-23

## 2023-07-28 RX ORDER — CYANOCOBALAMIN 1000 UG/ML
1000 INJECTION, SOLUTION INTRAMUSCULAR; SUBCUTANEOUS ONCE
Status: COMPLETED | OUTPATIENT
Start: 2023-07-28 | End: 2023-07-28

## 2023-07-28 RX ADMIN — CYANOCOBALAMIN 1000 MCG: 1000 INJECTION, SOLUTION INTRAMUSCULAR; SUBCUTANEOUS at 14:44

## 2023-07-28 NOTE — PROGRESS NOTES
Patient presented to office with his supply of medication this was given in the right deltoid and patient tolerated well

## 2023-08-28 ENCOUNTER — CLINICAL SUPPORT (OUTPATIENT)
Dept: FAMILY MEDICINE CLINIC | Facility: CLINIC | Age: 83
End: 2023-08-28
Payer: MEDICARE

## 2023-08-28 NOTE — PROGRESS NOTES
Pt presented in the office today in order to receive a pt supplied injection of b12 ordered by outside provider, injection was given in the left deltoid per pt request, pt tolerated well with no adverse reactions

## 2023-09-22 DIAGNOSIS — D64.9 ANEMIA, UNSPECIFIED TYPE: Primary | ICD-10-CM

## 2023-09-26 ENCOUNTER — TELEPHONE (OUTPATIENT)
Dept: ONCOLOGY | Facility: CLINIC | Age: 83
End: 2023-09-26

## 2023-09-26 NOTE — TELEPHONE ENCOUNTER
Caller: Jeff Alatorre    Relationship: Self    Best call back number: [phone]    What is the best time to reach you: ANY.LEAVE VM    Who are you requesting to speak with (clinical staff, provider,  specific staff member): SCHEDULING        What was the call regarding: PATIENT CALLED TO R/S APPT FOR TOMORROW 9/27/23 TO A LATER DATE UNTIL HE CAN GET NEW INSURANCE. PATIENT WANTS TO MOVE IT TO NEXT MONTH IN OCTOBER.     LATE OCTOBER    HE SAID WE CAN LEAVE APPT DAY ON VOICEMAIL    Is it okay if the provider responds through MyChart: ANY         Pt to ED triage with mother for c/o cough. Pt mother denies any other symptoms. Pt in no distress, appropriate for age.

## 2023-09-29 ENCOUNTER — CLINICAL SUPPORT (OUTPATIENT)
Dept: FAMILY MEDICINE CLINIC | Facility: CLINIC | Age: 83
End: 2023-09-29
Payer: MEDICARE

## 2023-09-29 NOTE — PROGRESS NOTES
Patient presented self to the office today 9/29/23 for a self provided B 12 injection. Injection given in the left deltoid. Patient tolerated well without issue.

## 2023-09-30 NOTE — TELEPHONE ENCOUNTER
CLINICAL PHARMACY NOTE: MEDS TO BEDS    Total # of Prescriptions Filled: 5   The following medications were delivered to the patient:  Cefdinir 300 mg  Pantoprazole sodium 40 mg  Pen needles   Humalog kwikpen  Cyclobenzaprine 10 mg    Additional Documentation:   Daughter picked up in the pharmacy at register Spoke with patient - scheduled for 07/27/2021 at 9:45am     Sent message to patient  through Locally.

## 2023-10-16 DIAGNOSIS — R73.03 PREDIABETES: ICD-10-CM

## 2023-10-16 RX ORDER — ALLOPURINOL 300 MG/1
TABLET ORAL
Qty: 90 TABLET | Refills: 3 | Status: SHIPPED | OUTPATIENT
Start: 2023-10-16

## 2023-10-16 NOTE — TELEPHONE ENCOUNTER
Rx Refill Note  Requested Prescriptions     Pending Prescriptions Disp Refills    metFORMIN (GLUCOPHAGE) 500 MG tablet [Pharmacy Med Name: METFORMIN HCL 500MG TABS] 180 tablet 3     Sig: TAKE ONE TABLET BY MOUTH TWICE A DAY WITH MEALS    allopurinol (ZYLOPRIM) 300 MG tablet [Pharmacy Med Name: ALLOPURINOL 300MG TABS] 90 tablet 3     Sig: TAKE ONE TABLET BY MOUTH EVERY DAY      Last office visit with prescribing clinician: 4/17/2023         Augusta Callahan MA  10/16/23, 09:42 CDT

## 2023-10-30 ENCOUNTER — CLINICAL SUPPORT (OUTPATIENT)
Dept: FAMILY MEDICINE CLINIC | Facility: CLINIC | Age: 83
End: 2023-10-30
Payer: MEDICARE

## 2023-10-30 NOTE — PROGRESS NOTES
Patient presented self to office today 10/30/23 for a self provided B-12 injection to the left deltoid. Patient tolerated without issue. Waited 15 minutes.

## 2023-10-31 ENCOUNTER — OFFICE VISIT (OUTPATIENT)
Dept: ONCOLOGY | Facility: CLINIC | Age: 83
End: 2023-10-31
Payer: MEDICARE

## 2023-10-31 ENCOUNTER — LAB (OUTPATIENT)
Dept: LAB | Facility: HOSPITAL | Age: 83
End: 2023-10-31
Payer: MEDICARE

## 2023-10-31 VITALS
SYSTOLIC BLOOD PRESSURE: 126 MMHG | TEMPERATURE: 97.6 F | DIASTOLIC BLOOD PRESSURE: 82 MMHG | HEART RATE: 65 BPM | HEIGHT: 70 IN | RESPIRATION RATE: 16 BRPM | BODY MASS INDEX: 27.97 KG/M2 | OXYGEN SATURATION: 97 % | WEIGHT: 195.4 LBS

## 2023-10-31 DIAGNOSIS — D64.9 ANEMIA, UNSPECIFIED TYPE: Primary | ICD-10-CM

## 2023-10-31 DIAGNOSIS — E61.1 IRON DEFICIENCY: ICD-10-CM

## 2023-10-31 DIAGNOSIS — E53.8 B12 DEFICIENCY: ICD-10-CM

## 2023-10-31 DIAGNOSIS — N18.31 STAGE 3A CHRONIC KIDNEY DISEASE: ICD-10-CM

## 2023-10-31 LAB
ALBUMIN SERPL-MCNC: 4.4 G/DL (ref 3.5–5.2)
ALBUMIN/GLOB SERPL: 1.5 G/DL
ALP SERPL-CCNC: 141 U/L (ref 39–117)
ALT SERPL W P-5'-P-CCNC: 11 U/L (ref 1–41)
ANION GAP SERPL CALCULATED.3IONS-SCNC: 10 MMOL/L (ref 5–15)
AST SERPL-CCNC: 14 U/L (ref 1–40)
BASOPHILS # BLD AUTO: 0.05 10*3/MM3 (ref 0–0.2)
BASOPHILS NFR BLD AUTO: 0.7 % (ref 0–1.5)
BILIRUB SERPL-MCNC: 0.4 MG/DL (ref 0–1.2)
BUN SERPL-MCNC: 29 MG/DL (ref 8–23)
BUN/CREAT SERPL: 22.1 (ref 7–25)
CALCIUM SPEC-SCNC: 9.7 MG/DL (ref 8.6–10.5)
CHLORIDE SERPL-SCNC: 107 MMOL/L (ref 98–107)
CO2 SERPL-SCNC: 26 MMOL/L (ref 22–29)
CREAT SERPL-MCNC: 1.31 MG/DL (ref 0.76–1.27)
DEPRECATED RDW RBC AUTO: 50.4 FL (ref 37–54)
EGFRCR SERPLBLD CKD-EPI 2021: 54 ML/MIN/1.73
EOSINOPHIL # BLD AUTO: 0.22 10*3/MM3 (ref 0–0.4)
EOSINOPHIL NFR BLD AUTO: 3 % (ref 0.3–6.2)
ERYTHROCYTE [DISTWIDTH] IN BLOOD BY AUTOMATED COUNT: 13.1 % (ref 12.3–15.4)
FERRITIN SERPL-MCNC: 93.89 NG/ML (ref 30–400)
GLOBULIN UR ELPH-MCNC: 2.9 GM/DL
GLUCOSE SERPL-MCNC: 78 MG/DL (ref 65–99)
HCT VFR BLD AUTO: 40.8 % (ref 37.5–51)
HGB BLD-MCNC: 12.9 G/DL (ref 13–17.7)
HOLD SPECIMEN: NORMAL
IMM GRANULOCYTES # BLD AUTO: 0.03 10*3/MM3 (ref 0–0.05)
IMM GRANULOCYTES NFR BLD AUTO: 0.4 % (ref 0–0.5)
IRON 24H UR-MRATE: 84 MCG/DL (ref 59–158)
IRON SATN MFR SERPL: 20 % (ref 20–50)
LYMPHOCYTES # BLD AUTO: 1.32 10*3/MM3 (ref 0.7–3.1)
LYMPHOCYTES NFR BLD AUTO: 18.1 % (ref 19.6–45.3)
MCH RBC QN AUTO: 32.7 PG (ref 26.6–33)
MCHC RBC AUTO-ENTMCNC: 31.6 G/DL (ref 31.5–35.7)
MCV RBC AUTO: 103.3 FL (ref 79–97)
MONOCYTES # BLD AUTO: 0.52 10*3/MM3 (ref 0.1–0.9)
MONOCYTES NFR BLD AUTO: 7.1 % (ref 5–12)
NEUTROPHILS NFR BLD AUTO: 5.16 10*3/MM3 (ref 1.7–7)
NEUTROPHILS NFR BLD AUTO: 70.7 % (ref 42.7–76)
NRBC BLD AUTO-RTO: 0 /100 WBC (ref 0–0.2)
PLATELET # BLD AUTO: 204 10*3/MM3 (ref 140–450)
PMV BLD AUTO: 10 FL (ref 6–12)
POTASSIUM SERPL-SCNC: 4 MMOL/L (ref 3.5–5.2)
PROT SERPL-MCNC: 7.3 G/DL (ref 6–8.5)
RBC # BLD AUTO: 3.95 10*6/MM3 (ref 4.14–5.8)
SODIUM SERPL-SCNC: 143 MMOL/L (ref 136–145)
TIBC SERPL-MCNC: 428 MCG/DL (ref 298–536)
TRANSFERRIN SERPL-MCNC: 287 MG/DL (ref 200–360)
WBC NRBC COR # BLD: 7.3 10*3/MM3 (ref 3.4–10.8)

## 2023-10-31 PROCEDURE — 36415 COLL VENOUS BLD VENIPUNCTURE: CPT

## 2023-10-31 PROCEDURE — 83540 ASSAY OF IRON: CPT

## 2023-10-31 PROCEDURE — 85025 COMPLETE CBC W/AUTO DIFF WBC: CPT

## 2023-10-31 PROCEDURE — 80053 COMPREHEN METABOLIC PANEL: CPT

## 2023-10-31 PROCEDURE — 84466 ASSAY OF TRANSFERRIN: CPT

## 2023-10-31 PROCEDURE — 82728 ASSAY OF FERRITIN: CPT

## 2023-10-31 NOTE — PROGRESS NOTES
[facility] ONC [facility] GROUP HEMATOLOGY & ONCOLOGY [place]  [address]  [facility] [address] [phone]    Patient Name: Jeff Alatorre  Encounter Date: 10/31/2023   YOB: 1940  Patient Number: [id number]    Hematology / Oncology Progress Note    HPI / REASON FOR VISIT: Jeff Alatorre is a 83 y.o. male who is followed by this office for polycythemia which was diagnosed many years ago and believed to be related to testosterone injections.  Bone marrow biopsy in either 2012 or 2013 which showed 55-65% cellularity, prominent erythroid hyperplasia with no simultaneous increases that are dyspoietic myeloid and megakaryocytic series (there was an isolated erythroid hyperplasia).  The flow cytometry and cytogenetics were unrevealing.  This appeared to be more of a reactive erythrocytosis rather than a myeloproliferative process.     He lost follow up from April 26, 2022 and returned on April 26. 2023.   He states that he had operation on 3/14/23 and penile implant was placed.  He has recovered well from that surgery but has had some anemia recently.  Hgb on 03/07 was 12.4.  On 04/17/23, Hgb was 9.9.  Records indicate that patient did not loose any significant amount of blood during his surgery.  He was started on oral iron on April 17.  Stool was normal prior to iron but then turned dark which is an expected findings.  Colonoscopy was good 2021.  He denies blood in stool or urine.      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       INTERVAL HISTORY     Pt presents to clinic today for continued follow up.  His last Retacrit was 5/10/23 and he has maintained Hgb > 11g since then.     He has no acute complaints today.  He had labs drawn and results were reviewed with him in office.         LABS    Lab Results - Last 18 Months   Lab Units 11/02/23  0945 10/31/23  0918 07/10/23  1022 06/28/23  0939 06/08/23  0855  05/24/23  0834 05/10/23  1017 04/26/23  1053 04/26/23  1053 04/17/23  0710 02/01/23  1046 10/17/22  0736   HEMOGLOBIN g/dL 13.2 12.9* 12.2* 12.6* 12.0* 11.2* 10.2*  --  8.1* 9.9*   < > 15.2   HEMATOCRIT % 38.5 40.8 39.5 40.8 39.4 37.8 33.7*  --  26.8* 29.6*   < > 45.4   MCV fL 99* 103.3* 100.5* 101.5* 102.9* 105.6* 107.0*  --  108.1* 99.7*   < > 100.0*   WBC x10E3/uL 7.5 7.30 7.12 7.33 6.90 5.88 6.13  --  6.98 9.11   < > 7.01   RDW % 12.7 13.1 13.5 13.7 13.6 14.0 14.9  --  15.9* 12.9   < > 12.6   MPV fL  --  10.0 10.4 10.5 10.3 9.9 9.8  --  9.8  --    < >  --    PLATELETS x10E3/uL 196 204 202 197 198 232 218  --  237 195   < > 188   IMM GRAN % %  --  0.4 0.3 0.4 0.3  --   --   --   --   --   --   --    NEUTROS ABS x10E3/uL 5.3 5.16 4.86 5.12 4.68 3.97 4.16   < > 5.10 6.66  --  4.70   LYMPHS ABS x10E3/uL 1.4 1.32 1.45 1.32 1.31 1.11 1.15   < >  --  1.57  --  1.39   MONOS ABS x10E3/uL 0.6 0.52 0.57 0.65 0.62 0.56 0.60   < >  --  0.59  --  0.56   EOS ABS x10E3/uL 0.2 0.22 0.17 0.17 0.23 0.16 0.16   < > 0.07 0.21  --  0.25   BASOS ABS x10E3/uL 0.1 0.05 0.05 0.04 0.04 0.06 0.04   < > 0.14 0.06  --  0.07   IMMATURE GRANS (ABS) 10*3/mm3  --  0.03 0.02 0.03 0.02  --   --   --   --   --   --   --    NRBC /100 WBC  --  0.0 0.0 0.0 0.0  --   --   --   --  0.0  --  0.0   NEUTROPHIL % %  --   --   --   --   --   --   --   --  73.0  --   --   --    MONOCYTES % %  --   --   --   --   --   --   --   --  2.0*  --   --   --    BASOPHIL % %  --   --   --   --   --   --   --   --  2.0*  --   --   --    ANISOCYTOSIS   --   --   --   --   --   --   --   --  Slight/1+  --   --   --     < > = values in this interval not displayed.       Lab Results - Last 18 Months   Lab Units 11/02/23  0945 10/31/23  0918 07/10/23  1022 06/28/23  0939 06/08/23  0855 05/24/23  0834 04/26/23  1054 04/26/23  1053   GLUCOSE mg/dL 109* 78 114* 97 102* 95  --  118*   SODIUM mmol/L 140 143 141 140 140 143  --  141   POTASSIUM mmol/L 4.5 4.0 4.3 4.9 4.7 4.1  --   4.9   CO2 mmol/L 22 26.0 23.0 25.0 25.0 24.0  --  22.0   CHLORIDE mmol/L 103 107 107 105 105 108*  --  108*   ANION GAP mmol/L  --  10.0 11.0 10.0 10.0 11.0  --  11.0   CREATININE mg/dL 1.29* 1.31* 1.36* 1.27 1.38* 1.23  --  1.24   BUN mg/dL 24 29* 31* 28* 26* 26*  --  31*   BUN / CREAT RATIO  19 22.1 22.8 22.0 18.8 21.1  --  25.0   CALCIUM mg/dL 10.0 9.7 9.7 9.9 9.7 9.4  --  9.6   ALK PHOS U/L  --  141* 129* 122* 124* 137*  --  118*   TOTAL PROTEIN g/dL  --  7.3 6.8 7.3 6.9 6.9  --  6.5   ALT (SGPT) U/L  --  11 14 14 12 11  --  14   AST (SGOT) U/L  --  14 18 17 15 15  --  15   BILIRUBIN mg/dL  --  0.4 0.2 0.3 0.3 0.2  --  0.2   ALBUMIN g/dL 4.6 4.4 4.4 4.4 4.2 4.3   < > 4.1   GLOBULIN gm/dL  --  2.9 2.4 2.9 2.7 2.6  --  2.4    < > = values in this interval not displayed.       Lab Results - Last 18 Months   Lab Units 11/02/23  0945 04/26/23  1054 04/26/23  1053   M-SPIKE g/dL  --  Not Observed  --    KAPPA/LAMBDA RATIO, S   --  1.11  --    FREE LAMBDA LIGHT CHAINS mg/L  --  25.3  --    URIC ACID mg/dL 3.6*  --   --    IG KAPPA FREE LIGHT CHAIN mg/L  --  28.1*  --    REFERENCE LAB REPORT   --   --  See Attached Report       Lab Results - Last 18 Months   Lab Units 11/02/23  0945 10/31/23  0918 06/28/23  0939 06/08/23  0855 05/24/23  0831 04/26/23  1053 10/17/22  0736   IRON mcg/dL  --  84 50*  --   --  91  --    TIBC mcg/dL  --  428 457  --   --  375  --    IRON SATURATION (TSAT) %  --  20 11*  --   --  24  --    FERRITIN ng/mL  --  93.89 73.80 77.46  --  244.90  --    TSH uIU/mL 1.700  --   --   --   --   --  2.350   FOLATE ng/mL 8.0  --   --   --  6.45  --  7.80         PAST MEDICAL HISTORY:  ALLERGIES:  No Known Allergies  CURRENT MEDICATIONS:  Outpatient Encounter Medications as of 10/31/2023   Medication Sig Dispense Refill    acetaminophen (TYLENOL) 500 MG tablet Take 1 tablet by mouth Daily As Needed for Mild Pain.      allopurinol (ZYLOPRIM) 300 MG tablet TAKE ONE TABLET BY MOUTH EVERY DAY 90 tablet 3     "amLODIPine (NORVASC) 5 MG tablet Take 1 tablet by mouth Daily. 90 tablet 1    atorvastatin (LIPITOR) 20 MG tablet TAKE ONE TABLET BY MOUTH NIGHTLY 90 tablet 3    Cyanocobalamin 1000 MCG/ML kit Inject 1 mL as directed Every 30 (Thirty) Days. 1 each 6    losartan (COZAAR) 100 MG tablet TAKE ONE TABLET BY MOUTH EVERY DAY 90 tablet 3    metFORMIN (GLUCOPHAGE) 500 MG tablet TAKE ONE TABLET BY MOUTH TWICE A DAY WITH MEALS 180 tablet 3    Syringe 25G X 1\" 3 ML misc Inject 1000 mcg (1mL) Cyanocobalamin IM every 28 days 6 each 0     Facility-Administered Encounter Medications as of 10/31/2023   Medication Dose Route Frequency Provider Last Rate Last Admin    cyanocobalamin injection 1,000 mcg  1,000 mcg Intramuscular Once Enriqueta Rao APRN         ADULT ILLNESSES:  Patient Active Problem List   Diagnosis Code    Hx of adenomatous colonic polyps Z86.010    Polycythemia vera D45    Adenomatous polyps D36.9    Idiopathic gout of multiple sites M10.09    Primary hypertension I10    Type 2 diabetes mellitus without complication, without long-term current use of insulin E11.9    Hyperlipidemia LDL goal <100 E78.5    Erectile dysfunction due to arterial insufficiency N52.01    Anemia D64.9    B12 deficiency E53.8     SURGERIES:  Past Surgical History:   Procedure Laterality Date    CAROTID ENDARTERECTOMY Left     CATARACT EXTRACTION, BILATERAL      COLON SURGERY      COLONOSCOPY      COLONOSCOPY N/A 07/27/2021    Procedure: COLONOSCOPY WITH ANESTHESIA;  Surgeon: Luciano Alatorre DO;  Location:  [facility];  Service: Gastroenterology;  Laterality: N/A;  preop; hx of polyps  postop; diverticulosis   PCP Devon Moore     HEMORROIDECTOMY      HERNIA REPAIR      PENILE PROSTHESIS IMPLANT N/A 03/14/2023    Procedure: 3-PIECE INFLATABLE PENILE PROSTHESIS PLACEMENT;  Surgeon: Garcia Santana MD;  Location:  [facility];  Service: Urology;  Laterality: N/A;    VASECTOMY       HEALTH MAINTENANCE ITEMS:  Health Maintenance Due "   Topic Date Due    TDAP/TD VACCINES (1 - Tdap) Never done    ZOSTER VACCINE (2 of 3) 04/05/2016    DIABETIC EYE EXAM  Never done    COVID-19 Vaccine (5 - 2023-24 season) 09/01/2023    ANNUAL WELLNESS VISIT  10/17/2023    BMI FOLLOWUP  10/17/2023       <no information>  Last Completed Colonoscopy            COLORECTAL CANCER SCREENING (COLONOSCOPY - Every 3 Years) Next due on 7/27/2024 07/27/2021  Surgical Procedure: COLONOSCOPY    07/27/2021  COLONOSCOPY    10/23/2020  Cologuard - Stool, Per Rectum    10/16/2020  Cologuard - ,    06/26/2018  COLONOSCOPY (Done)    Only the first 5 history entries have been loaded, but more history exists.                  Immunization History   Administered Date(s) Administered    COVID-19 (MODERNA) 1st,2nd,3rd Dose Monovalent 02/24/2021, 03/24/2021, 01/05/2022    COVID-19 (PFIZER) BIVALENT 12+YRS 11/08/2022    Flu Vaccine Split Quad 12/14/2015    Fluad Quad 65+ 10/13/2020    Fluzone (or Fluarix & Flulaval for VFC) >6mos 12/14/2015    Fluzone High Dose =>65 Years ([employer] ONLY) 11/04/2019    Fluzone High-Dose 65+yrs 10/15/2021, 10/17/2022, 08/21/2023    Pneumococcal Conjugate 20-Valent (PCV20) 10/17/2022    Zostavax 02/09/2016     Last Completed Mammogram       This patient has no relevant Health Maintenance data.              FAMILY HISTORY:  Family History   Problem Relation Age of Onset    No Known Problems Mother     Heart attack Father     No Known Problems Maternal Grandmother     No Known Problems Maternal Grandfather     No Known Problems Paternal Grandmother     No Known Problems Paternal Grandfather     No Known Problems Son     No Known Problems Daughter     Parkinsonism Brother     Colon cancer Neg Hx     Colon polyps Neg Hx     Esophageal cancer Neg Hx      SOCIAL HISTORY:  Social History     Socioeconomic History    Marital status: Single   Tobacco Use    Smoking status: Former     Packs/day: 1.00     Years: 46.00     Additional pack years: 0.00     Total pack  "years: 46.00     Types: Cigarettes     Quit date: 1980     Years since quittin.3     Passive exposure: Past    Smokeless tobacco: Never    Tobacco comments:     N/A   Vaping Use    Vaping Use: Never used   Substance and Sexual Activity    Alcohol use: Not Currently     Comment: Quite 20+ yrs ago    Drug use: Never    Sexual activity: Yes     Partners: Female     Birth control/protection: Vasectomy       REVIEW OF SYSTEMS:  Review of Systems   Constitutional:  Positive for fatigue. Negative for activity change, appetite change, fever, unexpected weight gain and unexpected weight loss.   HENT:  Negative for dental problem, facial swelling, swollen glands and trouble swallowing.    Eyes:  Negative for double vision and discharge.   Respiratory:  Negative for cough, shortness of breath and wheezing.    Cardiovascular:  Negative for chest pain, palpitations and leg swelling.   Gastrointestinal:  Negative for abdominal pain, blood in stool, nausea and vomiting.   Endocrine: Negative.    Genitourinary:  Negative for dysuria and hematuria.        Had penile implant 2023   Musculoskeletal:  Positive for arthralgias. Negative for myalgias.   Skin:  Negative for rash, skin lesions and wound.   Allergic/Immunologic: Negative for immunocompromised state.   Neurological:  Negative for speech difficulty, light-headedness, headache, memory problem and confusion.   Hematological:  Negative for adenopathy.   Psychiatric/Behavioral:  Negative for self-injury, suicidal ideas and depressed mood. The patient is not nervous/anxious.        /82   Pulse 65   Temp 97.6 °F (36.4 °C)   Resp 16   Ht 177.8 cm (70\")   Wt 88.6 kg (195 lb 6.4 oz)   SpO2 97%   BMI 28.04 kg/m²  Body surface area is 2.07 meters squared.    Pain Score    10/31/23 0931   PainSc: 0-No pain       Physical Exam  Constitutional:       Appearance: Normal appearance.   HENT:      Head: Normocephalic and atraumatic.   Cardiovascular:      Rate and " Rhythm: Normal rate and regular rhythm.   Pulmonary:      Effort: Pulmonary effort is normal.      Breath sounds: Normal breath sounds.   Abdominal:      General: Bowel sounds are normal.      Palpations: Abdomen is soft.   Musculoskeletal:      Right lower leg: No edema.      Left lower leg: No edema.   Skin:     General: Skin is warm and dry.   Neurological:      Mental Status: He is alert and oriented to person, place, and time.   Psychiatric:         Attention and Perception: Attention normal.         Mood and Affect: Mood normal.         Judgment: Judgment normal.         Jeff Alatorre reports a pain score of 0.  Given his pain assessment as noted, treatment options were discussed and the following options were decided upon as a follow-up plan to address the patient's pain:  No intervention indicated .           ASSESSMENT / PLAN    ASSESSMENT:   1. Anemia, unspecified type    2. Stage 3a chronic kidney disease    3. Iron deficiency    4. B12 deficiency        Secondary Polycythemia is no longer a concern.       1.  Stage 3a CKD  2.  Anemia  3.  Iron Deficiency   -Received Retacrit April 26 and May 10, 2023  -Labs today:  BUN 24, Creatinine 1.31, GFR 54, Hgb 12.9, Hct 40.8  -Iron 84, Ferritin 93, Sat 20%, TIBC 428  -Stable for observation     4.  B12 Deficiency   -Pt is getting injection monthly        PLAN:  No Retacrit indicated today.   Continue Retacrit 40,000 units monthly for Hgb < 11 or Hct < 33 if GFR < 60.  RTC in 3 months   Patient will have preoffice labs for CBC, CMP, Iron Profile , Ferritin   Continue current medications, treatment plans and follow up with PCP and any other providers.  Care discussed with patient.  Understanding expressed.  Patient agreeable with plan.      Enriqueta Rao, APRN  10/31/2023

## 2023-11-02 ENCOUNTER — OFFICE VISIT (OUTPATIENT)
Dept: FAMILY MEDICINE CLINIC | Facility: CLINIC | Age: 83
End: 2023-11-02
Payer: MEDICARE

## 2023-11-02 VITALS
TEMPERATURE: 97.8 F | OXYGEN SATURATION: 100 % | HEIGHT: 70 IN | SYSTOLIC BLOOD PRESSURE: 110 MMHG | RESPIRATION RATE: 20 BRPM | DIASTOLIC BLOOD PRESSURE: 68 MMHG | BODY MASS INDEX: 28.35 KG/M2 | WEIGHT: 198 LBS | HEART RATE: 67 BPM

## 2023-11-02 DIAGNOSIS — I10 PRIMARY HYPERTENSION: Primary | ICD-10-CM

## 2023-11-02 DIAGNOSIS — R53.83 OTHER FATIGUE: ICD-10-CM

## 2023-11-02 DIAGNOSIS — E11.9 TYPE 2 DIABETES MELLITUS WITHOUT COMPLICATION, WITHOUT LONG-TERM CURRENT USE OF INSULIN: ICD-10-CM

## 2023-11-02 DIAGNOSIS — E78.2 MIXED HYPERLIPIDEMIA: ICD-10-CM

## 2023-11-02 DIAGNOSIS — M10.09 IDIOPATHIC GOUT OF MULTIPLE SITES, UNSPECIFIED CHRONICITY: ICD-10-CM

## 2023-11-02 DIAGNOSIS — D64.9 ANEMIA, UNSPECIFIED TYPE: ICD-10-CM

## 2023-11-02 DIAGNOSIS — N18.31 STAGE 3A CHRONIC KIDNEY DISEASE: ICD-10-CM

## 2023-11-02 DIAGNOSIS — R40.0 DAYTIME SOMNOLENCE: ICD-10-CM

## 2023-11-02 DIAGNOSIS — Z12.5 PROSTATE CANCER SCREENING: ICD-10-CM

## 2023-11-02 RX ORDER — PNV NO.95/FERROUS FUM/FOLIC AC 28MG-0.8MG
325 TABLET ORAL
COMMUNITY

## 2023-11-02 NOTE — PROGRESS NOTES
Chief Complaint  Establish Care    Subjective        Jeff Alatorre presents to [facility]    HPI    Establish care    Anemia-2/2 CKD- Followed by hematology, receives epo as needed. Hgb hovers in 12's. Started recently on iron supplement as well. GFR fluctuates in 50's. Seen nephrology once. Says hydration is struggle.  H/o Polycythemia vera-2/2 testosterone therapy. Resolved after discontinuing  HLD-On lipitor 20mg. Most recent LDL 59. H/o carotid endarterectomy No heart disease.  Gout-On allopurinol. Due for uric acid levels. No mention of joint issues.  L7LH-C3o 6 mo ago 6.1, stable in this range over last 2 years. On metformin 500mg bid    Pt does note some difficulties w/ energy and falling asleep during middle of day. He does snore.    Past Medical History:   Diagnosis Date    Arthritis     Chronic kidney disease     Erectile dysfunction     Gout     History of diverticulitis     HTN (hypertension)     Hx of adenomatous colonic polyps 10/13/2020    Hypercholesteremia     Low testosterone     Polycythemia vera 10/13/2020    Squamous cell carcinoma of skin 10/2021    top of head    Type 2 diabetes mellitus without complication, without long-term current use of insulin 12/01/2022     Past Surgical History:   Procedure Laterality Date    CAROTID ENDARTERECTOMY Left     CATARACT EXTRACTION, BILATERAL      COLON SURGERY      COLONOSCOPY      COLONOSCOPY N/A 07/27/2021    Procedure: COLONOSCOPY WITH ANESTHESIA;  Surgeon: Luciano Alatorre DO;  Location:  [facility];  Service: Gastroenterology;  Laterality: N/A;  preop; hx of polyps  postop; diverticulosis   PCP Devon Moore     HEMORROIDECTOMY      HERNIA REPAIR      PENILE PROSTHESIS IMPLANT N/A 03/14/2023    Procedure: 3-PIECE INFLATABLE PENILE PROSTHESIS PLACEMENT;  Surgeon: Garcia Santana MD;  Location:  [facility];  Service: Urology;  Laterality: N/A;    VASECTOMY       Social History     Socioeconomic History  "   Marital status: Single   Tobacco Use    Smoking status: Former     Packs/day: 1.00     Years: 46.00     Additional pack years: 0.00     Total pack years: 46.00     Types: Cigarettes     Quit date: 1980     Years since quittin.3     Passive exposure: Past    Smokeless tobacco: Never    Tobacco comments:     N/A   Vaping Use    Vaping Use: Never used   Substance and Sexual Activity    Alcohol use: Not Currently     Comment: Quite 20+ yrs ago    Drug use: Never    Sexual activity: Yes     Partners: Female     Birth control/protection: Vasectomy       Objective   Vital Signs:  /68   Pulse 67   Temp 97.8 °F (36.6 °C) (Temporal)   Resp 20   Ht 177.8 cm (70\")   Wt 89.8 kg (198 lb)   SpO2 100%   BMI 28.41 kg/m²   Estimated body mass index is 28.41 kg/m² as calculated from the following:    Height as of this encounter: 177.8 cm (70\").    Weight as of this encounter: 89.8 kg (198 lb).             Physical Exam  Vitals reviewed.   Constitutional:       Appearance: Normal appearance.   HENT:      Head: Normocephalic.      Nose: Nose normal.      Mouth/Throat:      Mouth: Mucous membranes are moist.   Eyes:      Extraocular Movements: Extraocular movements intact.   Cardiovascular:      Rate and Rhythm: Normal rate and regular rhythm.      Heart sounds: Normal heart sounds.   Pulmonary:      Effort: Pulmonary effort is normal.      Breath sounds: Normal breath sounds.   Musculoskeletal:         General: Normal range of motion.      Cervical back: Normal range of motion.   Skin:     General: Skin is warm and dry.   Neurological:      General: No focal deficit present.      Mental Status: He is alert and oriented to person, place, and time.   Psychiatric:         Mood and Affect: Mood normal.         Behavior: Behavior normal.        Result Review :                   Assessment and Plan   Diagnoses and all orders for this visit:    1. Primary hypertension (Primary)  -At goal. Continue amlodipine and " losartan  -     Microalbumin / Creatinine Urine Ratio - Urine, Clean Catch    2. Anemia, unspecified type  -Stable, managed by hematology    3. Stage 3a chronic kidney disease  -Will monitor. Discussed proper hydration. Avoid NSAID's when possible  -     CBC w AUTO Differential  -     Vitamin D 25 hydroxy  -     Renal Function Panel    4. Type 2 diabetes mellitus without complication, without long-term current use of insulin  -Most recently at goal. Continue metformin  -     Hemoglobin A1c  -     Vitamin B12  -     Folate    5. Mixed hyperlipidemia  -Continue lipitor  -     Lipid Panel With LDL/HDL Ratio    6. Daytime somnolence  -     Ambulatory Referral to Sleep Medicine    7. Idiopathic gout of multiple sites, unspecified chronicity  -Continue allopurinol, recheck levels  -     Uric acid    8. Other fatigue  -     TSH Rfx On Abnormal To Free T4    9. Prostate cancer screening  -     PSA SCREENING      FU in 4 weeks for FU/labs discussion     I spent 45 minutes caring for Jeff on this date of service. This time includes time spent by me in the following activities:preparing for the visit, reviewing tests, obtaining and/or reviewing a separately obtained history, performing a medically appropriate examination and/or evaluation , counseling and educating the patient/family/caregiver, ordering medications, tests, or procedures, documenting information in the medical record, independently interpreting results and communicating that information with the patient/family/caregiver, and care coordination  EMR Dragon/Transcription disclaimer:   Much of this encounter note is an electronic transcription/translation of spoken language to printed text. The electronic translation of spoken language may permit erroneous, or at times, nonsensical words or phrases to be inadvertently transcribed; although attempts have made to review the note for such errors, some may still exist. Please excuse any unrecognized transcription  errors and contact us if the air is unintelligible or needs documented correction. Also, portions of this note have been copied forward, however, changed to reflect the most current clinical status of this patient.  Follow Up   Return in about 4 weeks (around 11/30/2023) for MAWV/FU on labs.  Patient was given instructions and counseling regarding his condition or for health maintenance advice. Please see specific information pulled into the AVS if appropriate.

## 2023-11-03 ENCOUNTER — PATIENT ROUNDING (BHMG ONLY) (OUTPATIENT)
Dept: FAMILY MEDICINE CLINIC | Facility: CLINIC | Age: 83
End: 2023-11-03
Payer: MEDICARE

## 2023-11-03 LAB
25(OH)D3+25(OH)D2 SERPL-MCNC: 56.3 NG/ML (ref 30–100)
ALBUMIN SERPL-MCNC: 4.6 G/DL (ref 3.7–4.7)
ALBUMIN/CREAT UR: 30 MG/G CREAT (ref 0–29)
BASOPHILS # BLD AUTO: 0.1 X10E3/UL (ref 0–0.2)
BASOPHILS NFR BLD AUTO: 1 %
BUN SERPL-MCNC: 24 MG/DL (ref 8–27)
BUN/CREAT SERPL: 19 (ref 10–24)
CALCIUM SERPL-MCNC: 10 MG/DL (ref 8.6–10.2)
CHLORIDE SERPL-SCNC: 103 MMOL/L (ref 96–106)
CHOLEST SERPL-MCNC: 106 MG/DL (ref 100–199)
CO2 SERPL-SCNC: 22 MMOL/L (ref 20–29)
CREAT SERPL-MCNC: 1.29 MG/DL (ref 0.76–1.27)
CREAT UR-MCNC: 26.2 MG/DL
EGFRCR SERPLBLD CKD-EPI 2021: 55 ML/MIN/1.73
EOSINOPHIL # BLD AUTO: 0.2 X10E3/UL (ref 0–0.4)
EOSINOPHIL NFR BLD AUTO: 2 %
ERYTHROCYTE [DISTWIDTH] IN BLOOD BY AUTOMATED COUNT: 12.7 % (ref 11.6–15.4)
FOLATE SERPL-MCNC: 8 NG/ML
GLUCOSE SERPL-MCNC: 109 MG/DL (ref 70–99)
HBA1C MFR BLD: 6.3 % (ref 4.8–5.6)
HCT VFR BLD AUTO: 38.5 % (ref 37.5–51)
HDLC SERPL-MCNC: 41 MG/DL
HGB BLD-MCNC: 13.2 G/DL (ref 13–17.7)
IMM GRANULOCYTES # BLD AUTO: 0 X10E3/UL (ref 0–0.1)
IMM GRANULOCYTES NFR BLD AUTO: 0 %
LDLC SERPL CALC-MCNC: 43 MG/DL (ref 0–99)
LDLC/HDLC SERPL: 1 RATIO (ref 0–3.6)
LYMPHOCYTES # BLD AUTO: 1.4 X10E3/UL (ref 0.7–3.1)
LYMPHOCYTES NFR BLD AUTO: 18 %
MCH RBC QN AUTO: 33.9 PG (ref 26.6–33)
MCHC RBC AUTO-ENTMCNC: 34.3 G/DL (ref 31.5–35.7)
MCV RBC AUTO: 99 FL (ref 79–97)
MICROALBUMIN UR-MCNC: 7.9 UG/ML
MONOCYTES # BLD AUTO: 0.6 X10E3/UL (ref 0.1–0.9)
MONOCYTES NFR BLD AUTO: 8 %
NEUTROPHILS # BLD AUTO: 5.3 X10E3/UL (ref 1.4–7)
NEUTROPHILS NFR BLD AUTO: 71 %
PHOSPHATE SERPL-MCNC: 3.4 MG/DL (ref 2.8–4.1)
PLATELET # BLD AUTO: 196 X10E3/UL (ref 150–450)
POTASSIUM SERPL-SCNC: 4.5 MMOL/L (ref 3.5–5.2)
PSA SERPL-MCNC: 1.2 NG/ML (ref 0–4)
RBC # BLD AUTO: 3.89 X10E6/UL (ref 4.14–5.8)
SODIUM SERPL-SCNC: 140 MMOL/L (ref 134–144)
TRIGL SERPL-MCNC: 124 MG/DL (ref 0–149)
TSH SERPL DL<=0.005 MIU/L-ACNC: 1.7 UIU/ML (ref 0.45–4.5)
URATE SERPL-MCNC: 3.6 MG/DL (ref 3.8–8.4)
VIT B12 SERPL-MCNC: 1488 PG/ML (ref 232–1245)
VLDLC SERPL CALC-MCNC: 22 MG/DL (ref 5–40)
WBC # BLD AUTO: 7.5 X10E3/UL (ref 3.4–10.8)

## 2023-11-03 NOTE — PROGRESS NOTES
November 3, 2023    Hello, may I speak with Jeff Alatorre?    My name is Lili      I am  with [facility]  [address] [address] [phone].    Before we get started may I verify your date of birth? 1940    I am calling to officially welcome you to our practice and ask about your recent visit. Is this a good time to talk? yes    Tell me about your visit with us. What things went well? Went wonderful       We're always looking for ways to make our patients' experiences even better. Do you have recommendations on ways we may improve?  no    Overall were you satisfied with your first visit to our practice? yes       I appreciate you taking the time to speak with me today. Is there anything else I can do for you? no      Thank you, and have a great day.

## 2023-11-30 ENCOUNTER — CLINICAL SUPPORT (OUTPATIENT)
Dept: FAMILY MEDICINE CLINIC | Facility: CLINIC | Age: 83
End: 2023-11-30
Payer: MEDICARE

## 2023-11-30 ENCOUNTER — OFFICE VISIT (OUTPATIENT)
Dept: FAMILY MEDICINE CLINIC | Facility: CLINIC | Age: 83
End: 2023-11-30
Payer: MEDICARE

## 2023-11-30 VITALS
DIASTOLIC BLOOD PRESSURE: 66 MMHG | BODY MASS INDEX: 28.35 KG/M2 | OXYGEN SATURATION: 99 % | SYSTOLIC BLOOD PRESSURE: 116 MMHG | HEIGHT: 70 IN | WEIGHT: 198 LBS | HEART RATE: 61 BPM

## 2023-11-30 DIAGNOSIS — Z00.00 MEDICARE ANNUAL WELLNESS VISIT, SUBSEQUENT: Primary | ICD-10-CM

## 2023-11-30 DIAGNOSIS — M10.09 IDIOPATHIC GOUT OF MULTIPLE SITES, UNSPECIFIED CHRONICITY: ICD-10-CM

## 2023-11-30 DIAGNOSIS — E11.9 TYPE 2 DIABETES MELLITUS WITHOUT COMPLICATION, WITHOUT LONG-TERM CURRENT USE OF INSULIN: ICD-10-CM

## 2023-11-30 DIAGNOSIS — N18.31 STAGE 3A CHRONIC KIDNEY DISEASE (CKD): ICD-10-CM

## 2023-11-30 DIAGNOSIS — E78.5 HYPERLIPIDEMIA LDL GOAL <100: ICD-10-CM

## 2023-11-30 DIAGNOSIS — I10 PRIMARY HYPERTENSION: ICD-10-CM

## 2023-11-30 DIAGNOSIS — J30.2 SEASONAL ALLERGIC RHINITIS, UNSPECIFIED TRIGGER: ICD-10-CM

## 2023-11-30 RX ORDER — FLUTICASONE PROPIONATE 50 MCG
2 SPRAY, SUSPENSION (ML) NASAL DAILY
Qty: 16 G | Refills: 2 | Status: SHIPPED | OUTPATIENT
Start: 2023-11-30

## 2023-11-30 NOTE — PROGRESS NOTES
Pt presented in office today in order to receive pt supplied b12 injection, injection given in right deltoid, pt tolerated well, no reaction.

## 2023-11-30 NOTE — PROGRESS NOTES
"The ABCs of the Annual Wellness Visit  Subsequent Medicare Wellness Visit    Subjective    Jeff Alatorre is a 83 y.o. male who presents for a Subsequent Medicare Wellness Visit.    The following portions of the patient's history were reviewed and   updated as appropriate: allergies, current medications, past family history, past medical history, past social history, past surgical history, and problem list.    Compared to one year ago, the patient feels his physical   health is the same.    Compared to one year ago, the patient feels his mental   health is the same.    Recent Hospitalizations:  He was not admitted to the hospital during the last year.       Current Medical Providers:  Patient Care Team:  Jimmy Curtis MD as PCP - General (Family Medicine)    Outpatient Medications Prior to Visit   Medication Sig Dispense Refill    acetaminophen (TYLENOL) 500 MG tablet Take 1 tablet by mouth Daily As Needed for Mild Pain.      allopurinol (ZYLOPRIM) 300 MG tablet TAKE ONE TABLET BY MOUTH EVERY DAY 90 tablet 3    amLODIPine (NORVASC) 5 MG tablet Take 1 tablet by mouth Daily. 90 tablet 1    atorvastatin (LIPITOR) 20 MG tablet TAKE ONE TABLET BY MOUTH NIGHTLY 90 tablet 3    Cyanocobalamin 1000 MCG/ML kit Inject 1 mL as directed Every 30 (Thirty) Days. 1 each 6    ferrous sulfate 325 (65 Fe) MG tablet Take 1 tablet by mouth.      losartan (COZAAR) 100 MG tablet TAKE ONE TABLET BY MOUTH EVERY DAY 90 tablet 3    metFORMIN (GLUCOPHAGE) 500 MG tablet TAKE ONE TABLET BY MOUTH TWICE A DAY WITH MEALS 180 tablet 3    Syringe 25G X 1\" 3 ML misc Inject 1000 mcg (1mL) Cyanocobalamin IM every 28 days 6 each 0     Facility-Administered Medications Prior to Visit   Medication Dose Route Frequency Provider Last Rate Last Admin    cyanocobalamin injection 1,000 mcg  1,000 mcg Intramuscular Once Enriqueta Rao APRN           No opioid medication identified on active medication list. I have reviewed chart for other potential  high risk " "medication/s and harmful drug interactions in the elderly.        Aspirin is not on active medication list.  Aspirin use is not indicated based on review of current medical condition/s. Risk of harm outweighs potential benefits.  .    Patient Active Problem List   Diagnosis    Hx of adenomatous colonic polyps    Polycythemia vera    Adenomatous polyps    Idiopathic gout of multiple sites    Primary hypertension    Type 2 diabetes mellitus without complication, without long-term current use of insulin    Hyperlipidemia LDL goal <100    Erectile dysfunction due to arterial insufficiency    Anemia    B12 deficiency    Stage 3a chronic kidney disease (CKD)    Seasonal allergic rhinitis     Advance Care Planning  Advance Directive is on file.  ACP discussion was declined by the patient. Patient has an advance directive in EMR which is still valid.      Objective    Vitals:    23 1016   BP: 116/66   BP Location: Left arm   Patient Position: Sitting   Cuff Size: Adult   Pulse: 61   SpO2: 99%   Weight: 89.8 kg (198 lb)   Height: 177.8 cm (70\")   PainSc: 0-No pain     Estimated body mass index is 28.41 kg/m² as calculated from the following:    Height as of this encounter: 177.8 cm (70\").    Weight as of this encounter: 89.8 kg (198 lb).    BMI is >= 25 and <30. (Overweight) The following options were offered after discussion;: exercise counseling/recommendations and nutrition counseling/recommendations      Does the patient have evidence of cognitive impairment? No    Lab Results   Component Value Date    CHLPL 106 2023    TRIG 124 2023    HDL 41 2023    LDL 43 2023    VLDL 22 2023    HGBA1C 6.3 (H) 2023        HEALTH RISK ASSESSMENT    Smoking Status:  Social History     Tobacco Use   Smoking Status Former    Packs/day: 1.00    Years: 46.00    Additional pack years: 0.00    Total pack years: 46.00    Types: Cigarettes    Quit date: 1980    Years since quittin.4    Passive " exposure: Past   Smokeless Tobacco Never   Tobacco Comments    N/A     Alcohol Consumption:  Social History     Substance and Sexual Activity   Alcohol Use Not Currently    Comment: Quite 20+ yrs ago     Fall Risk Screen:    MARGARITAADI Fall Risk Assessment was completed, and patient is at LOW risk for falls.Assessment completed on:2023    Depression Screenin/30/2023    10:18 AM   PHQ-2/PHQ-9 Depression Screening   Little Interest or Pleasure in Doing Things 0-->not at all   Feeling Down, Depressed or Hopeless 0-->not at all   PHQ-9: Brief Depression Severity Measure Score 0       Health Habits and Functional and Cognitive Screenin/30/2023    10:16 AM   Functional & Cognitive Status   Do you have difficulty preparing food and eating? No   Do you have difficulty bathing yourself, getting dressed or grooming yourself? No   Do you have difficulty using the toilet? No   Do you have difficulty moving around from place to place? No   Do you have trouble with steps or getting out of a bed or a chair? No   Current Diet Well Balanced Diet   Dental Exam Not up to date   Eye Exam Not up to date   Exercise (times per week) 7 times per week   Current Exercises Include Walking;Yard Work;House Cleaning   Do you need help using the phone?  No   Are you deaf or do you have serious difficulty hearing?  No   Do you need help to go to places out of walking distance? No   Do you need help shopping? No   Do you need help preparing meals?  No   Do you need help with housework?  No   Do you need help with laundry? No   Do you need help taking your medications? No   Do you need help managing money? No   Do you ever drive or ride in a car without wearing a seat belt? No   Have you felt unusual stress, anger or loneliness in the last month? No   Who do you live with? Child   If you need help, do you have trouble finding someone available to you? No   Have you been bothered in the last four weeks by sexual problems? No    Do you have difficulty concentrating, remembering or making decisions? No       Age-appropriate Screening Schedule:  Refer to the list below for future screening recommendations based on patient's age, sex and/or medical conditions. Orders for these recommended tests are listed in the plan section. The patient has been provided with a written plan.    Health Maintenance   Topic Date Due    TDAP/TD VACCINES (1 - Tdap) Never done    DIABETIC EYE EXAM  Never done    ANNUAL WELLNESS VISIT  10/17/2023    COVID-19 Vaccine (5 - 2023-24 season) 12/02/2023 (Originally 9/1/2023)    ZOSTER VACCINE (3 of 3) 12/25/2023    HEMOGLOBIN A1C  05/02/2024    COLORECTAL CANCER SCREENING  07/27/2024    LIPID PANEL  11/02/2024    URINE MICROALBUMIN  11/02/2024    BMI FOLLOWUP  11/30/2024    INFLUENZA VACCINE  Completed    Pneumococcal Vaccine 65+  Completed                CMS Preventative Services Quick Reference  Risk Factors Identified During Encounter  None Identified  The above risks/problems have been discussed with the patient.  Pertinent information has been shared with the patient in the After Visit Summary.  An After Visit Summary and PPPS were made available to the patient.    Follow Up:   Next Medicare Wellness visit to be scheduled in 1 year.       Additional E&M Note during same encounter follows:  Patient has multiple medical problems which are significant and separately identifiable that require additional work above and beyond the Medicare Wellness Visit.      Chief Complaint  Medicare Wellness-subsequent    Subjective        Jeff Alatorre presents to [facility]    HPI    Pt had labs done since last visit. Pt has CKD3a, stable levels. Mild microalbuminuria detected (30). On losartan. Pt has DM, A1c stable at 6.3. Anemia has improved, sees hematology. Labs otherwise unremarkable. He is trying to navigate insurance options as he will be out of network at end of year. He states he is  working on exemption for continuity of care that would reportedly cost him $75 monthly extra. States having constant runny nose and uncontrolled allergies.      Past Medical History:   Diagnosis Date    Arthritis     Chronic kidney disease     Erectile dysfunction     Gout     History of diverticulitis     HTN (hypertension)     Hx of adenomatous colonic polyps 10/13/2020    Hypercholesteremia     Low testosterone     Polycythemia vera 10/13/2020    Squamous cell carcinoma of skin 10/2021    top of head    Type 2 diabetes mellitus without complication, without long-term current use of insulin 2022     Past Surgical History:   Procedure Laterality Date    CAROTID ENDARTERECTOMY Left     CATARACT EXTRACTION, BILATERAL      COLON SURGERY      COLONOSCOPY      COLONOSCOPY N/A 2021    Procedure: COLONOSCOPY WITH ANESTHESIA;  Surgeon: Luciano Alatorre DO;  Location:  [facility];  Service: Gastroenterology;  Laterality: N/A;  preop; hx of polyps  postop; diverticulosis   PCP Devon Moore     HEMORROIDECTOMY      HERNIA REPAIR      PENILE PROSTHESIS IMPLANT N/A 2023    Procedure: 3-PIECE INFLATABLE PENILE PROSTHESIS PLACEMENT;  Surgeon: Garcia Santana MD;  Location:  [facility] OR;  Service: Urology;  Laterality: N/A;    VASECTOMY       Social History     Socioeconomic History    Marital status: Single   Tobacco Use    Smoking status: Former     Packs/day: 1.00     Years: 46.00     Additional pack years: 0.00     Total pack years: 46.00     Types: Cigarettes     Quit date: 1980     Years since quittin.4     Passive exposure: Past    Smokeless tobacco: Never    Tobacco comments:     N/A   Vaping Use    Vaping Use: Never used   Substance and Sexual Activity    Alcohol use: Not Currently     Comment: Quite 20+ yrs ago    Drug use: Never    Sexual activity: Yes     Partners: Female     Birth control/protection: Vasectomy       Objective   Vital Signs:  /66 (BP Location: Left arm, Patient  "Position: Sitting, Cuff Size: Adult)   Pulse 61   Ht 177.8 cm (70\")   Wt 89.8 kg (198 lb)   SpO2 99%   BMI 28.41 kg/m²   Estimated body mass index is 28.41 kg/m² as calculated from the following:    Height as of this encounter: 177.8 cm (70\").    Weight as of this encounter: 89.8 kg (198 lb).             Physical Exam  Vitals reviewed.   Constitutional:       Appearance: Normal appearance.   HENT:      Head: Normocephalic.      Nose: Nose normal.      Mouth/Throat:      Mouth: Mucous membranes are moist.   Eyes:      Extraocular Movements: Extraocular movements intact.   Cardiovascular:      Rate and Rhythm: Normal rate and regular rhythm.      Heart sounds: Normal heart sounds.   Pulmonary:      Effort: Pulmonary effort is normal.      Breath sounds: Normal breath sounds.   Musculoskeletal:         General: Normal range of motion.      Cervical back: Normal range of motion.   Skin:     General: Skin is warm and dry.   Neurological:      General: No focal deficit present.      Mental Status: He is alert and oriented to person, place, and time.   Psychiatric:         Mood and Affect: Mood normal.         Behavior: Behavior normal.        Result Review :                   Assessment and Plan   Diagnoses and all orders for this visit:    1. Medicare annual wellness visit, subsequent (Primary)    2. Primary hypertension  -At goal. With microalbuminuria, on ARB and amlodipine  -     Comprehensive metabolic panel; Future    3. Type 2 diabetes mellitus without complication, without long-term current use of insulin  -Controlled w/ metformin. Recheck A1c in 3 mo  -     Hemoglobin A1c; Future    4. Hyperlipidemia LDL goal <100  -Controlled on lipitor 20mg    5. Idiopathic gout of multiple sites, unspecified chronicity  -At goal, on allopurinol    6. Stage 3a chronic kidney disease (CKD)  -Stable. On ARB. Discussed r/b of farxiga addition. Will revisit at next visit given insurance uncertainties    7. Seasonal allergic " rhinitis, unspecified trigger  -     fluticasone (FLONASE) 50 MCG/ACT nasal spray; 2 sprays into the nostril(s) as directed by provider Daily.  Dispense: 16 g; Refill: 2      FU 6 mo, POC a1c         Follow Up   Return in about 6 months (around 5/30/2024) for FU labs prior.  Patient was given instructions and counseling regarding his condition or for health maintenance advice. Please see specific information pulled into the AVS if appropriate.

## 2023-12-12 ENCOUNTER — TELEPHONE (OUTPATIENT)
Dept: FAMILY MEDICINE CLINIC | Facility: CLINIC | Age: 83
End: 2023-12-12

## 2023-12-12 NOTE — TELEPHONE ENCOUNTER
Caller: KAL    Relationship: Other  WITH [facility]    Best call back number: [phone]     What is the best time to reach you: ANYTIME    Who are you requesting to speak with (clinical staff, provider,  specific staff member): CLINICAL    What was the call regarding: WANTED TO MAKE DR. GARG AWARE THAT SHE JUST FAXED OVER FOR HOME SLEEP STUDY AND CPAP ORDER FOR PATIENT. THE FAX WENT THROUGH

## 2023-12-12 NOTE — TELEPHONE ENCOUNTER
Caller: Jeff Alatorre    Relationship: Self    Best call back number:  [phone]     Caller requesting test results: FOR SLEEP STUDY    What test was performed: SLEEP STUDY WANTS RESULTS

## 2023-12-19 ENCOUNTER — OFFICE VISIT (OUTPATIENT)
Dept: FAMILY MEDICINE CLINIC | Facility: CLINIC | Age: 83
End: 2023-12-19
Payer: MEDICARE

## 2023-12-19 VITALS
DIASTOLIC BLOOD PRESSURE: 68 MMHG | TEMPERATURE: 97.3 F | RESPIRATION RATE: 20 BRPM | HEIGHT: 70 IN | WEIGHT: 199 LBS | BODY MASS INDEX: 28.49 KG/M2 | HEART RATE: 58 BPM | OXYGEN SATURATION: 99 % | SYSTOLIC BLOOD PRESSURE: 120 MMHG

## 2023-12-19 DIAGNOSIS — G47.33 OSA (OBSTRUCTIVE SLEEP APNEA): Primary | ICD-10-CM

## 2023-12-19 PROCEDURE — 99213 OFFICE O/P EST LOW 20 MIN: CPT | Performed by: STUDENT IN AN ORGANIZED HEALTH CARE EDUCATION/TRAINING PROGRAM

## 2023-12-19 PROCEDURE — 3074F SYST BP LT 130 MM HG: CPT | Performed by: STUDENT IN AN ORGANIZED HEALTH CARE EDUCATION/TRAINING PROGRAM

## 2023-12-19 PROCEDURE — 3078F DIAST BP <80 MM HG: CPT | Performed by: STUDENT IN AN ORGANIZED HEALTH CARE EDUCATION/TRAINING PROGRAM

## 2023-12-19 RX ORDER — LOSARTAN POTASSIUM 100 MG/1
100 TABLET ORAL DAILY
Qty: 90 TABLET | Refills: 3 | Status: SHIPPED | OUTPATIENT
Start: 2023-12-19

## 2023-12-19 RX ORDER — LOSARTAN POTASSIUM 100 MG/1
TABLET ORAL
Qty: 90 TABLET | Refills: 3 | OUTPATIENT
Start: 2023-12-19

## 2023-12-19 NOTE — PROGRESS NOTES
Chief Complaint  discuss cpap     Subjective        Jeff Alatorre presents to [facility]    HPI    Pt here to go over sleep study results. He has questions about treatment options before proceeding with cpap through [facility]. Testing indicated mild ANYI. He is inquiring about inspire treatment. No other concerns.      Past Medical History:   Diagnosis Date    Arthritis     Chronic kidney disease     Erectile dysfunction     Gout     History of diverticulitis     HTN (hypertension)     Hx of adenomatous colonic polyps 10/13/2020    Hypercholesteremia     Low testosterone     Polycythemia vera 10/13/2020    Squamous cell carcinoma of skin 10/2021    top of head    Type 2 diabetes mellitus without complication, without long-term current use of insulin 2022     Past Surgical History:   Procedure Laterality Date    CAROTID ENDARTERECTOMY Left     CATARACT EXTRACTION, BILATERAL      COLON SURGERY      COLONOSCOPY      COLONOSCOPY N/A 2021    Procedure: COLONOSCOPY WITH ANESTHESIA;  Surgeon: Luciano Alatorre DO;  Location: [facility] ENDOSCOPY;  Service: Gastroenterology;  Laterality: N/A;  preop; hx of polyps  postop; diverticulosis   PCP Devon Moore     HEMORROIDECTOMY      HERNIA REPAIR      PENILE PROSTHESIS IMPLANT N/A 2023    Procedure: 3-PIECE INFLATABLE PENILE PROSTHESIS PLACEMENT;  Surgeon: Garcia Santana MD;  Location: [facility] OR;  Service: Urology;  Laterality: N/A;    VASECTOMY       Social History     Socioeconomic History    Marital status: Single   Tobacco Use    Smoking status: Former     Packs/day: 1.00     Years: 46.00     Additional pack years: 0.00     Total pack years: 46.00     Types: Cigarettes     Quit date: 1980     Years since quittin.4     Passive exposure: Past    Smokeless tobacco: Never    Tobacco comments:     N/A   Vaping Use    Vaping Use: Never used   Substance and Sexual Activity    Alcohol use: Not Currently      "Comment: Quite 20+ yrs ago    Drug use: Never    Sexual activity: Yes     Partners: Female     Birth control/protection: Vasectomy       Objective   Vital Signs:  /68   Pulse 58   Temp 97.3 °F (36.3 °C) (Temporal)   Resp 20   Ht 177.8 cm (70\")   Wt 90.3 kg (199 lb)   SpO2 99%   BMI 28.55 kg/m²   Estimated body mass index is 28.55 kg/m² as calculated from the following:    Height as of this encounter: 177.8 cm (70\").    Weight as of this encounter: 90.3 kg (199 lb).              Physical Exam  Vitals reviewed.   Constitutional:       Appearance: Normal appearance.   HENT:      Head: Normocephalic.      Nose: Nose normal.      Mouth/Throat:      Mouth: Mucous membranes are moist.   Eyes:      Extraocular Movements: Extraocular movements intact.   Cardiovascular:      Rate and Rhythm: Normal rate and regular rhythm.      Heart sounds: Normal heart sounds.   Pulmonary:      Effort: Pulmonary effort is normal.      Breath sounds: Normal breath sounds.   Musculoskeletal:         General: Normal range of motion.      Cervical back: Normal range of motion.   Skin:     General: Skin is warm and dry.   Neurological:      General: No focal deficit present.      Mental Status: He is alert and oriented to person, place, and time.   Psychiatric:         Mood and Affect: Mood normal.         Behavior: Behavior normal.        Result Review :                   Assessment and Plan   Diagnoses and all orders for this visit:    1. ANYI (obstructive sleep apnea) (Primary)  -Counseled on sleep study results, ANYI treatment options. Discussed inspire might be option should he notice improvement with CPAP however not tolerating. Will arrange for CPAP therapy trial first. Pt in agreement.    Other orders  -     losartan (COZAAR) 100 MG tablet; Take 1 tablet by mouth Daily.  Dispense: 90 tablet; Refill: 3         EMR Dragon/Transcription disclaimer:   Much of this encounter note is an electronic transcription/translation of spoken " language to printed text. The electronic translation of spoken language may permit erroneous, or at times, nonsensical words or phrases to be inadvertently transcribed; although attempts have made to review the note for such errors, some may still exist. Please excuse any unrecognized transcription errors and contact us if the air is unintelligible or needs documented correction. Also, portions of this note have been copied forward, however, changed to reflect the most current clinical status of this patient.  Follow Up   No follow-ups on file.  Patient was given instructions and counseling regarding his condition or for health maintenance advice. Please see specific information pulled into the AVS if appropriate.

## 2023-12-22 RX ORDER — AMLODIPINE BESYLATE 5 MG/1
5 TABLET ORAL DAILY
Qty: 90 TABLET | Refills: 1 | Status: SHIPPED | OUTPATIENT
Start: 2023-12-22

## 2023-12-22 NOTE — TELEPHONE ENCOUNTER
Rx Refill Note  Requested Prescriptions     Pending Prescriptions Disp Refills    amLODIPine (NORVASC) 5 MG tablet [Pharmacy Med Name: AMLODIPINE BESYLATE 5MG TABS] 90 tablet 1     Sig: TAKE ONE TABLET BY MOUTH EVERY DAY      Last office visit with prescribing clinician: 4/17/2023   Last telemedicine visit with prescribing clinician: Visit date not found   Next office visit with prescribing clinician: Visit date not found                         Would you like a call back once the refill request has been completed: [] Yes [] No    If the office needs to give you a call back, can they leave a voicemail: [] Yes [] No    Jenna Woods, PCT  12/22/23, 09:29 CST

## 2023-12-29 ENCOUNTER — CLINICAL SUPPORT (OUTPATIENT)
Dept: FAMILY MEDICINE CLINIC | Facility: CLINIC | Age: 83
End: 2023-12-29
Payer: MEDICARE

## 2023-12-29 NOTE — PROGRESS NOTES
Pt presented in office in order to receive pt supplied b12 injection, injection given in left deltoid, pt tolerated well no reaction

## 2024-01-16 NOTE — PROGRESS NOTES
Subjective    Mr. Alatorre is 83 y.o. male    Chief Complaint: Kidney stone    History of Present Illness  83-year-old male established patient follow-up for kidney stones, enlarged prostate, history of hematuria.  Cystoscopy and CT urogram last summer revealed enlarged prostate and small nonobstructing kidney stones measuring up to 4 mm.  He denies any recent hematuria, flank pain, or bothersome LUTS.  KUB x-ray done today reviewed by me with the patient shows small bilateral kidney stones measuring approximately 3 mm or less, no ureteral stone visible.  He had placement of Coloplast Titan touch three-piece splittable penile implant 3/14/2022.    I independently visualized and reviewed the patient's prior imaging studies today in clinic and discussed the imaging findings with the patient.      The following portions of the patient's history were reviewed and updated as appropriate: allergies, current medications, past family history, past medical history, past social history, past surgical history and problem list.    Review of Systems      Current Outpatient Medications:     acetaminophen (TYLENOL) 500 MG tablet, Take 1 tablet by mouth Daily As Needed for Mild Pain., Disp: , Rfl:     allopurinol (ZYLOPRIM) 300 MG tablet, TAKE ONE TABLET BY MOUTH EVERY DAY, Disp: 90 tablet, Rfl: 3    amLODIPine (NORVASC) 5 MG tablet, TAKE ONE TABLET BY MOUTH EVERY DAY, Disp: 90 tablet, Rfl: 1    aspirin 81 MG EC tablet, 1 tablet., Disp: , Rfl:     atorvastatin (LIPITOR) 20 MG tablet, TAKE ONE TABLET BY MOUTH NIGHTLY, Disp: 90 tablet, Rfl: 3    Cyanocobalamin 1000 MCG/ML kit, Inject 1 mL as directed Every 30 (Thirty) Days., Disp: 1 each, Rfl: 6    ferrous sulfate 325 (65 Fe) MG tablet, Take 1 tablet by mouth., Disp: , Rfl:     fluticasone (FLONASE) 50 MCG/ACT nasal spray, 2 sprays into the nostril(s) as directed by provider Daily., Disp: 16 g, Rfl: 2    losartan (COZAAR) 100 MG tablet, Take 1 tablet by mouth Daily., Disp: 90 tablet,  "Rfl: 3    metFORMIN (GLUCOPHAGE) 500 MG tablet, TAKE ONE TABLET BY MOUTH TWICE A DAY WITH MEALS, Disp: 180 tablet, Rfl: 3    Syringe 25G X 1\" 3 ML misc, Inject 1000 mcg (1mL) Cyanocobalamin IM every 28 days, Disp: 6 each, Rfl: 0  No current facility-administered medications for this visit.    Facility-Administered Medications Ordered in Other Visits:     cyanocobalamin injection 1,000 mcg, 1,000 mcg, Intramuscular, Once, Enriqueta Rao APRN    Past Medical History:   Diagnosis Date    Arthritis     Chronic kidney disease     Erectile dysfunction     Gout     History of diverticulitis     HTN (hypertension)     Hx of adenomatous colonic polyps 10/13/2020    Hypercholesteremia     Low testosterone     Polycythemia vera 10/13/2020    Squamous cell carcinoma of skin 10/2021    top of head    Type 2 diabetes mellitus without complication, without long-term current use of insulin 2022       Past Surgical History:   Procedure Laterality Date    CAROTID ENDARTERECTOMY Left     CATARACT EXTRACTION, BILATERAL      COLON SURGERY      COLONOSCOPY      COLONOSCOPY N/A 2021    Procedure: COLONOSCOPY WITH ANESTHESIA;  Surgeon: Luciano Alatorre DO;  Location: [facility] ENDOSCOPY;  Service: Gastroenterology;  Laterality: N/A;  preop; hx of polyps  postop; diverticulosis   PCP Devon Moore     HEMORROIDECTOMY      HERNIA REPAIR      PENILE PROSTHESIS IMPLANT N/A 2023    Procedure: 3-PIECE INFLATABLE PENILE PROSTHESIS PLACEMENT;  Surgeon: Garcia Santana MD;  Location: [facility] OR;  Service: Urology;  Laterality: N/A;    VASECTOMY         Social History     Socioeconomic History    Marital status: Single   Tobacco Use    Smoking status: Former     Packs/day: 1.00     Years: 46.00     Additional pack years: 0.00     Total pack years: 46.00     Types: Cigarettes     Quit date: 1980     Years since quittin.5     Passive exposure: Past    Smokeless tobacco: Never    Tobacco comments:     N/A   Vaping Use    " "Vaping Use: Never used   Substance and Sexual Activity    Alcohol use: Not Currently     Comment: Quite 20+ yrs ago    Drug use: Never    Sexual activity: Yes     Partners: Female     Birth control/protection: Vasectomy       Family History   Problem Relation Age of Onset    No Known Problems Mother     Heart attack Father     No Known Problems Maternal Grandmother     No Known Problems Maternal Grandfather     No Known Problems Paternal Grandmother     No Known Problems Paternal Grandfather     No Known Problems Son     No Known Problems Daughter     Parkinsonism Brother     Colon cancer Neg Hx     Colon polyps Neg Hx     Esophageal cancer Neg Hx        Objective    Temp 97.5 °F (36.4 °C)   Ht 177.8 cm (70\")   Wt 91.6 kg (202 lb)   BMI 28.98 kg/m²     Physical Exam        Results for orders placed or performed in visit on 01/24/24   POC Urinalysis Dipstick, Multipro    Specimen: Urine   Result Value Ref Range    Color Yellow Yellow, Straw, Dark Yellow, Pearl    Clarity, UA Clear Clear    Glucose, UA Negative Negative mg/dL    Bilirubin Negative Negative    Ketones, UA Negative Negative    Specific Gravity  1.020 1.005 - 1.030    Blood, UA Negative Negative    pH, Urine 6.0 5.0 - 8.0    Protein, POC Negative Negative mg/dL    Urobilinogen, UA Normal Normal, 0.2 E.U./dL    Nitrite, UA Negative Negative    Leukocytes Trace (A) Negative     Assessment and Plan    Diagnoses and all orders for this visit:    1. Kidney stone (Primary)  -     POC Urinalysis Dipstick, Multipro  -     XR abdomen kub; Future      Small asymptomatic bilateral kidney stones.  He will follow-up with me in 1 year in the [facility] with pre-clinic KUB      This document has been signed by SUNSHINE Santana MD on January 25, 2024 21:32 CST                "

## 2024-01-23 ENCOUNTER — TELEPHONE (OUTPATIENT)
Dept: UROLOGY | Facility: CLINIC | Age: 84
End: 2024-01-23
Payer: MEDICARE

## 2024-01-24 ENCOUNTER — OFFICE VISIT (OUTPATIENT)
Dept: UROLOGY | Facility: CLINIC | Age: 84
End: 2024-01-24
Payer: MEDICARE

## 2024-01-24 ENCOUNTER — HOSPITAL ENCOUNTER (OUTPATIENT)
Dept: GENERAL RADIOLOGY | Facility: HOSPITAL | Age: 84
Discharge: HOME | End: 2024-01-24
Admitting: UROLOGY
Payer: MEDICARE

## 2024-01-24 VITALS — HEIGHT: 70 IN | WEIGHT: 202 LBS | BODY MASS INDEX: 28.92 KG/M2 | TEMPERATURE: 97.5 F

## 2024-01-24 DIAGNOSIS — N20.0 KIDNEY STONE: ICD-10-CM

## 2024-01-24 DIAGNOSIS — N20.0 KIDNEY STONE: Primary | ICD-10-CM

## 2024-01-24 LAB
BILIRUB BLD-MCNC: NEGATIVE MG/DL
CLARITY, POC: CLEAR
COLOR UR: YELLOW
GLUCOSE UR STRIP-MCNC: NEGATIVE MG/DL
KETONES UR QL: NEGATIVE
LEUKOCYTE EST, POC: ABNORMAL
NITRITE UR-MCNC: NEGATIVE MG/ML
PH UR: 6 [PH] (ref 5–8)
PROT UR STRIP-MCNC: NEGATIVE MG/DL
RBC # UR STRIP: NEGATIVE /UL
SP GR UR: 1.02 (ref 1–1.03)
UROBILINOGEN UR QL: NORMAL

## 2024-01-24 PROCEDURE — 74018 RADEX ABDOMEN 1 VIEW: CPT

## 2024-01-24 RX ORDER — ASPIRIN 81 MG/1
81 TABLET ORAL
COMMUNITY

## 2024-01-26 DIAGNOSIS — E53.8 B12 DEFICIENCY: Primary | ICD-10-CM

## 2024-01-26 RX ORDER — CYANOCOBALAMIN 1000 UG/ML
INJECTION, SOLUTION INTRAMUSCULAR; SUBCUTANEOUS
Qty: 1 ML | Refills: 6 | Status: SHIPPED | OUTPATIENT
Start: 2024-01-26

## 2024-01-30 ENCOUNTER — OFFICE VISIT (OUTPATIENT)
Dept: ONCOLOGY | Facility: CLINIC | Age: 84
End: 2024-01-30
Payer: MEDICARE

## 2024-01-30 ENCOUNTER — CLINICAL SUPPORT (OUTPATIENT)
Dept: FAMILY MEDICINE CLINIC | Facility: CLINIC | Age: 84
End: 2024-01-30
Payer: MEDICARE

## 2024-01-30 ENCOUNTER — LAB (OUTPATIENT)
Dept: LAB | Facility: HOSPITAL | Age: 84
End: 2024-01-30
Payer: MEDICARE

## 2024-01-30 VITALS
OXYGEN SATURATION: 99 % | DIASTOLIC BLOOD PRESSURE: 78 MMHG | WEIGHT: 202.1 LBS | HEIGHT: 70 IN | SYSTOLIC BLOOD PRESSURE: 118 MMHG | HEART RATE: 82 BPM | RESPIRATION RATE: 16 BRPM | TEMPERATURE: 97.4 F | BODY MASS INDEX: 28.93 KG/M2

## 2024-01-30 DIAGNOSIS — D64.9 ANEMIA, UNSPECIFIED TYPE: ICD-10-CM

## 2024-01-30 DIAGNOSIS — D63.1 ANEMIA DUE TO STAGE 3A CHRONIC KIDNEY DISEASE: Primary | ICD-10-CM

## 2024-01-30 DIAGNOSIS — E61.1 IRON DEFICIENCY: ICD-10-CM

## 2024-01-30 DIAGNOSIS — E53.8 B12 DEFICIENCY: ICD-10-CM

## 2024-01-30 DIAGNOSIS — N18.31 STAGE 3A CHRONIC KIDNEY DISEASE: ICD-10-CM

## 2024-01-30 DIAGNOSIS — N18.31 ANEMIA DUE TO STAGE 3A CHRONIC KIDNEY DISEASE: Primary | ICD-10-CM

## 2024-01-30 DIAGNOSIS — E53.8 B12 DEFICIENCY: Primary | ICD-10-CM

## 2024-01-30 PROBLEM — D45 POLYCYTHEMIA VERA: Status: RESOLVED | Noted: 2020-10-13 | Resolved: 2024-01-30

## 2024-01-30 LAB
ALBUMIN SERPL-MCNC: 4.3 G/DL (ref 3.5–5.2)
ALBUMIN/GLOB SERPL: 1.7 G/DL
ALP SERPL-CCNC: 134 U/L (ref 39–117)
ALT SERPL W P-5'-P-CCNC: 12 U/L (ref 1–41)
ANION GAP SERPL CALCULATED.3IONS-SCNC: 10 MMOL/L (ref 5–15)
AST SERPL-CCNC: 15 U/L (ref 1–40)
BASOPHILS # BLD AUTO: 0.05 10*3/MM3 (ref 0–0.2)
BASOPHILS NFR BLD AUTO: 0.8 % (ref 0–1.5)
BILIRUB SERPL-MCNC: 0.4 MG/DL (ref 0–1.2)
BUN SERPL-MCNC: 27 MG/DL (ref 8–23)
BUN/CREAT SERPL: 18.4 (ref 7–25)
CALCIUM SPEC-SCNC: 9.2 MG/DL (ref 8.6–10.5)
CHLORIDE SERPL-SCNC: 105 MMOL/L (ref 98–107)
CO2 SERPL-SCNC: 26 MMOL/L (ref 22–29)
CREAT SERPL-MCNC: 1.47 MG/DL (ref 0.76–1.27)
DEPRECATED RDW RBC AUTO: 49.4 FL (ref 37–54)
EGFRCR SERPLBLD CKD-EPI 2021: 47 ML/MIN/1.73
EOSINOPHIL # BLD AUTO: 0.29 10*3/MM3 (ref 0–0.4)
EOSINOPHIL NFR BLD AUTO: 4.5 % (ref 0.3–6.2)
ERYTHROCYTE [DISTWIDTH] IN BLOOD BY AUTOMATED COUNT: 13.2 % (ref 12.3–15.4)
FERRITIN SERPL-MCNC: 93.33 NG/ML (ref 30–400)
GLOBULIN UR ELPH-MCNC: 2.6 GM/DL
GLUCOSE SERPL-MCNC: 98 MG/DL (ref 65–99)
HCT VFR BLD AUTO: 39.3 % (ref 37.5–51)
HGB BLD-MCNC: 12.6 G/DL (ref 13–17.7)
HOLD SPECIMEN: NORMAL
IMM GRANULOCYTES # BLD AUTO: 0.02 10*3/MM3 (ref 0–0.05)
IMM GRANULOCYTES NFR BLD AUTO: 0.3 % (ref 0–0.5)
IRON 24H UR-MRATE: 84 MCG/DL (ref 59–158)
IRON SATN MFR SERPL: 20 % (ref 20–50)
LYMPHOCYTES # BLD AUTO: 1.41 10*3/MM3 (ref 0.7–3.1)
LYMPHOCYTES NFR BLD AUTO: 22 % (ref 19.6–45.3)
MCH RBC QN AUTO: 32.7 PG (ref 26.6–33)
MCHC RBC AUTO-ENTMCNC: 32.1 G/DL (ref 31.5–35.7)
MCV RBC AUTO: 102.1 FL (ref 79–97)
MONOCYTES # BLD AUTO: 0.59 10*3/MM3 (ref 0.1–0.9)
MONOCYTES NFR BLD AUTO: 9.2 % (ref 5–12)
NEUTROPHILS NFR BLD AUTO: 4.04 10*3/MM3 (ref 1.7–7)
NEUTROPHILS NFR BLD AUTO: 63.2 % (ref 42.7–76)
NRBC BLD AUTO-RTO: 0 /100 WBC (ref 0–0.2)
PLATELET # BLD AUTO: 191 10*3/MM3 (ref 140–450)
PMV BLD AUTO: 10 FL (ref 6–12)
POTASSIUM SERPL-SCNC: 4.2 MMOL/L (ref 3.5–5.2)
PROT SERPL-MCNC: 6.9 G/DL (ref 6–8.5)
RBC # BLD AUTO: 3.85 10*6/MM3 (ref 4.14–5.8)
SODIUM SERPL-SCNC: 141 MMOL/L (ref 136–145)
TIBC SERPL-MCNC: 422 MCG/DL (ref 298–536)
TRANSFERRIN SERPL-MCNC: 283 MG/DL (ref 200–360)
VIT B12 BLD-MCNC: 713 PG/ML (ref 211–946)
WBC NRBC COR # BLD AUTO: 6.4 10*3/MM3 (ref 3.4–10.8)

## 2024-01-30 PROCEDURE — 96372 THER/PROPH/DIAG INJ SC/IM: CPT | Performed by: NURSE PRACTITIONER

## 2024-01-30 PROCEDURE — 82607 VITAMIN B-12: CPT

## 2024-01-30 PROCEDURE — 83540 ASSAY OF IRON: CPT

## 2024-01-30 PROCEDURE — 36415 COLL VENOUS BLD VENIPUNCTURE: CPT

## 2024-01-30 PROCEDURE — 82728 ASSAY OF FERRITIN: CPT

## 2024-01-30 PROCEDURE — 85025 COMPLETE CBC W/AUTO DIFF WBC: CPT

## 2024-01-30 PROCEDURE — 84466 ASSAY OF TRANSFERRIN: CPT

## 2024-01-30 PROCEDURE — 80053 COMPREHEN METABOLIC PANEL: CPT

## 2024-01-30 RX ORDER — CYANOCOBALAMIN 1000 UG/ML
1000 INJECTION, SOLUTION INTRAMUSCULAR; SUBCUTANEOUS
Status: SHIPPED | OUTPATIENT
Start: 2024-01-30

## 2024-01-30 RX ADMIN — CYANOCOBALAMIN 1000 MCG: 1000 INJECTION, SOLUTION INTRAMUSCULAR; SUBCUTANEOUS at 15:33

## 2024-01-30 NOTE — PROGRESS NOTES
Patient presented self to office with self provided  b 12 injection. Given in left Deltoid. Patient waited in lobby 15 minutes and tolerated well.

## 2024-01-30 NOTE — PROGRESS NOTES
[facility]AT[facility]Y [place]  [address]  [place] [address] [phone]    Patient Name: Jeff Alatorre  Encounter Date: 01/30/2024   YOB: 1940  Patient Number: [id number]    Hematology / Oncology Progress Note    HPI / REASON FOR VISIT: Jeff Alatorre is a 83 y.o. male who is followed by this office for polycythemia which was diagnosed many years ago and believed to be related to testosterone injections.  Bone marrow biopsy in either 2012 or 2013 which showed 55-65% cellularity, prominent erythroid hyperplasia with no simultaneous increases that are dyspoietic myeloid and megakaryocytic series (there was an isolated erythroid hyperplasia).  The flow cytometry and cytogenetics were unrevealing.  This appeared to be more of a reactive erythrocytosis rather than a myeloproliferative process.     He lost follow up from April 26, 2022 and returned on April 26. 2023.   He states that he had operation on 3/14/23 and penile implant was placed.  He has recovered well from that surgery but has had some anemia recently.  Hgb on 03/07 was 12.4.  On 04/17/23, Hgb was 9.9.  Records indicate that patient did not loose any significant amount of blood during his surgery.  He was started on oral iron on April 17.  Stool was normal prior to iron but then turned dark which is an expected findings.  Colonoscopy was good 2021.  He denies blood in stool or urine.      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       INTERVAL HISTORY     Pt presents to clinic today for continued follow up.  His last Retacrit was 5/10/23 and he has maintained Hgb > 11g since then.     He has no acute complaints today.  He had labs drawn and results were reviewed with him in office.         LABS    Lab Results - Last 18 Months   Lab Units 01/30/24  0938 11/02/23  0945 10/31/23  0918 07/10/23  1022 06/28/23  0939  06/08/23  0855 05/24/23  0834 05/10/23  1017 04/26/23  1053 04/17/23  0710   HEMOGLOBIN g/dL 12.6* 13.2 12.9* 12.2* 12.6* 12.0* 11.2*   < > 8.1* 9.9*   HEMATOCRIT % 39.3 38.5 40.8 39.5 40.8 39.4 37.8   < > 26.8* 29.6*   MCV fL 102.1* 99* 103.3* 100.5* 101.5* 102.9* 105.6*   < > 108.1* 99.7*   WBC 10*3/mm3 6.40 7.5 7.30 7.12 7.33 6.90 5.88   < > 6.98 9.11   RDW % 13.2 12.7 13.1 13.5 13.7 13.6 14.0   < > 15.9* 12.9   MPV fL 10.0  --  10.0 10.4 10.5 10.3 9.9   < > 9.8  --    PLATELETS 10*3/mm3 191 196 204 202 197 198 232   < > 237 195   IMM GRAN % % 0.3  --  0.4 0.3 0.4 0.3  --   --   --   --    NEUTROS ABS 10*3/mm3 4.04 5.3 5.16 4.86 5.12 4.68 3.97   < > 5.10 6.66   LYMPHS ABS 10*3/mm3 1.41 1.4 1.32 1.45 1.32 1.31 1.11   < >  --  1.57   MONOS ABS 10*3/mm3 0.59 0.6 0.52 0.57 0.65 0.62 0.56   < >  --  0.59   EOS ABS 10*3/mm3 0.29 0.2 0.22 0.17 0.17 0.23 0.16   < > 0.07 0.21   BASOS ABS 10*3/mm3 0.05 0.1 0.05 0.05 0.04 0.04 0.06   < > 0.14 0.06   IMMATURE GRANS (ABS) 10*3/mm3 0.02  --  0.03 0.02 0.03 0.02  --   --   --   --    NRBC /100 WBC 0.0  --  0.0 0.0 0.0 0.0  --   --   --  0.0   NEUTROPHIL % %  --   --   --   --   --   --   --   --  73.0  --    MONOCYTES % %  --   --   --   --   --   --   --   --  2.0*  --    BASOPHIL % %  --   --   --   --   --   --   --   --  2.0*  --    ANISOCYTOSIS   --   --   --   --   --   --   --   --  Slight/1+  --     < > = values in this interval not displayed.       Lab Results - Last 18 Months   Lab Units 01/30/24  0938 11/02/23  0945 10/31/23  0918 07/10/23  1022 06/28/23  0939 06/08/23  0855 05/24/23  0834   GLUCOSE mg/dL 98 109* 78 114* 97 102* 95   SODIUM mmol/L 141 140 143 141 140 140 143   POTASSIUM mmol/L 4.2 4.5 4.0 4.3 4.9 4.7 4.1   CO2 mmol/L 26.0 22 26.0 23.0 25.0 25.0 24.0   CHLORIDE mmol/L 105 103 107 107 105 105 108*   ANION GAP mmol/L 10.0  --  10.0 11.0 10.0 10.0 11.0   CREATININE mg/dL 1.47* 1.29* 1.31* 1.36* 1.27 1.38* 1.23   BUN mg/dL 27* 24 29* 31* 28* 26* 26*   BUN  / CREAT RATIO  18.4 19 22.1 22.8 22.0 18.8 21.1   CALCIUM mg/dL 9.2 10.0 9.7 9.7 9.9 9.7 9.4   ALK PHOS U/L 134*  --  141* 129* 122* 124* 137*   TOTAL PROTEIN g/dL 6.9  --  7.3 6.8 7.3 6.9 6.9   ALT (SGPT) U/L 12  --  11 14 14 12 11   AST (SGOT) U/L 15  --  14 18 17 15 15   BILIRUBIN mg/dL 0.4  --  0.4 0.2 0.3 0.3 0.2   ALBUMIN g/dL 4.3 4.6 4.4 4.4 4.4 4.2 4.3   GLOBULIN gm/dL 2.6  --  2.9 2.4 2.9 2.7 2.6       Lab Results - Last 18 Months   Lab Units 11/02/23  0945 04/26/23  1054 04/26/23  1053   M-SPIKE g/dL  --  Not Observed  --    KAPPA/LAMBDA RATIO, S   --  1.11  --    FREE LAMBDA LIGHT CHAINS mg/L  --  25.3  --    URIC ACID mg/dL 3.6*  --   --    IG KAPPA FREE LIGHT CHAIN mg/L  --  28.1*  --    REFERENCE LAB REPORT   --   --  See Attached Report       Lab Results - Last 18 Months   Lab Units 01/30/24  0938 11/02/23  0945 10/31/23  0918 06/28/23  0939 06/08/23  0855 05/24/23  0831 04/26/23  1053 10/17/22  0736   IRON mcg/dL 84  --  84 50*  --   --  91  --    TIBC mcg/dL 422  --  428 457  --   --  375  --    IRON SATURATION (TSAT) % 20  --  20 11*  --   --  24  --    FERRITIN ng/mL 93.33  --  93.89 73.80 77.46  --  244.90  --    TSH uIU/mL  --  1.700  --   --   --   --   --  2.350   FOLATE ng/mL  --  8.0  --   --   --  6.45  --  7.80         PAST MEDICAL HISTORY:  ALLERGIES:  No Known Allergies  CURRENT MEDICATIONS:  Outpatient Encounter Medications as of 1/30/2024   Medication Sig Dispense Refill    acetaminophen (TYLENOL) 500 MG tablet Take 1 tablet by mouth Daily As Needed for Mild Pain.      allopurinol (ZYLOPRIM) 300 MG tablet TAKE ONE TABLET BY MOUTH EVERY DAY 90 tablet 3    amLODIPine (NORVASC) 5 MG tablet TAKE ONE TABLET BY MOUTH EVERY DAY 90 tablet 1    aspirin 81 MG EC tablet 1 tablet.      atorvastatin (LIPITOR) 20 MG tablet TAKE ONE TABLET BY MOUTH NIGHTLY 90 tablet 3    cyanocobalamin 1000 MCG/ML injection INJECT 1ML AS DIRECTED EVERY 30 DAYS 1 mL 6    ferrous sulfate 325 (65 Fe) MG tablet Take 1  "tablet by mouth. Pt takes once or twice weekly      fluticasone (FLONASE) 50 MCG/ACT nasal spray 2 sprays into the nostril(s) as directed by provider Daily. 16 g 2    losartan (COZAAR) 100 MG tablet Take 1 tablet by mouth Daily. 90 tablet 3    metFORMIN (GLUCOPHAGE) 500 MG tablet TAKE ONE TABLET BY MOUTH TWICE A DAY WITH MEALS 180 tablet 3    Syringe 25G X 1\" 3 ML misc Inject 1000 mcg (1mL) Cyanocobalamin IM every 28 days 6 each 0     Facility-Administered Encounter Medications as of 1/30/2024   Medication Dose Route Frequency Provider Last Rate Last Admin    cyanocobalamin injection 1,000 mcg  1,000 mcg Intramuscular Once Enriqueta Rao APRN         ADULT ILLNESSES:  Patient Active Problem List   Diagnosis Code    Hx of adenomatous colonic polyps Z86.010    Adenomatous polyps D36.9    Idiopathic gout of multiple sites M10.09    Primary hypertension I10    Type 2 diabetes mellitus without complication, without long-term current use of insulin E11.9    Hyperlipidemia LDL goal <100 E78.5    Erectile dysfunction due to arterial insufficiency N52.01    Anemia D64.9    B12 deficiency E53.8    Stage 3a chronic kidney disease (CKD) N18.31    Seasonal allergic rhinitis J30.2    ANYI (obstructive sleep apnea) G47.33     SURGERIES:  Past Surgical History:   Procedure Laterality Date    CAROTID ENDARTERECTOMY Left     CATARACT EXTRACTION, BILATERAL      COLON SURGERY      COLONOSCOPY      COLONOSCOPY N/A 07/27/2021    Procedure: COLONOSCOPY WITH ANESTHESIA;  Surgeon: Luciano Alatorre DO;  Location:  [facility] ENDOSCOPY;  Service: Gastroenterology;  Laterality: N/A;  preop; hx of polyps  postop; diverticulosis   PCP Devon Moore     HEMORROIDECTOMY      HERNIA REPAIR      PENILE PROSTHESIS IMPLANT N/A 03/14/2023    Procedure: 3-PIECE INFLATABLE PENILE PROSTHESIS PLACEMENT;  Surgeon: Garcia Santana MD;  Location:  [facility] OR;  Service: Urology;  Laterality: N/A;    VASECTOMY       HEALTH MAINTENANCE ITEMS:  Health Maintenance " Due   Topic Date Due    TDAP/TD VACCINES (1 - Tdap) Never done    DIABETIC EYE EXAM  Never done    ZOSTER VACCINE (3 of 3) 12/25/2023       <no information>  Last Completed Colonoscopy            COLORECTAL CANCER SCREENING (COLONOSCOPY - Every 3 Years) Next due on 7/27/2024 07/27/2021  Surgical Procedure: COLONOSCOPY    07/27/2021  COLONOSCOPY    10/23/2020  Cologuard - Stool, Per Rectum    10/16/2020  Cologuard - ,    06/26/2018  COLONOSCOPY (Done)    Only the first 5 history entries have been loaded, but more history exists.                  Immunization History   Administered Date(s) Administered    Arexvy (RSV, Adults 60+ yrs) 11/29/2023    COVID-19 (MODERNA) 1st,2nd,3rd Dose Monovalent 02/24/2021, 03/24/2021, 01/05/2022    COVID-19 (PFIZER) BIVALENT 12+YRS 11/08/2022    COVID-19 F23 (MODERNA) 12YRS+ (SPIKEVAX) 01/02/2024    Flu Vaccine Split Quad 12/14/2015    Fluad Quad 65+ 10/13/2020    Fluzone (or Fluarix & Flulaval for VFC) >6mos 12/14/2015    Fluzone High Dose =>65 Years (Vaxcare ONLY) 11/04/2019    Fluzone High-Dose 65+yrs 10/15/2021, 10/17/2022, 08/21/2023    Pneumococcal Conjugate 20-Valent (PCV20) 10/17/2022    Shingrix 10/30/2023    Zostavax 02/09/2016     Last Completed Mammogram       This patient has no relevant Health Maintenance data.              FAMILY HISTORY:  Family History   Problem Relation Age of Onset    No Known Problems Mother     Heart attack Father     No Known Problems Maternal Grandmother     No Known Problems Maternal Grandfather     No Known Problems Paternal Grandmother     No Known Problems Paternal Grandfather     No Known Problems Son     No Known Problems Daughter     Parkinsonism Brother     Colon cancer Neg Hx     Colon polyps Neg Hx     Esophageal cancer Neg Hx      SOCIAL HISTORY:  Social History     Socioeconomic History    Marital status: Single   Tobacco Use    Smoking status: Former     Packs/day: 1.00     Years: 46.00     Additional pack years: 0.00     Total  "pack years: 46.00     Types: Cigarettes     Quit date: 1980     Years since quittin.6     Passive exposure: Past    Smokeless tobacco: Never    Tobacco comments:     N/A   Vaping Use    Vaping Use: Never used   Substance and Sexual Activity    Alcohol use: Not Currently     Comment: Quite 20+ yrs ago    Drug use: Never    Sexual activity: Yes     Partners: Female     Birth control/protection: Vasectomy       REVIEW OF SYSTEMS:  Review of Systems   Constitutional:  Positive for fatigue. Negative for activity change, appetite change, fever, unexpected weight gain and unexpected weight loss.   HENT:  Negative for dental problem, facial swelling, swollen glands and trouble swallowing.    Eyes:  Negative for double vision and discharge.   Respiratory:  Negative for cough, shortness of breath and wheezing.    Cardiovascular:  Negative for chest pain, palpitations and leg swelling.   Gastrointestinal:  Negative for abdominal pain, blood in stool, nausea and vomiting.   Endocrine: Negative.    Genitourinary:  Negative for dysuria and hematuria.        Had penile implant 2023   Musculoskeletal:  Positive for arthralgias. Negative for myalgias.   Skin:  Negative for rash, skin lesions and wound.   Allergic/Immunologic: Negative for immunocompromised state.   Neurological:  Negative for speech difficulty, light-headedness, headache, memory problem and confusion.   Hematological:  Negative for adenopathy.   Psychiatric/Behavioral:  Negative for self-injury, suicidal ideas and depressed mood. The patient is not nervous/anxious.        /78   Pulse 82   Temp 97.4 °F (36.3 °C)   Resp 16   Ht 177.8 cm (70\")   Wt 91.7 kg (202 lb 1.6 oz)   SpO2 99%   BMI 29.00 kg/m²  Body surface area is 2.1 meters squared.    Pain Score    24 0945   PainSc: 0-No pain       Physical Exam  Constitutional:       Appearance: Normal appearance.   HENT:      Head: Normocephalic and atraumatic.   Cardiovascular:      Rate " and Rhythm: Normal rate and regular rhythm.   Pulmonary:      Effort: Pulmonary effort is normal.      Breath sounds: Normal breath sounds.   Abdominal:      General: Bowel sounds are normal.      Palpations: Abdomen is soft.   Musculoskeletal:      Right lower leg: No edema.      Left lower leg: No edema.   Skin:     General: Skin is warm and dry.   Neurological:      Mental Status: He is alert and oriented to person, place, and time.   Psychiatric:         Attention and Perception: Attention normal.         Mood and Affect: Mood normal.         Judgment: Judgment normal.         Jeff Alatorre reports a pain score of 0.  Given his pain assessment as noted, treatment options were discussed and the following options were decided upon as a follow-up plan to address the patient's pain:  No intervention indicated .           ASSESSMENT / PLAN    ASSESSMENT:   1. Anemia due to stage 3a chronic kidney disease    2. B12 deficiency    3. Iron deficiency          Secondary Polycythemia is no longer a concern.       1.  Anemia in Stage 3a CKD  2.  Iron Deficiency   -Received Retacrit April 26 and May 10, 2023  - Labs today:  BUN 27, Creatinine 1.47, GFR 47.0   Iron 84, Ferritin 93, Sat 20%, TIBC 422   -Hgb 12.6, Hct 39.3  -Renal condition is  Stable .  Continue current treatment regimen.  Continue current medications.  Renal condition will be reassessed  per PCP  .  -ARACELI criteria requires Ferritin 100 / Sat 20%.  Pt will take oral iron intermittently   -No Retacrit indicated as Hgb > 100  -Stable for observation     3.  B12 Deficiency   -Pt is getting injection monthly  per PCP   -B12 today pending       PLAN:  No Retacrit indicated today.    Pt has not required Retacrit since May 10, 2023.  Labs only in 3 months for CBC, CMP, Iron Profile, Ferritin, B12  RTC in 6 months   Patient will have preoffice labs for CBC, CMP, Iron Profile , Ferritin   Pt will contact office if he has any problems or becomes symptomatic   Continue  current medications, treatment plans and follow up with PCP and any other providers.  Care discussed with patient.  Understanding expressed.  Patient agreeable with plan.      Enriqueta Rao, APRN  01/30/2024

## 2024-01-30 NOTE — LETTER
January 30, 2024     Jimmy Curtis MD  [address] [facility] [address]    Patient: Jeff Alatorre   YOB: 1940   Date of Visit: 1/30/2024     Dear Jimmy Curtis MD:       Thank you for referring Jeff Alatorre to me for evaluation. Below are the relevant portions of my assessment and plan of care.    If you have questions, please do not hesitate to call me. I look forward to following Jeff along with you.         Sincerely,        BRYANT Nix        CC: No Recipients    Enriqueta Rao APRN  01/30/24 1046  Sign when Signing Visit  [facility] [place] [facility] HEMATOLOGY & ONCOLOGY [address]  [facility] [address]  [phone]    Patient Name: Jeff Alatorre  Encounter Date: 01/30/2024   YOB: 1940  Patient Number: [id number]    Hematology / Oncology Progress Note    HPI / REASON FOR VISIT: Jeff Alatorre is a 83 y.o. male who is followed by this office for polycythemia which was diagnosed many years ago and believed to be related to testosterone injections.  Bone marrow biopsy in either 2012 or 2013 which showed 55-65% cellularity, prominent erythroid hyperplasia with no simultaneous increases that are dyspoietic myeloid and megakaryocytic series (there was an isolated erythroid hyperplasia).  The flow cytometry and cytogenetics were unrevealing.  This appeared to be more of a reactive erythrocytosis rather than a myeloproliferative process.     He lost follow up from April 26, 2022 and returned on April 26. 2023.   He states that he had operation on 3/14/23 and penile implant was placed.  He has recovered well from that surgery but has had some anemia recently.  Hgb on 03/07 was 12.4.  On 04/17/23, Hgb was 9.9.  Records indicate that patient did not loose any significant amount of blood during his surgery.  He was started on oral iron on April 17.  Stool was normal prior to iron but then turned dark which  is an expected findings.  Colonoscopy was good 2021.  He denies blood in stool or urine.      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       INTERVAL HISTORY     Pt presents to clinic today for continued follow up.  His last Retacrit was 5/10/23 and he has maintained Hgb > 11g since then.     He has no acute complaints today.  He had labs drawn and results were reviewed with him in office.         LABS    Lab Results - Last 18 Months   Lab Units 01/30/24  0938 11/02/23  0945 10/31/23  0918 07/10/23  1022 06/28/23  0939 06/08/23  0855 05/24/23  0834 05/10/23  1017 04/26/23  1053 04/17/23  0710   HEMOGLOBIN g/dL 12.6* 13.2 12.9* 12.2* 12.6* 12.0* 11.2*   < > 8.1* 9.9*   HEMATOCRIT % 39.3 38.5 40.8 39.5 40.8 39.4 37.8   < > 26.8* 29.6*   MCV fL 102.1* 99* 103.3* 100.5* 101.5* 102.9* 105.6*   < > 108.1* 99.7*   WBC 10*3/mm3 6.40 7.5 7.30 7.12 7.33 6.90 5.88   < > 6.98 9.11   RDW % 13.2 12.7 13.1 13.5 13.7 13.6 14.0   < > 15.9* 12.9   MPV fL 10.0  --  10.0 10.4 10.5 10.3 9.9   < > 9.8  --    PLATELETS 10*3/mm3 191 196 204 202 197 198 232   < > 237 195   IMM GRAN % % 0.3  --  0.4 0.3 0.4 0.3  --   --   --   --    NEUTROS ABS 10*3/mm3 4.04 5.3 5.16 4.86 5.12 4.68 3.97   < > 5.10 6.66   LYMPHS ABS 10*3/mm3 1.41 1.4 1.32 1.45 1.32 1.31 1.11   < >  --  1.57   MONOS ABS 10*3/mm3 0.59 0.6 0.52 0.57 0.65 0.62 0.56   < >  --  0.59   EOS ABS 10*3/mm3 0.29 0.2 0.22 0.17 0.17 0.23 0.16   < > 0.07 0.21   BASOS ABS 10*3/mm3 0.05 0.1 0.05 0.05 0.04 0.04 0.06   < > 0.14 0.06   IMMATURE GRANS (ABS) 10*3/mm3 0.02  --  0.03 0.02 0.03 0.02  --   --   --   --    NRBC /100 WBC 0.0  --  0.0 0.0 0.0 0.0  --   --   --  0.0   NEUTROPHIL % %  --   --   --   --   --   --   --   --  73.0  --    MONOCYTES % %  --   --   --   --   --   --   --   --  2.0*  --    BASOPHIL % %  --   --   --   --   --   --   --   --  2.0*  --    ANISOCYTOSIS   --   --   --   --   --   --   --   --  Slight/1+  --     < > = values in this  interval not displayed.       Lab Results - Last 18 Months   Lab Units 01/30/24  0938 11/02/23  0945 10/31/23  0918 07/10/23  1022 06/28/23  0939 06/08/23  0855 05/24/23  0834   GLUCOSE mg/dL 98 109* 78 114* 97 102* 95   SODIUM mmol/L 141 140 143 141 140 140 143   POTASSIUM mmol/L 4.2 4.5 4.0 4.3 4.9 4.7 4.1   CO2 mmol/L 26.0 22 26.0 23.0 25.0 25.0 24.0   CHLORIDE mmol/L 105 103 107 107 105 105 108*   ANION GAP mmol/L 10.0  --  10.0 11.0 10.0 10.0 11.0   CREATININE mg/dL 1.47* 1.29* 1.31* 1.36* 1.27 1.38* 1.23   BUN mg/dL 27* 24 29* 31* 28* 26* 26*   BUN / CREAT RATIO  18.4 19 22.1 22.8 22.0 18.8 21.1   CALCIUM mg/dL 9.2 10.0 9.7 9.7 9.9 9.7 9.4   ALK PHOS U/L 134*  --  141* 129* 122* 124* 137*   TOTAL PROTEIN g/dL 6.9  --  7.3 6.8 7.3 6.9 6.9   ALT (SGPT) U/L 12  --  11 14 14 12 11   AST (SGOT) U/L 15  --  14 18 17 15 15   BILIRUBIN mg/dL 0.4  --  0.4 0.2 0.3 0.3 0.2   ALBUMIN g/dL 4.3 4.6 4.4 4.4 4.4 4.2 4.3   GLOBULIN gm/dL 2.6  --  2.9 2.4 2.9 2.7 2.6       Lab Results - Last 18 Months   Lab Units 11/02/23  0945 04/26/23  1054 04/26/23  1053   M-SPIKE g/dL  --  Not Observed  --    KAPPA/LAMBDA RATIO, S   --  1.11  --    FREE LAMBDA LIGHT CHAINS mg/L  --  25.3  --    URIC ACID mg/dL 3.6*  --   --    IG KAPPA FREE LIGHT CHAIN mg/L  --  28.1*  --    REFERENCE LAB REPORT   --   --  See Attached Report       Lab Results - Last 18 Months   Lab Units 01/30/24  0938 11/02/23  0945 10/31/23  0918 06/28/23  0939 06/08/23  0855 05/24/23  0831 04/26/23  1053 10/17/22  0736   IRON mcg/dL 84  --  84 50*  --   --  91  --    TIBC mcg/dL 422  --  428 457  --   --  375  --    IRON SATURATION (TSAT) % 20  --  20 11*  --   --  24  --    FERRITIN ng/mL 93.33  --  93.89 73.80 77.46  --  244.90  --    TSH uIU/mL  --  1.700  --   --   --   --   --  2.350   FOLATE ng/mL  --  8.0  --   --   --  6.45  --  7.80         PAST MEDICAL HISTORY:  ALLERGIES:  No Known Allergies  CURRENT MEDICATIONS:  Outpatient Encounter Medications as of  "1/30/2024   Medication Sig Dispense Refill   • acetaminophen (TYLENOL) 500 MG tablet Take 1 tablet by mouth Daily As Needed for Mild Pain.     • allopurinol (ZYLOPRIM) 300 MG tablet TAKE ONE TABLET BY MOUTH EVERY DAY 90 tablet 3   • amLODIPine (NORVASC) 5 MG tablet TAKE ONE TABLET BY MOUTH EVERY DAY 90 tablet 1   • aspirin 81 MG EC tablet 1 tablet.     • atorvastatin (LIPITOR) 20 MG tablet TAKE ONE TABLET BY MOUTH NIGHTLY 90 tablet 3   • cyanocobalamin 1000 MCG/ML injection INJECT 1ML AS DIRECTED EVERY 30 DAYS 1 mL 6   • ferrous sulfate 325 (65 Fe) MG tablet Take 1 tablet by mouth. Pt takes once or twice weekly     • fluticasone (FLONASE) 50 MCG/ACT nasal spray 2 sprays into the nostril(s) as directed by provider Daily. 16 g 2   • losartan (COZAAR) 100 MG tablet Take 1 tablet by mouth Daily. 90 tablet 3   • metFORMIN (GLUCOPHAGE) 500 MG tablet TAKE ONE TABLET BY MOUTH TWICE A DAY WITH MEALS 180 tablet 3   • Syringe 25G X 1\" 3 ML misc Inject 1000 mcg (1mL) Cyanocobalamin IM every 28 days 6 each 0     Facility-Administered Encounter Medications as of 1/30/2024   Medication Dose Route Frequency Provider Last Rate Last Admin   • cyanocobalamin injection 1,000 mcg  1,000 mcg Intramuscular Once Enriqueta Rao APRN         ADULT ILLNESSES:  Patient Active Problem List   Diagnosis Code   • Hx of adenomatous colonic polyps Z86.010   • Adenomatous polyps D36.9   • Idiopathic gout of multiple sites M10.09   • Primary hypertension I10   • Type 2 diabetes mellitus without complication, without long-term current use of insulin E11.9   • Hyperlipidemia LDL goal <100 E78.5   • Erectile dysfunction due to arterial insufficiency N52.01   • Anemia D64.9   • B12 deficiency E53.8   • Stage 3a chronic kidney disease (CKD) N18.31   • Seasonal allergic rhinitis J30.2   • ANYI (obstructive sleep apnea) G47.33     SURGERIES:  Past Surgical History:   Procedure Laterality Date   • CAROTID ENDARTERECTOMY Left    • CATARACT EXTRACTION, " BILATERAL     • COLON SURGERY     • COLONOSCOPY     • COLONOSCOPY N/A 07/27/2021    Procedure: COLONOSCOPY WITH ANESTHESIA;  Surgeon: Luciano Alatorre DO;  Location:  [facility];  Service: Gastroenterology;  Laterality: N/A;  preop; hx of polyps  postop; diverticulosis   PCP Devon Moore    • HEMORROIDECTOMY     • HERNIA REPAIR     • PENILE PROSTHESIS IMPLANT N/A 03/14/2023    Procedure: 3-PIECE INFLATABLE PENILE PROSTHESIS PLACEMENT;  Surgeon: Garcia Santana MD;  Location:  [facility];  Service: Urology;  Laterality: N/A;   • VASECTOMY       HEALTH MAINTENANCE ITEMS:  Health Maintenance Due   Topic Date Due   • TDAP/TD VACCINES (1 - Tdap) Never done   • DIABETIC EYE EXAM  Never done   • ZOSTER VACCINE (3 of 3) 12/25/2023       <no information>  Last Completed Colonoscopy            COLORECTAL CANCER SCREENING (COLONOSCOPY - Every 3 Years) Next due on 7/27/2024 07/27/2021  Surgical Procedure: COLONOSCOPY    07/27/2021  COLONOSCOPY    10/23/2020  Cologuard - Stool, Per Rectum    10/16/2020  Cologuard - ,    06/26/2018  COLONOSCOPY (Done)    Only the first 5 history entries have been loaded, but more history exists.                  Immunization History   Administered Date(s) Administered   • Arexvy (RSV, Adults 60+ yrs) 11/29/2023   • COVID-19 (MODERNA) 1st,2nd,3rd Dose Monovalent 02/24/2021, 03/24/2021, 01/05/2022   • COVID-19 (PFIZER) BIVALENT 12+YRS 11/08/2022   • COVID-19 F23 (MODERNA) 12YRS+ (SPIKEVAX) 01/02/2024   • Flu Vaccine Split Quad 12/14/2015   • Fluad Quad 65+ 10/13/2020   • Fluzone (or Fluarix & Flulaval for VFC) >6mos 12/14/2015   • Fluzone High Dose =>65 Years (Vaxcare ONLY) 11/04/2019   • Fluzone High-Dose 65+yrs 10/15/2021, 10/17/2022, 08/21/2023   • Pneumococcal Conjugate 20-Valent (PCV20) 10/17/2022   • Shingrix 10/30/2023   • Zostavax 02/09/2016     Last Completed Mammogram       This patient has no relevant Health Maintenance data.              FAMILY HISTORY:  Family History    Problem Relation Age of Onset   • No Known Problems Mother    • Heart attack Father    • No Known Problems Maternal Grandmother    • No Known Problems Maternal Grandfather    • No Known Problems Paternal Grandmother    • No Known Problems Paternal Grandfather    • No Known Problems Son    • No Known Problems Daughter    • Parkinsonism Brother    • Colon cancer Neg Hx    • Colon polyps Neg Hx    • Esophageal cancer Neg Hx      SOCIAL HISTORY:  Social History     Socioeconomic History   • Marital status: Single   Tobacco Use   • Smoking status: Former     Packs/day: 1.00     Years: 46.00     Additional pack years: 0.00     Total pack years: 46.00     Types: Cigarettes     Quit date: 1980     Years since quittin.6     Passive exposure: Past   • Smokeless tobacco: Never   • Tobacco comments:     N/A   Vaping Use   • Vaping Use: Never used   Substance and Sexual Activity   • Alcohol use: Not Currently     Comment: Quite 20+ yrs ago   • Drug use: Never   • Sexual activity: Yes     Partners: Female     Birth control/protection: Vasectomy       REVIEW OF SYSTEMS:  Review of Systems   Constitutional:  Positive for fatigue. Negative for activity change, appetite change, fever, unexpected weight gain and unexpected weight loss.   HENT:  Negative for dental problem, facial swelling, swollen glands and trouble swallowing.    Eyes:  Negative for double vision and discharge.   Respiratory:  Negative for cough, shortness of breath and wheezing.    Cardiovascular:  Negative for chest pain, palpitations and leg swelling.   Gastrointestinal:  Negative for abdominal pain, blood in stool, nausea and vomiting.   Endocrine: Negative.    Genitourinary:  Negative for dysuria and hematuria.        Had penile implant 2023   Musculoskeletal:  Positive for arthralgias. Negative for myalgias.   Skin:  Negative for rash, skin lesions and wound.   Allergic/Immunologic: Negative for immunocompromised state.   Neurological:   "Negative for speech difficulty, light-headedness, headache, memory problem and confusion.   Hematological:  Negative for adenopathy.   Psychiatric/Behavioral:  Negative for self-injury, suicidal ideas and depressed mood. The patient is not nervous/anxious.        /78   Pulse 82   Temp 97.4 °F (36.3 °C)   Resp 16   Ht 177.8 cm (70\")   Wt 91.7 kg (202 lb 1.6 oz)   SpO2 99%   BMI 29.00 kg/m²  Body surface area is 2.1 meters squared.    Pain Score    01/30/24 0945   PainSc: 0-No pain       Physical Exam  Constitutional:       Appearance: Normal appearance.   HENT:      Head: Normocephalic and atraumatic.   Cardiovascular:      Rate and Rhythm: Normal rate and regular rhythm.   Pulmonary:      Effort: Pulmonary effort is normal.      Breath sounds: Normal breath sounds.   Abdominal:      General: Bowel sounds are normal.      Palpations: Abdomen is soft.   Musculoskeletal:      Right lower leg: No edema.      Left lower leg: No edema.   Skin:     General: Skin is warm and dry.   Neurological:      Mental Status: He is alert and oriented to person, place, and time.   Psychiatric:         Attention and Perception: Attention normal.         Mood and Affect: Mood normal.         Judgment: Judgment normal.         Jeff Alatorre reports a pain score of 0.  Given his pain assessment as noted, treatment options were discussed and the following options were decided upon as a follow-up plan to address the patient's pain:  No intervention indicated .           ASSESSMENT / PLAN    ASSESSMENT:   1. Anemia due to stage 3a chronic kidney disease    2. B12 deficiency    3. Iron deficiency          Secondary Polycythemia is no longer a concern.       1.  Anemia in Stage 3a CKD  2.  Iron Deficiency   -Received Retacrit April 26 and May 10, 2023  - Labs today:  BUN 27, Creatinine 1.47, GFR 47.0   Iron 84, Ferritin 93, Sat 20%, TIBC 422   -Hgb 12.6, Hct 39.3  -Renal condition is  Stable .  Continue current treatment " regimen.  Continue current medications.  Renal condition will be reassessed  per PCP  .  -ARACELI criteria requires Ferritin 100 / Sat 20%.  Pt will take oral iron intermittently   -No Retacrit indicated as Hgb > 100  -Stable for observation     3.  B12 Deficiency   -Pt is getting injection monthly  per PCP   -B12 today pending       PLAN:  No Retacrit indicated today.    Pt has not required Retacrit since May 10, 2023.  Labs only in 3 months for CBC, CMP, Iron Profile, Ferritin, B12  RTC in 6 months   Patient will have preoffice labs for CBC, CMP, Iron Profile , Ferritin   Pt will contact office if he has any problems or becomes symptomatic   Continue current medications, treatment plans and follow up with PCP and any other providers.  Care discussed with patient.  Understanding expressed.  Patient agreeable with plan.      Enriqueta Rao, BRYANT  01/30/2024

## 2024-02-22 DIAGNOSIS — E78.2 MIXED HYPERLIPIDEMIA: ICD-10-CM

## 2024-02-28 ENCOUNTER — CLINICAL SUPPORT (OUTPATIENT)
Dept: FAMILY MEDICINE CLINIC | Facility: CLINIC | Age: 84
End: 2024-02-28
Payer: MEDICARE

## 2024-02-28 RX ADMIN — CYANOCOBALAMIN 1000 MCG: 1000 INJECTION, SOLUTION INTRAMUSCULAR; SUBCUTANEOUS at 10:33

## 2024-02-28 NOTE — PROGRESS NOTES
Patient presented to office for a patient [id number] injection. Given in left Deltoid. Patient waited in lobby 15 minutes and tolerated well.

## 2024-03-05 RX ORDER — ATORVASTATIN CALCIUM 20 MG/1
20 TABLET, FILM COATED ORAL NIGHTLY
Qty: 90 TABLET | Refills: 3 | Status: SHIPPED | OUTPATIENT
Start: 2024-03-05

## 2024-03-05 NOTE — TELEPHONE ENCOUNTER
Rx Refill Note  Requested Prescriptions     Pending Prescriptions Disp Refills    atorvastatin (LIPITOR) 20 MG tablet [Pharmacy Med Name: ATORVASTATIN CALCIUM 20MG TABS] 90 tablet 3     Sig: TAKE ONE TABLET BY MOUTH EVERY DAY AT NIGHT      Last office visit with prescribing clinician: 3/14/2022   Last telemedicine visit with prescribing clinician: Visit date not found   Next office visit with prescribing clinician: Visit date not found       Protocol met    Cata Mccloud MA  03/05/24, 08:45 CST

## 2024-03-06 ENCOUNTER — TELEPHONE (OUTPATIENT)
Dept: FAMILY MEDICINE CLINIC | Facility: CLINIC | Age: 84
End: 2024-03-06
Payer: MEDICARE

## 2024-03-06 NOTE — TELEPHONE ENCOUNTER
Caller: HEAVEN     Relationship: GUERLINE SEXTON     Best call back number: [phone]  FAX NUMBER [phone]    Requested Prescriptions:     atorvastatin CALCIUM 20 MG           Pharmacy where request should be sent:  [facility] [place], [place] - [address] [phone]  - [phone]  [phone]     Last office visit with prescribing clinician: 12/19/2023   Last telemedicine visit with prescribing clinician: Visit date not found   Next office visit with prescribing clinician: 5/30/2024     Additional details provided by patient: SHE STATES THE PATIENT HAS BEEN TRYING TO GET HIS REFILL AND STATES DR TAPIA OFFICE HAS TOLD HIM THEY HAVE DONE EVERYTHING THEY CAN     Does the patient have less than a 3 day supply:  [x] Yes  [] No    Would you like a call back once the refill request has been completed: [x] Yes [x] No    If the office needs to give you a call back, can they leave a voicemail: [x] Yes [x] No    Ratna Hurtado Rep   03/06/24 10:22 CST

## 2024-03-19 ENCOUNTER — OFFICE VISIT (OUTPATIENT)
Dept: FAMILY MEDICINE CLINIC | Facility: CLINIC | Age: 84
End: 2024-03-19
Payer: MEDICARE

## 2024-03-19 VITALS
SYSTOLIC BLOOD PRESSURE: 128 MMHG | HEIGHT: 70 IN | DIASTOLIC BLOOD PRESSURE: 60 MMHG | WEIGHT: 202 LBS | OXYGEN SATURATION: 99 % | TEMPERATURE: 97.5 F | BODY MASS INDEX: 28.92 KG/M2 | HEART RATE: 82 BPM | RESPIRATION RATE: 20 BRPM

## 2024-03-19 DIAGNOSIS — K21.9 GASTROESOPHAGEAL REFLUX DISEASE, UNSPECIFIED WHETHER ESOPHAGITIS PRESENT: Primary | ICD-10-CM

## 2024-03-19 PROCEDURE — 99213 OFFICE O/P EST LOW 20 MIN: CPT | Performed by: STUDENT IN AN ORGANIZED HEALTH CARE EDUCATION/TRAINING PROGRAM

## 2024-03-19 PROCEDURE — 3078F DIAST BP <80 MM HG: CPT | Performed by: STUDENT IN AN ORGANIZED HEALTH CARE EDUCATION/TRAINING PROGRAM

## 2024-03-19 PROCEDURE — 3074F SYST BP LT 130 MM HG: CPT | Performed by: STUDENT IN AN ORGANIZED HEALTH CARE EDUCATION/TRAINING PROGRAM

## 2024-03-19 RX ORDER — PANTOPRAZOLE SODIUM 40 MG/1
40 TABLET, DELAYED RELEASE ORAL DAILY
Qty: 30 TABLET | Refills: 1 | Status: SHIPPED | OUTPATIENT
Start: 2024-03-19

## 2024-03-19 NOTE — PROGRESS NOTES
Chief Complaint  Heartburn and chest pain  (Eating and then with exertions he is having pain in the chest )    Subjective        Jeff Alatorre presents to [facility]    HPI    Epigastric pain  -Started in last couple of months.  Patient describes upper epigastric discomfort sometime after eating along with exertion.  Describes heartburn symptoms.  Not taking any medicine over-the-counter.  Does not have symptoms with exertion without eating.  Describes recently lifting several bags of mulch and now not having any difficulty.  Conversely if he eats and does not exert himself he does not notice any symptoms as well.  No nausea or vomiting or change in bowel habits, no report of radiation of pain to arms or neck.  He does not have any pre-existing heart disease.      Past Surgical History:   Procedure Laterality Date    CAROTID ENDARTERECTOMY Left     CATARACT EXTRACTION, BILATERAL      COLON SURGERY      COLONOSCOPY      COLONOSCOPY N/A 2021    Procedure: COLONOSCOPY WITH ANESTHESIA;  Surgeon: Luciano Alatorre DO;  Location:  [facility];  Service: Gastroenterology;  Laterality: N/A;  preop; hx of polyps  postop; diverticulosis   PCP Devon Moore     HEMORROIDECTOMY      HERNIA REPAIR      PENILE PROSTHESIS IMPLANT N/A 2023    Procedure: 3-PIECE INFLATABLE PENILE PROSTHESIS PLACEMENT;  Surgeon: Garcia Santana MD;  Location:  [facility];  Service: Urology;  Laterality: N/A;    VASECTOMY       Social History     Socioeconomic History    Marital status: Single   Tobacco Use    Smoking status: Former     Current packs/day: 0.00     Types: Cigarettes     Quit date: 1980     Years since quittin.7     Passive exposure: Past    Smokeless tobacco: Never    Tobacco comments:     N/A   Vaping Use    Vaping status: Never Used   Substance and Sexual Activity    Alcohol use: Not Currently     Comment: Quite 20+ yrs ago    Drug use: Never    Sexual activity: Yes  "    Partners: Female     Birth control/protection: Vasectomy       Objective   Vital Signs:  /60   Pulse 82   Temp 97.5 °F (36.4 °C) (Temporal)   Resp 20   Ht 177.8 cm (70\")   Wt 91.6 kg (202 lb)   SpO2 99%   BMI 28.98 kg/m²   Estimated body mass index is 28.98 kg/m² as calculated from the following:    Height as of this encounter: 177.8 cm (70\").    Weight as of this encounter: 91.6 kg (202 lb).              Physical Exam  Vitals reviewed.   Constitutional:       Appearance: Normal appearance.   HENT:      Head: Normocephalic.      Nose: Nose normal.      Mouth/Throat:      Mouth: Mucous membranes are moist.   Eyes:      Extraocular Movements: Extraocular movements intact.   Cardiovascular:      Rate and Rhythm: Normal rate and regular rhythm.      Heart sounds: Normal heart sounds.   Pulmonary:      Effort: Pulmonary effort is normal.      Breath sounds: Normal breath sounds.   Musculoskeletal:         General: Normal range of motion.      Cervical back: Normal range of motion.   Skin:     General: Skin is warm and dry.   Neurological:      General: No focal deficit present.      Mental Status: He is alert and oriented to person, place, and time.   Psychiatric:         Mood and Affect: Mood normal.         Behavior: Behavior normal.        Result Review :                   Assessment and Plan   Diagnoses and all orders for this visit:    1. Gastroesophageal reflux disease, unspecified whether esophagitis present (Primary)  -Symptoms consistent with GI etiology, low suspicion for cardiac at this moment.  Will try acid suppression trial first.  May need GI referral/EGD to further evaluate.  -     pantoprazole (Protonix) 40 MG EC tablet; Take 1 tablet by mouth Daily.  Dispense: 30 tablet; Refill: 1             EMR Dragon/Transcription disclaimer:   Much of this encounter note is an electronic transcription/translation of spoken language to printed text. The electronic translation of spoken language may " permit erroneous, or at times, nonsensical words or phrases to be inadvertently transcribed; although attempts have made to review the note for such errors, some may still exist. Please excuse any unrecognized transcription errors and contact us if the air is unintelligible or needs documented correction. Also, portions of this note have been copied forward, however, changed to reflect the most current clinical status of this patient.  Follow Up   Return in about 4 weeks (around 4/16/2024).  Patient was given instructions and counseling regarding his condition or for health maintenance advice. Please see specific information pulled into the AVS if appropriate.

## 2024-04-01 ENCOUNTER — CLINICAL SUPPORT (OUTPATIENT)
Dept: FAMILY MEDICINE CLINIC | Facility: CLINIC | Age: 84
End: 2024-04-01
Payer: MEDICARE

## 2024-04-01 DIAGNOSIS — E53.8 B12 DEFICIENCY: Primary | ICD-10-CM

## 2024-04-01 RX ADMIN — CYANOCOBALAMIN 1000 MCG: 1000 INJECTION, SOLUTION INTRAMUSCULAR; SUBCUTANEOUS at 09:07

## 2024-04-01 NOTE — PROGRESS NOTES
Patient presented self to the office today 4/1/24 for a patient provider B-12 injection administered to the left deltoid. Patient waited 15 minutes;Tolerated well /without issue.

## 2024-04-02 ENCOUNTER — OFFICE VISIT (OUTPATIENT)
Dept: FAMILY MEDICINE CLINIC | Facility: CLINIC | Age: 84
End: 2024-04-02
Payer: MEDICARE

## 2024-04-02 VITALS
BODY MASS INDEX: 28.49 KG/M2 | HEIGHT: 70 IN | HEART RATE: 58 BPM | WEIGHT: 199 LBS | RESPIRATION RATE: 22 BRPM | OXYGEN SATURATION: 98 % | SYSTOLIC BLOOD PRESSURE: 148 MMHG | TEMPERATURE: 98.7 F | DIASTOLIC BLOOD PRESSURE: 78 MMHG

## 2024-04-02 DIAGNOSIS — R07.9 EXERTIONAL CHEST PAIN: Primary | ICD-10-CM

## 2024-04-02 NOTE — PROGRESS NOTES
Chief Complaint  reflux follow up  and Shortness of Breath    Subjective        Jeff Alatorre presents to [facility]    HPI    Chest pains  -We discussed potential etiologies at last visit, favored GI source given relation to eating. I started him on protonix. He does not feel this has helped overall however has used pepto bismol once and sx improved.  Sx appear to have progressed as he's noticing sharp pains more clearly w/ exertion such as going up hills or steps. He reports getting out of breath at these times as well. When he stops to rest symptoms improve. No other changes.    Past Medical History:   Diagnosis Date    Arthritis     Chronic kidney disease     Erectile dysfunction     Gout     History of diverticulitis     HTN (hypertension)     Hx of adenomatous colonic polyps 10/13/2020    Hypercholesteremia     Low testosterone     Polycythemia vera 10/13/2020    Squamous cell carcinoma of skin 10/2021    top of head    Type 2 diabetes mellitus without complication, without long-term current use of insulin 12/01/2022     Past Surgical History:   Procedure Laterality Date    CAROTID ENDARTERECTOMY Left     CATARACT EXTRACTION, BILATERAL      COLON SURGERY      COLONOSCOPY      COLONOSCOPY N/A 07/27/2021    Procedure: COLONOSCOPY WITH ANESTHESIA;  Surgeon: Luciano Alatorre DO;  Location: [facility] ENDOSCOPY;  Service: Gastroenterology;  Laterality: N/A;  preop; hx of polyps  postop; diverticulosis   PCP Devon Moore     HEMORROIDECTOMY      HERNIA REPAIR      PENILE PROSTHESIS IMPLANT N/A 03/14/2023    Procedure: 3-PIECE INFLATABLE PENILE PROSTHESIS PLACEMENT;  Surgeon: Garcia Santana MD;  Location: [facility] OR;  Service: Urology;  Laterality: N/A;    VASECTOMY       Social History     Socioeconomic History    Marital status: Single   Tobacco Use    Smoking status: Former     Current packs/day: 0.00     Types: Cigarettes     Quit date: 7/4/1980     Years since quitting:  "43.7     Passive exposure: Past    Smokeless tobacco: Never    Tobacco comments:     N/A   Vaping Use    Vaping status: Never Used   Substance and Sexual Activity    Alcohol use: Not Currently     Comment: Quite 20+ yrs ago    Drug use: Never    Sexual activity: Yes     Partners: Female     Birth control/protection: Vasectomy       Objective   Vital Signs:  /78   Pulse 58   Temp 98.7 °F (37.1 °C) (Temporal)   Resp 22   Ht 177.8 cm (70\")   Wt 90.3 kg (199 lb)   SpO2 98%   BMI 28.55 kg/m²   Estimated body mass index is 28.55 kg/m² as calculated from the following:    Height as of this encounter: 177.8 cm (70\").    Weight as of this encounter: 90.3 kg (199 lb).          Physical Exam  Vitals reviewed.   Constitutional:       Appearance: Normal appearance.   HENT:      Head: Normocephalic.      Nose: Nose normal.      Mouth/Throat:      Mouth: Mucous membranes are moist.   Eyes:      Extraocular Movements: Extraocular movements intact.   Cardiovascular:      Rate and Rhythm: Normal rate and regular rhythm.      Heart sounds: Normal heart sounds.   Pulmonary:      Effort: Pulmonary effort is normal.      Breath sounds: Normal breath sounds.   Musculoskeletal:         General: Normal range of motion.      Cervical back: Normal range of motion.   Skin:     General: Skin is warm and dry.   Neurological:      General: No focal deficit present.      Mental Status: He is alert and oriented to person, place, and time.   Psychiatric:         Mood and Affect: Mood normal.         Behavior: Behavior normal.        Result Review :                   Assessment and Plan   Diagnoses and all orders for this visit:    1. Exertional chest pain (Primary)  -Although reflux sx may be co-existing to some degree I recommend ruling out cardiac ischemia due to his stable angina symptoms. Continue daily asa, statin. BP slightly high today but per pt report this appears to be outlier.   -     Ambulatory Referral to Cardiology         "     EMR Dragon/Transcription disclaimer:   Much of this encounter note is an electronic transcription/translation of spoken language to printed text. The electronic translation of spoken language may permit erroneous, or at times, nonsensical words or phrases to be inadvertently transcribed; although attempts have made to review the note for such errors, some may still exist. Please excuse any unrecognized transcription errors and contact us if the air is unintelligible or needs documented correction. Also, portions of this note have been copied forward, however, changed to reflect the most current clinical status of this patient.  Follow Up   No follow-ups on file.  Patient was given instructions and counseling regarding his condition or for health maintenance advice. Please see specific information pulled into the AVS if appropriate.

## 2024-04-25 ENCOUNTER — OFFICE VISIT (OUTPATIENT)
Dept: CARDIOLOGY | Facility: CLINIC | Age: 84
End: 2024-04-25
Payer: MEDICARE

## 2024-04-25 VITALS
SYSTOLIC BLOOD PRESSURE: 108 MMHG | BODY MASS INDEX: 28.58 KG/M2 | HEART RATE: 72 BPM | OXYGEN SATURATION: 99 % | HEIGHT: 70 IN | DIASTOLIC BLOOD PRESSURE: 72 MMHG | WEIGHT: 199.6 LBS

## 2024-04-25 DIAGNOSIS — I10 PRIMARY HYPERTENSION: ICD-10-CM

## 2024-04-25 DIAGNOSIS — E11.9 TYPE 2 DIABETES MELLITUS WITHOUT COMPLICATION, WITHOUT LONG-TERM CURRENT USE OF INSULIN: ICD-10-CM

## 2024-04-25 DIAGNOSIS — R07.89 CHEST PAIN, ATYPICAL: Primary | ICD-10-CM

## 2024-04-25 DIAGNOSIS — I77.9 CAROTID ARTERY DISEASE, UNSPECIFIED LATERALITY, UNSPECIFIED TYPE: ICD-10-CM

## 2024-04-25 DIAGNOSIS — R06.09 DOE (DYSPNEA ON EXERTION): ICD-10-CM

## 2024-04-25 DIAGNOSIS — N18.31 STAGE 3A CHRONIC KIDNEY DISEASE (CKD): ICD-10-CM

## 2024-04-25 DIAGNOSIS — E78.5 HYPERLIPIDEMIA LDL GOAL <100: ICD-10-CM

## 2024-04-25 NOTE — PROGRESS NOTES
"    Subjective:     Encounter Date:04/25/2024      Patient ID: Jeff Alatorre is a 84 y.o. male.    Chief Complaint: referred back for chest pain, ROSALES    History of Present Illness    The patient presents today for evaluation of chest pain and shortness of breath with exertion.  He previously saw Dr. Contreras in clinic December 2022 and recommendations were made to follow-up as needed at that time.    By way of review, when Dr. Contreras saw the patient he had had a couple of echocardiograms in 2011 and 2015 reporting a normal LVEF.  He has a right bundle branch block.  He reported a history of prior carotid endarterectomy.  He reported he had a stress test performed 2 years prior in [facility] (patient tells me today \"results were lost\").  He had quit smoking many years prior.  He was very active.  He was not having any symptoms suggestive of myocardial ischemia.  Therefore, Dr. Contreras recommended the patient continue to follow with his primary care physician for risk factor modification.  He was on good medical therapy at that time including aspirin, statin and antihypertensives with a well-controlled blood pressure.    Today the patient reports over the past 6 to 8 months he has noticed progressively worsening chest pain.  Over the past 1 to 2 years he has had progressively worsening shortness of breath with exertion.  He describes his chest pain as heartburn with radiation to his jaw.  It is exertional when he is walking, but he only has this discomfort if he is exerting after he eats.  It resolves with Pepto-Bismol.  When he exerts on an empty stomach he has no symptoms.  He states his PCP tried him on Protonix and it did not help so he quit taking it.  He reports leg cramps but not necessarily with exertion.  He denies orthopnea, PND, edema, rapid weight gain, palpitations.  Blood pressure is well-controlled.  He is compliant with his medications.  He is adamant that he wanted a cardiac evaluation prior to a " "gastrointestinal evaluation though he admits his symptoms tend to point toward acid reflux.  He is concerned that his exertional dyspnea may be due to underlying heart disease.    The following portions of the patient's history were reviewed and updated as appropriate: allergies, current medications, past family history, past medical history, past social history, past surgical history and problem list.    Review of Systems   Constitutional: Negative for malaise/fatigue.        \"Always cold\"   Cardiovascular:  Positive for chest pain and dyspnea on exertion. Negative for claudication, leg swelling, near-syncope, orthopnea, palpitations, paroxysmal nocturnal dyspnea and syncope.   Respiratory:  Positive for shortness of breath. Negative for cough.    Hematologic/Lymphatic: Does not bruise/bleed easily.   Musculoskeletal:  Negative for falls.        \"Leg cramps\"   Gastrointestinal:  Positive for heartburn. Negative for bloating.        Belching    Neurological:  Negative for dizziness, light-headedness and weakness.       No Known Allergies    Current Outpatient Medications:     acetaminophen (TYLENOL) 500 MG tablet, Take 1 tablet by mouth Daily As Needed for Mild Pain., Disp: , Rfl:     allopurinol (ZYLOPRIM) 300 MG tablet, TAKE ONE TABLET BY MOUTH EVERY DAY, Disp: 90 tablet, Rfl: 3    amLODIPine (NORVASC) 5 MG tablet, TAKE ONE TABLET BY MOUTH EVERY DAY, Disp: 90 tablet, Rfl: 1    aspirin 81 MG EC tablet, 1 tablet., Disp: , Rfl:     atorvastatin (LIPITOR) 20 MG tablet, TAKE ONE TABLET BY MOUTH EVERY DAY AT NIGHT, Disp: 90 tablet, Rfl: 3    cyanocobalamin 1000 MCG/ML injection, INJECT 1ML AS DIRECTED EVERY 30 DAYS, Disp: 1 mL, Rfl: 6    fluticasone (FLONASE) 50 MCG/ACT nasal spray, 2 sprays into the nostril(s) as directed by provider Daily., Disp: 16 g, Rfl: 2    losartan (COZAAR) 100 MG tablet, Take 1 tablet by mouth Daily., Disp: 90 tablet, Rfl: 3    metFORMIN (GLUCOPHAGE) 500 MG tablet, TAKE ONE TABLET BY MOUTH " "TWICE A DAY WITH MEALS, Disp: 180 tablet, Rfl: 3    Syringe 25G X 1\" 3 ML misc, Inject 1000 mcg (1mL) Cyanocobalamin IM every 28 days, Disp: 6 each, Rfl: 0    Current Facility-Administered Medications:     cyanocobalamin injection 1,000 mcg, 1,000 mcg, Intramuscular, Q28 Days, Mertmichelle Nella HARPER, APRN, 1,000 mcg at 04/01/24 0907    Facility-Administered Medications Ordered in Other Visits:     cyanocobalamin injection 1,000 mcg, 1,000 mcg, Intramuscular, Once, Enriqueta Rao SHANNON, APRN         Objective:    /72   Pulse 72   Ht 177.8 cm (70\")   Wt 90.5 kg (199 lb 9.6 oz)   SpO2 99%   BMI 28.64 kg/m²        Vitals and nursing note reviewed.   Constitutional:       General: Not in acute distress.     Appearance: Well-developed and not in distress. Not diaphoretic.   Neck:      Vascular: No JVD.   Pulmonary:      Effort: Pulmonary effort is normal. No respiratory distress.      Breath sounds: Normal breath sounds.   Cardiovascular:      Normal rate. Regular rhythm.      Murmurs: There is no murmur.   Edema:     Peripheral edema absent.   Abdominal:      Tenderness: There is no abdominal tenderness.   Skin:     General: Skin is warm and dry.   Neurological:      Mental Status: Alert and oriented to person, place, and time.         Lab Review:   Lab Results   Component Value Date    GLUCOSE 98 01/30/2024    BUN 27 (H) 01/30/2024    CREATININE 1.47 (H) 01/30/2024    EGFRRESULT 55 (L) 11/02/2023    EGFR 47.0 (L) 01/30/2024    BCR 18.4 01/30/2024    K 4.2 01/30/2024    CO2 26.0 01/30/2024    CALCIUM 9.2 01/30/2024    PROTENTOTREF 5.0 (L) 04/26/2023    ALBUMIN 4.3 01/30/2024    BILITOT 0.4 01/30/2024    AST 15 01/30/2024    ALT 12 01/30/2024        Lab Results   Component Value Date    CHLPL 106 11/02/2023    TRIG 124 11/02/2023    HDL 41 11/02/2023    LDL 43 11/02/2023        Lab Results   Component Value Date    HGBA1C 6.3 (H) 11/02/2023        Lab Results   Component Value Date    WBC 6.40 01/30/2024    HGB 12.6 (L) " 01/30/2024    HCT 39.3 01/30/2024    .1 (H) 01/30/2024     01/30/2024              ECG 12 Lead    Date/Time: 4/25/2024 11:25 AM  Performed by: Maryjo Tolbetr APRN    Authorized by: Maryjo Tolbert APRN  Comparison: compared with previous ECG from 2/1/2023  Similar to previous ECG  Rhythm: sinus rhythm  BPM: 72  Conduction: right bundle branch block    Clinical impression: abnormal EKG          Results for orders placed in visit on 02/16/15    SCANNED - ECHOCARDIOGRAM      Assessment:      Problem List Items Addressed This Visit          Cardiac and Vasculature    Primary hypertension    Hyperlipidemia LDL goal <100    Carotid artery disease       Endocrine and Metabolic    Type 2 diabetes mellitus without complication, without long-term current use of insulin       Other    Stage 3a chronic kidney disease (CKD)     Other Visit Diagnoses       Chest pain, atypical    -  Primary    Relevant Orders    Adult Stress Echo W/ Cont or Stress Agent if Necessary Per Protocol    ROSALES (dyspnea on exertion)        Relevant Orders    Adult Stress Echo W/ Cont or Stress Agent if Necessary Per Protocol            Plan:     1.  Chest pain and shortness of breath with exertion: See description above.  His chest pain is suggestive of gastroesophageal reflux, but he has also had some exertional dyspnea that has been progressively worsening over the past 1 to 2 years.  I have ordered a stress echocardiogram for further evaluation.  I have also recommended he establish care with gastroenterology.    2.  Hypertension: Well-controlled on losartan and amlodipine.  Continue current medical therapy and continue to follow with PCP.    3.  Hyperlipidemia: LDL well-controlled at 43 on atorvastatin 20 mg continue to follow with PCP    4.  Diabetes mellitus type 2: A1c well-controlled at 6.3.  Continue to follow-up with PCP    5.  History of carotid endarterectomy: Continue current medical therapy including aspirin, statin and other  risk factor modification.    Follow-up to be determined based upon results of stress testing.  If this is low risk, follow-up with cardiology as needed for new or worsening symptoms or concerns

## 2024-04-25 NOTE — H&P (VIEW-ONLY)
"    Subjective:     Encounter Date:04/25/2024      Patient ID: Jeff Alatorre is a 84 y.o. male.    Chief Complaint: referred back for chest pain, ROSALES    History of Present Illness    The patient presents today for evaluation of chest pain and shortness of breath with exertion.  He previously saw Dr. Contreras in clinic December 2022 and recommendations were made to follow-up as needed at that time.    By way of review, when Dr. Contreras saw the patient he had had a couple of echocardiograms in 2011 and 2015 reporting a normal LVEF.  He has a right bundle branch block.  He reported a history of prior carotid endarterectomy.  He reported he had a stress test performed 2 years prior in [facility] (patient tells me today \"results were lost\").  He had quit smoking many years prior.  He was very active.  He was not having any symptoms suggestive of myocardial ischemia.  Therefore, Dr. Contreras recommended the patient continue to follow with his primary care physician for risk factor modification.  He was on good medical therapy at that time including aspirin, statin and antihypertensives with a well-controlled blood pressure.    Today the patient reports over the past 6 to 8 months he has noticed progressively worsening chest pain.  Over the past 1 to 2 years he has had progressively worsening shortness of breath with exertion.  He describes his chest pain as heartburn with radiation to his jaw.  It is exertional when he is walking, but he only has this discomfort if he is exerting after he eats.  It resolves with Pepto-Bismol.  When he exerts on an empty stomach he has no symptoms.  He states his PCP tried him on Protonix and it did not help so he quit taking it.  He reports leg cramps but not necessarily with exertion.  He denies orthopnea, PND, edema, rapid weight gain, palpitations.  Blood pressure is well-controlled.  He is compliant with his medications.  He is adamant that he wanted a cardiac evaluation prior to a " "gastrointestinal evaluation though he admits his symptoms tend to point toward acid reflux.  He is concerned that his exertional dyspnea may be due to underlying heart disease.    The following portions of the patient's history were reviewed and updated as appropriate: allergies, current medications, past family history, past medical history, past social history, past surgical history and problem list.    Review of Systems   Constitutional: Negative for malaise/fatigue.        \"Always cold\"   Cardiovascular:  Positive for chest pain and dyspnea on exertion. Negative for claudication, leg swelling, near-syncope, orthopnea, palpitations, paroxysmal nocturnal dyspnea and syncope.   Respiratory:  Positive for shortness of breath. Negative for cough.    Hematologic/Lymphatic: Does not bruise/bleed easily.   Musculoskeletal:  Negative for falls.        \"Leg cramps\"   Gastrointestinal:  Positive for heartburn. Negative for bloating.        Belching    Neurological:  Negative for dizziness, light-headedness and weakness.       No Known Allergies    Current Outpatient Medications:     acetaminophen (TYLENOL) 500 MG tablet, Take 1 tablet by mouth Daily As Needed for Mild Pain., Disp: , Rfl:     allopurinol (ZYLOPRIM) 300 MG tablet, TAKE ONE TABLET BY MOUTH EVERY DAY, Disp: 90 tablet, Rfl: 3    amLODIPine (NORVASC) 5 MG tablet, TAKE ONE TABLET BY MOUTH EVERY DAY, Disp: 90 tablet, Rfl: 1    aspirin 81 MG EC tablet, 1 tablet., Disp: , Rfl:     atorvastatin (LIPITOR) 20 MG tablet, TAKE ONE TABLET BY MOUTH EVERY DAY AT NIGHT, Disp: 90 tablet, Rfl: 3    cyanocobalamin 1000 MCG/ML injection, INJECT 1ML AS DIRECTED EVERY 30 DAYS, Disp: 1 mL, Rfl: 6    fluticasone (FLONASE) 50 MCG/ACT nasal spray, 2 sprays into the nostril(s) as directed by provider Daily., Disp: 16 g, Rfl: 2    losartan (COZAAR) 100 MG tablet, Take 1 tablet by mouth Daily., Disp: 90 tablet, Rfl: 3    metFORMIN (GLUCOPHAGE) 500 MG tablet, TAKE ONE TABLET BY MOUTH " "TWICE A DAY WITH MEALS, Disp: 180 tablet, Rfl: 3    Syringe 25G X 1\" 3 ML misc, Inject 1000 mcg (1mL) Cyanocobalamin IM every 28 days, Disp: 6 each, Rfl: 0    Current Facility-Administered Medications:     cyanocobalamin injection 1,000 mcg, 1,000 mcg, Intramuscular, Q28 Days, Mertmichelle Nella HARPER, APRN, 1,000 mcg at 04/01/24 0907    Facility-Administered Medications Ordered in Other Visits:     cyanocobalamin injection 1,000 mcg, 1,000 mcg, Intramuscular, Once, Enriqueta Rao SHANNON, APRN         Objective:    /72   Pulse 72   Ht 177.8 cm (70\")   Wt 90.5 kg (199 lb 9.6 oz)   SpO2 99%   BMI 28.64 kg/m²        Vitals and nursing note reviewed.   Constitutional:       General: Not in acute distress.     Appearance: Well-developed and not in distress. Not diaphoretic.   Neck:      Vascular: No JVD.   Pulmonary:      Effort: Pulmonary effort is normal. No respiratory distress.      Breath sounds: Normal breath sounds.   Cardiovascular:      Normal rate. Regular rhythm.      Murmurs: There is no murmur.   Edema:     Peripheral edema absent.   Abdominal:      Tenderness: There is no abdominal tenderness.   Skin:     General: Skin is warm and dry.   Neurological:      Mental Status: Alert and oriented to person, place, and time.         Lab Review:   Lab Results   Component Value Date    GLUCOSE 98 01/30/2024    BUN 27 (H) 01/30/2024    CREATININE 1.47 (H) 01/30/2024    EGFRRESULT 55 (L) 11/02/2023    EGFR 47.0 (L) 01/30/2024    BCR 18.4 01/30/2024    K 4.2 01/30/2024    CO2 26.0 01/30/2024    CALCIUM 9.2 01/30/2024    PROTENTOTREF 5.0 (L) 04/26/2023    ALBUMIN 4.3 01/30/2024    BILITOT 0.4 01/30/2024    AST 15 01/30/2024    ALT 12 01/30/2024        Lab Results   Component Value Date    CHLPL 106 11/02/2023    TRIG 124 11/02/2023    HDL 41 11/02/2023    LDL 43 11/02/2023        Lab Results   Component Value Date    HGBA1C 6.3 (H) 11/02/2023        Lab Results   Component Value Date    WBC 6.40 01/30/2024    HGB 12.6 (L) " 01/30/2024    HCT 39.3 01/30/2024    .1 (H) 01/30/2024     01/30/2024              ECG 12 Lead    Date/Time: 4/25/2024 11:25 AM  Performed by: Maryjo Tolbert APRN    Authorized by: Maryjo Tolbert APRN  Comparison: compared with previous ECG from 2/1/2023  Similar to previous ECG  Rhythm: sinus rhythm  BPM: 72  Conduction: right bundle branch block    Clinical impression: abnormal EKG          Results for orders placed in visit on 02/16/15    SCANNED - ECHOCARDIOGRAM      Assessment:      Problem List Items Addressed This Visit          Cardiac and Vasculature    Primary hypertension    Hyperlipidemia LDL goal <100    Carotid artery disease       Endocrine and Metabolic    Type 2 diabetes mellitus without complication, without long-term current use of insulin       Other    Stage 3a chronic kidney disease (CKD)     Other Visit Diagnoses       Chest pain, atypical    -  Primary    Relevant Orders    Adult Stress Echo W/ Cont or Stress Agent if Necessary Per Protocol    ROSALES (dyspnea on exertion)        Relevant Orders    Adult Stress Echo W/ Cont or Stress Agent if Necessary Per Protocol            Plan:     1.  Chest pain and shortness of breath with exertion: See description above.  His chest pain is suggestive of gastroesophageal reflux, but he has also had some exertional dyspnea that has been progressively worsening over the past 1 to 2 years.  I have ordered a stress echocardiogram for further evaluation.  I have also recommended he establish care with gastroenterology.    2.  Hypertension: Well-controlled on losartan and amlodipine.  Continue current medical therapy and continue to follow with PCP.    3.  Hyperlipidemia: LDL well-controlled at 43 on atorvastatin 20 mg continue to follow with PCP    4.  Diabetes mellitus type 2: A1c well-controlled at 6.3.  Continue to follow-up with PCP    5.  History of carotid endarterectomy: Continue current medical therapy including aspirin, statin and other  risk factor modification.    Follow-up to be determined based upon results of stress testing.  If this is low risk, follow-up with cardiology as needed for new or worsening symptoms or concerns

## 2024-04-30 ENCOUNTER — LAB (OUTPATIENT)
Dept: LAB | Facility: HOSPITAL | Age: 84
End: 2024-04-30
Payer: MEDICARE

## 2024-04-30 DIAGNOSIS — E53.8 B12 DEFICIENCY: ICD-10-CM

## 2024-04-30 DIAGNOSIS — D63.1 ANEMIA DUE TO STAGE 3A CHRONIC KIDNEY DISEASE: ICD-10-CM

## 2024-04-30 DIAGNOSIS — N18.31 ANEMIA DUE TO STAGE 3A CHRONIC KIDNEY DISEASE: ICD-10-CM

## 2024-04-30 DIAGNOSIS — E11.9 TYPE 2 DIABETES MELLITUS WITHOUT COMPLICATION, WITHOUT LONG-TERM CURRENT USE OF INSULIN: ICD-10-CM

## 2024-04-30 DIAGNOSIS — E61.1 IRON DEFICIENCY: ICD-10-CM

## 2024-04-30 LAB
ALBUMIN SERPL-MCNC: 4.5 G/DL (ref 3.5–5.2)
ALBUMIN/GLOB SERPL: 1.9 G/DL
ALP SERPL-CCNC: 127 U/L (ref 39–117)
ALT SERPL W P-5'-P-CCNC: 11 U/L (ref 1–41)
ANION GAP SERPL CALCULATED.3IONS-SCNC: 12 MMOL/L (ref 5–15)
AST SERPL-CCNC: 14 U/L (ref 1–40)
BASOPHILS # BLD AUTO: 0.05 10*3/MM3 (ref 0–0.2)
BASOPHILS NFR BLD AUTO: 0.8 % (ref 0–1.5)
BILIRUB SERPL-MCNC: 0.4 MG/DL (ref 0–1.2)
BUN SERPL-MCNC: 24 MG/DL (ref 8–23)
BUN/CREAT SERPL: 19 (ref 7–25)
CALCIUM SPEC-SCNC: 9.5 MG/DL (ref 8.6–10.5)
CHLORIDE SERPL-SCNC: 105 MMOL/L (ref 98–107)
CO2 SERPL-SCNC: 24 MMOL/L (ref 22–29)
CREAT SERPL-MCNC: 1.26 MG/DL (ref 0.76–1.27)
DEPRECATED RDW RBC AUTO: 49.4 FL (ref 37–54)
EGFRCR SERPLBLD CKD-EPI 2021: 56.2 ML/MIN/1.73
EOSINOPHIL # BLD AUTO: 0.2 10*3/MM3 (ref 0–0.4)
EOSINOPHIL NFR BLD AUTO: 3 % (ref 0.3–6.2)
ERYTHROCYTE [DISTWIDTH] IN BLOOD BY AUTOMATED COUNT: 13.1 % (ref 12.3–15.4)
FERRITIN SERPL-MCNC: 86.15 NG/ML (ref 30–400)
GLOBULIN UR ELPH-MCNC: 2.4 GM/DL
GLUCOSE SERPL-MCNC: 117 MG/DL (ref 65–99)
HBA1C MFR BLD: 6 % (ref 4.8–5.6)
HCT VFR BLD AUTO: 39.2 % (ref 37.5–51)
HGB BLD-MCNC: 12.5 G/DL (ref 13–17.7)
IMM GRANULOCYTES # BLD AUTO: 0.03 10*3/MM3 (ref 0–0.05)
IMM GRANULOCYTES NFR BLD AUTO: 0.5 % (ref 0–0.5)
IRON 24H UR-MRATE: 61 MCG/DL (ref 59–158)
IRON SATN MFR SERPL: 15 % (ref 20–50)
LYMPHOCYTES # BLD AUTO: 1.37 10*3/MM3 (ref 0.7–3.1)
LYMPHOCYTES NFR BLD AUTO: 20.6 % (ref 19.6–45.3)
MCH RBC QN AUTO: 32.7 PG (ref 26.6–33)
MCHC RBC AUTO-ENTMCNC: 31.9 G/DL (ref 31.5–35.7)
MCV RBC AUTO: 102.6 FL (ref 79–97)
MONOCYTES # BLD AUTO: 0.53 10*3/MM3 (ref 0.1–0.9)
MONOCYTES NFR BLD AUTO: 8 % (ref 5–12)
NEUTROPHILS NFR BLD AUTO: 4.46 10*3/MM3 (ref 1.7–7)
NEUTROPHILS NFR BLD AUTO: 67.1 % (ref 42.7–76)
NRBC BLD AUTO-RTO: 0 /100 WBC (ref 0–0.2)
PLATELET # BLD AUTO: 188 10*3/MM3 (ref 140–450)
PMV BLD AUTO: 10 FL (ref 6–12)
POTASSIUM SERPL-SCNC: 4.4 MMOL/L (ref 3.5–5.2)
PROT SERPL-MCNC: 6.9 G/DL (ref 6–8.5)
RBC # BLD AUTO: 3.82 10*6/MM3 (ref 4.14–5.8)
SODIUM SERPL-SCNC: 141 MMOL/L (ref 136–145)
TIBC SERPL-MCNC: 413 MCG/DL (ref 298–536)
TRANSFERRIN SERPL-MCNC: 277 MG/DL (ref 200–360)
VIT B12 BLD-MCNC: 879 PG/ML (ref 211–946)
WBC NRBC COR # BLD AUTO: 6.64 10*3/MM3 (ref 3.4–10.8)

## 2024-04-30 PROCEDURE — 84466 ASSAY OF TRANSFERRIN: CPT

## 2024-04-30 PROCEDURE — 82728 ASSAY OF FERRITIN: CPT

## 2024-04-30 PROCEDURE — 83036 HEMOGLOBIN GLYCOSYLATED A1C: CPT

## 2024-04-30 PROCEDURE — 83540 ASSAY OF IRON: CPT

## 2024-04-30 PROCEDURE — 85025 COMPLETE CBC W/AUTO DIFF WBC: CPT

## 2024-04-30 PROCEDURE — 36415 COLL VENOUS BLD VENIPUNCTURE: CPT

## 2024-04-30 PROCEDURE — 82607 VITAMIN B-12: CPT

## 2024-04-30 PROCEDURE — 80053 COMPREHEN METABOLIC PANEL: CPT

## 2024-05-01 ENCOUNTER — TELEPHONE (OUTPATIENT)
Dept: ONCOLOGY | Facility: CLINIC | Age: 84
End: 2024-05-01
Payer: MEDICARE

## 2024-05-01 ENCOUNTER — CLINICAL SUPPORT (OUTPATIENT)
Dept: FAMILY MEDICINE CLINIC | Facility: CLINIC | Age: 84
End: 2024-05-01
Payer: MEDICARE

## 2024-05-01 DIAGNOSIS — E53.8 B12 DEFICIENCY: Primary | ICD-10-CM

## 2024-05-01 PROCEDURE — 96372 THER/PROPH/DIAG INJ SC/IM: CPT | Performed by: NURSE PRACTITIONER

## 2024-05-01 RX ORDER — CYANOCOBALAMIN 1000 UG/ML
1000 INJECTION, SOLUTION INTRAMUSCULAR; SUBCUTANEOUS
Status: DISCONTINUED | OUTPATIENT
Start: 2024-05-01 | End: 2024-05-01

## 2024-05-01 RX ADMIN — CYANOCOBALAMIN 1000 MCG: 1000 INJECTION, SOLUTION INTRAMUSCULAR; SUBCUTANEOUS at 09:18

## 2024-05-01 NOTE — TELEPHONE ENCOUNTER
----- Message from Mona HARPER sent at 5/1/2024  3:35 PM CDT -----  Can you ask him to take his iron daily. His sat is down to 15.

## 2024-05-01 NOTE — PROGRESS NOTES
Patient presented to the office today for b12 injection. Medication administered to the right deltoid. Patient waited 15 minutes in office with no reaction observed. Patient supplied medication.

## 2024-05-02 ENCOUNTER — HOSPITAL ENCOUNTER (OUTPATIENT)
Dept: CARDIOLOGY | Facility: HOSPITAL | Age: 84
Discharge: HOME | End: 2024-05-02
Admitting: NURSE PRACTITIONER
Payer: MEDICARE

## 2024-05-02 VITALS
HEART RATE: 62 BPM | DIASTOLIC BLOOD PRESSURE: 70 MMHG | BODY MASS INDEX: 26.63 KG/M2 | HEIGHT: 70 IN | SYSTOLIC BLOOD PRESSURE: 120 MMHG | WEIGHT: 186 LBS

## 2024-05-02 DIAGNOSIS — R07.89 CHEST PAIN, ATYPICAL: ICD-10-CM

## 2024-05-02 DIAGNOSIS — R06.09 DOE (DYSPNEA ON EXERTION): ICD-10-CM

## 2024-05-02 PROCEDURE — 25510000001 PERFLUTREN 6.52 MG/ML SUSPENSION: Performed by: INTERNAL MEDICINE

## 2024-05-02 PROCEDURE — 93017 CV STRESS TEST TRACING ONLY: CPT

## 2024-05-02 PROCEDURE — 93350 STRESS TTE ONLY: CPT

## 2024-05-02 RX ADMIN — PERFLUTREN 8.48 MG: 6.52 INJECTION, SUSPENSION INTRAVENOUS at 10:18

## 2024-05-03 LAB
BH CV STRESS BP STAGE 1: NORMAL
BH CV STRESS BP STAGE 2: NORMAL
BH CV STRESS DURATION MIN STAGE 1: 3
BH CV STRESS DURATION MIN STAGE 2: 1
BH CV STRESS DURATION SEC STAGE 1: 0
BH CV STRESS DURATION SEC STAGE 2: 39
BH CV STRESS GRADE STAGE 1: 10
BH CV STRESS GRADE STAGE 2: 12
BH CV STRESS HR STAGE 1: 106
BH CV STRESS HR STAGE 2: 125
BH CV STRESS METS STAGE 1: 5
BH CV STRESS METS STAGE 2: 7.5
BH CV STRESS PROTOCOL 1: NORMAL
BH CV STRESS RECOVERY BP: NORMAL MMHG
BH CV STRESS RECOVERY HR: 82 BPM
BH CV STRESS SPEED STAGE 1: 1.7
BH CV STRESS SPEED STAGE 2: 2.5
BH CV STRESS STAGE 1: 1
BH CV STRESS STAGE 2: 2
MAXIMAL PREDICTED HEART RATE: 136 BPM
PERCENT MAX PREDICTED HR: 91.91 %
STRESS BASELINE BP: NORMAL MMHG
STRESS BASELINE HR: 59 BPM
STRESS PERCENT HR: 108 %
STRESS POST EXERCISE DUR MIN: 4 MIN
STRESS POST EXERCISE DUR SEC: 39 SEC
STRESS POST PEAK BP: NORMAL MMHG
STRESS POST PEAK HR: 125 BPM
STRESS TARGET HR: 116 BPM

## 2024-05-06 RX ORDER — NITROGLYCERIN 0.4 MG/1
TABLET SUBLINGUAL
Qty: 25 TABLET | Refills: 1 | Status: SHIPPED | OUTPATIENT
Start: 2024-05-06

## 2024-05-07 RX ORDER — RANOLAZINE 500 MG/1
500 TABLET, EXTENDED RELEASE ORAL 2 TIMES DAILY
Qty: 60 TABLET | Refills: 11 | Status: SHIPPED | OUTPATIENT
Start: 2024-05-07

## 2024-05-14 ENCOUNTER — TELEPHONE (OUTPATIENT)
Dept: CARDIOLOGY | Facility: CLINIC | Age: 84
End: 2024-05-14
Payer: MEDICARE

## 2024-05-14 DIAGNOSIS — R94.39 ABNORMAL STRESS TEST: Primary | ICD-10-CM

## 2024-05-14 DIAGNOSIS — R06.09 DOE (DYSPNEA ON EXERTION): ICD-10-CM

## 2024-05-14 RX ORDER — SODIUM CHLORIDE 0.9 % (FLUSH) 0.9 %
10 SYRINGE (ML) INJECTION AS NEEDED
Status: CANCELLED | OUTPATIENT
Start: 2024-05-14

## 2024-05-14 RX ORDER — SODIUM CHLORIDE 0.9 % (FLUSH) 0.9 %
3 SYRINGE (ML) INJECTION EVERY 12 HOURS SCHEDULED
Status: CANCELLED | OUTPATIENT
Start: 2024-05-14

## 2024-05-14 RX ORDER — ASPIRIN 81 MG/1
324 TABLET, CHEWABLE ORAL ONCE
Status: CANCELLED | OUTPATIENT
Start: 2024-05-14 | End: 2024-05-14

## 2024-05-14 RX ORDER — ASPIRIN 81 MG/1
81 TABLET ORAL DAILY
Status: CANCELLED | OUTPATIENT
Start: 2024-05-15

## 2024-05-14 RX ORDER — SODIUM CHLORIDE 9 MG/ML
40 INJECTION, SOLUTION INTRAVENOUS AS NEEDED
Status: CANCELLED | OUTPATIENT
Start: 2024-05-14

## 2024-05-14 NOTE — TELEPHONE ENCOUNTER
This pt called and left me a vm asking that you specifically to call him back. He does not want anyone but you. Just ANGY

## 2024-05-16 PROBLEM — R94.39 ABNORMAL STRESS TEST: Status: ACTIVE | Noted: 2024-05-14

## 2024-05-16 PROBLEM — R06.09 DOE (DYSPNEA ON EXERTION): Status: ACTIVE | Noted: 2024-05-14

## 2024-05-20 ENCOUNTER — HOSPITAL ENCOUNTER (OUTPATIENT)
Facility: HOSPITAL | Age: 84
Setting detail: HOSPITAL OUTPATIENT SURGERY
Discharge: HOME | End: 2024-05-20
Attending: INTERNAL MEDICINE | Admitting: INTERNAL MEDICINE
Payer: MEDICARE

## 2024-05-20 VITALS
BODY MASS INDEX: 26.64 KG/M2 | RESPIRATION RATE: 18 BRPM | TEMPERATURE: 98.3 F | SYSTOLIC BLOOD PRESSURE: 119 MMHG | HEIGHT: 70 IN | DIASTOLIC BLOOD PRESSURE: 66 MMHG | OXYGEN SATURATION: 96 % | HEART RATE: 56 BPM | WEIGHT: 186.07 LBS

## 2024-05-20 DIAGNOSIS — R94.39 ABNORMAL STRESS TEST: ICD-10-CM

## 2024-05-20 DIAGNOSIS — I25.118 CORONARY ARTERY DISEASE OF NATIVE ARTERY OF NATIVE HEART WITH STABLE ANGINA PECTORIS: Primary | ICD-10-CM

## 2024-05-20 DIAGNOSIS — R06.09 DOE (DYSPNEA ON EXERTION): ICD-10-CM

## 2024-05-20 DIAGNOSIS — E78.2 MIXED HYPERLIPIDEMIA: ICD-10-CM

## 2024-05-20 LAB
ALBUMIN SERPL-MCNC: 4.6 G/DL (ref 3.5–5.2)
ALBUMIN/GLOB SERPL: 1.8 G/DL
ALP SERPL-CCNC: 143 U/L (ref 39–117)
ALT SERPL W P-5'-P-CCNC: 17 U/L (ref 1–41)
ANION GAP SERPL CALCULATED.3IONS-SCNC: 10 MMOL/L (ref 5–15)
AST SERPL-CCNC: 17 U/L (ref 1–40)
BILIRUB SERPL-MCNC: 0.6 MG/DL (ref 0–1.2)
BUN SERPL-MCNC: 24 MG/DL (ref 8–23)
BUN/CREAT SERPL: 18.6 (ref 7–25)
CALCIUM SPEC-SCNC: 9.5 MG/DL (ref 8.6–10.5)
CHLORIDE SERPL-SCNC: 103 MMOL/L (ref 98–107)
CO2 SERPL-SCNC: 27 MMOL/L (ref 22–29)
CREAT SERPL-MCNC: 1.29 MG/DL (ref 0.76–1.27)
DEPRECATED RDW RBC AUTO: 47.8 FL (ref 37–54)
EGFRCR SERPLBLD CKD-EPI 2021: 54.7 ML/MIN/1.73
ERYTHROCYTE [DISTWIDTH] IN BLOOD BY AUTOMATED COUNT: 12.7 % (ref 12.3–15.4)
GLOBULIN UR ELPH-MCNC: 2.5 GM/DL
GLUCOSE SERPL-MCNC: 116 MG/DL (ref 65–99)
HCT VFR BLD AUTO: 41.3 % (ref 37.5–51)
HGB BLD-MCNC: 13.4 G/DL (ref 13–17.7)
MCH RBC QN AUTO: 32.8 PG (ref 26.6–33)
MCHC RBC AUTO-ENTMCNC: 32.4 G/DL (ref 31.5–35.7)
MCV RBC AUTO: 101.2 FL (ref 79–97)
PLATELET # BLD AUTO: 218 10*3/MM3 (ref 140–450)
PMV BLD AUTO: 10.1 FL (ref 6–12)
POTASSIUM SERPL-SCNC: 4.3 MMOL/L (ref 3.5–5.2)
PROT SERPL-MCNC: 7.1 G/DL (ref 6–8.5)
RBC # BLD AUTO: 4.08 10*6/MM3 (ref 4.14–5.8)
SODIUM SERPL-SCNC: 140 MMOL/L (ref 136–145)
WBC NRBC COR # BLD AUTO: 8.17 10*3/MM3 (ref 3.4–10.8)

## 2024-05-20 PROCEDURE — 85027 COMPLETE CBC AUTOMATED: CPT | Performed by: NURSE PRACTITIONER

## 2024-05-20 PROCEDURE — 93454 CORONARY ARTERY ANGIO S&I: CPT | Performed by: INTERNAL MEDICINE

## 2024-05-20 PROCEDURE — 25010000002 HEPARIN (PORCINE) 2000-0.9 UNIT/L-% SOLUTION: Performed by: INTERNAL MEDICINE

## 2024-05-20 PROCEDURE — 25810000003 SODIUM CHLORIDE 0.9 % SOLUTION: Performed by: NURSE PRACTITIONER

## 2024-05-20 PROCEDURE — 99152 MOD SED SAME PHYS/QHP 5/>YRS: CPT | Performed by: INTERNAL MEDICINE

## 2024-05-20 PROCEDURE — C1894 INTRO/SHEATH, NON-LASER: HCPCS | Performed by: INTERNAL MEDICINE

## 2024-05-20 PROCEDURE — 25010000002 MIDAZOLAM PER 1 MG: Performed by: INTERNAL MEDICINE

## 2024-05-20 PROCEDURE — 25010000002 FENTANYL CITRATE (PF) 100 MCG/2ML SOLUTION: Performed by: INTERNAL MEDICINE

## 2024-05-20 PROCEDURE — 25010000002 DIPHENHYDRAMINE PER 50 MG: Performed by: INTERNAL MEDICINE

## 2024-05-20 PROCEDURE — 99153 MOD SED SAME PHYS/QHP EA: CPT | Performed by: INTERNAL MEDICINE

## 2024-05-20 PROCEDURE — 25010000002 HEPARIN (PORCINE) 1000-0.9 UT/500ML-% SOLUTION: Performed by: INTERNAL MEDICINE

## 2024-05-20 PROCEDURE — 25010000002 HEPARIN (PORCINE) PER 1000 UNITS: Performed by: INTERNAL MEDICINE

## 2024-05-20 PROCEDURE — C1769 GUIDE WIRE: HCPCS | Performed by: INTERNAL MEDICINE

## 2024-05-20 PROCEDURE — 80053 COMPREHEN METABOLIC PANEL: CPT | Performed by: INTERNAL MEDICINE

## 2024-05-20 PROCEDURE — 25510000001 IOPAMIDOL 61 % SOLUTION: Performed by: INTERNAL MEDICINE

## 2024-05-20 RX ORDER — NITROGLYCERIN 0.4 MG/1
0.4 TABLET SUBLINGUAL
Status: CANCELLED | OUTPATIENT
Start: 2024-05-20

## 2024-05-20 RX ORDER — DIPHENHYDRAMINE HYDROCHLORIDE 50 MG/ML
INJECTION INTRAMUSCULAR; INTRAVENOUS
Status: DISCONTINUED | OUTPATIENT
Start: 2024-05-20 | End: 2024-05-20 | Stop reason: HOSPADM

## 2024-05-20 RX ORDER — HEPARIN SODIUM 1000 [USP'U]/ML
INJECTION, SOLUTION INTRAVENOUS; SUBCUTANEOUS
Status: DISCONTINUED | OUTPATIENT
Start: 2024-05-20 | End: 2024-05-20 | Stop reason: HOSPADM

## 2024-05-20 RX ORDER — ACETAMINOPHEN 325 MG/1
650 TABLET ORAL EVERY 4 HOURS PRN
Status: CANCELLED | OUTPATIENT
Start: 2024-05-20

## 2024-05-20 RX ORDER — HEPARIN SODIUM 200 [USP'U]/100ML
INJECTION, SOLUTION INTRAVENOUS
Status: DISCONTINUED | OUTPATIENT
Start: 2024-05-20 | End: 2024-05-20 | Stop reason: HOSPADM

## 2024-05-20 RX ORDER — ASPIRIN 81 MG/1
TABLET, CHEWABLE ORAL
Status: DISCONTINUED
Start: 2024-05-20 | End: 2024-05-20 | Stop reason: HOSPADM

## 2024-05-20 RX ORDER — ASPIRIN 81 MG/1
324 TABLET, CHEWABLE ORAL ONCE
Status: COMPLETED | OUTPATIENT
Start: 2024-05-20 | End: 2024-05-20

## 2024-05-20 RX ORDER — FENTANYL CITRATE 50 UG/ML
INJECTION, SOLUTION INTRAMUSCULAR; INTRAVENOUS
Status: DISCONTINUED | OUTPATIENT
Start: 2024-05-20 | End: 2024-05-20 | Stop reason: HOSPADM

## 2024-05-20 RX ORDER — ATORVASTATIN CALCIUM 40 MG/1
40 TABLET, FILM COATED ORAL NIGHTLY
Qty: 30 TABLET | Refills: 11 | Status: SHIPPED | OUTPATIENT
Start: 2024-05-20

## 2024-05-20 RX ORDER — SODIUM CHLORIDE 9 MG/ML
125 INJECTION, SOLUTION INTRAVENOUS CONTINUOUS
Status: CANCELLED | OUTPATIENT
Start: 2024-05-20 | End: 2024-05-20

## 2024-05-20 RX ORDER — LIDOCAINE HYDROCHLORIDE 20 MG/ML
INJECTION, SOLUTION INFILTRATION; PERINEURAL
Status: DISCONTINUED | OUTPATIENT
Start: 2024-05-20 | End: 2024-05-20 | Stop reason: HOSPADM

## 2024-05-20 RX ORDER — MIDAZOLAM HYDROCHLORIDE 1 MG/ML
INJECTION INTRAMUSCULAR; INTRAVENOUS
Status: DISCONTINUED | OUTPATIENT
Start: 2024-05-20 | End: 2024-05-20 | Stop reason: HOSPADM

## 2024-05-20 RX ORDER — ASPIRIN 81 MG/1
81 TABLET ORAL DAILY
Status: DISCONTINUED | OUTPATIENT
Start: 2024-05-21 | End: 2024-05-20 | Stop reason: HOSPADM

## 2024-05-20 RX ORDER — SODIUM CHLORIDE 9 MG/ML
40 INJECTION, SOLUTION INTRAVENOUS AS NEEDED
Status: DISCONTINUED | OUTPATIENT
Start: 2024-05-20 | End: 2024-05-20 | Stop reason: HOSPADM

## 2024-05-20 RX ORDER — ISOSORBIDE MONONITRATE 30 MG/1
30 TABLET, EXTENDED RELEASE ORAL DAILY
Qty: 30 TABLET | Refills: 11 | Status: SHIPPED | OUTPATIENT
Start: 2024-05-20

## 2024-05-20 RX ORDER — SODIUM CHLORIDE 0.9 % (FLUSH) 0.9 %
10 SYRINGE (ML) INJECTION AS NEEDED
Status: DISCONTINUED | OUTPATIENT
Start: 2024-05-20 | End: 2024-05-20 | Stop reason: HOSPADM

## 2024-05-20 RX ORDER — VERAPAMIL HYDROCHLORIDE 2.5 MG/ML
INJECTION, SOLUTION INTRAVENOUS
Status: DISCONTINUED | OUTPATIENT
Start: 2024-05-20 | End: 2024-05-20 | Stop reason: HOSPADM

## 2024-05-20 RX ORDER — SODIUM CHLORIDE 0.9 % (FLUSH) 0.9 %
3 SYRINGE (ML) INJECTION EVERY 12 HOURS SCHEDULED
Status: DISCONTINUED | OUTPATIENT
Start: 2024-05-20 | End: 2024-05-20 | Stop reason: HOSPADM

## 2024-05-20 RX ADMIN — ASPIRIN 324 MG: 81 TABLET, CHEWABLE ORAL at 07:14

## 2024-05-20 RX ADMIN — SODIUM CHLORIDE 253 ML: 9 INJECTION, SOLUTION INTRAVENOUS at 07:15

## 2024-05-20 NOTE — INTERVAL H&P NOTE
H&P updated. The patient was examined and the following changes are noted:  stress test was high risk for ischemia so cardiac catheterization was recommended.     I discussed cardiac catheterization, the procedure, risks (including bleeding, infection, vascular damage [including minor oozing, bruising, bleeding, and up to and including the need for vascular surgery], contrast reaction, renal failure, respiratory failure, heart attack, stroke, arrhythmia and even death), benefits, and alternatives and the patient has voiced understanding and is willing to proceed.

## 2024-05-23 ENCOUNTER — OFFICE VISIT (OUTPATIENT)
Dept: FAMILY MEDICINE CLINIC | Facility: CLINIC | Age: 84
End: 2024-05-23
Payer: MEDICARE

## 2024-05-23 VITALS
SYSTOLIC BLOOD PRESSURE: 138 MMHG | DIASTOLIC BLOOD PRESSURE: 68 MMHG | WEIGHT: 186 LBS | OXYGEN SATURATION: 97 % | HEART RATE: 56 BPM | TEMPERATURE: 97.6 F | HEIGHT: 70 IN | BODY MASS INDEX: 26.63 KG/M2 | RESPIRATION RATE: 20 BRPM

## 2024-05-23 DIAGNOSIS — E78.5 HYPERLIPIDEMIA LDL GOAL <100: ICD-10-CM

## 2024-05-23 DIAGNOSIS — I10 PRIMARY HYPERTENSION: ICD-10-CM

## 2024-05-23 DIAGNOSIS — I25.118 CORONARY ARTERY DISEASE OF NATIVE ARTERY OF NATIVE HEART WITH STABLE ANGINA PECTORIS: Primary | ICD-10-CM

## 2024-05-23 PROBLEM — R94.39 ABNORMAL STRESS TEST: Status: RESOLVED | Noted: 2024-05-14 | Resolved: 2024-05-23

## 2024-05-23 PROCEDURE — 1126F AMNT PAIN NOTED NONE PRSNT: CPT | Performed by: STUDENT IN AN ORGANIZED HEALTH CARE EDUCATION/TRAINING PROGRAM

## 2024-05-23 PROCEDURE — 3078F DIAST BP <80 MM HG: CPT | Performed by: STUDENT IN AN ORGANIZED HEALTH CARE EDUCATION/TRAINING PROGRAM

## 2024-05-23 PROCEDURE — 3075F SYST BP GE 130 - 139MM HG: CPT | Performed by: STUDENT IN AN ORGANIZED HEALTH CARE EDUCATION/TRAINING PROGRAM

## 2024-05-23 PROCEDURE — 1170F FXNL STATUS ASSESSED: CPT | Performed by: STUDENT IN AN ORGANIZED HEALTH CARE EDUCATION/TRAINING PROGRAM

## 2024-05-23 PROCEDURE — 99215 OFFICE O/P EST HI 40 MIN: CPT | Performed by: STUDENT IN AN ORGANIZED HEALTH CARE EDUCATION/TRAINING PROGRAM

## 2024-05-23 PROCEDURE — G2211 COMPLEX E/M VISIT ADD ON: HCPCS | Performed by: STUDENT IN AN ORGANIZED HEALTH CARE EDUCATION/TRAINING PROGRAM

## 2024-05-23 NOTE — PROGRESS NOTES
Chief Complaint  Labs Only    Subjective        Jeff Alatorre presents to [facility] PRIMARY [facility]    HPI    FU  Since I saw pt last he was re-evaluated by cardiology w/ LHC which showed severe distal left main and 3V CAD. He has been recommended CTS eval for CABG, has appt next week. He states he has decided to go through with this if recommended.   Lipitor dose increased to 40 mg. Otherwise still on daily asa, renexa, imdur.  Would not add BB at this time given HR. BP has been controlled at home. A1c 6.0. Other recent labs reviewed.    Past Medical History:   Diagnosis Date    Arthritis     Chronic kidney disease     Coronary artery disease of native artery of native heart with stable angina pectoris 5/23/2024    Erectile dysfunction     Gout     History of diverticulitis     HTN (hypertension)     Hx of adenomatous colonic polyps 10/13/2020    Hypercholesteremia     Low testosterone     Polycythemia vera 10/13/2020    Squamous cell carcinoma of skin 10/2021    top of head    Type 2 diabetes mellitus without complication, without long-term current use of insulin 12/01/2022     Past Surgical History:   Procedure Laterality Date    CARDIAC CATHETERIZATION Left 5/20/2024    Procedure: Coronary angiography;  Surgeon: Zaid Contreras MD;  Location: [facility] CATH INVASIVE LOCATION;  Service: Cardiology;  Laterality: Left;    CARDIAC CATHETERIZATION Left 5/20/2024    Procedure: Percutaneous Coronary Intervention;  Surgeon: Zaid Contreras MD;  Location: [facility] CATH INVASIVE LOCATION;  Service: Cardiology;  Laterality: Left;    CAROTID ENDARTERECTOMY Left     CATARACT EXTRACTION, BILATERAL      COLON SURGERY      COLONOSCOPY      COLONOSCOPY N/A 07/27/2021    Procedure: COLONOSCOPY WITH ANESTHESIA;  Surgeon: Luciano Alatorre DO;  Location: [facility] ENDOSCOPY;  Service: Gastroenterology;  Laterality: N/A;  preop; hx of polyps  postop; diverticulosis   PCP Devon Moore     HEMORROIDECTOMY       "HERNIA REPAIR      PENILE PROSTHESIS IMPLANT N/A 2023    Procedure: 3-PIECE INFLATABLE PENILE PROSTHESIS PLACEMENT;  Surgeon: Garcia Santana MD;  Location: [facility] OR;  Service: Urology;  Laterality: N/A;    VASECTOMY       Social History     Socioeconomic History    Marital status: Single   Tobacco Use    Smoking status: Former     Current packs/day: 0.00     Types: Cigarettes     Quit date: 1980     Years since quittin.9     Passive exposure: Past    Smokeless tobacco: Never    Tobacco comments:     N/A   Vaping Use    Vaping status: Never Used   Substance and Sexual Activity    Alcohol use: Not Currently     Comment: Quite 20+ yrs ago    Drug use: Never    Sexual activity: Yes     Partners: Female     Birth control/protection: Vasectomy       Objective   Vital Signs:  /68   Pulse 56   Temp 97.6 °F (36.4 °C) (Temporal)   Resp 20   Ht 177 cm (69.69\")   Wt 84.4 kg (186 lb)   SpO2 97%   BMI 26.93 kg/m²   Estimated body mass index is 26.93 kg/m² as calculated from the following:    Height as of this encounter: 177 cm (69.69\").    Weight as of this encounter: 84.4 kg (186 lb).              Physical Exam  Vitals reviewed.   Constitutional:       Appearance: Normal appearance.   HENT:      Head: Normocephalic.      Nose: Nose normal.      Mouth/Throat:      Mouth: Mucous membranes are moist.   Eyes:      Extraocular Movements: Extraocular movements intact.   Cardiovascular:      Rate and Rhythm: Normal rate and regular rhythm.      Heart sounds: Normal heart sounds.   Pulmonary:      Effort: Pulmonary effort is normal.      Breath sounds: Normal breath sounds.   Musculoskeletal:         General: Normal range of motion.      Cervical back: Normal range of motion.   Skin:     General: Skin is warm and dry.   Neurological:      General: No focal deficit present.      Mental Status: He is alert and oriented to person, place, and time.   Psychiatric:         Mood and Affect: Mood normal.         " Behavior: Behavior normal.        Result Review :                   Assessment and Plan   Diagnoses and all orders for this visit:    1. Coronary artery disease of native artery of native heart with stable angina pectoris (Primary)  -D/w pt and wife current condition, [facility] results and recommendations. Would recommend CABG consideration, reassurance provided. Will focus on controlling underlying RF's in meantime. Continue daily asa. Hold on BB w/ current HR. Continue lipitor 40mg, LDL at CAD goal. Continue ranexa and imdur.  Monitor BP closely, continue losartan and amlodipine.     2. Primary hypertension  -See above    3. Hyperlipidemia LDL goal <100  -See above      FU after seeing CTS evaluation     I spent 42 minutes caring for Jeff on this date of service. This time includes time spent by me in the following activities:preparing for the visit, reviewing tests, performing a medically appropriate examination and/or evaluation , counseling and educating the patient/family/caregiver, ordering medications, tests, or procedures, referring and communicating with other health care professionals , documenting information in the medical record, and care coordination  EMR Dragon/Transcription disclaimer:   Much of this encounter note is an electronic transcription/translation of spoken language to printed text. The electronic translation of spoken language may permit erroneous, or at times, nonsensical words or phrases to be inadvertently transcribed; although attempts have made to review the note for such errors, some may still exist. Please excuse any unrecognized transcription errors and contact us if the air is unintelligible or needs documented correction. Also, portions of this note have been copied forward, however, changed to reflect the most current clinical status of this patient.  Follow Up   No follow-ups on file.  Patient was given instructions and counseling regarding his condition or for health maintenance  advice. Please see specific information pulled into the AVS if appropriate.

## 2024-05-29 ENCOUNTER — OFFICE VISIT (OUTPATIENT)
Dept: CARDIAC SURGERY | Facility: CLINIC | Age: 84
End: 2024-05-29
Payer: MEDICARE

## 2024-05-29 VITALS
HEIGHT: 70 IN | DIASTOLIC BLOOD PRESSURE: 58 MMHG | BODY MASS INDEX: 26.63 KG/M2 | WEIGHT: 186 LBS | SYSTOLIC BLOOD PRESSURE: 98 MMHG | HEART RATE: 72 BPM | OXYGEN SATURATION: 97 %

## 2024-05-29 DIAGNOSIS — I73.89 OTHER SPECIFIED PERIPHERAL VASCULAR DISEASES: ICD-10-CM

## 2024-05-29 DIAGNOSIS — I79.8 OTHER DISORDERS OF ARTERIES, ARTERIOLES AND CAPILLARIES IN DISEASES CLASSIFIED ELSEWHERE: ICD-10-CM

## 2024-05-29 DIAGNOSIS — I25.118 CORONARY ARTERY DISEASE OF NATIVE ARTERY OF NATIVE HEART WITH STABLE ANGINA PECTORIS: Primary | ICD-10-CM

## 2024-05-29 DIAGNOSIS — N18.31 STAGE 3A CHRONIC KIDNEY DISEASE (CKD): ICD-10-CM

## 2024-05-29 PROCEDURE — 3078F DIAST BP <80 MM HG: CPT | Performed by: THORACIC SURGERY (CARDIOTHORACIC VASCULAR SURGERY)

## 2024-05-29 PROCEDURE — 1160F RVW MEDS BY RX/DR IN RCRD: CPT | Performed by: THORACIC SURGERY (CARDIOTHORACIC VASCULAR SURGERY)

## 2024-05-29 PROCEDURE — 1159F MED LIST DOCD IN RCRD: CPT | Performed by: THORACIC SURGERY (CARDIOTHORACIC VASCULAR SURGERY)

## 2024-05-29 PROCEDURE — 99204 OFFICE O/P NEW MOD 45 MIN: CPT | Performed by: THORACIC SURGERY (CARDIOTHORACIC VASCULAR SURGERY)

## 2024-05-29 PROCEDURE — 3074F SYST BP LT 130 MM HG: CPT | Performed by: THORACIC SURGERY (CARDIOTHORACIC VASCULAR SURGERY)

## 2024-05-29 NOTE — PROGRESS NOTES
Chief Complaint   Patient presents with    Coronary Artery Disease     New patient referred from Dr. Contreras         Subjective     History of Present Illness  The patient is an 84-year-old male who presents for evaluation of multiple medical concerns. He is accompanied by his close female friend.    The patient reports experiencing chest pain and dyspnea, both at rest and during physical exertion. He recalls an episode of dyspnea while ascending mountains, but denies any associated pain. Despite these symptoms, he maintains an active lifestyle, walking approximately a mile daily, lifting up to 50 pounds, and engaging in hiking without any significant issues. His friend expresses concern regarding his ability to travel post-surgery. The patient's friend inquires about the timeline for scheduling the surgery due to concerns about potential myocardial infarction. The patient experiences chest tightness and jaw pain, which typically lasts for 4 to 5 minutes before subsiding. If the pain does not persist beyond 5 minutes, he resorts to nitroglycerin. He also reports edema in his left leg, which was more pronounced yesterday after hiking on rocks. The patient's friend inquires about the duration of the surgery, whether the patient would be admitted to the intensive care unit, whether he should stay in the waiting room, and whether he should stay in a hotel for the initial night post-surgery. The patient's friend also notes occasional depression, which she queries as unusual given his cardiac condition. The patient denies any history of depression.    Supplemental Information  He has gout. He has had 2 knee replacements.   He quit smoking 40 years ago.    Review of Systems       Past Medical History:   Diagnosis Date    Arthritis     Chronic kidney disease     Coronary artery disease of native artery of native heart with stable angina pectoris 5/23/2024    Erectile dysfunction     Gout     History of diverticulitis     HTN  (hypertension)     Hx of adenomatous colonic polyps 10/13/2020    Hypercholesteremia     Low testosterone     Polycythemia vera 10/13/2020    Squamous cell carcinoma of skin 10/2021    top of head    Type 2 diabetes mellitus without complication, without long-term current use of insulin 12/01/2022     Past Surgical History:   Procedure Laterality Date    CARDIAC CATHETERIZATION Left 5/20/2024    Procedure: Coronary angiography;  Surgeon: Zaid Contreras MD;  Location: [facility] CATH INVASIVE LOCATION;  Service: Cardiology;  Laterality: Left;    CARDIAC CATHETERIZATION Left 5/20/2024    Procedure: Percutaneous Coronary Intervention;  Surgeon: Zaid Contreras MD;  Location: [facility] CATH INVASIVE LOCATION;  Service: Cardiology;  Laterality: Left;    CAROTID ENDARTERECTOMY Left     CATARACT EXTRACTION, BILATERAL      COLON SURGERY      COLONOSCOPY      COLONOSCOPY N/A 07/27/2021    Procedure: COLONOSCOPY WITH ANESTHESIA;  Surgeon: Luciano Alatorre DO;  Location: [facility] ENDOSCOPY;  Service: Gastroenterology;  Laterality: N/A;  preop; hx of polyps  postop; diverticulosis   PCP Devon Moore     HEMORROIDECTOMY      HERNIA REPAIR      PENILE PROSTHESIS IMPLANT N/A 03/14/2023    Procedure: 3-PIECE INFLATABLE PENILE PROSTHESIS PLACEMENT;  Surgeon: Garcia Santana MD;  Location: [facility] OR;  Service: Urology;  Laterality: N/A;    VASECTOMY       Family History   Problem Relation Age of Onset    No Known Problems Mother     Heart attack Father     No Known Problems Maternal Grandmother     No Known Problems Maternal Grandfather     No Known Problems Paternal Grandmother     No Known Problems Paternal Grandfather     No Known Problems Son     No Known Problems Daughter     Parkinsonism Brother     Colon cancer Neg Hx     Colon polyps Neg Hx     Esophageal cancer Neg Hx      Social History     Tobacco Use    Smoking status: Former     Current packs/day: 0.00     Average packs/day: 1 pack/day for 40.0 years (40.0 ttl pk-yrs)  "    Types: Cigarettes     Start date:      Quit date:      Years since quittin.4     Passive exposure: Past    Smokeless tobacco: Never    Tobacco comments:     N/A   Vaping Use    Vaping status: Never Used   Substance Use Topics    Alcohol use: Not Currently     Comment: Quite 20+ yrs ago    Drug use: Never     Current Outpatient Medications   Medication Sig Dispense Refill    acetaminophen (TYLENOL) 500 MG tablet Take 1 tablet by mouth Daily As Needed for Mild Pain.      allopurinol (ZYLOPRIM) 300 MG tablet TAKE ONE TABLET BY MOUTH EVERY DAY 90 tablet 3    aspirin 81 MG EC tablet 1 tablet.      atorvastatin (LIPITOR) 40 MG tablet Take 1 tablet by mouth Every Night. 30 tablet 11    cyanocobalamin 1000 MCG/ML injection INJECT 1ML AS DIRECTED EVERY 30 DAYS 1 mL 6    Ferrous Sulfate (IRON PO) Take  by mouth. OTC      fluticasone (FLONASE) 50 MCG/ACT nasal spray 2 sprays into the nostril(s) as directed by provider Daily. 16 g 2    isosorbide mononitrate (IMDUR) 30 MG 24 hr tablet Take 1 tablet by mouth Daily. 30 tablet 11    losartan (COZAAR) 100 MG tablet Take 1 tablet by mouth Daily. 90 tablet 3    metFORMIN (GLUCOPHAGE) 500 MG tablet TAKE ONE TABLET BY MOUTH TWICE A DAY WITH MEALS 180 tablet 3    nitroglycerin (NITROSTAT) 0.4 MG SL tablet 1 under the tongue as needed for angina, may repeat q5mins for up three doses 25 tablet 1    ranolazine (Ranexa) 500 MG 12 hr tablet Take 1 tablet by mouth 2 (Two) Times a Day. 60 tablet 11    Syringe 25G X 1\" 3 ML misc Inject 1000 mcg (1mL) Cyanocobalamin IM every 28 days 6 each 0    amLODIPine (NORVASC) 5 MG tablet Take 1 tablet by mouth Daily. 90 tablet 2     Current Facility-Administered Medications   Medication Dose Route Frequency Provider Last Rate Last Admin    cyanocobalamin injection 1,000 mcg  1,000 mcg Intramuscular Q28 Days Nella Landry APRN   1,000 mcg at 24 1313     Facility-Administered Medications Ordered in Other Visits   Medication Dose " "Route Frequency Provider Last Rate Last Admin    cyanocobalamin injection 1,000 mcg  1,000 mcg Intramuscular Once Enriqueta Rao BRYANT ORTEGA         Allergies:  Patient has no known allergies.    Objective      Vital Signs  Visit Vitals  BP 98/58 (BP Location: Right arm, Patient Position: Sitting, Cuff Size: Adult)   Pulse 72   Ht 177 cm (69.69\")   Wt 84.4 kg (186 lb)   SpO2 97%   BMI 26.93 kg/m²         Physical Exam  Physical Exam  Patient appears younger than his stated age, in no acute distress, appropriate, and alert.  Lungs are clear to auscultation bilaterally. No wheezing, rubs, or rales.  Heart has a regular rate and rhythm without murmurs, rubs, or gallops.  Abdomen is soft, nondistended, and nontender.  Extremities show no clubbing or cyanosis. There is some edema in the left lower extremity compared to the right, but it is not significant. They report it is better today compared to yesterday after he hiked for a mile.    Results Review:     Results  Laboratory Studies  A1c is 6.    Imaging  Heart catheterization shows multiple blockages in the LAD and circumflex.  I reviewed the patient's new clinical results.  Discussed with patient and family      Assessment & Plan       Diagnoses and all orders for this visit:    1. Coronary artery disease of native artery of native heart with stable angina pectoris (Primary)  -     Adult Transthoracic Echo Complete W/ Cont if Necessary Per Protocol; Future  -     CT angiogram chest w contrast; Future  -     US Carotid Bilateral; Future  -     US Vein Mapping Bilateral; Future  -     US Ankle / Brachial Indices Extremity Complete; Future  -     Blood Gas, Arterial -; Future  -     Complete PFT - Pre & Post Bronchodilator; Future    2. Other disorders of arteries, arterioles and capillaries in diseases classified elsewhere  -     US Carotid Bilateral; Future    3. Other specified peripheral vascular diseases  -     US Ankle / Brachial Indices Extremity Complete; " Future    4. Stage 3a chronic kidney disease (CKD)      Assessment & Plan  1. Multivessel coronary disease, well-controlled diabetes mellitus, previous carotid endarterectomy on the left, more recent ultrasound surveillance, past history of tobacco use, remote.  The patient was thoroughly informed about the advantages and disadvantages of various treatment options, including medical management only, PCI, or surgical revascularization. Given his diabetes and the complexity of his coronary artery disease, it is agreed that he would be the most suitable candidate for coronary artery bypass grafting. Given his underlying chronic kidney disease, he remains at a low risk for hemodialysis. The operative conduct and expected postoperative course were thoroughly discussed. The rationale behind the recommendation for coronary artery bypass grafting was also discussed. Pre and post-operative pulmonary function tests, bilateral ultrasound, carotid duplex, a CT of the chest, 2D complete echocardiogram and ABIs, and bilateral vein mapping of the lower extremities will be obtained. All queries were addressed to the best of my ability, and they expressed understanding of the risks. The STS risk calculator was utilized for risk discussion. The patient, a non-smoker, and I commended him for this purpose. I will continue to process his care. I discussed with him and his friend that currently he is under an elective pathway with concerning coronary disease, and I would like to expedite his testing where possible. Should he experience any changes in his symptomatology, he is to contact my office promptly or present to the ER to escalate his urgency.    Transcribed from ambient dictation for Jose F Kim MD by Jose F Kim MD.  05/29/24   14:20 CDT    Patient or patient representative verbalized consent for the use of Ambient Listening during the visit with  Jose F Kim MD for chart documentation. 6/16/2024  08:23  CDT

## 2024-05-29 NOTE — H&P (VIEW-ONLY)
Chief Complaint   Patient presents with    Coronary Artery Disease     New patient referred from Dr. Contreras         Subjective     History of Present Illness  The patient is an 84-year-old male who presents for evaluation of multiple medical concerns. He is accompanied by his close female friend.    The patient reports experiencing chest pain and dyspnea, both at rest and during physical exertion. He recalls an episode of dyspnea while ascending mountains, but denies any associated pain. Despite these symptoms, he maintains an active lifestyle, walking approximately a mile daily, lifting up to 50 pounds, and engaging in hiking without any significant issues. His friend expresses concern regarding his ability to travel post-surgery. The patient's friend inquires about the timeline for scheduling the surgery due to concerns about potential myocardial infarction. The patient experiences chest tightness and jaw pain, which typically lasts for 4 to 5 minutes before subsiding. If the pain does not persist beyond 5 minutes, he resorts to nitroglycerin. He also reports edema in his left leg, which was more pronounced yesterday after hiking on rocks. The patient's friend inquires about the duration of the surgery, whether the patient would be admitted to the intensive care unit, whether he should stay in the waiting room, and whether he should stay in a hotel for the initial night post-surgery. The patient's friend also notes occasional depression, which she queries as unusual given his cardiac condition. The patient denies any history of depression.    Supplemental Information  He has gout. He has had 2 knee replacements.   He quit smoking 40 years ago.    Review of Systems       Past Medical History:   Diagnosis Date    Arthritis     Chronic kidney disease     Coronary artery disease of native artery of native heart with stable angina pectoris 5/23/2024    Erectile dysfunction     Gout     History of diverticulitis     HTN  (hypertension)     Hx of adenomatous colonic polyps 10/13/2020    Hypercholesteremia     Low testosterone     Polycythemia vera 10/13/2020    Squamous cell carcinoma of skin 10/2021    top of head    Type 2 diabetes mellitus without complication, without long-term current use of insulin 12/01/2022     Past Surgical History:   Procedure Laterality Date    CARDIAC CATHETERIZATION Left 5/20/2024    Procedure: Coronary angiography;  Surgeon: Zaid Contreras MD;  Location: [facility] CATH INVASIVE LOCATION;  Service: Cardiology;  Laterality: Left;    CARDIAC CATHETERIZATION Left 5/20/2024    Procedure: Percutaneous Coronary Intervention;  Surgeon: Zaid Contreras MD;  Location: [facility] CATH INVASIVE LOCATION;  Service: Cardiology;  Laterality: Left;    CAROTID ENDARTERECTOMY Left     CATARACT EXTRACTION, BILATERAL      COLON SURGERY      COLONOSCOPY      COLONOSCOPY N/A 07/27/2021    Procedure: COLONOSCOPY WITH ANESTHESIA;  Surgeon: Luciano Alatorre DO;  Location: [facility] ENDOSCOPY;  Service: Gastroenterology;  Laterality: N/A;  preop; hx of polyps  postop; diverticulosis   PCP Devon Moore     HEMORROIDECTOMY      HERNIA REPAIR      PENILE PROSTHESIS IMPLANT N/A 03/14/2023    Procedure: 3-PIECE INFLATABLE PENILE PROSTHESIS PLACEMENT;  Surgeon: Garcia Santana MD;  Location: [facility] OR;  Service: Urology;  Laterality: N/A;    VASECTOMY       Family History   Problem Relation Age of Onset    No Known Problems Mother     Heart attack Father     No Known Problems Maternal Grandmother     No Known Problems Maternal Grandfather     No Known Problems Paternal Grandmother     No Known Problems Paternal Grandfather     No Known Problems Son     No Known Problems Daughter     Parkinsonism Brother     Colon cancer Neg Hx     Colon polyps Neg Hx     Esophageal cancer Neg Hx      Social History     Tobacco Use    Smoking status: Former     Current packs/day: 0.00     Average packs/day: 1 pack/day for 40.0 years (40.0 ttl pk-yrs)  "    Types: Cigarettes     Start date:      Quit date:      Years since quittin.4     Passive exposure: Past    Smokeless tobacco: Never    Tobacco comments:     N/A   Vaping Use    Vaping status: Never Used   Substance Use Topics    Alcohol use: Not Currently     Comment: Quite 20+ yrs ago    Drug use: Never     Current Outpatient Medications   Medication Sig Dispense Refill    acetaminophen (TYLENOL) 500 MG tablet Take 1 tablet by mouth Daily As Needed for Mild Pain.      allopurinol (ZYLOPRIM) 300 MG tablet TAKE ONE TABLET BY MOUTH EVERY DAY 90 tablet 3    aspirin 81 MG EC tablet 1 tablet.      atorvastatin (LIPITOR) 40 MG tablet Take 1 tablet by mouth Every Night. 30 tablet 11    cyanocobalamin 1000 MCG/ML injection INJECT 1ML AS DIRECTED EVERY 30 DAYS 1 mL 6    Ferrous Sulfate (IRON PO) Take  by mouth. OTC      fluticasone (FLONASE) 50 MCG/ACT nasal spray 2 sprays into the nostril(s) as directed by provider Daily. 16 g 2    isosorbide mononitrate (IMDUR) 30 MG 24 hr tablet Take 1 tablet by mouth Daily. 30 tablet 11    losartan (COZAAR) 100 MG tablet Take 1 tablet by mouth Daily. 90 tablet 3    metFORMIN (GLUCOPHAGE) 500 MG tablet TAKE ONE TABLET BY MOUTH TWICE A DAY WITH MEALS 180 tablet 3    nitroglycerin (NITROSTAT) 0.4 MG SL tablet 1 under the tongue as needed for angina, may repeat q5mins for up three doses 25 tablet 1    ranolazine (Ranexa) 500 MG 12 hr tablet Take 1 tablet by mouth 2 (Two) Times a Day. 60 tablet 11    Syringe 25G X 1\" 3 ML misc Inject 1000 mcg (1mL) Cyanocobalamin IM every 28 days 6 each 0    amLODIPine (NORVASC) 5 MG tablet Take 1 tablet by mouth Daily. 90 tablet 2     Current Facility-Administered Medications   Medication Dose Route Frequency Provider Last Rate Last Admin    cyanocobalamin injection 1,000 mcg  1,000 mcg Intramuscular Q28 Days Nella Landry APRN   1,000 mcg at 24 1313     Facility-Administered Medications Ordered in Other Visits   Medication Dose " "Route Frequency Provider Last Rate Last Admin    cyanocobalamin injection 1,000 mcg  1,000 mcg Intramuscular Once Enriqueta Rao BRYANT ORTEGA         Allergies:  Patient has no known allergies.    Objective      Vital Signs  Visit Vitals  BP 98/58 (BP Location: Right arm, Patient Position: Sitting, Cuff Size: Adult)   Pulse 72   Ht 177 cm (69.69\")   Wt 84.4 kg (186 lb)   SpO2 97%   BMI 26.93 kg/m²         Physical Exam  Physical Exam  Patient appears younger than his stated age, in no acute distress, appropriate, and alert.  Lungs are clear to auscultation bilaterally. No wheezing, rubs, or rales.  Heart has a regular rate and rhythm without murmurs, rubs, or gallops.  Abdomen is soft, nondistended, and nontender.  Extremities show no clubbing or cyanosis. There is some edema in the left lower extremity compared to the right, but it is not significant. They report it is better today compared to yesterday after he hiked for a mile.    Results Review:     Results  Laboratory Studies  A1c is 6.    Imaging  Heart catheterization shows multiple blockages in the LAD and circumflex.  I reviewed the patient's new clinical results.  Discussed with patient and family      Assessment & Plan       Diagnoses and all orders for this visit:    1. Coronary artery disease of native artery of native heart with stable angina pectoris (Primary)  -     Adult Transthoracic Echo Complete W/ Cont if Necessary Per Protocol; Future  -     CT angiogram chest w contrast; Future  -     US Carotid Bilateral; Future  -     US Vein Mapping Bilateral; Future  -     US Ankle / Brachial Indices Extremity Complete; Future  -     Blood Gas, Arterial -; Future  -     Complete PFT - Pre & Post Bronchodilator; Future    2. Other disorders of arteries, arterioles and capillaries in diseases classified elsewhere  -     US Carotid Bilateral; Future    3. Other specified peripheral vascular diseases  -     US Ankle / Brachial Indices Extremity Complete; " Future    4. Stage 3a chronic kidney disease (CKD)      Assessment & Plan  1. Multivessel coronary disease, well-controlled diabetes mellitus, previous carotid endarterectomy on the left, more recent ultrasound surveillance, past history of tobacco use, remote.  The patient was thoroughly informed about the advantages and disadvantages of various treatment options, including medical management only, PCI, or surgical revascularization. Given his diabetes and the complexity of his coronary artery disease, it is agreed that he would be the most suitable candidate for coronary artery bypass grafting. Given his underlying chronic kidney disease, he remains at a low risk for hemodialysis. The operative conduct and expected postoperative course were thoroughly discussed. The rationale behind the recommendation for coronary artery bypass grafting was also discussed. Pre and post-operative pulmonary function tests, bilateral ultrasound, carotid duplex, a CT of the chest, 2D complete echocardiogram and ABIs, and bilateral vein mapping of the lower extremities will be obtained. All queries were addressed to the best of my ability, and they expressed understanding of the risks. The STS risk calculator was utilized for risk discussion. The patient, a non-smoker, and I commended him for this purpose. I will continue to process his care. I discussed with him and his friend that currently he is under an elective pathway with concerning coronary disease, and I would like to expedite his testing where possible. Should he experience any changes in his symptomatology, he is to contact my office promptly or present to the ER to escalate his urgency.    Transcribed from ambient dictation for Jose F Kim MD by Jose F Kim MD.  05/29/24   14:20 CDT    Patient or patient representative verbalized consent for the use of Ambient Listening during the visit with  Jose F Kim MD for chart documentation. 6/16/2024  08:23  CDT

## 2024-05-30 ENCOUNTER — CLINICAL SUPPORT (OUTPATIENT)
Dept: FAMILY MEDICINE CLINIC | Facility: CLINIC | Age: 84
End: 2024-05-30
Payer: MEDICARE

## 2024-05-30 DIAGNOSIS — E53.8 B12 DEFICIENCY: Primary | ICD-10-CM

## 2024-05-30 PROCEDURE — 96372 THER/PROPH/DIAG INJ SC/IM: CPT | Performed by: NURSE PRACTITIONER

## 2024-05-30 RX ADMIN — CYANOCOBALAMIN 1000 MCG: 1000 INJECTION, SOLUTION INTRAMUSCULAR; SUBCUTANEOUS at 13:13

## 2024-05-30 NOTE — PROGRESS NOTES
Pt presented in office today in order to receive pt supplied b12 injection, injection given in left deltoid,pt tolerated well no adverse reaction after awaiting 15 min after admin

## 2024-06-04 ENCOUNTER — HOSPITAL ENCOUNTER (OUTPATIENT)
Dept: PULMONOLOGY | Facility: HOSPITAL | Age: 84
Discharge: HOME | End: 2024-06-04
Payer: MEDICARE

## 2024-06-04 ENCOUNTER — HOSPITAL ENCOUNTER (OUTPATIENT)
Dept: CT IMAGING | Facility: HOSPITAL | Age: 84
Discharge: HOME | End: 2024-06-04
Payer: MEDICARE

## 2024-06-04 ENCOUNTER — HOSPITAL ENCOUNTER (OUTPATIENT)
Dept: CARDIOLOGY | Facility: HOSPITAL | Age: 84
Discharge: HOME | End: 2024-06-04
Payer: MEDICARE

## 2024-06-04 ENCOUNTER — HOSPITAL ENCOUNTER (OUTPATIENT)
Dept: ULTRASOUND IMAGING | Facility: HOSPITAL | Age: 84
Discharge: HOME | End: 2024-06-04
Payer: MEDICARE

## 2024-06-04 VITALS
HEIGHT: 69 IN | BODY MASS INDEX: 27.55 KG/M2 | SYSTOLIC BLOOD PRESSURE: 121 MMHG | DIASTOLIC BLOOD PRESSURE: 81 MMHG | WEIGHT: 186 LBS

## 2024-06-04 DIAGNOSIS — I73.89 OTHER SPECIFIED PERIPHERAL VASCULAR DISEASES: ICD-10-CM

## 2024-06-04 DIAGNOSIS — I25.118 CORONARY ARTERY DISEASE OF NATIVE ARTERY OF NATIVE HEART WITH STABLE ANGINA PECTORIS: ICD-10-CM

## 2024-06-04 DIAGNOSIS — I79.8 OTHER DISORDERS OF ARTERIES, ARTERIOLES AND CAPILLARIES IN DISEASES CLASSIFIED ELSEWHERE: ICD-10-CM

## 2024-06-04 LAB
ARTERIAL PATENCY WRIST A: ABNORMAL
ATMOSPHERIC PRESS: 748 MMHG
BASE EXCESS BLDA CALC-SCNC: -3.1 MMOL/L (ref 0–2)
BDY SITE: ABNORMAL
BODY TEMPERATURE: 37
CREAT BLDA-MCNC: 1.6 MG/DL (ref 0.6–1.3)
HCO3 BLDA-SCNC: 21.4 MMOL/L (ref 20–26)
Lab: ABNORMAL
MODALITY: ABNORMAL
PCO2 BLDA: 35.7 MM HG (ref 35–45)
PCO2 TEMP ADJ BLD: 35.7 MM HG (ref 35–45)
PH BLDA: 7.39 PH UNITS (ref 7.35–7.45)
PH, TEMP CORRECTED: 7.39 PH UNITS (ref 7.35–7.45)
PO2 BLDA: 88.2 MM HG (ref 83–108)
PO2 TEMP ADJ BLD: 88.2 MM HG (ref 83–108)
SAO2 % BLDCOA: 97.6 % (ref 94–99)
VENTILATOR MODE: ABNORMAL

## 2024-06-04 PROCEDURE — 93970 EXTREMITY STUDY: CPT

## 2024-06-04 PROCEDURE — 71275 CT ANGIOGRAPHY CHEST: CPT

## 2024-06-04 PROCEDURE — 93923 UPR/LXTR ART STDY 3+ LVLS: CPT

## 2024-06-04 PROCEDURE — 93923 UPR/LXTR ART STDY 3+ LVLS: CPT | Performed by: SURGERY

## 2024-06-04 PROCEDURE — 82565 ASSAY OF CREATININE: CPT

## 2024-06-04 PROCEDURE — 82803 BLOOD GASES ANY COMBINATION: CPT

## 2024-06-04 PROCEDURE — 94060 EVALUATION OF WHEEZING: CPT | Performed by: INTERNAL MEDICINE

## 2024-06-04 PROCEDURE — 93880 EXTRACRANIAL BILAT STUDY: CPT | Performed by: SURGERY

## 2024-06-04 PROCEDURE — 94060 EVALUATION OF WHEEZING: CPT

## 2024-06-04 PROCEDURE — 93880 EXTRACRANIAL BILAT STUDY: CPT

## 2024-06-04 PROCEDURE — 25510000001 IOPAMIDOL PER 1 ML: Performed by: NURSE PRACTITIONER

## 2024-06-04 PROCEDURE — 93970 EXTREMITY STUDY: CPT | Performed by: SURGERY

## 2024-06-04 PROCEDURE — 36600 WITHDRAWAL OF ARTERIAL BLOOD: CPT

## 2024-06-04 PROCEDURE — 94726 PLETHYSMOGRAPHY LUNG VOLUMES: CPT | Performed by: INTERNAL MEDICINE

## 2024-06-04 PROCEDURE — 94726 PLETHYSMOGRAPHY LUNG VOLUMES: CPT

## 2024-06-04 PROCEDURE — 93306 TTE W/DOPPLER COMPLETE: CPT

## 2024-06-04 PROCEDURE — 25510000001 PERFLUTREN 6.52 MG/ML SUSPENSION: Performed by: INTERNAL MEDICINE

## 2024-06-04 PROCEDURE — 94729 DIFFUSING CAPACITY: CPT | Performed by: INTERNAL MEDICINE

## 2024-06-04 PROCEDURE — 94729 DIFFUSING CAPACITY: CPT

## 2024-06-04 RX ORDER — ALBUTEROL SULFATE 2.5 MG/3ML
2.5 SOLUTION RESPIRATORY (INHALATION) ONCE
Status: COMPLETED | OUTPATIENT
Start: 2024-06-04 | End: 2024-06-04

## 2024-06-04 RX ADMIN — ALBUTEROL SULFATE 2.5 MG: 2.5 SOLUTION RESPIRATORY (INHALATION) at 08:35

## 2024-06-04 RX ADMIN — PERFLUTREN 13.04 MG: 6.52 INJECTION, SUSPENSION INTRAVENOUS at 10:33

## 2024-06-04 RX ADMIN — IOPAMIDOL 100 ML: 755 INJECTION, SOLUTION INTRAVENOUS at 09:29

## 2024-06-05 ENCOUNTER — TELEPHONE (OUTPATIENT)
Dept: CARDIAC SURGERY | Facility: CLINIC | Age: 84
End: 2024-06-05
Payer: MEDICARE

## 2024-06-05 DIAGNOSIS — I65.23 BILATERAL CAROTID ARTERY STENOSIS: Primary | ICD-10-CM

## 2024-06-05 LAB
BH CV ECHO MEAS - AO MAX PG: 7.6 MMHG
BH CV ECHO MEAS - AO MEAN PG: 4.3 MMHG
BH CV ECHO MEAS - AO V2 MAX: 138.3 CM/SEC
BH CV ECHO MEAS - AO V2 VTI: 32.3 CM
BH CV ECHO MEAS - AVA(I,D): 3.1 CM2
BH CV ECHO MEAS - EDV(CUBED): 63.6 ML
BH CV ECHO MEAS - EDV(MOD-SP2): 45.6 ML
BH CV ECHO MEAS - EDV(MOD-SP4): 61.6 ML
BH CV ECHO MEAS - EF(MOD-SP2): 74.3 %
BH CV ECHO MEAS - EF(MOD-SP4): 64.9 %
BH CV ECHO MEAS - ESV(CUBED): 15.9 ML
BH CV ECHO MEAS - ESV(MOD-SP2): 11.7 ML
BH CV ECHO MEAS - ESV(MOD-SP4): 21.6 ML
BH CV ECHO MEAS - FS: 37.1 %
BH CV ECHO MEAS - IVS/LVPW: 1.28 CM
BH CV ECHO MEAS - IVSD: 1.26 CM
BH CV ECHO MEAS - LA DIMENSION: 3.9 CM
BH CV ECHO MEAS - LV DIASTOLIC VOL/BSA (35-75): 30.7 CM2
BH CV ECHO MEAS - LV MASS(C)D: 148.9 GRAMS
BH CV ECHO MEAS - LV MAX PG: 4.3 MMHG
BH CV ECHO MEAS - LV MEAN PG: 2.48 MMHG
BH CV ECHO MEAS - LV SYSTOLIC VOL/BSA (12-30): 10.8 CM2
BH CV ECHO MEAS - LV V1 MAX: 104 CM/SEC
BH CV ECHO MEAS - LV V1 VTI: 26.8 CM
BH CV ECHO MEAS - LVIDD: 4 CM
BH CV ECHO MEAS - LVIDS: 2.5 CM
BH CV ECHO MEAS - LVOT AREA: 3.8 CM2
BH CV ECHO MEAS - LVOT DIAM: 2.19 CM
BH CV ECHO MEAS - LVPWD: 0.98 CM
BH CV ECHO MEAS - MR MAX PG: 30.1 MMHG
BH CV ECHO MEAS - MR MAX VEL: 274.4 CM/SEC
BH CV ECHO MEAS - MV A MAX VEL: 94.2 CM/SEC
BH CV ECHO MEAS - MV DEC SLOPE: 407.9 CM/SEC2
BH CV ECHO MEAS - MV DEC TIME: 0.27 SEC
BH CV ECHO MEAS - MV E MAX VEL: 104.9 CM/SEC
BH CV ECHO MEAS - MV E/A: 1.11
BH CV ECHO MEAS - PA V2 MAX: 117.9 CM/SEC
BH CV ECHO MEAS - PULM A REVS DUR: 0.13 SEC
BH CV ECHO MEAS - PULM A REVS VEL: 31.9 CM/SEC
BH CV ECHO MEAS - PULM DIAS VEL: 60.1 CM/SEC
BH CV ECHO MEAS - PULM S/D: 1.14
BH CV ECHO MEAS - PULM SYS VEL: 68.3 CM/SEC
BH CV ECHO MEAS - RAP SYSTOLE: 3 MMHG
BH CV ECHO MEAS - RV MAX PG: 4.1 MMHG
BH CV ECHO MEAS - RV V1 MAX: 100.7 CM/SEC
BH CV ECHO MEAS - RV V1 VTI: 21.8 CM
BH CV ECHO MEAS - RVDD: 2.28 CM
BH CV ECHO MEAS - RVSP: 19.5 MMHG
BH CV ECHO MEAS - SV(LVOT): 101.4 ML
BH CV ECHO MEAS - SV(MOD-SP2): 33.9 ML
BH CV ECHO MEAS - SV(MOD-SP4): 39.9 ML
BH CV ECHO MEAS - SVI(LVOT): 50.6 ML/M2
BH CV ECHO MEAS - SVI(MOD-SP2): 16.9 ML/M2
BH CV ECHO MEAS - SVI(MOD-SP4): 19.9 ML/M2
BH CV ECHO MEAS - TR MAX PG: 16.5 MMHG
BH CV ECHO MEAS - TR MAX VEL: 203 CM/SEC
LEFT ATRIUM VOLUME: 78 ML

## 2024-06-05 NOTE — TELEPHONE ENCOUNTER
Attempted to reach patient to discuss preoperative testing.  Carotid ultrasound shows bilateral carotid artery stenosis with plaque burden at right bifurcation; CT angiogram of the neck has been recommended to better ascertain risk prior to proceeding with cardiac surgery.  I have ordered this.  Please schedule and notify patient.  He did not answer when I called and I left a message to return call tomorrow when phones rollover

## 2024-06-06 NOTE — TELEPHONE ENCOUNTER
Pt called back and was informed as directed.  Pt voiced understanding and will await a call from our office with appt and instructions for CTA/kahm

## 2024-06-12 RX ORDER — AMLODIPINE BESYLATE 5 MG/1
5 TABLET ORAL DAILY
Qty: 90 TABLET | Refills: 1 | OUTPATIENT
Start: 2024-06-12

## 2024-06-12 RX ORDER — AMLODIPINE BESYLATE 5 MG/1
5 TABLET ORAL DAILY
Qty: 90 TABLET | Refills: 2 | Status: SHIPPED | OUTPATIENT
Start: 2024-06-12

## 2024-06-12 NOTE — TELEPHONE ENCOUNTER
Rx Refill Note  Requested Prescriptions     Pending Prescriptions Disp Refills    amLODIPine (NORVASC) 5 MG tablet 90 tablet 1     Sig: Take 1 tablet by mouth Daily.      Last office visit with prescribing clinician: 5/23/2024   Last telemedicine visit with prescribing clinician: Visit date not found   Next office visit with prescribing clinician: Visit date not found             Cata Mccloud MA  06/12/24, 12:00 CDT

## 2024-06-13 ENCOUNTER — HOSPITAL ENCOUNTER (OUTPATIENT)
Dept: CT IMAGING | Facility: HOSPITAL | Age: 84
Discharge: HOME | End: 2024-06-13
Admitting: NURSE PRACTITIONER
Payer: MEDICARE

## 2024-06-13 DIAGNOSIS — I65.23 BILATERAL CAROTID ARTERY STENOSIS: ICD-10-CM

## 2024-06-13 LAB — CREAT BLDA-MCNC: 1.9 MG/DL (ref 0.6–1.3)

## 2024-06-13 PROCEDURE — 25510000001 IOPAMIDOL PER 1 ML: Performed by: NURSE PRACTITIONER

## 2024-06-13 PROCEDURE — 70498 CT ANGIOGRAPHY NECK: CPT

## 2024-06-13 PROCEDURE — 82565 ASSAY OF CREATININE: CPT

## 2024-06-13 RX ADMIN — IOPAMIDOL 100 ML: 755 INJECTION, SOLUTION INTRAVENOUS at 15:28

## 2024-06-14 ENCOUNTER — TELEPHONE (OUTPATIENT)
Dept: CARDIAC SURGERY | Facility: CLINIC | Age: 84
End: 2024-06-14
Payer: MEDICARE

## 2024-06-14 NOTE — TELEPHONE ENCOUNTER
Pt calling to request a call back.  States he has not rec'd any of his test results and now he has been called to come in for a BP check next week and pt is getting upset re: this and would like someone to call him today.  Can reach him at #[phone]/[email or web address]

## 2024-06-14 NOTE — TELEPHONE ENCOUNTER
Spoke with patient. I explained to him why he needs to come to the office for a blood pressure check. I also informed him that his tests will be explained to him at that time. He was appreciative and voiced understanding.

## 2024-06-19 ENCOUNTER — OFFICE VISIT (OUTPATIENT)
Dept: CARDIAC SURGERY | Facility: CLINIC | Age: 84
End: 2024-06-19
Payer: MEDICARE

## 2024-06-19 VITALS
WEIGHT: 195 LBS | BODY MASS INDEX: 28.88 KG/M2 | HEIGHT: 69 IN | DIASTOLIC BLOOD PRESSURE: 54 MMHG | SYSTOLIC BLOOD PRESSURE: 106 MMHG

## 2024-06-19 DIAGNOSIS — Z98.890 HISTORY OF LEFT-SIDED CAROTID ENDARTERECTOMY: ICD-10-CM

## 2024-06-19 DIAGNOSIS — I77.1 STENOSIS OF RIGHT SUBCLAVIAN ARTERY: ICD-10-CM

## 2024-06-19 DIAGNOSIS — E11.9 TYPE 2 DIABETES MELLITUS WITHOUT COMPLICATION, WITHOUT LONG-TERM CURRENT USE OF INSULIN: ICD-10-CM

## 2024-06-19 DIAGNOSIS — N18.31 STAGE 3A CHRONIC KIDNEY DISEASE (CKD): ICD-10-CM

## 2024-06-19 DIAGNOSIS — I65.23 BILATERAL CAROTID ARTERY STENOSIS: ICD-10-CM

## 2024-06-19 DIAGNOSIS — Z01.30 BP CHECK: Primary | ICD-10-CM

## 2024-06-19 DIAGNOSIS — I25.118 CORONARY ARTERY DISEASE OF NATIVE ARTERY OF NATIVE HEART WITH STABLE ANGINA PECTORIS: ICD-10-CM

## 2024-06-19 NOTE — PROGRESS NOTES
"Subjective   Chief Complaint   Patient presents with    Blood Pressure Check     Pt is here for blood pressure check       Patient ID: Jeff Alatorre is a 84 y.o. male who is here for BP check.     History of Present Illness    He was previously seen by Dr. Kim for consideration of coronary artery bypass grafting.  Preoperative testing identified 50 to 69% stenosis of right and left internal carotid arteries with heavy plaque burden at the right bifurcation.  CT angiogram of the chest and neck were performed which showed severe stenosis of the origin of the right subclavian artery, 60% stenosis of the right carotid artery, and without flow limiting stenosis of the left carotid artery. He has history of prior left carotid endarterectomy about 10   He routinely checks blood pressures in his right arm only but has recently been checking his blood pressure in both arms.  On June 17 BP readings: R arm 107/61, L arm 122/63; on 6/19 (at home) R arm 101/52, L arm 126/52.     Blood pressure recheck today performed by myself: R arm 90/60, L 111/58.     Blood sugars are well controlled. No chest pain but he has been limiting activities.     He does not routinely follow with vascular surgery.     Patient and significant other have questions with next steps and testing results.     The following portions of the patient's history were reviewed and updated as appropriate: allergies, current medications, past family history, past medical history, past social history, past surgical history and problem list.    Review of Systems   Constitutional:  Negative for chills, diaphoresis and fever.   Respiratory:  Negative for shortness of breath and wheezing.    Cardiovascular:  Negative for chest pain (\"none since starting Imdur\"), palpitations and leg swelling.   Musculoskeletal:         Left upper thigh pain when walking, \"feel like its a muscle\"       Objective   Visit Vitals  /54 (BP Location: Left arm, Patient Position: " "Sitting, Cuff Size: Adult)   Ht 175.3 cm (69\")   Wt 88.5 kg (195 lb)   BMI 28.80 kg/m²       Physical Exam  Vitals reviewed.   Constitutional:       General: He is not in acute distress.     Appearance: Normal appearance. He is well-developed. He is not diaphoretic.      Comments: Appears younger than stated age   HENT:      Head: Normocephalic and atraumatic.   Eyes:      Pupils: Pupils are equal, round, and reactive to light.   Cardiovascular:      Rate and Rhythm: Normal rate and regular rhythm.      Heart sounds: Normal heart sounds. No murmur heard.     No friction rub.   Pulmonary:      Effort: Pulmonary effort is normal. No respiratory distress.      Breath sounds: Normal breath sounds. No wheezing or rales.   Abdominal:      General: There is no distension.      Palpations: Abdomen is soft.      Tenderness: There is no abdominal tenderness. There is no guarding.   Musculoskeletal:      Cervical back: Normal range of motion and neck supple.      Right lower leg: No edema.      Left lower leg: No edema.   Skin:     General: Skin is warm and dry.      Coloration: Skin is not pale.      Findings: No rash.   Neurological:      Mental Status: He is alert and oriented to person, place, and time.   Psychiatric:         Behavior: Behavior normal.         CT Angiogram Neck    Result Date: 6/13/2024  Narrative: EXAMINATION: CT ANGIOGRAM NECK- 6/13/2024 4:24 PM  HISTORY: Moderate bilateral carotid artery stenosis seen on ultrasound, heavy plaque burden at right bifurcation; I65.23-Occlusion and stenosis of bilateral carotid arteries  TOTAL DOSE: 292.81 mGy.cm (Automatic exposure control technique was implemented in an effort to keep the radiation dose as low as possible without compromising image quality)  REPORT: Spiral CT of the neck was performed after administration of intravenous contrast using CTA protocol, to include reconstructed coronal, sagittal and 3D images.  COMPARISON: Ultrasound of the carotid arteries " 6/4/2024.  Review of lung windows demonstrates mild bibasilar atelectasis, partially calcified pleural plaque is present in the right apical hemithorax posteriorly. No pneumothorax is identified. Small volume of calcified plaque is noted within the thoracic aortic arch, which is normal in caliber. The brachiocephalic artery is tortuous there is severe narrowing at the origin of the right subclavian artery, this appears to be greater than 90%. The vessel remains patent.  The right common carotid artery is patent, normal in caliber, there is heavy calcified plaque at the right carotid bifurcation and the smallest patent lumen measures 2 mm, at the origin of the right ICA. This corresponds with about 60% stenosis. The right ECA is patent. The distal right ICA is patent, with a small volume of calcified plaque noted at the cavernous segment. The [place] is included in the field-of-view with patency of the middle and anterior cerebral arteries bilaterally.  On the left, the common carotid artery is patent, normal caliber, somewhat tortuous proximally. There is evidence of previous left carotid endarterectomy, with no recurrent flow-limiting stenosis identified. No carotid dissection is identified.  In the posterior circulation, both vertebral arteries are patent, neither vessel appears dominant. No vertebral dissection is identified. Bilateral PICA are patent. The basilar artery is patent as are the distal branches of the basilar artery. Review of the soft tissue windows demonstrates no neck mass or lymphadenopathy. The airway is patent. The visualized sinuses are normally aerated. The thyroid gland appears within normal limits. Review of bone windows demonstrates advanced cervical spondylosis at C5-6 and C6-7, there is moderate endplate spurring at these levels, greatest at C6-7, with moderate spinal stenosis noted.      Impression: 1. Severe stenosis at the origin of the right subclavian artery related to  mixed calcified and noncalcified plaque, estimated at greater than 90%. 2. Evidence of previous left carotid endarterectomy, with no flow-limiting left ICA stenosis. 3. Moderately heavy calcified plaque at the right carotid bifurcation including the origin of the ICA, with approximately 60% stenosis. 4. No carotid or vertebral dissection is identified.      This report was signed and finalized on 6/13/2024 4:37 PM by Dr. Esvin Kirk MD.      Adult Transthoracic Echo Complete W/ Cont if Necessary Per Protocol    Result Date: 6/5/2024  Narrative:   Left ventricular systolic function is normal. Left ventricular ejection fraction appears to be 61 - 65%.   Left ventricular wall thickness is consistent with mild septal asymmetric hypertrophy.   The left atrial cavity is dilated.   Estimated right ventricular systolic pressure from tricuspid regurgitation is normal (<35 mmHg).   Normal size and function of the right ventricle.   No significant (worse than mild) valvular pathology.   No previous studies available for comparison.     Complete PFT - Pre & Post Bronchodilator    Result Date: 6/4/2024  Narrative: [facility] - Pulmonary Function Test [address] [place] [address]  [id number] [phone] Patient : Jeff Alatorre MRN : [id number] CSN : [id number] Pulmonologist : Lorenzo Sepulveda MD Date : 6/4/2024 ______________________________________________________________________ Interpretation : 1.  Spirometry is within normal limits and in fact FEF Max is supranormal. 2.  There is improvement in midflows postbronchodilator which are now supranormal.  There is also slight decline in FEF Max postbronchodilator but it remains within normal limits.  Otherwise there is no significant change in spirometry postbronchodilator. 3.  Lung volumes reveal a mild decrease in residual volume with an elevated inspiratory capacity and otherwise are within normal limits. 4.  There is a moderate diffusion impairment even when  corrected for alveolar volume. 5.  Arterial blood gases on room air reveal a mild metabolic acidosis with respiratory sensation and otherwise are within normal limits. Lorenzo Sepulveda MD     US Vein Mapping Bilateral    Result Date: 6/4/2024  Narrative: History:  Pre-operative planning for CABG.  Comments: Duplex ultrasound was used to image and map bilateral lower extremity greater saphenous veins.   The right greater saphenous vein is patent.  The diameters are 8 mm proximally, 3.3 mm at the level of the knee, and 3.3 mm distally.  The left greater saphenous vein is patent.   The diameters are 7 mm proximally, 3.7 mm at the level of the knee, and 3.0 mm distally.      Impression: Impression: Patent bilateral greater saphenous veins with measurements as described in comments.    This report was signed and finalized on 6/4/2024 3:42 PM by Dr. Tomasz Mantilla MD.      US Carotid Bilateral    Result Date: 6/4/2024  Narrative: History: Carotid occlusive disease      Impression: Impression: 1. There is 50 to 69% stenosis of the right internal carotid artery. 2. There is 50 to 69% stenosis of the left internal carotid artery. 3. Antegrade flow is demonstrated in bilateral vertebral arteries. 4. There is heavy plaque burden at the right bifurcation. Further imaging/evaluation may be warranted with a CTA of the neck.  Comments: Bilateral carotid vertebral arterial duplex scan was performed.  Grayscale imaging shows intimal thickening and calcified elements at the carotid bifurcation. The right internal carotid artery peak systolic velocity is 182.7 cm/sec. The end-diastolic velocity is 35.7 cm/sec. The right ICA/CCA ratio is approximately 1.6. These findings correlate with 50 to 69% stenosis of the right internal carotid artery.  Grayscale imaging shows intimal thickening and calcified elements at the carotid bifurcation. The left internal carotid artery peak systolic velocity is 133.9 cm/sec. The end-diastolic  velocity is 30.7 cm/sec. The left ICA/CCA ratio is approximately 1.1. These findings correlate with 50 to 69% stenosis of the left internal carotid artery.  Antegrade flow is demonstrated in bilateral vertebral arteries. There is greater than 50% stenosis in the right external carotid artery.  This report was signed and finalized on 6/4/2024 3:37 PM by Dr. Tomasz Mantilla MD.      US Ankle / Brachial Indices Extremity Complete    Result Date: 6/4/2024  Narrative:  History: PAD  Comments: Bilateral lower extremity arterial with multi-level pulse volume recordings and segmental pressures were performed at rest and stress.  The right ankle/brachial index is 1.13. The waveforms are triphasic without dampening. These findings are consistent with no significant arterial insufficiency of the right lower extremity at rest.  The left ankle/brachial index is 1.17. The waveforms are triphasic without dampening. These findings are consistent with no significant arterial insufficiency of the left lower extremity at rest.      Impression: Impression: 1. No significant arterial insufficiency of the right lower extremity at rest. 2. No significant arterial insufficiency of the left lower extremity at rest.     This report was signed and finalized on 6/4/2024 3:28 PM by Dr. Tomasz Mantilla MD.      CT Angiogram Chest    Result Date: 6/4/2024  Narrative: CT ANGIOGRAM CHEST- 6/4/2024 8:16 AM  HISTORY: cad, pre op cabg planning; I25.118-Atherosclerotic heart disease of native coronary artery with other forms of angina pectoris  COMPARISON: None  TOTAL DOSE LENGTH PRODUCT: 336.16 mGy.cm. Automated exposure control was also utilized to decrease patient radiation dose.  TECHNIQUE: Axial images of the chest are performed following IV contrast. 2D, 3D, and MIPS reconstructed images are reviewed  FINDINGS: There are moderate atherosclerotic changes of the descending thoracic aorta measuring up to 3 cm. Arch of the aorta measuring 2.8 cm. A  sending aorta measuring 2.9 cm. Cardiomegaly. Scattered coronary calcification. No pericardial or pleural effusion.  Small calcified mediastinal and right hilar lymph nodes. No pathologic intrathoracic or axillary lymphadenopathy visualized.  Images the upper abdomen demonstrate no suspicious adrenal nodules. Mild cortical renal atrophy. 4 mm nonobstructing mid right intrarenal stone. No hydronephrosis identified. Fatty atrophy of the pancreas.  There are  peripheral interstitial lung changes with mild peripheral emphysematous change. No parenchymal consolidation. Few calcified left lower lobe granuloma. No suspicious pulmonary nodules. No pneumothorax. No discrete endobronchial lesion.  Postoperative changes of the left shoulder. No focal aggressive regional bony lesions. Moderate degenerative changes seen throughout the thoracic spine.      Impression: Moderate atherosclerotic changes of the descending thoracic aorta with no thoracic aortic aneurysm or dissection. Scattered coronary calcifications. Mild cardiomegaly. Peripheral interstitial lung changes appear chronic. Mild peripheral bullous emphysema. No parenchymal consolidation or suspicious nodularity. Mild elevation left hemidiaphragm.  This report was signed and finalized on 6/4/2024 10:02 AM by Dr. Kristina Pineda MD.       Cardiac catheterization findings:    Selective coronary angiography:   Dominance: right    Left Main coronary artery: Medium caliber vessel arising normally from the left cusp that bifurcates. There is gradual tapering and mild disease in the distal half of the vessel ipsilateral to the circumflex, approaching 40-50 % stenosis at the distal left main.    Left anterior descending artery: Medium caliber vessel arising normally from the left cusp that supplies small caliber diagonal branches and multiple septal perforators as it courses the anterior interventricular groove, terminating at the apex.. There appears to be 40-50% ostial  stenosis of the vessel arises from the left main. There is eccentric 60% stenosis of the proximal vessel contralateral to the origin of the first diagonal branch. There is moderate calcification in the proximal to mid vessel. There is also a focal eccentric 80% stenosis in the mid vessel just prior to the origin of a tiny small third diagonal branch. Distal to this, there is diffuse mild disease.    Left circumflex: Medium caliber vessel arising normally from the distal left main that is not dominant for posterior circulation, supplying 2 obtuse marginal branches. The distal left main disease extends into the ostium of the circumflex, where there is 80% ostial stenosis. There is also eccentric 40% stenosis in the proximal to mid vessel. OM1 is medium caliber and has focal 50% stenosis in its proximal portion. Otherwise there is diffuse mild disease throughout the circumflex system and its branches.    Right coronary artery: Medium to large caliber vessel arising normally from the right coronary cusp that is dominant for posterior circulation, supplying a small caliber PDA there is severe ostial disease in the right coronary, with dampening of pressure upon engagement with a 6 Mac[place] catheter. There is eccentric moderately calcified 40-50% stenosis proximally. The mid vessel is moderate to severely calcified with severe 85-90% stenosis. There is another sequential tubular 80% stenosis in the transition from the mid to distal vessel. Proximal to the crux there is a another severe 90% stenosis with CATE-2 flow beyond this. And what appears to be a small PDA, there is severe 95% ostial stenosis with right to right collaterals filling this vessel in retrograde fashion.      Estimated Blood Loss: <20mL  Specimens: None  Complications: None     Impression:  1. Severe distal left main and 3 vessel coronary artery disease. The stress echocardiographic findings are explained by the series of severe stenoses throughout the  right coronary artery (which are more severe in terms of % stenosis than other vessels), and I suspect his worsened symptoms of late are due to this vessel, but the totality of disease is much worse than just this vessel.           Assessment & Plan         Diagnoses and all orders for this visit:    1. BP check (Primary)    2. Coronary artery disease of native artery of native heart with stable angina pectoris    3. Type 2 diabetes mellitus without complication, without long-term current use of insulin    4. Stage 3a chronic kidney disease (CKD)    5. Bilateral carotid artery stenosis    6. History of left-sided carotid endarterectomy    7. Stenosis of right subclavian artery           Overall, Jeff Alatorre is doing well.  BP check does show that his BP is running slightly lower in the right arm.  Reviewed with Dr. Kim and recommend to proceed forward with coronary artery bypass grafting.  Will find a surgical date and call patient within 24 hours.  Reviewed all testing results with patient and answered his questions to the best of my ability.  He has no chest pain and has been stable from a cardiac standpoint.  We discussed postoperative restrictions and he was educated on not using his arms to assist to a standing position.  He is able to correct with verbal cues.  Patient and significant other verbalized understanding to the aforementioned plan of care.       Jeff Alatorre  reports that he quit smoking about 29 years ago. His smoking use included cigarettes. He started smoking about 69 years ago. He has a 40 pack-year smoking history. He has been exposed to tobacco smoke. He has never used smokeless tobacco.      Advance Care Planning   ACP discussion was declined by the patient. Patient has an advance directive in EMR which is still valid.

## 2024-06-20 ENCOUNTER — TELEPHONE (OUTPATIENT)
Dept: CARDIAC SURGERY | Facility: CLINIC | Age: 84
End: 2024-06-20
Payer: MEDICARE

## 2024-06-20 ENCOUNTER — PREP FOR SURGERY (OUTPATIENT)
Dept: OTHER | Facility: HOSPITAL | Age: 84
End: 2024-06-20
Payer: MEDICARE

## 2024-06-20 DIAGNOSIS — R06.02 BREATH SHORTNESS: ICD-10-CM

## 2024-06-20 DIAGNOSIS — R73.09 OTHER ABNORMAL GLUCOSE: ICD-10-CM

## 2024-06-20 DIAGNOSIS — I25.118 CORONARY ARTERY DISEASE OF NATIVE ARTERY OF NATIVE HEART WITH STABLE ANGINA PECTORIS: Primary | ICD-10-CM

## 2024-06-20 RX ORDER — SODIUM CHLORIDE 9 MG/ML
30 INJECTION, SOLUTION INTRAVENOUS CONTINUOUS PRN
OUTPATIENT
Start: 2024-06-20

## 2024-06-20 RX ORDER — CHLORHEXIDINE GLUCONATE 500 MG/1
CLOTH TOPICAL EVERY 12 HOURS PRN
OUTPATIENT
Start: 2024-06-20

## 2024-06-20 RX ORDER — PREGABALIN 25 MG/1
25 CAPSULE ORAL ONCE
OUTPATIENT
Start: 2024-06-25

## 2024-06-20 RX ORDER — SODIUM CHLORIDE 0.9 % (FLUSH) 0.9 %
3 SYRINGE (ML) INJECTION EVERY 12 HOURS SCHEDULED
OUTPATIENT
Start: 2024-06-20

## 2024-06-20 RX ORDER — DEXTROSE MONOHYDRATE 25 G/50ML
10-50 INJECTION, SOLUTION INTRAVENOUS
OUTPATIENT
Start: 2024-06-20

## 2024-06-20 RX ORDER — ACETAMINOPHEN 500 MG
1000 TABLET ORAL ONCE
OUTPATIENT
Start: 2024-06-25

## 2024-06-20 RX ORDER — IBUPROFEN 600 MG/1
1 TABLET ORAL
OUTPATIENT
Start: 2024-06-20

## 2024-06-20 RX ORDER — NICOTINE POLACRILEX 4 MG
15 LOZENGE BUCCAL
OUTPATIENT
Start: 2024-06-20

## 2024-06-20 RX ORDER — SODIUM CHLORIDE 0.9 % (FLUSH) 0.9 %
3-10 SYRINGE (ML) INJECTION AS NEEDED
OUTPATIENT
Start: 2024-06-20

## 2024-06-20 RX ORDER — SODIUM CHLORIDE 0.9 % (FLUSH) 0.9 %
30 SYRINGE (ML) INJECTION ONCE AS NEEDED
OUTPATIENT
Start: 2024-06-20

## 2024-06-20 NOTE — TELEPHONE ENCOUNTER
Called patient and offered him surgery date of June 25.  He is agreeable to this.  Surgery orders have been placed.  Please schedule and notify patient.  Arrival time of 10 AM on 6/25.  Bactroban sent to his pharmacy.

## 2024-06-20 NOTE — TELEPHONE ENCOUNTER
Patient aware of prework date/time and OR date/arrival time 1000/npo/no meds. Aware to  Bactroban for use on 6/24 @ 1700 - instructions given.

## 2024-06-24 ENCOUNTER — HOSPITAL ENCOUNTER (OUTPATIENT)
Dept: GENERAL RADIOLOGY | Facility: HOSPITAL | Age: 84
Discharge: HOME | End: 2024-06-24
Payer: MEDICARE

## 2024-06-24 ENCOUNTER — PRE-ADMISSION TESTING (OUTPATIENT)
Dept: PREADMISSION TESTING | Facility: HOSPITAL | Age: 84
DRG: 236 | End: 2024-06-24
Payer: MEDICARE

## 2024-06-24 ENCOUNTER — ANESTHESIA EVENT (OUTPATIENT)
Dept: PERIOP | Facility: HOSPITAL | Age: 84
End: 2024-06-24
Payer: MEDICARE

## 2024-06-24 VITALS
BODY MASS INDEX: 28.7 KG/M2 | DIASTOLIC BLOOD PRESSURE: 57 MMHG | HEIGHT: 69 IN | HEART RATE: 58 BPM | SYSTOLIC BLOOD PRESSURE: 108 MMHG | OXYGEN SATURATION: 97 % | WEIGHT: 193.78 LBS | RESPIRATION RATE: 16 BRPM

## 2024-06-24 DIAGNOSIS — R06.02 BREATH SHORTNESS: ICD-10-CM

## 2024-06-24 DIAGNOSIS — R73.09 OTHER ABNORMAL GLUCOSE: ICD-10-CM

## 2024-06-24 DIAGNOSIS — I25.118 CORONARY ARTERY DISEASE OF NATIVE ARTERY OF NATIVE HEART WITH STABLE ANGINA PECTORIS: ICD-10-CM

## 2024-06-24 LAB
ABO GROUP BLD: NORMAL
ALBUMIN SERPL-MCNC: 4.2 G/DL (ref 3.5–5.2)
ALBUMIN/GLOB SERPL: 1.7 G/DL
ALP SERPL-CCNC: 139 U/L (ref 39–117)
ALT SERPL W P-5'-P-CCNC: 11 U/L (ref 1–41)
ANION GAP SERPL CALCULATED.3IONS-SCNC: 9 MMOL/L (ref 5–15)
APTT PPP: 29.5 SECONDS (ref 24.5–36)
AST SERPL-CCNC: 12 U/L (ref 1–40)
BASOPHILS # BLD AUTO: 0.04 10*3/MM3 (ref 0–0.2)
BASOPHILS NFR BLD AUTO: 0.6 % (ref 0–1.5)
BILIRUB SERPL-MCNC: 0.3 MG/DL (ref 0–1.2)
BLD GP AB SCN SERPL QL: NEGATIVE
BUN SERPL-MCNC: 26 MG/DL (ref 8–23)
BUN/CREAT SERPL: 18.7 (ref 7–25)
CALCIUM SPEC-SCNC: 9.4 MG/DL (ref 8.6–10.5)
CHLORIDE SERPL-SCNC: 103 MMOL/L (ref 98–107)
CO2 SERPL-SCNC: 24 MMOL/L (ref 22–29)
CREAT SERPL-MCNC: 1.39 MG/DL (ref 0.76–1.27)
DEPRECATED RDW RBC AUTO: 49.1 FL (ref 37–54)
EGFRCR SERPLBLD CKD-EPI 2021: 50 ML/MIN/1.73
EOSINOPHIL # BLD AUTO: 0.23 10*3/MM3 (ref 0–0.4)
EOSINOPHIL NFR BLD AUTO: 3.5 % (ref 0.3–6.2)
ERYTHROCYTE [DISTWIDTH] IN BLOOD BY AUTOMATED COUNT: 13.3 % (ref 12.3–15.4)
GLOBULIN UR ELPH-MCNC: 2.5 GM/DL
GLUCOSE SERPL-MCNC: 112 MG/DL (ref 65–99)
HBA1C MFR BLD: 6.4 % (ref 4.8–5.6)
HCT VFR BLD AUTO: 36.7 % (ref 37.5–51)
HGB BLD-MCNC: 12.2 G/DL (ref 13–17.7)
IMM GRANULOCYTES # BLD AUTO: 0.05 10*3/MM3 (ref 0–0.05)
IMM GRANULOCYTES NFR BLD AUTO: 0.8 % (ref 0–0.5)
INR PPP: 1.02 (ref 0.91–1.09)
LYMPHOCYTES # BLD AUTO: 1.12 10*3/MM3 (ref 0.7–3.1)
LYMPHOCYTES NFR BLD AUTO: 17.1 % (ref 19.6–45.3)
MCH RBC QN AUTO: 33.5 PG (ref 26.6–33)
MCHC RBC AUTO-ENTMCNC: 33.2 G/DL (ref 31.5–35.7)
MCV RBC AUTO: 100.8 FL (ref 79–97)
MONOCYTES # BLD AUTO: 0.52 10*3/MM3 (ref 0.1–0.9)
MONOCYTES NFR BLD AUTO: 8 % (ref 5–12)
NEUTROPHILS NFR BLD AUTO: 4.58 10*3/MM3 (ref 1.7–7)
NEUTROPHILS NFR BLD AUTO: 70 % (ref 42.7–76)
NRBC BLD AUTO-RTO: 0 /100 WBC (ref 0–0.2)
PLATELET # BLD AUTO: 217 10*3/MM3 (ref 140–450)
PMV BLD AUTO: 10 FL (ref 6–12)
POTASSIUM SERPL-SCNC: 4.9 MMOL/L (ref 3.5–5.2)
PROT SERPL-MCNC: 6.7 G/DL (ref 6–8.5)
PROTHROMBIN TIME: 13.8 SECONDS (ref 11.8–14.8)
RBC # BLD AUTO: 3.64 10*6/MM3 (ref 4.14–5.8)
RH BLD: POSITIVE
SODIUM SERPL-SCNC: 136 MMOL/L (ref 136–145)
T&S EXPIRATION DATE: NORMAL
WBC NRBC COR # BLD AUTO: 6.54 10*3/MM3 (ref 3.4–10.8)

## 2024-06-24 PROCEDURE — 71046 X-RAY EXAM CHEST 2 VIEWS: CPT

## 2024-06-24 PROCEDURE — 86901 BLOOD TYPING SEROLOGIC RH(D): CPT

## 2024-06-24 PROCEDURE — 86850 RBC ANTIBODY SCREEN: CPT

## 2024-06-24 PROCEDURE — 80053 COMPREHEN METABOLIC PANEL: CPT

## 2024-06-24 PROCEDURE — 85025 COMPLETE CBC W/AUTO DIFF WBC: CPT

## 2024-06-24 PROCEDURE — 86920 COMPATIBILITY TEST SPIN: CPT

## 2024-06-24 PROCEDURE — 86900 BLOOD TYPING SEROLOGIC ABO: CPT

## 2024-06-24 PROCEDURE — 93005 ELECTROCARDIOGRAM TRACING: CPT

## 2024-06-24 PROCEDURE — 85730 THROMBOPLASTIN TIME PARTIAL: CPT

## 2024-06-24 PROCEDURE — 83036 HEMOGLOBIN GLYCOSYLATED A1C: CPT

## 2024-06-24 PROCEDURE — 85610 PROTHROMBIN TIME: CPT

## 2024-06-24 PROCEDURE — 93010 ELECTROCARDIOGRAM REPORT: CPT | Performed by: INTERNAL MEDICINE

## 2024-06-24 PROCEDURE — 36415 COLL VENOUS BLD VENIPUNCTURE: CPT

## 2024-06-24 NOTE — ANESTHESIA PREPROCEDURE EVALUATION
Anesthesia Evaluation     Patient summary reviewed   no history of anesthetic complications:                Airway   Mallampati: II  TM distance: >3 FB  Neck ROM: full  Dental      Pulmonary    (+) ,sleep apnea  (-) COPD, asthma, not a smoker  Cardiovascular   Exercise tolerance: good (4-7 METS)    (+) hypertension, CAD (including left main disease), angina, hyperlipidemia  (-) pacemaker, past MI, CHF, cardiac stents, carotid artery disease      Neuro/Psych  (-) seizures, TIA, CVA  GI/Hepatic/Renal/Endo    (+) renal disease- CRI, diabetes mellitus  (-) GERD, liver disease    Musculoskeletal     Abdominal    Substance History      OB/GYN          Other                      Anesthesia Plan    ASA 4     general, Marie, CVL and PAC     (No CI to MICAELA)  intravenous induction     Anesthetic plan, risks, benefits, and alternatives have been provided, discussed and informed consent has been obtained with: patient.    Use of blood products discussed with patient  Consented to blood products.      CODE STATUS:

## 2024-06-24 NOTE — DISCHARGE INSTRUCTIONS
Preparing for Surgery  Follow these instructions before the procedure:  Several days or weeks before your procedure        Ask your health care provider about:  Changing or stopping your regular medicines. This is especially important if you are taking diabetes medicines or blood thinners.  Taking medicines such as aspirin and ibuprofen. These medicines can thin your blood. Do not take these medicines unless your health care provider tells you to take them.  Taking over-the-counter medicines, vitamins, herbs, and supplements.    Contact your surgeon if you:  Develop a fever of more than 100.4°F (38°C) or other feelings of illness during the 48 hours before your surgery.  Have symptoms that get worse.  Have questions or concerns about your surgery.  If you are going home the same day of your surgery you will need to arrange for a responsible adult, age 18 years old or older, to drive you home from the hospital and stay with you for 24 hours. Verification of the  will be made prior to any procedure requiring sedation. You may not go home in a taxi or any form of public transportation by yourself.     Day before your procedure  Medication(s) you need to stop the day before your surgery: LOSARTAN    24 hours before your procedure DO NOT drink alcoholic beverages or smoke.  24 hours before your procedure STOP taking Erectile Dysfunction medication (i.e.,Cialis, Viagra)   You may be asked to shower with a germ-killing soap.  Day of your procedure         8 hours before your procedure STOP all food, any dairy products, and full liquids. This includes hard candy, chewing gum or mints. This is extremely important to prevent serious complications.     Up to 2 hours before your scheduled arrival time, you may have clear liquids no cream, powder, or pulp of any kind. Safe options are water, black coffee, plain tea, soda, Gatorade/Powerade, clear broth, apple juice.    2 hours before your scheduled arrival time, STOP  drinking clear liquids.    You may need to take another shower with a germ-killing soap before you leave home in the morning. Do not use perfumes, colognes, or body lotions.  Wear comfortable loose-fitting clothing.  Remove all jewelry including body piercing and rings, dark colored nail polish, and make up prior to arrival at the hospital. Leave all valuables at home.   Bring your hearing aids if you rely on them.  Do not wear contact lenses. If you wear eyeglasses remember to bring a case to store them in while you are in surgery.  Do not use denture adhesives since you will be asked to remove them during your surgery.    You do not need to bring your home medications into the hospital.   Bring your sleep apnea device with you on the day of your surgery (if this applies to you).  If you wear portable oxygen, bring it with you.   If you are staying overnight, you may bring a bag of items you may need such as slippers, robe and a change of clothes for your discharge. You may want to leave these items in the car until you are ready for them since your family will take your belongings when you leave the pre-operative area.  Arrive at the hospital as scheduled by the office. You will be asked to arrive 2 hours prior to your surgery time in order to prepare for your procedure.  When you arrive at the hospital  Go to the registration desk located at the main entrance of the hospital.  After registration is completed, you will be given a beeper and a sticker sheet. Take the stickers to Outpatient Surgery and place in the tray at the end of the desk to notify the staff that you have arrived and registered.   Return to the lobby to wait. You are not always called back according to the time of arrival but rather the time your doctor will be ready.  When your beeper lights up and vibrates proceed through the double doors, under the stairs, and a member of the Outpatient Surgery staff will escort you to your preoperative room.    How to Use Chlorhexidine Before Surgery  Chlorhexidine gluconate (CHG) is a germ-killing (antiseptic) solution that is used to clean the skin. It can get rid of the bacteria that normally live on the skin and can keep them away for about 24 hours. To clean your skin with CHG, you may be given:  A CHG solution to use in the shower or as part of a sponge bath.  A prepackaged cloth that contains CHG.  Cleaning your skin with CHG may help lower the risk for infection:  While you are staying in the intensive care unit of the hospital.  If you have a vascular access, such as a central line, to provide short-term or long-term access to your veins.  If you have a catheter to drain urine from your bladder.  If you are on a ventilator. A ventilator is a machine that helps you breathe by moving air in and out of your lungs.  After surgery.  What are the risks?  Risks of using CHG include:  A skin reaction.  Hearing loss, if CHG gets in your ears and you have a perforated eardrum.  Eye injury, if CHG gets in your eyes and is not rinsed out.  The CHG product catching fire.  Make sure that you avoid smoking and flames after applying CHG to your skin.  Do not use CHG:  If you have a chlorhexidine allergy or have previously reacted to chlorhexidine.  On babies younger than 2 months of age.  How to use CHG solution  Use CHG only as told by your health care provider, and follow the instructions on the label.  Use the full amount of CHG as directed. Usually, this is one bottle.  During a shower    Follow these steps when using CHG solution during a shower (unless your health care provider gives you different instructions):  Start the shower.  Use your normal soap and shampoo to wash your face and hair.  Turn off the shower or move out of the shower stream.  Pour the CHG onto a clean washcloth. Do not use any type of brush or rough-edged sponge.  Starting at your neck, lather your body down to your toes. Make sure you follow these  instructions:  If you will be having surgery, pay special attention to the part of your body where you will be having surgery. Scrub this area for at least 1 minute.  Do not use CHG on your head or face. If the solution gets into your ears or eyes, rinse them well with water.  Avoid your genital area.  Avoid any areas of skin that have broken skin, cuts, or scrapes.  Scrub your back and under your arms. Make sure to wash skin folds.  Let the lather sit on your skin for 1-2 minutes or as long as told by your health care provider.  Thoroughly rinse your entire body in the shower. Make sure that all body creases and crevices are rinsed well.  Dry off with a clean towel. Do not put any substances on your body afterward--such as powder, lotion, or perfume--unless you are told to do so by your health care provider. Only use lotions that are recommended by the .  Put on clean clothes or pajamas.  If it is the night before your surgery, sleep in clean sheets.     During a sponge bath  Follow these steps when using CHG solution during a sponge bath (unless your health care provider gives you different instructions):  Use your normal soap and shampoo to wash your face and hair.  Pour the CHG onto a clean washcloth.  Starting at your neck, lather your body down to your toes. Make sure you follow these instructions:  If you will be having surgery, pay special attention to the part of your body where you will be having surgery. Scrub this area for at least 1 minute.  Do not use CHG on your head or face. If the solution gets into your ears or eyes, rinse them well with water.  Avoid your genital area.  Avoid any areas of skin that have broken skin, cuts, or scrapes.  Scrub your back and under your arms. Make sure to wash skin folds.  Let the lather sit on your skin for 1-2 minutes or as long as told by your health care provider.  Using a different clean, wet washcloth, thoroughly rinse your entire body. Make sure that  all body creases and crevices are rinsed well.  Dry off with a clean towel. Do not put any substances on your body afterward--such as powder, lotion, or perfume--unless you are told to do so by your health care provider. Only use lotions that are recommended by the .  Put on clean clothes or pajamas.  If it is the night before your surgery, sleep in clean sheets.  How to use CHG prepackaged cloths  Only use CHG cloths as told by your health care provider, and follow the instructions on the label.  Use the CHG cloth on clean, dry skin.  Do not use the CHG cloth on your head or face unless your health care provider tells you to.  When washing with the CHG cloth:  Avoid your genital area.  Avoid any areas of skin that have broken skin, cuts, or scrapes.  Before surgery    Follow these steps when using a CHG cloth to clean before surgery (unless your health care provider gives you different instructions):  Using the CHG cloth, vigorously scrub the part of your body where you will be having surgery. Scrub using a back-and-forth motion for 3 minutes. The area on your body should be completely wet with CHG when you are done scrubbing.  Do not rinse. Discard the cloth and let the area air-dry. Do not put any substances on the area afterward, such as powder, lotion, or perfume.  Put on clean clothes or pajamas.  If it is the night before your surgery, sleep in clean sheets.     For general bathing  Follow these steps when using CHG cloths for general bathing (unless your health care provider gives you different instructions).  Use a separate CHG cloth for each area of your body. Make sure you wash between any folds of skin and between your fingers and toes. Wash your body in the following order, switching to a new cloth after each step:  The front of your neck, shoulders, and chest.  Both of your arms, under your arms, and your hands.  Your stomach and groin area, avoiding the genitals.  Your right leg and  foot.  Your left leg and foot.  The back of your neck, your back, and your buttocks.  Do not rinse. Discard the cloth and let the area air-dry. Do not put any substances on your body afterward--such as powder, lotion, or perfume--unless you are told to do so by your health care provider. Only use lotions that are recommended by the .  Put on clean clothes or pajamas.  Contact a health care provider if:  Your skin gets irritated after scrubbing.  You have questions about using your solution or cloth.  You swallow any chlorhexidine. Call your local poison control center (1-541.779.1032 in the [place]  Get help right away if:  Your eyes itch badly, or they become very red or swollen.  Your skin itches badly and is red or swollen.  Your hearing changes.  You have trouble seeing.  You have swelling or tingling in your mouth or throat.  You have trouble breathing.  These symptoms may represent a serious problem that is an emergency. Do not wait to see if the symptoms will go away. Get medical help right away. Call your local emergency services (967 in the [place] Do not drive yourself to the hospital.  Summary  Chlorhexidine gluconate (CHG) is a germ-killing (antiseptic) solution that is used to clean the skin. Cleaning your skin with CHG may help to lower your risk for infection.  You may be given CHG to use for bathing. It may be in a bottle or in a prepackaged cloth to use on your skin. Carefully follow your health care provider's instructions and the instructions on the product label.  Do not use CHG if you have a chlorhexidine allergy.  Contact your health care provider if your skin gets irritated after scrubbing.  This information is not intended to replace advice given to you by your health care provider. Make sure you discuss any questions you have with your health care provider.  Document Revised: 04/17/2023 Document Reviewed: 02/28/2022  [employer]vier Patient Education © 2023 [employer]

## 2024-06-25 ENCOUNTER — APPOINTMENT (OUTPATIENT)
Dept: CARDIOLOGY | Facility: HOSPITAL | Age: 84
DRG: 236 | End: 2024-06-25
Payer: MEDICARE

## 2024-06-25 ENCOUNTER — ANESTHESIA (OUTPATIENT)
Dept: PERIOP | Facility: HOSPITAL | Age: 84
End: 2024-06-25
Payer: MEDICARE

## 2024-06-25 ENCOUNTER — APPOINTMENT (OUTPATIENT)
Dept: GENERAL RADIOLOGY | Facility: HOSPITAL | Age: 84
DRG: 236 | End: 2024-06-25
Payer: MEDICARE

## 2024-06-25 ENCOUNTER — HOSPITAL ENCOUNTER (INPATIENT)
Facility: HOSPITAL | Age: 84
LOS: 6 days | Discharge: HOME | DRG: 236 | End: 2024-07-01
Attending: THORACIC SURGERY (CARDIOTHORACIC VASCULAR SURGERY) | Admitting: THORACIC SURGERY (CARDIOTHORACIC VASCULAR SURGERY)
Payer: MEDICARE

## 2024-06-25 DIAGNOSIS — I25.118 CORONARY ARTERY DISEASE OF NATIVE ARTERY OF NATIVE HEART WITH STABLE ANGINA PECTORIS: ICD-10-CM

## 2024-06-25 DIAGNOSIS — E78.2 MIXED HYPERLIPIDEMIA: ICD-10-CM

## 2024-06-25 DIAGNOSIS — Z95.1 S/P CABG (CORONARY ARTERY BYPASS GRAFT): Primary | ICD-10-CM

## 2024-06-25 DIAGNOSIS — N18.31 STAGE 3A CHRONIC KIDNEY DISEASE (CKD): ICD-10-CM

## 2024-06-25 DIAGNOSIS — Z74.09 IMPAIRED FUNCTIONAL MOBILITY AND ACTIVITY TOLERANCE: ICD-10-CM

## 2024-06-25 PROBLEM — I25.10 CAD (CORONARY ARTERY DISEASE): Status: ACTIVE | Noted: 2024-06-25

## 2024-06-25 LAB
ALBUMIN SERPL-MCNC: 3.3 G/DL (ref 3.5–5.2)
ANION GAP SERPL CALCULATED.3IONS-SCNC: 7 MMOL/L (ref 5–15)
APTT PPP: 36.6 SECONDS (ref 24.5–36)
APTT PPP: 37.2 SECONDS (ref 24.5–36)
ARTERIAL PATENCY WRIST A: ABNORMAL
ATMOSPHERIC PRESS: 748 MMHG
ATMOSPHERIC PRESS: 749 MMHG
ATMOSPHERIC PRESS: 750 MMHG
ATMOSPHERIC PRESS: 751 MMHG
BASE EXCESS BLDA CALC-SCNC: -0.1 MMOL/L (ref 0–2)
BASE EXCESS BLDA CALC-SCNC: -0.6 MMOL/L (ref 0–2)
BASE EXCESS BLDA CALC-SCNC: -0.6 MMOL/L (ref 0–2)
BASE EXCESS BLDA CALC-SCNC: -0.8 MMOL/L (ref 0–2)
BASE EXCESS BLDA CALC-SCNC: -1.1 MMOL/L (ref 0–2)
BASE EXCESS BLDA CALC-SCNC: -1.4 MMOL/L (ref 0–2)
BASE EXCESS BLDA CALC-SCNC: -1.5 MMOL/L (ref 0–2)
BASE EXCESS BLDA CALC-SCNC: -1.9 MMOL/L (ref 0–2)
BASE EXCESS BLDA CALC-SCNC: -3 MMOL/L (ref 0–2)
BASE EXCESS BLDA CALC-SCNC: -3.9 MMOL/L (ref 0–2)
BASE EXCESS BLDA CALC-SCNC: 0 MMOL/L (ref 0–2)
BASOPHILS # BLD AUTO: 0.01 10*3/MM3 (ref 0–0.2)
BASOPHILS NFR BLD AUTO: 0.1 % (ref 0–1.5)
BDY SITE: ABNORMAL
BODY TEMPERATURE: 37
BUN SERPL-MCNC: 27 MG/DL (ref 8–23)
BUN/CREAT SERPL: 19.9 (ref 7–25)
CA-I BLD-MCNC: 4.23 MG/DL (ref 4.6–5.4)
CA-I BLD-MCNC: 4.25 MG/DL (ref 4.6–5.4)
CA-I BLD-MCNC: 4.3 MG/DL (ref 4.6–5.4)
CA-I BLD-MCNC: 4.42 MG/DL (ref 4.6–5.4)
CA-I BLD-MCNC: 4.44 MG/DL (ref 4.6–5.4)
CA-I BLD-MCNC: 4.48 MG/DL (ref 4.6–5.4)
CA-I BLD-MCNC: 4.8 MG/DL (ref 4.6–5.4)
CA-I BLD-MCNC: 4.81 MG/DL (ref 4.6–5.4)
CA-I BLD-MCNC: 5.68 MG/DL (ref 4.6–5.4)
CA-I BLD-MCNC: 6.03 MG/DL (ref 4.6–5.4)
CALCIUM SPEC-SCNC: 10.5 MG/DL (ref 8.6–10.5)
CHLORIDE SERPL-SCNC: 112 MMOL/L (ref 98–107)
CO2 SERPL-SCNC: 25 MMOL/L (ref 22–29)
COHGB MFR BLD: 0.4 % (ref 0–5)
COHGB MFR BLD: 0.5 % (ref 0–5)
COHGB MFR BLD: 0.6 % (ref 0–5)
COHGB MFR BLD: 0.9 % (ref 0–5)
CREAT SERPL-MCNC: 1.36 MG/DL (ref 0.76–1.27)
DEPRECATED RDW RBC AUTO: 48.6 FL (ref 37–54)
DEPRECATED RDW RBC AUTO: 48.8 FL (ref 37–54)
EGFRCR SERPLBLD CKD-EPI 2021: 51.3 ML/MIN/1.73
EOSINOPHIL # BLD AUTO: 0.05 10*3/MM3 (ref 0–0.4)
EOSINOPHIL NFR BLD AUTO: 0.5 % (ref 0.3–6.2)
ERYTHROCYTE [DISTWIDTH] IN BLOOD BY AUTOMATED COUNT: 13.4 % (ref 12.3–15.4)
ERYTHROCYTE [DISTWIDTH] IN BLOOD BY AUTOMATED COUNT: 13.4 % (ref 12.3–15.4)
FIBRINOGEN PPP-MCNC: 205 MG/DL (ref 240–460)
GAS FLOW AIRWAY: 2 LPM
GAS FLOW AIRWAY: 2.5 LPM
GLUCOSE BLDC GLUCOMTR-MCNC: 114 MG/DL (ref 70–130)
GLUCOSE BLDC GLUCOMTR-MCNC: 134 MG/DL (ref 70–130)
GLUCOSE BLDC GLUCOMTR-MCNC: 143 MG/DL (ref 70–130)
GLUCOSE SERPL-MCNC: 99 MG/DL (ref 65–99)
HCO3 BLDA-SCNC: 20.9 MMOL/L (ref 20–26)
HCO3 BLDA-SCNC: 22.1 MMOL/L (ref 20–26)
HCO3 BLDA-SCNC: 22.6 MMOL/L (ref 20–26)
HCO3 BLDA-SCNC: 22.9 MMOL/L (ref 20–26)
HCO3 BLDA-SCNC: 23.1 MMOL/L (ref 20–26)
HCO3 BLDA-SCNC: 23.5 MMOL/L (ref 20–26)
HCO3 BLDA-SCNC: 23.7 MMOL/L (ref 20–26)
HCO3 BLDA-SCNC: 23.9 MMOL/L (ref 20–26)
HCO3 BLDA-SCNC: 24.8 MMOL/L (ref 20–26)
HCO3 BLDA-SCNC: 24.8 MMOL/L (ref 20–26)
HCO3 BLDA-SCNC: 25.1 MMOL/L (ref 20–26)
HCT VFR BLD AUTO: 18.6 % (ref 37.5–51)
HCT VFR BLD AUTO: 23.5 % (ref 37.5–51)
HCT VFR BLD CALC: 21.5 % (ref 38–51)
HCT VFR BLD CALC: 22.7 % (ref 38–51)
HCT VFR BLD CALC: 23.9 % (ref 38–51)
HCT VFR BLD CALC: 24.6 % (ref 38–51)
HCT VFR BLD CALC: 24.7 % (ref 38–51)
HCT VFR BLD CALC: 24.8 % (ref 38–51)
HCT VFR BLD CALC: 27.2 % (ref 38–51)
HCT VFR BLD CALC: 33.7 % (ref 38–51)
HCT VFR BLD CALC: 34.7 % (ref 38–51)
HGB BLD-MCNC: 6.2 G/DL (ref 13–17.7)
HGB BLD-MCNC: 7.8 G/DL (ref 13–17.7)
HGB BLDA-MCNC: 11 G/DL (ref 14–18)
HGB BLDA-MCNC: 11.3 G/DL (ref 14–18)
HGB BLDA-MCNC: 7 G/DL (ref 14–18)
HGB BLDA-MCNC: 7.4 G/DL (ref 14–18)
HGB BLDA-MCNC: 7.8 G/DL (ref 14–18)
HGB BLDA-MCNC: 8 G/DL (ref 14–18)
HGB BLDA-MCNC: 8.1 G/DL (ref 14–18)
HGB BLDA-MCNC: 8.1 G/DL (ref 14–18)
HGB BLDA-MCNC: 8.9 G/DL (ref 14–18)
IMM GRANULOCYTES # BLD AUTO: 0.07 10*3/MM3 (ref 0–0.05)
IMM GRANULOCYTES NFR BLD AUTO: 0.8 % (ref 0–0.5)
INHALED O2 CONCENTRATION: 100 %
INHALED O2 CONCENTRATION: 40 %
INHALED O2 CONCENTRATION: 60 %
INHALED O2 CONCENTRATION: 80 %
INHALED O2 CONCENTRATION: 90 %
INR PPP: 1.25 (ref 0.91–1.09)
INR PPP: 1.32 (ref 0.91–1.09)
LYMPHOCYTES # BLD AUTO: 0.85 10*3/MM3 (ref 0.7–3.1)
LYMPHOCYTES NFR BLD AUTO: 9.2 % (ref 19.6–45.3)
Lab: ABNORMAL
MCH RBC QN AUTO: 33.3 PG (ref 26.6–33)
MCH RBC QN AUTO: 33.9 PG (ref 26.6–33)
MCHC RBC AUTO-ENTMCNC: 33.2 G/DL (ref 31.5–35.7)
MCHC RBC AUTO-ENTMCNC: 33.3 G/DL (ref 31.5–35.7)
MCV RBC AUTO: 100.4 FL (ref 79–97)
MCV RBC AUTO: 101.6 FL (ref 79–97)
METHGB BLD QL: 0.8 % (ref 0–3)
METHGB BLD QL: 0.8 % (ref 0–3)
METHGB BLD QL: 1 % (ref 0–3)
METHGB BLD QL: 1 % (ref 0–3)
METHGB BLD QL: 1.1 % (ref 0–3)
METHGB BLD QL: 1.2 % (ref 0–3)
METHGB BLD QL: 1.2 % (ref 0–3)
METHGB BLD QL: 1.3 % (ref 0–3)
METHGB BLD QL: 1.3 % (ref 0–3)
MODALITY: ABNORMAL
MONOCYTES # BLD AUTO: 0.16 10*3/MM3 (ref 0.1–0.9)
MONOCYTES NFR BLD AUTO: 1.7 % (ref 5–12)
NEUTROPHILS NFR BLD AUTO: 8.13 10*3/MM3 (ref 1.7–7)
NEUTROPHILS NFR BLD AUTO: 87.7 % (ref 42.7–76)
NOTIFIED BY: ABNORMAL
NOTIFIED WHO: ABNORMAL
NRBC BLD AUTO-RTO: 0 /100 WBC (ref 0–0.2)
OXYHGB MFR BLDV: 97.7 % (ref 94–99)
OXYHGB MFR BLDV: 98.4 % (ref 94–99)
OXYHGB MFR BLDV: 98.5 % (ref 94–99)
OXYHGB MFR BLDV: 98.6 % (ref 94–99)
OXYHGB MFR BLDV: 98.7 % (ref 94–99)
OXYHGB MFR BLDV: 98.7 % (ref 94–99)
OXYHGB MFR BLDV: 98.9 % (ref 94–99)
OXYHGB MFR BLDV: 99.1 % (ref 94–99)
OXYHGB MFR BLDV: 99.1 % (ref 94–99)
PCO2 BLDA: 34.2 MM HG (ref 35–45)
PCO2 BLDA: 34.5 MM HG (ref 35–45)
PCO2 BLDA: 35.5 MM HG (ref 35–45)
PCO2 BLDA: 36.5 MM HG (ref 35–45)
PCO2 BLDA: 36.9 MM HG (ref 35–45)
PCO2 BLDA: 37.1 MM HG (ref 35–45)
PCO2 BLDA: 39.2 MM HG (ref 35–45)
PCO2 BLDA: 40 MM HG (ref 35–45)
PCO2 BLDA: 40.7 MM HG (ref 35–45)
PCO2 BLDA: 43.8 MM HG (ref 35–45)
PCO2 BLDA: 45.2 MM HG (ref 35–45)
PCO2 TEMP ADJ BLD: 34.2 MM HG (ref 35–45)
PCO2 TEMP ADJ BLD: 34.5 MM HG (ref 35–45)
PCO2 TEMP ADJ BLD: 35.5 MM HG (ref 35–45)
PCO2 TEMP ADJ BLD: 36.5 MM HG (ref 35–45)
PCO2 TEMP ADJ BLD: 36.9 MM HG (ref 35–45)
PCO2 TEMP ADJ BLD: 37.1 MM HG (ref 35–45)
PCO2 TEMP ADJ BLD: 39.2 MM HG (ref 35–45)
PCO2 TEMP ADJ BLD: 40 MM HG (ref 35–45)
PCO2 TEMP ADJ BLD: 40.7 MM HG (ref 35–45)
PCO2 TEMP ADJ BLD: 43.8 MM HG (ref 35–45)
PCO2 TEMP ADJ BLD: 45.2 MM HG (ref 35–45)
PEEP RESPIRATORY: 10 CM[H2O]
PEEP RESPIRATORY: 6 CM[H2O]
PH BLDA: 7.33 PH UNITS (ref 7.35–7.45)
PH BLDA: 7.35 PH UNITS (ref 7.35–7.45)
PH BLDA: 7.38 PH UNITS (ref 7.35–7.45)
PH BLDA: 7.39 PH UNITS (ref 7.35–7.45)
PH BLDA: 7.39 PH UNITS (ref 7.35–7.45)
PH BLDA: 7.4 PH UNITS (ref 7.35–7.45)
PH BLDA: 7.41 PH UNITS (ref 7.35–7.45)
PH BLDA: 7.42 PH UNITS (ref 7.35–7.45)
PH BLDA: 7.43 PH UNITS (ref 7.35–7.45)
PH, TEMP CORRECTED: 7.33 PH UNITS (ref 7.35–7.45)
PH, TEMP CORRECTED: 7.35 PH UNITS (ref 7.35–7.45)
PH, TEMP CORRECTED: 7.38 PH UNITS (ref 7.35–7.45)
PH, TEMP CORRECTED: 7.39 PH UNITS (ref 7.35–7.45)
PH, TEMP CORRECTED: 7.39 PH UNITS (ref 7.35–7.45)
PH, TEMP CORRECTED: 7.4 PH UNITS (ref 7.35–7.45)
PH, TEMP CORRECTED: 7.41 PH UNITS (ref 7.35–7.45)
PH, TEMP CORRECTED: 7.42 PH UNITS (ref 7.35–7.45)
PH, TEMP CORRECTED: 7.43 PH UNITS (ref 7.35–7.45)
PHOSPHATE SERPL-MCNC: 3.2 MG/DL (ref 2.5–4.5)
PLATELET # BLD AUTO: 116 10*3/MM3 (ref 140–450)
PLATELET # BLD AUTO: 143 10*3/MM3 (ref 140–450)
PMV BLD AUTO: 10.4 FL (ref 6–12)
PMV BLD AUTO: 9.8 FL (ref 6–12)
PO2 BLD: 136 MM[HG] (ref 0–500)
PO2 BLD: 250 MM[HG] (ref 0–500)
PO2 BLDA: 219 MM HG (ref 83–108)
PO2 BLDA: 391 MM HG (ref 83–108)
PO2 BLDA: 493 MM HG (ref 83–108)
PO2 BLDA: 493 MM HG (ref 83–108)
PO2 BLDA: 528 MM HG (ref 83–108)
PO2 BLDA: 529 MM HG (ref 83–108)
PO2 BLDA: 81.8 MM HG (ref 83–108)
PO2 BLDA: 99.8 MM HG (ref 83–108)
PO2 BLDA: >547 MM HG (ref 83–108)
PO2 TEMP ADJ BLD: 219 MM HG (ref 83–108)
PO2 TEMP ADJ BLD: 391 MM HG (ref 83–108)
PO2 TEMP ADJ BLD: 493 MM HG (ref 83–108)
PO2 TEMP ADJ BLD: 493 MM HG (ref 83–108)
PO2 TEMP ADJ BLD: 528 MM HG (ref 83–108)
PO2 TEMP ADJ BLD: 529 MM HG (ref 83–108)
PO2 TEMP ADJ BLD: 81.8 MM HG (ref 83–108)
PO2 TEMP ADJ BLD: 99.8 MM HG (ref 83–108)
PO2 TEMP ADJ BLD: >547 MM HG (ref 83–108)
POTASSIUM BLDA-SCNC: 4.3 MMOL/L (ref 3.5–5.2)
POTASSIUM BLDA-SCNC: 4.8 MMOL/L (ref 3.5–5.2)
POTASSIUM BLDA-SCNC: 4.8 MMOL/L (ref 3.5–5.2)
POTASSIUM BLDA-SCNC: 4.9 MMOL/L (ref 3.5–5.2)
POTASSIUM BLDA-SCNC: 5.4 MMOL/L (ref 3.5–5.2)
POTASSIUM BLDA-SCNC: 5.7 MMOL/L (ref 3.5–5.2)
POTASSIUM BLDA-SCNC: 5.8 MMOL/L (ref 3.5–5.2)
POTASSIUM BLDA-SCNC: 5.8 MMOL/L (ref 3.5–5.2)
POTASSIUM BLDA-SCNC: 6 MMOL/L (ref 3.5–5.2)
POTASSIUM SERPL-SCNC: 4.3 MMOL/L (ref 3.5–5.2)
PROTHROMBIN TIME: 16.3 SECONDS (ref 11.8–14.8)
PROTHROMBIN TIME: 16.9 SECONDS (ref 11.8–14.8)
PSV: 10 CMH2O
QT INTERVAL: 450 MS
QTC INTERVAL: 434 MS
RBC # BLD AUTO: 1.83 10*6/MM3 (ref 4.14–5.8)
RBC # BLD AUTO: 2.34 10*6/MM3 (ref 4.14–5.8)
SAO2 % BLDCOA: 100 % (ref 94–99)
SAO2 % BLDCOA: 96.2 % (ref 94–99)
SAO2 % BLDCOA: 98.4 % (ref 94–99)
SAO2 % BLDCOA: 99.9 % (ref 94–99)
SAO2 % BLDCOA: >100.1 % (ref 94–99)
SET MECH RESP RATE: 18
SET MECH RESP RATE: 18
SODIUM BLDA-SCNC: 137 MMOL/L (ref 136–145)
SODIUM BLDA-SCNC: 137 MMOL/L (ref 136–145)
SODIUM BLDA-SCNC: 138 MMOL/L (ref 136–145)
SODIUM BLDA-SCNC: 139 MMOL/L (ref 136–145)
SODIUM BLDA-SCNC: 140 MMOL/L (ref 136–145)
SODIUM BLDA-SCNC: 140 MMOL/L (ref 136–145)
SODIUM BLDA-SCNC: 143 MMOL/L (ref 136–145)
SODIUM SERPL-SCNC: 144 MMOL/L (ref 136–145)
VENTILATOR MODE: ABNORMAL
VT ON VENT VENT: 500 ML
VT ON VENT VENT: 500 ML
WBC NRBC COR # BLD AUTO: 9.26 10*3/MM3 (ref 3.4–10.8)
WBC NRBC COR # BLD AUTO: 9.27 10*3/MM3 (ref 3.4–10.8)

## 2024-06-25 PROCEDURE — 25810000003 SODIUM CHLORIDE 0.9 % SOLUTION

## 2024-06-25 PROCEDURE — 86900 BLOOD TYPING SEROLOGIC ABO: CPT

## 2024-06-25 PROCEDURE — 85025 COMPLETE CBC W/AUTO DIFF WBC: CPT | Performed by: THORACIC SURGERY (CARDIOTHORACIC VASCULAR SURGERY)

## 2024-06-25 PROCEDURE — 25010000002 VANCOMYCIN 10 G RECONSTITUTED SOLUTION

## 2024-06-25 PROCEDURE — 85610 PROTHROMBIN TIME: CPT | Performed by: THORACIC SURGERY (CARDIOTHORACIC VASCULAR SURGERY)

## 2024-06-25 PROCEDURE — 02100Z9 BYPASS CORONARY ARTERY, ONE ARTERY FROM LEFT INTERNAL MAMMARY, OPEN APPROACH: ICD-10-PCS | Performed by: THORACIC SURGERY (CARDIOTHORACIC VASCULAR SURGERY)

## 2024-06-25 PROCEDURE — 94799 UNLISTED PULMONARY SVC/PX: CPT

## 2024-06-25 PROCEDURE — 63710000001 INSULIN REGULAR HUMAN PER 5 UNITS

## 2024-06-25 PROCEDURE — 25810000003 LACTATED RINGERS PER 1000 ML: Performed by: THORACIC SURGERY (CARDIOTHORACIC VASCULAR SURGERY)

## 2024-06-25 PROCEDURE — 85027 COMPLETE CBC AUTOMATED: CPT | Performed by: THORACIC SURGERY (CARDIOTHORACIC VASCULAR SURGERY)

## 2024-06-25 PROCEDURE — 25010000002 HEPARIN (PORCINE) PER 1000 UNITS

## 2024-06-25 PROCEDURE — 82375 ASSAY CARBOXYHB QUANT: CPT | Performed by: THORACIC SURGERY (CARDIOTHORACIC VASCULAR SURGERY)

## 2024-06-25 PROCEDURE — 80069 RENAL FUNCTION PANEL: CPT | Performed by: THORACIC SURGERY (CARDIOTHORACIC VASCULAR SURGERY)

## 2024-06-25 PROCEDURE — 25010000002 HEPARIN (PORCINE) PER 1000 UNITS: Performed by: THORACIC SURGERY (CARDIOTHORACIC VASCULAR SURGERY)

## 2024-06-25 PROCEDURE — 82330 ASSAY OF CALCIUM: CPT

## 2024-06-25 PROCEDURE — 25010000002 PHENYLEPHRINE 10 MG/ML SOLUTION 1 ML VIAL

## 2024-06-25 PROCEDURE — 25810000003 SODIUM CHLORIDE 0.9 % SOLUTION 250 ML FLEX CONT

## 2024-06-25 PROCEDURE — 25010000002 PAPAVERINE PER 60 MG: Performed by: THORACIC SURGERY (CARDIOTHORACIC VASCULAR SURGERY)

## 2024-06-25 PROCEDURE — 82948 REAGENT STRIP/BLOOD GLUCOSE: CPT

## 2024-06-25 PROCEDURE — 94640 AIRWAY INHALATION TREATMENT: CPT

## 2024-06-25 PROCEDURE — 06BP4ZZ EXCISION OF RIGHT SAPHENOUS VEIN, PERCUTANEOUS ENDOSCOPIC APPROACH: ICD-10-PCS | Performed by: THORACIC SURGERY (CARDIOTHORACIC VASCULAR SURGERY)

## 2024-06-25 PROCEDURE — C1713 ANCHOR/SCREW BN/BN,TIS/BN: HCPCS | Performed by: THORACIC SURGERY (CARDIOTHORACIC VASCULAR SURGERY)

## 2024-06-25 PROCEDURE — 25010000002 VASOPRESSIN 20 UNIT/ML SOLUTION

## 2024-06-25 PROCEDURE — P9059 PLASMA, FRZ BETWEEN 8-24HOUR: HCPCS

## 2024-06-25 PROCEDURE — C1751 CATH, INF, PER/CENT/MIDLINE: HCPCS

## 2024-06-25 PROCEDURE — 25810000003 SODIUM CHLORIDE 0.9 % SOLUTION: Performed by: THORACIC SURGERY (CARDIOTHORACIC VASCULAR SURGERY)

## 2024-06-25 PROCEDURE — 25010000002 MANNITOL PER 50 ML

## 2024-06-25 PROCEDURE — A4648 IMPLANTABLE TISSUE MARKER: HCPCS | Performed by: THORACIC SURGERY (CARDIOTHORACIC VASCULAR SURGERY)

## 2024-06-25 PROCEDURE — 25010000002 ACETAMINOPHEN 10 MG/ML SOLUTION: Performed by: THORACIC SURGERY (CARDIOTHORACIC VASCULAR SURGERY)

## 2024-06-25 PROCEDURE — 25010000002 MIDAZOLAM PER 1 MG

## 2024-06-25 PROCEDURE — 36430 TRANSFUSION BLD/BLD COMPNT: CPT

## 2024-06-25 PROCEDURE — 33533 CABG ARTERIAL SINGLE: CPT | Performed by: THORACIC SURGERY (CARDIOTHORACIC VASCULAR SURGERY)

## 2024-06-25 PROCEDURE — 25010000002 VASOPRESSIN 20 UNIT/ML SOLUTION 1 ML VIAL: Performed by: THORACIC SURGERY (CARDIOTHORACIC VASCULAR SURGERY)

## 2024-06-25 PROCEDURE — 25010000002 POTASSIUM CHLORIDE PER 2 MEQ OF POTASSIUM

## 2024-06-25 PROCEDURE — 25010000002 FUROSEMIDE PER 20 MG

## 2024-06-25 PROCEDURE — 93318 ECHO TRANSESOPHAGEAL INTRAOP: CPT

## 2024-06-25 PROCEDURE — 25010000002 METHYLENE BLUE 50 MG/10ML SOLUTION: Performed by: THORACIC SURGERY (CARDIOTHORACIC VASCULAR SURGERY)

## 2024-06-25 PROCEDURE — 94002 VENT MGMT INPAT INIT DAY: CPT

## 2024-06-25 PROCEDURE — 33519 CABG ARTERY-VEIN THREE: CPT | Performed by: THORACIC SURGERY (CARDIOTHORACIC VASCULAR SURGERY)

## 2024-06-25 PROCEDURE — 93010 ELECTROCARDIOGRAM REPORT: CPT | Performed by: INTERNAL MEDICINE

## 2024-06-25 PROCEDURE — P9100 PATHOGEN TEST FOR PLATELETS: HCPCS

## 2024-06-25 PROCEDURE — 5A1221Z PERFORMANCE OF CARDIAC OUTPUT, CONTINUOUS: ICD-10-PCS | Performed by: THORACIC SURGERY (CARDIOTHORACIC VASCULAR SURGERY)

## 2024-06-25 PROCEDURE — 25810000003 LACTATED RINGERS PER 1000 ML

## 2024-06-25 PROCEDURE — P9047 ALBUMIN (HUMAN), 25%, 50ML: HCPCS

## 2024-06-25 PROCEDURE — 86927 PLASMA FRESH FROZEN: CPT

## 2024-06-25 PROCEDURE — 93005 ELECTROCARDIOGRAM TRACING: CPT | Performed by: THORACIC SURGERY (CARDIOTHORACIC VASCULAR SURGERY)

## 2024-06-25 PROCEDURE — 25010000002 VANCOMYCIN 1 G RECONSTITUTED SOLUTION 1 EACH VIAL: Performed by: THORACIC SURGERY (CARDIOTHORACIC VASCULAR SURGERY)

## 2024-06-25 PROCEDURE — 25810000003 LACTATED RINGERS SOLUTION: Performed by: THORACIC SURGERY (CARDIOTHORACIC VASCULAR SURGERY)

## 2024-06-25 PROCEDURE — 25010000002 NITROGLYCERIN 200 MCG/ML SOLUTION: Performed by: THORACIC SURGERY (CARDIOTHORACIC VASCULAR SURGERY)

## 2024-06-25 PROCEDURE — 82805 BLOOD GASES W/O2 SATURATION: CPT | Performed by: THORACIC SURGERY (CARDIOTHORACIC VASCULAR SURGERY)

## 2024-06-25 PROCEDURE — 25010000002 PROTAMINE SULFATE PER 10 MG

## 2024-06-25 PROCEDURE — 85730 THROMBOPLASTIN TIME PARTIAL: CPT | Performed by: THORACIC SURGERY (CARDIOTHORACIC VASCULAR SURGERY)

## 2024-06-25 PROCEDURE — P9016 RBC LEUKOCYTES REDUCED: HCPCS

## 2024-06-25 PROCEDURE — 021209W BYPASS CORONARY ARTERY, THREE ARTERIES FROM AORTA WITH AUTOLOGOUS VENOUS TISSUE, OPEN APPROACH: ICD-10-PCS | Performed by: THORACIC SURGERY (CARDIOTHORACIC VASCULAR SURGERY)

## 2024-06-25 PROCEDURE — 25010000002 SUGAMMADEX 200 MG/2ML SOLUTION

## 2024-06-25 PROCEDURE — 97607 NEG PRS WND THR NDME<=50SQCM: CPT | Performed by: THORACIC SURGERY (CARDIOTHORACIC VASCULAR SURGERY)

## 2024-06-25 PROCEDURE — 25010000002 MIDAZOLAM PER 1 MG: Performed by: ANESTHESIOLOGY

## 2024-06-25 PROCEDURE — P9035 PLATELET PHERES LEUKOREDUCED: HCPCS

## 2024-06-25 PROCEDURE — 25010000002 PHENYLEPHRINE 10 MG/ML SOLUTION

## 2024-06-25 PROCEDURE — 82803 BLOOD GASES ANY COMBINATION: CPT

## 2024-06-25 PROCEDURE — 71045 X-RAY EXAM CHEST 1 VIEW: CPT

## 2024-06-25 PROCEDURE — 25010000002 CEFAZOLIN PER 500 MG: Performed by: NURSE PRACTITIONER

## 2024-06-25 PROCEDURE — 25010000002 NICARDIPINE 2.5 MG/ML SOLUTION

## 2024-06-25 PROCEDURE — 25810000003 DEXTROSE 5 % WITH KCL 20 MEQ 20 MEQ/L SOLUTION: Performed by: THORACIC SURGERY (CARDIOTHORACIC VASCULAR SURGERY)

## 2024-06-25 PROCEDURE — 83050 HGB METHEMOGLOBIN QUAN: CPT | Performed by: THORACIC SURGERY (CARDIOTHORACIC VASCULAR SURGERY)

## 2024-06-25 PROCEDURE — 25010000002 FENTANYL CITRATE (PF) 50 MCG/ML SOLUTION

## 2024-06-25 PROCEDURE — 33508 ENDOSCOPIC VEIN HARVEST: CPT | Performed by: THORACIC SURGERY (CARDIOTHORACIC VASCULAR SURGERY)

## 2024-06-25 PROCEDURE — 25010000002 ALBUMIN HUMAN 25% PER 50 ML

## 2024-06-25 PROCEDURE — 85384 FIBRINOGEN ACTIVITY: CPT | Performed by: THORACIC SURGERY (CARDIOTHORACIC VASCULAR SURGERY)

## 2024-06-25 DEVICE — CLIP LIGAT VASC HORIZON TI LG ORNG 6CT: Type: IMPLANTABLE DEVICE | Site: CHEST | Status: FUNCTIONAL

## 2024-06-25 DEVICE — WR SUT NONABS MF SS V40 1/2CIR TC 6/0 18IN M649G: Type: IMPLANTABLE DEVICE | Site: STERNUM | Status: FUNCTIONAL

## 2024-06-25 DEVICE — WAX,BONE,NATURAL
Type: IMPLANTABLE DEVICE | Site: STERNUM | Status: FUNCTIONAL
Brand: MEDLINE INDUSTRIES

## 2024-06-25 DEVICE — HEMOST ABS SURGIFOAM SZ100 8X12 10MM: Type: IMPLANTABLE DEVICE | Site: STERNUM | Status: FUNCTIONAL

## 2024-06-25 DEVICE — KT HEMOST ABS SURGIFOAM PORCN 1GRAM: Type: IMPLANTABLE DEVICE | Site: STERNUM | Status: FUNCTIONAL

## 2024-06-25 DEVICE — IMPLANTABLE DEVICE: Type: IMPLANTABLE DEVICE | Site: HEART | Status: FUNCTIONAL

## 2024-06-25 DEVICE — DISK-SHAPED STYLE, SILICONE (1 PER STERILE PKG)
Type: IMPLANTABLE DEVICE | Site: HEART | Status: FUNCTIONAL
Brand: SCANLAN® RADIOMARK® GRAFT MARKERS

## 2024-06-25 DEVICE — PLEDGET INCISIONLINE REINF TFE SFT PTFE 1.5X3X7MM WHT: Type: IMPLANTABLE DEVICE | Site: HEART | Status: FUNCTIONAL

## 2024-06-25 RX ORDER — MIDAZOLAM HYDROCHLORIDE 1 MG/ML
INJECTION INTRAMUSCULAR; INTRAVENOUS AS NEEDED
Status: DISCONTINUED | OUTPATIENT
Start: 2024-06-25 | End: 2024-06-25 | Stop reason: SURG

## 2024-06-25 RX ORDER — BUPIVACAINE HCL/0.9 % NACL/PF 0.125 %
PLASTIC BAG, INJECTION (ML) EPIDURAL AS NEEDED
Status: DISCONTINUED | OUTPATIENT
Start: 2024-06-25 | End: 2024-06-25 | Stop reason: SURG

## 2024-06-25 RX ORDER — SODIUM CHLORIDE, SODIUM LACTATE, POTASSIUM CHLORIDE, CALCIUM CHLORIDE 600; 310; 30; 20 MG/100ML; MG/100ML; MG/100ML; MG/100ML
1000 INJECTION, SOLUTION INTRAVENOUS CONTINUOUS
Status: DISCONTINUED | OUTPATIENT
Start: 2024-06-25 | End: 2024-06-25

## 2024-06-25 RX ORDER — BISACODYL 5 MG/1
10 TABLET, DELAYED RELEASE ORAL 2 TIMES DAILY
Status: DISCONTINUED | OUTPATIENT
Start: 2024-06-26 | End: 2024-07-01 | Stop reason: HOSPADM

## 2024-06-25 RX ORDER — DEXTROSE MONOHYDRATE 25 G/50ML
10-50 INJECTION, SOLUTION INTRAVENOUS
Status: DISCONTINUED | OUTPATIENT
Start: 2024-06-25 | End: 2024-06-25

## 2024-06-25 RX ORDER — PANTOPRAZOLE SODIUM 40 MG/1
40 TABLET, DELAYED RELEASE ORAL
Qty: 3 TABLET | Refills: 0 | Status: COMPLETED | OUTPATIENT
Start: 2024-06-26 | End: 2024-06-28

## 2024-06-25 RX ORDER — NITROGLYCERIN 20 MG/100ML
5 INJECTION INTRAVENOUS CONTINUOUS
Status: DISCONTINUED | OUTPATIENT
Start: 2024-06-25 | End: 2024-06-29

## 2024-06-25 RX ORDER — LIDOCAINE 4 G/G
2 PATCH TOPICAL
Status: DISCONTINUED | OUTPATIENT
Start: 2024-06-25 | End: 2024-07-01 | Stop reason: HOSPADM

## 2024-06-25 RX ORDER — LIDOCAINE HYDROCHLORIDE 20 MG/ML
INJECTION, SOLUTION EPIDURAL; INFILTRATION; INTRACAUDAL; PERINEURAL AS NEEDED
Status: DISCONTINUED | OUTPATIENT
Start: 2024-06-25 | End: 2024-06-25 | Stop reason: SURG

## 2024-06-25 RX ORDER — SODIUM CHLORIDE 0.9 % (FLUSH) 0.9 %
10 SYRINGE (ML) INJECTION AS NEEDED
Status: DISCONTINUED | OUTPATIENT
Start: 2024-06-25 | End: 2024-06-25

## 2024-06-25 RX ORDER — SODIUM CHLORIDE 0.9 % (FLUSH) 0.9 %
3-10 SYRINGE (ML) INJECTION AS NEEDED
Status: DISCONTINUED | OUTPATIENT
Start: 2024-06-25 | End: 2024-06-25

## 2024-06-25 RX ORDER — PROTAMINE SULFATE 10 MG/ML
INJECTION, SOLUTION INTRAVENOUS AS NEEDED
Status: DISCONTINUED | OUTPATIENT
Start: 2024-06-25 | End: 2024-06-25 | Stop reason: SURG

## 2024-06-25 RX ORDER — CHLORHEXIDINE GLUCONATE ORAL RINSE 1.2 MG/ML
15 SOLUTION DENTAL EVERY 12 HOURS SCHEDULED
Status: DISCONTINUED | OUTPATIENT
Start: 2024-06-25 | End: 2024-06-29

## 2024-06-25 RX ORDER — ONDANSETRON 2 MG/ML
4 INJECTION INTRAMUSCULAR; INTRAVENOUS EVERY 6 HOURS PRN
Status: DISCONTINUED | OUTPATIENT
Start: 2024-06-25 | End: 2024-07-01 | Stop reason: HOSPADM

## 2024-06-25 RX ORDER — FENTANYL CITRATE 50 UG/ML
50 INJECTION, SOLUTION INTRAMUSCULAR; INTRAVENOUS
Status: DISCONTINUED | OUTPATIENT
Start: 2024-06-25 | End: 2024-06-26

## 2024-06-25 RX ORDER — ATORVASTATIN CALCIUM 10 MG/1
20 TABLET, FILM COATED ORAL NIGHTLY
Status: DISCONTINUED | OUTPATIENT
Start: 2024-06-26 | End: 2024-07-01 | Stop reason: HOSPADM

## 2024-06-25 RX ORDER — SODIUM CHLORIDE 0.9 % (FLUSH) 0.9 %
30 SYRINGE (ML) INJECTION ONCE AS NEEDED
Status: DISCONTINUED | OUTPATIENT
Start: 2024-06-25 | End: 2024-06-25

## 2024-06-25 RX ORDER — MIDAZOLAM HYDROCHLORIDE 1 MG/ML
0.5 INJECTION INTRAMUSCULAR; INTRAVENOUS
Status: DISCONTINUED | OUTPATIENT
Start: 2024-06-25 | End: 2024-06-25

## 2024-06-25 RX ORDER — SODIUM CHLORIDE 0.9 % (FLUSH) 0.9 %
3 SYRINGE (ML) INJECTION EVERY 12 HOURS SCHEDULED
Status: DISCONTINUED | OUTPATIENT
Start: 2024-06-25 | End: 2024-06-25

## 2024-06-25 RX ORDER — ACETAMINOPHEN 10 MG/ML
1000 INJECTION, SOLUTION INTRAVENOUS EVERY 8 HOURS
Qty: 300 ML | Refills: 0 | Status: COMPLETED | OUTPATIENT
Start: 2024-06-25 | End: 2024-06-26

## 2024-06-25 RX ORDER — SODIUM CHLORIDE 9 MG/ML
40 INJECTION, SOLUTION INTRAVENOUS AS NEEDED
Status: DISCONTINUED | OUTPATIENT
Start: 2024-06-25 | End: 2024-06-25

## 2024-06-25 RX ORDER — ROCURONIUM BROMIDE 10 MG/ML
INJECTION, SOLUTION INTRAVENOUS AS NEEDED
Status: DISCONTINUED | OUTPATIENT
Start: 2024-06-25 | End: 2024-06-25 | Stop reason: SURG

## 2024-06-25 RX ORDER — SUFENTANIL CITRATE 50 UG/ML
INJECTION EPIDURAL; INTRAVENOUS AS NEEDED
Status: DISCONTINUED | OUTPATIENT
Start: 2024-06-25 | End: 2024-06-25 | Stop reason: SURG

## 2024-06-25 RX ORDER — IBUPROFEN 600 MG/1
1 TABLET ORAL
Status: DISCONTINUED | OUTPATIENT
Start: 2024-06-25 | End: 2024-06-26

## 2024-06-25 RX ORDER — CHLORHEXIDINE GLUCONATE 500 MG/1
CLOTH TOPICAL EVERY 12 HOURS PRN
Status: DISCONTINUED | OUTPATIENT
Start: 2024-06-25 | End: 2024-06-25

## 2024-06-25 RX ORDER — PREGABALIN 25 MG/1
25 CAPSULE ORAL EVERY 12 HOURS
Status: DISCONTINUED | OUTPATIENT
Start: 2024-06-26 | End: 2024-07-01 | Stop reason: HOSPADM

## 2024-06-25 RX ORDER — OXYCODONE HYDROCHLORIDE 5 MG/1
10 TABLET ORAL
Status: DISCONTINUED | OUTPATIENT
Start: 2024-06-25 | End: 2024-07-01 | Stop reason: HOSPADM

## 2024-06-25 RX ORDER — ASPIRIN 81 MG/1
81 TABLET ORAL DAILY
Status: DISCONTINUED | OUTPATIENT
Start: 2024-06-27 | End: 2024-07-01 | Stop reason: HOSPADM

## 2024-06-25 RX ORDER — ACETAMINOPHEN 500 MG
1000 TABLET ORAL EVERY 6 HOURS
Status: DISCONTINUED | OUTPATIENT
Start: 2024-06-26 | End: 2024-07-01 | Stop reason: HOSPADM

## 2024-06-25 RX ORDER — SODIUM CHLORIDE 0.9 % (FLUSH) 0.9 %
3 SYRINGE (ML) INJECTION AS NEEDED
Status: DISCONTINUED | OUTPATIENT
Start: 2024-06-25 | End: 2024-06-25

## 2024-06-25 RX ORDER — METOPROLOL TARTRATE 1 MG/ML
INJECTION, SOLUTION INTRAVENOUS AS NEEDED
Status: DISCONTINUED | OUTPATIENT
Start: 2024-06-25 | End: 2024-06-25 | Stop reason: SURG

## 2024-06-25 RX ORDER — DEXMEDETOMIDINE HYDROCHLORIDE 4 UG/ML
INJECTION, SOLUTION INTRAVENOUS CONTINUOUS PRN
Status: DISCONTINUED | OUTPATIENT
Start: 2024-06-25 | End: 2024-06-25 | Stop reason: SURG

## 2024-06-25 RX ORDER — POTASSIUM CHLORIDE, DEXTROSE MONOHYDRATE 150; 5 MG/100ML; G/100ML
30 INJECTION, SOLUTION INTRAVENOUS CONTINUOUS
Status: DISCONTINUED | OUTPATIENT
Start: 2024-06-25 | End: 2024-06-29

## 2024-06-25 RX ORDER — NALOXONE HCL 0.4 MG/ML
0.4 VIAL (ML) INJECTION
Status: DISCONTINUED | OUTPATIENT
Start: 2024-06-25 | End: 2024-06-26

## 2024-06-25 RX ORDER — NICARDIPINE HYDROCHLORIDE 2.5 MG/ML
INJECTION INTRAVENOUS AS NEEDED
Status: DISCONTINUED | OUTPATIENT
Start: 2024-06-25 | End: 2024-06-25 | Stop reason: SURG

## 2024-06-25 RX ORDER — SODIUM CHLORIDE, SODIUM LACTATE, POTASSIUM CHLORIDE, CALCIUM CHLORIDE 600; 310; 30; 20 MG/100ML; MG/100ML; MG/100ML; MG/100ML
100 INJECTION, SOLUTION INTRAVENOUS CONTINUOUS
Status: DISCONTINUED | OUTPATIENT
Start: 2024-06-25 | End: 2024-06-25

## 2024-06-25 RX ORDER — CLOPIDOGREL BISULFATE 75 MG/1
75 TABLET ORAL DAILY
Status: DISCONTINUED | OUTPATIENT
Start: 2024-06-26 | End: 2024-07-01 | Stop reason: HOSPADM

## 2024-06-25 RX ORDER — IBUPROFEN 600 MG/1
1 TABLET ORAL
Status: DISCONTINUED | OUTPATIENT
Start: 2024-06-25 | End: 2024-06-25

## 2024-06-25 RX ORDER — MEPERIDINE HYDROCHLORIDE 50 MG/ML
25 INJECTION INTRAMUSCULAR; INTRAVENOUS; SUBCUTANEOUS
Status: DISCONTINUED | OUTPATIENT
Start: 2024-06-25 | End: 2024-07-01

## 2024-06-25 RX ORDER — IPRATROPIUM BROMIDE AND ALBUTEROL SULFATE 2.5; .5 MG/3ML; MG/3ML
1.5 SOLUTION RESPIRATORY (INHALATION)
Status: DISCONTINUED | OUTPATIENT
Start: 2024-06-25 | End: 2024-06-25 | Stop reason: SDUPTHER

## 2024-06-25 RX ORDER — AMIODARONE HYDROCHLORIDE 200 MG/1
400 TABLET ORAL 2 TIMES DAILY WITH MEALS
Status: DISCONTINUED | OUTPATIENT
Start: 2024-06-26 | End: 2024-07-01 | Stop reason: HOSPADM

## 2024-06-25 RX ORDER — NICOTINE POLACRILEX 4 MG
15 LOZENGE BUCCAL
Status: DISCONTINUED | OUTPATIENT
Start: 2024-06-25 | End: 2024-06-26

## 2024-06-25 RX ORDER — NICOTINE POLACRILEX 4 MG
15 LOZENGE BUCCAL
Status: DISCONTINUED | OUTPATIENT
Start: 2024-06-25 | End: 2024-06-25

## 2024-06-25 RX ORDER — ACETAMINOPHEN 650 MG/1
650 SUPPOSITORY RECTAL EVERY 6 HOURS
Status: DISCONTINUED | OUTPATIENT
Start: 2024-06-26 | End: 2024-06-29

## 2024-06-25 RX ORDER — ACETAMINOPHEN 500 MG
1000 TABLET ORAL ONCE
Status: COMPLETED | OUTPATIENT
Start: 2024-06-25 | End: 2024-06-25

## 2024-06-25 RX ORDER — CHLORHEXIDINE GLUCONATE ORAL RINSE 1.2 MG/ML
15 SOLUTION DENTAL EVERY 12 HOURS
Status: DISCONTINUED | OUTPATIENT
Start: 2024-06-25 | End: 2024-06-25 | Stop reason: SDUPTHER

## 2024-06-25 RX ORDER — PANTOPRAZOLE SODIUM 40 MG/10ML
40 INJECTION, POWDER, LYOPHILIZED, FOR SOLUTION INTRAVENOUS ONCE
Qty: 10 ML | Refills: 0 | Status: COMPLETED | OUTPATIENT
Start: 2024-06-25 | End: 2024-06-25

## 2024-06-25 RX ORDER — ACETAMINOPHEN 160 MG/5ML
1000 SOLUTION ORAL EVERY 6 HOURS
Status: DISCONTINUED | OUTPATIENT
Start: 2024-06-26 | End: 2024-06-29

## 2024-06-25 RX ORDER — LIDOCAINE HYDROCHLORIDE 10 MG/ML
0.5 INJECTION, SOLUTION EPIDURAL; INFILTRATION; INTRACAUDAL; PERINEURAL ONCE AS NEEDED
Status: DISCONTINUED | OUTPATIENT
Start: 2024-06-25 | End: 2024-06-25

## 2024-06-25 RX ORDER — DEXMEDETOMIDINE HYDROCHLORIDE 4 UG/ML
.2-1.5 INJECTION, SOLUTION INTRAVENOUS
Status: DISCONTINUED | OUTPATIENT
Start: 2024-06-25 | End: 2024-06-26

## 2024-06-25 RX ORDER — DEXTROSE MONOHYDRATE 25 G/50ML
10-50 INJECTION, SOLUTION INTRAVENOUS
Status: DISCONTINUED | OUTPATIENT
Start: 2024-06-25 | End: 2024-06-26

## 2024-06-25 RX ORDER — MIDAZOLAM HYDROCHLORIDE 1 MG/ML
1 INJECTION INTRAMUSCULAR; INTRAVENOUS ONCE
Status: COMPLETED | OUTPATIENT
Start: 2024-06-25 | End: 2024-06-25

## 2024-06-25 RX ORDER — OXYCODONE HYDROCHLORIDE 5 MG/1
5 TABLET ORAL
Status: DISCONTINUED | OUTPATIENT
Start: 2024-06-25 | End: 2024-07-01 | Stop reason: HOSPADM

## 2024-06-25 RX ORDER — POLYETHYLENE GLYCOL 3350 17 G/17G
17 POWDER, FOR SOLUTION ORAL DAILY
Status: DISCONTINUED | OUTPATIENT
Start: 2024-06-26 | End: 2024-07-01 | Stop reason: HOSPADM

## 2024-06-25 RX ORDER — CALCIUM CHLORIDE 100 MG/ML
INJECTION INTRAVENOUS; INTRAVENTRICULAR AS NEEDED
Status: DISCONTINUED | OUTPATIENT
Start: 2024-06-25 | End: 2024-06-25 | Stop reason: SURG

## 2024-06-25 RX ORDER — ALBUTEROL SULFATE 2.5 MG/3ML
2.5 SOLUTION RESPIRATORY (INHALATION) EVERY 4 HOURS PRN
Status: ACTIVE | OUTPATIENT
Start: 2024-06-25 | End: 2024-06-26

## 2024-06-25 RX ORDER — SODIUM CHLORIDE 9 MG/ML
INJECTION, SOLUTION INTRAVENOUS CONTINUOUS PRN
Status: DISCONTINUED | OUTPATIENT
Start: 2024-06-25 | End: 2024-06-25 | Stop reason: SURG

## 2024-06-25 RX ORDER — ASPIRIN 81 MG/1
162 TABLET, CHEWABLE ORAL ONCE
Status: COMPLETED | OUTPATIENT
Start: 2024-06-26 | End: 2024-06-26

## 2024-06-25 RX ORDER — FENTANYL CITRATE 50 UG/ML
INJECTION, SOLUTION INTRAMUSCULAR; INTRAVENOUS AS NEEDED
Status: DISCONTINUED | OUTPATIENT
Start: 2024-06-25 | End: 2024-06-25 | Stop reason: SURG

## 2024-06-25 RX ORDER — PREGABALIN 25 MG/1
25 CAPSULE ORAL ONCE
Status: COMPLETED | OUTPATIENT
Start: 2024-06-25 | End: 2024-06-25

## 2024-06-25 RX ORDER — VECURONIUM BROMIDE 1 MG/ML
INJECTION, POWDER, LYOPHILIZED, FOR SOLUTION INTRAVENOUS AS NEEDED
Status: DISCONTINUED | OUTPATIENT
Start: 2024-06-25 | End: 2024-06-25 | Stop reason: SURG

## 2024-06-25 RX ORDER — SODIUM CHLORIDE 9 MG/ML
30 INJECTION, SOLUTION INTRAVENOUS CONTINUOUS PRN
Status: DISCONTINUED | OUTPATIENT
Start: 2024-06-25 | End: 2024-06-25

## 2024-06-25 RX ORDER — HEPARIN SODIUM 1000 [USP'U]/ML
INJECTION, SOLUTION INTRAVENOUS; SUBCUTANEOUS AS NEEDED
Status: DISCONTINUED | OUTPATIENT
Start: 2024-06-25 | End: 2024-06-25 | Stop reason: SURG

## 2024-06-25 RX ORDER — PHENYLEPHRINE HCL IN 0.9% NACL 50MG/250ML
.5-3 PLASTIC BAG, INJECTION (ML) INTRAVENOUS CONTINUOUS PRN
Status: DISCONTINUED | OUTPATIENT
Start: 2024-06-25 | End: 2024-06-29

## 2024-06-25 RX ORDER — MAGNESIUM HYDROXIDE 1200 MG/15ML
LIQUID ORAL AS NEEDED
Status: DISCONTINUED | OUTPATIENT
Start: 2024-06-25 | End: 2024-06-25 | Stop reason: HOSPADM

## 2024-06-25 RX ORDER — ENOXAPARIN SODIUM 100 MG/ML
40 INJECTION SUBCUTANEOUS DAILY
Status: DISCONTINUED | OUTPATIENT
Start: 2024-06-26 | End: 2024-07-01 | Stop reason: HOSPADM

## 2024-06-25 RX ORDER — FENTANYL CITRATE 50 UG/ML
25 INJECTION, SOLUTION INTRAMUSCULAR; INTRAVENOUS
Status: DISCONTINUED | OUTPATIENT
Start: 2024-06-25 | End: 2024-06-26

## 2024-06-25 RX ADMIN — ROCURONIUM 100 MG: 50 INJECTION, SOLUTION INTRAVENOUS at 13:00

## 2024-06-25 RX ADMIN — VECURONIUM BROMIDE 2 MG: 1 INJECTION, POWDER, LYOPHILIZED, FOR SOLUTION INTRAVENOUS at 17:15

## 2024-06-25 RX ADMIN — VASOPRESSIN 0.04 UNITS/MIN: 20 INJECTION, SOLUTION INTRAVENOUS at 19:08

## 2024-06-25 RX ADMIN — ACETAMINOPHEN 1000 MG: 10 INJECTION, SOLUTION INTRAVENOUS at 20:53

## 2024-06-25 RX ADMIN — Medication 100 MCG: at 15:13

## 2024-06-25 RX ADMIN — POTASSIUM CHLORIDE AND DEXTROSE MONOHYDRATE 30 ML/HR: 150; 5 INJECTION, SOLUTION INTRAVENOUS at 20:25

## 2024-06-25 RX ADMIN — MIDAZOLAM HYDROCHLORIDE 2 MG: 1 INJECTION, SOLUTION INTRAMUSCULAR; INTRAVENOUS at 12:59

## 2024-06-25 RX ADMIN — NICARDIPINE HYDROCHLORIDE 500 MCG: 2.5 INJECTION INTRAVENOUS at 18:30

## 2024-06-25 RX ADMIN — MIDAZOLAM 2 MG: 1 INJECTION INTRAMUSCULAR; INTRAVENOUS at 15:58

## 2024-06-25 RX ADMIN — Medication 100 MCG: at 15:06

## 2024-06-25 RX ADMIN — FENTANYL CITRATE 150 MCG: 50 INJECTION, SOLUTION INTRAMUSCULAR; INTRAVENOUS at 15:57

## 2024-06-25 RX ADMIN — Medication 1 APPLICATION: at 20:53

## 2024-06-25 RX ADMIN — AMINOCAPROIC ACID 5 G: 250 INJECTION, SOLUTION INTRAVENOUS at 13:38

## 2024-06-25 RX ADMIN — DEXMEDETOMIDINE HYDROCHLORIDE 1.2 MCG/KG/HR: 4 INJECTION, SOLUTION INTRAVENOUS at 21:54

## 2024-06-25 RX ADMIN — Medication 100 MCG: at 12:59

## 2024-06-25 RX ADMIN — MIDAZOLAM 1 MG: 1 INJECTION INTRAMUSCULAR; INTRAVENOUS at 11:18

## 2024-06-25 RX ADMIN — MIDAZOLAM 2 MG: 1 INJECTION INTRAMUSCULAR; INTRAVENOUS at 17:56

## 2024-06-25 RX ADMIN — ACETAMINOPHEN 1000 MG: 500 TABLET, FILM COATED ORAL at 11:02

## 2024-06-25 RX ADMIN — CALCIUM CHLORIDE 0.5 G: 100 INJECTION INTRAVENOUS; INTRAVENTRICULAR at 19:40

## 2024-06-25 RX ADMIN — SUFENTANIL CITRATE 50 MCG: 50 INJECTION, SOLUTION EPIDURAL; INTRAVENOUS at 14:17

## 2024-06-25 RX ADMIN — LIDOCAINE HYDROCHLORIDE 100 MG: 20 INJECTION, SOLUTION EPIDURAL; INFILTRATION; INTRACAUDAL; PERINEURAL at 12:59

## 2024-06-25 RX ADMIN — Medication 100 MCG: at 15:08

## 2024-06-25 RX ADMIN — FENTANYL CITRATE 100 MCG: 50 INJECTION, SOLUTION INTRAMUSCULAR; INTRAVENOUS at 18:35

## 2024-06-25 RX ADMIN — CEFAZOLIN 2000 MG: 2 INJECTION, POWDER, FOR SOLUTION INTRAMUSCULAR; INTRAVENOUS at 14:14

## 2024-06-25 RX ADMIN — Medication 1250 MG: at 13:55

## 2024-06-25 RX ADMIN — CHLORHEXIDINE GLUCONATE 0.12% ORAL RINSE 15 ML: 1.2 LIQUID ORAL at 20:53

## 2024-06-25 RX ADMIN — CHLORHEXIDINE GLUCONATE: 500 CLOTH TOPICAL at 11:15

## 2024-06-25 RX ADMIN — SODIUM CHLORIDE 500 ML: 900 INJECTION, SOLUTION INTRAVENOUS at 23:20

## 2024-06-25 RX ADMIN — PREGABALIN 25 MG: 25 CAPSULE ORAL at 11:02

## 2024-06-25 RX ADMIN — Medication 100 MCG: at 15:28

## 2024-06-25 RX ADMIN — HEPARIN SODIUM 3000 UNITS: 1000 INJECTION, SOLUTION INTRAVENOUS; SUBCUTANEOUS at 13:55

## 2024-06-25 RX ADMIN — CEFAZOLIN 2000 MG: 2 INJECTION, POWDER, FOR SOLUTION INTRAMUSCULAR; INTRAVENOUS at 18:14

## 2024-06-25 RX ADMIN — PHENYLEPHRINE HYDROCHLORIDE 0.2 MCG/KG/MIN: 10 INJECTION INTRAVENOUS at 13:38

## 2024-06-25 RX ADMIN — SODIUM CHLORIDE, POTASSIUM CHLORIDE, SODIUM LACTATE AND CALCIUM CHLORIDE 1000 ML: 600; 310; 30; 20 INJECTION, SOLUTION INTRAVENOUS at 10:57

## 2024-06-25 RX ADMIN — Medication 100 MCG: at 15:25

## 2024-06-25 RX ADMIN — SUGAMMADEX 200 MG: 100 INJECTION, SOLUTION INTRAVENOUS at 19:46

## 2024-06-25 RX ADMIN — MIDAZOLAM HYDROCHLORIDE 3 MG: 1 INJECTION, SOLUTION INTRAMUSCULAR; INTRAVENOUS at 12:56

## 2024-06-25 RX ADMIN — FENTANYL CITRATE 250 MCG: 50 INJECTION, SOLUTION INTRAMUSCULAR; INTRAVENOUS at 14:59

## 2024-06-25 RX ADMIN — FENTANYL CITRATE 150 MCG: 50 INJECTION, SOLUTION INTRAMUSCULAR; INTRAVENOUS at 16:48

## 2024-06-25 RX ADMIN — Medication 200 MCG: at 15:32

## 2024-06-25 RX ADMIN — NITROGLYCERIN 5 MCG/MIN: 20 INJECTION INTRAVENOUS at 20:24

## 2024-06-25 RX ADMIN — SUFENTANIL CITRATE 50 MCG: 50 INJECTION, SOLUTION EPIDURAL; INTRAVENOUS at 12:59

## 2024-06-25 RX ADMIN — DEXMEDETOMIDINE HYDROCHLORIDE 0.6 MCG/KG/HR: 4 INJECTION, SOLUTION INTRAVENOUS at 18:30

## 2024-06-25 RX ADMIN — IPRATROPIUM BROMIDE 0.5 MG: 0.5 SOLUTION RESPIRATORY (INHALATION) at 20:17

## 2024-06-25 RX ADMIN — PANTOPRAZOLE SODIUM 40 MG: 40 INJECTION, POWDER, FOR SOLUTION INTRAVENOUS at 20:53

## 2024-06-25 RX ADMIN — HEPARIN SODIUM 30000 UNITS: 1000 INJECTION, SOLUTION INTRAVENOUS; SUBCUTANEOUS at 15:01

## 2024-06-25 RX ADMIN — METOPROLOL TARTRATE 1 MG: 5 INJECTION INTRAVENOUS at 13:39

## 2024-06-25 RX ADMIN — FENTANYL CITRATE 200 MCG: 50 INJECTION, SOLUTION INTRAMUSCULAR; INTRAVENOUS at 18:45

## 2024-06-25 RX ADMIN — PROTAMINE SULFATE 150 MG: 10 INJECTION, SOLUTION INTRAVENOUS at 18:26

## 2024-06-25 RX ADMIN — SODIUM CHLORIDE: 9 INJECTION, SOLUTION INTRAVENOUS at 13:10

## 2024-06-25 RX ADMIN — SODIUM CHLORIDE 2 UNITS/HR: 9 INJECTION, SOLUTION INTRAVENOUS at 17:36

## 2024-06-25 RX ADMIN — LIDOCAINE 2 PATCH: 4 PATCH TOPICAL at 20:24

## 2024-06-25 RX ADMIN — VECURONIUM BROMIDE 2 MG: 1 INJECTION, POWDER, LYOPHILIZED, FOR SOLUTION INTRAVENOUS at 15:43

## 2024-06-25 RX ADMIN — VECURONIUM BROMIDE 3 MG: 1 INJECTION, POWDER, LYOPHILIZED, FOR SOLUTION INTRAVENOUS at 18:38

## 2024-06-25 RX ADMIN — SODIUM CHLORIDE, POTASSIUM CHLORIDE, SODIUM LACTATE AND CALCIUM CHLORIDE 1000 ML: 600; 310; 30; 20 INJECTION, SOLUTION INTRAVENOUS at 20:27

## 2024-06-25 RX ADMIN — FENTANYL CITRATE 150 MCG: 50 INJECTION, SOLUTION INTRAMUSCULAR; INTRAVENOUS at 17:36

## 2024-06-25 RX ADMIN — VECURONIUM BROMIDE 3 MG: 1 INJECTION, POWDER, LYOPHILIZED, FOR SOLUTION INTRAVENOUS at 14:18

## 2024-06-25 RX ADMIN — Medication 1 APPLICATION: at 11:17

## 2024-06-25 RX ADMIN — SODIUM CHLORIDE, POTASSIUM CHLORIDE, SODIUM LACTATE AND CALCIUM CHLORIDE 1000 ML: 600; 310; 30; 20 INJECTION, SOLUTION INTRAVENOUS at 11:06

## 2024-06-25 RX ADMIN — ALBUTEROL SULFATE 1.25 MG: 2.5 SOLUTION RESPIRATORY (INHALATION) at 20:13

## 2024-06-25 NOTE — ANESTHESIA PROCEDURE NOTES
Arterial Line    Pre-sedation assessment completed: 6/25/2024 11:23 AM    Patient reassessed immediately prior to procedure    Patient location during procedure: pre-op  Start time: 6/25/2024 11:23 AM  Stop Time:6/25/2024 11:23 AM       Line placed for hemodynamic monitoring.  Performed By   Anesthesiologist: Jimmy Montero MD   Preanesthetic Checklist  Completed: patient identified, IV checked, site marked, risks and benefits discussed, surgical consent, monitors and equipment checked, pre-op evaluation and timeout performed  Arterial Line Prep    Sterile Tech: cap, gloves and mask  Prep: ChloraPrep  Patient monitoring: blood pressure monitoring, continuous pulse oximetry and EKG  Arterial Line Procedure   Laterality:left  Location:  radial artery  Catheter size: 20 G   Guidance: landmark technique and palpation technique  Number of attempts: 1  Successful placement: yes   Post Assessment   Dressing Type: occlusive dressing applied, secured with tape and wrist guard applied.   Complications no  Circ/Move/Sens Assessment: normal.   Patient Tolerance: patient tolerated the procedure well with no apparent complications  Additional Notes  Wire intact. Calibrated/Connections checked, line flushed.  Appropriate waveform. Clear occlusive opsite applied over catheter insertion site. Pt remained hemodynamically stable throughout procedure.

## 2024-06-25 NOTE — ANESTHESIA PROCEDURE NOTES
Airway  Urgency: elective    Date/Time: 6/25/2024 1:01 PM  Airway not difficult    General Information and Staff    Patient location during procedure: OR  CRNA/CAA: Dave Vivas CRNA    Indications and Patient Condition  Indications for airway management: airway protection    Preoxygenated: yes  Mask difficulty assessment: 1 - vent by mask    Final Airway Details  Final airway type: endotracheal airway      Successful airway: ETT  Cuffed: yes   Successful intubation technique: direct laryngoscopy  Facilitating devices/methods: intubating stylet  Endotracheal tube insertion site: oral  Blade: Bocanegra  Blade size: 2  ETT size (mm): 8.0  Cormack-Lehane Classification: grade I - full view of glottis  Placement verified by: capnometry   Cuff volume (mL): 9  Measured from: lips  ETT/EBT  to lips (cm): 22  Number of attempts at approach: 1  Assessment: lips, teeth, and gum same as pre-op and atraumatic intubation

## 2024-06-25 NOTE — ANESTHESIA PROCEDURE NOTES
Intra-Op Anesthesia MICAELA    Procedure Performed: Intra-Op Anesthesia MICAELA       Start Time:  6/25/2024 1:15 PM       End Time:   6/25/2024 1:25 PM    Preanesthesia Checklist:  Patient identified, IV assessed, risks and benefits discussed, monitors and equipment assessed, procedure being performed at surgeon's request and anesthesia consent obtained.    General Procedure Information  MICAELA Placed for monitoring purposes only -- This is not a diagnostic MICAELA

## 2024-06-25 NOTE — ANESTHESIA PROCEDURE NOTES
Central Line    Pre-sedation assessment completed: 6/25/2024 1:02 PM    Patient reassessed immediately prior to procedure    Patient location during procedure: OR  Start time: 6/25/2024 1:02 PM  Stop Time:6/25/2024 1:12 PM  Indications: vascular access  Staff  Anesthesiologist: Jimmy Montero MD  Preanesthetic Checklist  Completed: patient identified, IV checked, site marked, risks and benefits discussed, surgical consent, monitors and equipment checked, pre-op evaluation and timeout performed  Central Line Prep  Sterile Tech:cap, gloves, mask, sterile barriers and gown  Prep: chloraprep  no medical exclusion documented for following all elements of maximal sterile barrier technique  Patient monitoring: blood pressure monitoring, continuous pulse oximetry and EKG  Central Line Procedure  Laterality:right  Location:internal jugular  Catheter Type:MAC and Duncan-Sanjay  Catheter Size:9 Fr  Guidance:ultrasound guided  PROCEDURE NOTE/ULTRASOUND INTERPRETATION.  Using ultrasound guidance the potential vascular sites for insertion of the catheter were visualized to determine the patency of the vessel to be used for vascular access.  After selecting the appropriate site for insertion, the needle was visualized under ultrasound being inserted into the internal jugular vein, followed by ultrasound confirmation of wire and catheter placement. There were no abnormalities seen on ultrasound; an image was taken; and the patient tolerated the procedure with no complications. Images: still images not obtained  Assessment  Post procedure:occlusive dressing applied, line sutured and biopatch applied  Assessement:blood return through all ports, free fluid flow, chest x-ray ordered and Quan Test  Complications:no  Patient Tolerance:patient tolerated the procedure well with no apparent complications  Additional Notes  Pcw 52

## 2024-06-26 ENCOUNTER — APPOINTMENT (OUTPATIENT)
Dept: GENERAL RADIOLOGY | Facility: HOSPITAL | Age: 84
DRG: 236 | End: 2024-06-26
Payer: MEDICARE

## 2024-06-26 LAB
ALBUMIN SERPL-MCNC: 3.3 G/DL (ref 3.5–5.2)
ALBUMIN SERPL-MCNC: 3.4 G/DL (ref 3.5–5.2)
ANION GAP SERPL CALCULATED.3IONS-SCNC: 10 MMOL/L (ref 5–15)
ANION GAP SERPL CALCULATED.3IONS-SCNC: 13 MMOL/L (ref 5–15)
ANION GAP SERPL CALCULATED.3IONS-SCNC: 8 MMOL/L (ref 5–15)
ARTERIAL PATENCY WRIST A: ABNORMAL
ARTERIAL PATENCY WRIST A: ABNORMAL
ATMOSPHERIC PRESS: 747 MMHG
ATMOSPHERIC PRESS: 750 MMHG
BASE EXCESS BLDA CALC-SCNC: -1.5 MMOL/L (ref 0–2)
BASE EXCESS BLDA CALC-SCNC: -3.1 MMOL/L (ref 0–2)
BASOPHILS # BLD AUTO: 0.02 10*3/MM3 (ref 0–0.2)
BASOPHILS # BLD AUTO: 0.03 10*3/MM3 (ref 0–0.2)
BASOPHILS NFR BLD AUTO: 0.2 % (ref 0–1.5)
BASOPHILS NFR BLD AUTO: 0.2 % (ref 0–1.5)
BDY SITE: ABNORMAL
BDY SITE: ABNORMAL
BODY TEMPERATURE: 37
BODY TEMPERATURE: 37
BUN SERPL-MCNC: 28 MG/DL (ref 8–23)
BUN/CREAT SERPL: 17.5 (ref 7–25)
BUN/CREAT SERPL: 18.8 (ref 7–25)
BUN/CREAT SERPL: 19.3 (ref 7–25)
CA-I BLD-MCNC: 4.69 MG/DL (ref 4.6–5.4)
CA-I BLD-MCNC: 4.74 MG/DL (ref 4.6–5.4)
CALCIUM SPEC-SCNC: 8.7 MG/DL (ref 8.6–10.5)
CALCIUM SPEC-SCNC: 8.8 MG/DL (ref 8.6–10.5)
CALCIUM SPEC-SCNC: 9.3 MG/DL (ref 8.6–10.5)
CHLORIDE SERPL-SCNC: 105 MMOL/L (ref 98–107)
CHLORIDE SERPL-SCNC: 109 MMOL/L (ref 98–107)
CHLORIDE SERPL-SCNC: 109 MMOL/L (ref 98–107)
CO2 SERPL-SCNC: 20 MMOL/L (ref 22–29)
CO2 SERPL-SCNC: 23 MMOL/L (ref 22–29)
CO2 SERPL-SCNC: 25 MMOL/L (ref 22–29)
COHGB MFR BLD: 1.1 % (ref 0–5)
CREAT SERPL-MCNC: 1.45 MG/DL (ref 0.76–1.27)
CREAT SERPL-MCNC: 1.49 MG/DL (ref 0.76–1.27)
CREAT SERPL-MCNC: 1.6 MG/DL (ref 0.76–1.27)
DEPRECATED RDW RBC AUTO: 53.7 FL (ref 37–54)
DEPRECATED RDW RBC AUTO: 54 FL (ref 37–54)
DEPRECATED RDW RBC AUTO: 57.7 FL (ref 37–54)
EGFRCR SERPLBLD CKD-EPI 2021: 42.2 ML/MIN/1.73
EGFRCR SERPLBLD CKD-EPI 2021: 46 ML/MIN/1.73
EGFRCR SERPLBLD CKD-EPI 2021: 47.5 ML/MIN/1.73
EOSINOPHIL # BLD AUTO: 0.02 10*3/MM3 (ref 0–0.4)
EOSINOPHIL # BLD AUTO: 0.08 10*3/MM3 (ref 0–0.4)
EOSINOPHIL NFR BLD AUTO: 0.2 % (ref 0.3–6.2)
EOSINOPHIL NFR BLD AUTO: 0.6 % (ref 0.3–6.2)
ERYTHROCYTE [DISTWIDTH] IN BLOOD BY AUTOMATED COUNT: 15.4 % (ref 12.3–15.4)
ERYTHROCYTE [DISTWIDTH] IN BLOOD BY AUTOMATED COUNT: 15.7 % (ref 12.3–15.4)
ERYTHROCYTE [DISTWIDTH] IN BLOOD BY AUTOMATED COUNT: 16.6 % (ref 12.3–15.4)
GAS FLOW AIRWAY: 2 LPM
GLUCOSE BLDC GLUCOMTR-MCNC: 134 MG/DL (ref 70–130)
GLUCOSE BLDC GLUCOMTR-MCNC: 147 MG/DL (ref 70–130)
GLUCOSE BLDC GLUCOMTR-MCNC: 149 MG/DL (ref 70–130)
GLUCOSE BLDC GLUCOMTR-MCNC: 153 MG/DL (ref 70–130)
GLUCOSE BLDC GLUCOMTR-MCNC: 155 MG/DL (ref 70–130)
GLUCOSE BLDC GLUCOMTR-MCNC: 161 MG/DL (ref 70–130)
GLUCOSE BLDC GLUCOMTR-MCNC: 162 MG/DL (ref 70–130)
GLUCOSE BLDC GLUCOMTR-MCNC: 166 MG/DL (ref 70–130)
GLUCOSE BLDC GLUCOMTR-MCNC: 171 MG/DL (ref 70–130)
GLUCOSE BLDC GLUCOMTR-MCNC: 179 MG/DL (ref 70–130)
GLUCOSE BLDC GLUCOMTR-MCNC: 181 MG/DL (ref 70–130)
GLUCOSE BLDC GLUCOMTR-MCNC: 191 MG/DL (ref 70–130)
GLUCOSE BLDC GLUCOMTR-MCNC: 201 MG/DL (ref 70–130)
GLUCOSE SERPL-MCNC: 124 MG/DL (ref 65–99)
GLUCOSE SERPL-MCNC: 159 MG/DL (ref 65–99)
GLUCOSE SERPL-MCNC: 163 MG/DL (ref 65–99)
HCO3 BLDA-SCNC: 22.3 MMOL/L (ref 20–26)
HCO3 BLDA-SCNC: 23.8 MMOL/L (ref 20–26)
HCT VFR BLD AUTO: 27.1 % (ref 37.5–51)
HCT VFR BLD AUTO: 27.2 % (ref 37.5–51)
HCT VFR BLD AUTO: 28.1 % (ref 37.5–51)
HCT VFR BLD CALC: 26.7 % (ref 38–51)
HGB BLD-MCNC: 8.9 G/DL (ref 13–17.7)
HGB BLD-MCNC: 9.1 G/DL (ref 13–17.7)
HGB BLD-MCNC: 9.1 G/DL (ref 13–17.7)
HGB BLDA-MCNC: 8.7 G/DL (ref 14–18)
IMM GRANULOCYTES # BLD AUTO: 0.04 10*3/MM3 (ref 0–0.05)
IMM GRANULOCYTES # BLD AUTO: 0.05 10*3/MM3 (ref 0–0.05)
IMM GRANULOCYTES NFR BLD AUTO: 0.4 % (ref 0–0.5)
IMM GRANULOCYTES NFR BLD AUTO: 0.4 % (ref 0–0.5)
INHALED O2 CONCENTRATION: 30 %
LYMPHOCYTES # BLD AUTO: 0.38 10*3/MM3 (ref 0.7–3.1)
LYMPHOCYTES # BLD AUTO: 0.55 10*3/MM3 (ref 0.7–3.1)
LYMPHOCYTES NFR BLD AUTO: 3 % (ref 19.6–45.3)
LYMPHOCYTES NFR BLD AUTO: 5.1 % (ref 19.6–45.3)
Lab: ABNORMAL
Lab: ABNORMAL
Lab: NORMAL
MAGNESIUM SERPL-MCNC: 2 MG/DL (ref 1.6–2.4)
MCH RBC QN AUTO: 30.8 PG (ref 26.6–33)
MCH RBC QN AUTO: 31.3 PG (ref 26.6–33)
MCH RBC QN AUTO: 32.2 PG (ref 26.6–33)
MCHC RBC AUTO-ENTMCNC: 32.4 G/DL (ref 31.5–35.7)
MCHC RBC AUTO-ENTMCNC: 32.7 G/DL (ref 31.5–35.7)
MCHC RBC AUTO-ENTMCNC: 33.6 G/DL (ref 31.5–35.7)
MCV RBC AUTO: 95.3 FL (ref 79–97)
MCV RBC AUTO: 95.8 FL (ref 79–97)
MCV RBC AUTO: 95.8 FL (ref 79–97)
METHGB BLD QL: 0.7 % (ref 0–3)
MODALITY: ABNORMAL
MODALITY: ABNORMAL
MONOCYTES # BLD AUTO: 0.62 10*3/MM3 (ref 0.1–0.9)
MONOCYTES # BLD AUTO: 0.84 10*3/MM3 (ref 0.1–0.9)
MONOCYTES NFR BLD AUTO: 5.8 % (ref 5–12)
MONOCYTES NFR BLD AUTO: 6.6 % (ref 5–12)
NEUTROPHILS NFR BLD AUTO: 11.38 10*3/MM3 (ref 1.7–7)
NEUTROPHILS NFR BLD AUTO: 88.3 % (ref 42.7–76)
NEUTROPHILS NFR BLD AUTO: 89.2 % (ref 42.7–76)
NEUTROPHILS NFR BLD AUTO: 9.47 10*3/MM3 (ref 1.7–7)
NRBC BLD AUTO-RTO: 0 /100 WBC (ref 0–0.2)
NRBC BLD AUTO-RTO: 0 /100 WBC (ref 0–0.2)
OXYHGB MFR BLDV: 91.5 % (ref 94–99)
PCO2 BLDA: 40.5 MM HG (ref 35–45)
PCO2 BLDA: 41.8 MM HG (ref 35–45)
PCO2 TEMP ADJ BLD: 40.5 MM HG (ref 35–45)
PCO2 TEMP ADJ BLD: 41.8 MM HG (ref 35–45)
PEEP RESPIRATORY: 5 CM[H2O]
PH BLDA: 7.35 PH UNITS (ref 7.35–7.45)
PH BLDA: 7.36 PH UNITS (ref 7.35–7.45)
PH, TEMP CORRECTED: 7.35 PH UNITS (ref 7.35–7.45)
PH, TEMP CORRECTED: 7.36 PH UNITS (ref 7.35–7.45)
PHOSPHATE SERPL-MCNC: 3.5 MG/DL (ref 2.5–4.5)
PHOSPHATE SERPL-MCNC: 4.3 MG/DL (ref 2.5–4.5)
PLATELET # BLD AUTO: 100 10*3/MM3 (ref 140–450)
PLATELET # BLD AUTO: 111 10*3/MM3 (ref 140–450)
PLATELET # BLD AUTO: 99 10*3/MM3 (ref 140–450)
PMV BLD AUTO: 10.3 FL (ref 6–12)
PMV BLD AUTO: 10.6 FL (ref 6–12)
PMV BLD AUTO: 9.7 FL (ref 6–12)
PO2 BLD: 247 MM[HG] (ref 0–500)
PO2 BLDA: 66.8 MM HG (ref 83–108)
PO2 BLDA: 74.1 MM HG (ref 83–108)
PO2 TEMP ADJ BLD: 66.8 MM HG (ref 83–108)
PO2 TEMP ADJ BLD: 74.1 MM HG (ref 83–108)
POTASSIUM BLDA-SCNC: 4.1 MMOL/L (ref 3.5–5.2)
POTASSIUM SERPL-SCNC: 4.1 MMOL/L (ref 3.5–5.2)
POTASSIUM SERPL-SCNC: 4.2 MMOL/L (ref 3.5–5.2)
POTASSIUM SERPL-SCNC: 4.6 MMOL/L (ref 3.5–5.2)
PSV: 10 CMH2O
RBC # BLD AUTO: 2.83 10*6/MM3 (ref 4.14–5.8)
RBC # BLD AUTO: 2.84 10*6/MM3 (ref 4.14–5.8)
RBC # BLD AUTO: 2.95 10*6/MM3 (ref 4.14–5.8)
SAO2 % BLDCOA: 93.2 % (ref 94–99)
SAO2 % BLDCOA: 95.3 % (ref 94–99)
SODIUM BLDA-SCNC: 140 MMOL/L (ref 136–145)
SODIUM SERPL-SCNC: 138 MMOL/L (ref 136–145)
SODIUM SERPL-SCNC: 142 MMOL/L (ref 136–145)
SODIUM SERPL-SCNC: 142 MMOL/L (ref 136–145)
VENTILATOR MODE: ABNORMAL
VENTILATOR MODE: ABNORMAL
WBC NRBC COR # BLD AUTO: 10.28 10*3/MM3 (ref 3.4–10.8)
WBC NRBC COR # BLD AUTO: 10.72 10*3/MM3 (ref 3.4–10.8)
WBC NRBC COR # BLD AUTO: 12.76 10*3/MM3 (ref 3.4–10.8)

## 2024-06-26 PROCEDURE — 82330 ASSAY OF CALCIUM: CPT

## 2024-06-26 PROCEDURE — 82805 BLOOD GASES W/O2 SATURATION: CPT

## 2024-06-26 PROCEDURE — 36430 TRANSFUSION BLD/BLD COMPNT: CPT

## 2024-06-26 PROCEDURE — 94799 UNLISTED PULMONARY SVC/PX: CPT

## 2024-06-26 PROCEDURE — 86900 BLOOD TYPING SEROLOGIC ABO: CPT

## 2024-06-26 PROCEDURE — 93010 ELECTROCARDIOGRAM REPORT: CPT | Performed by: INTERNAL MEDICINE

## 2024-06-26 PROCEDURE — P9016 RBC LEUKOCYTES REDUCED: HCPCS

## 2024-06-26 PROCEDURE — 99024 POSTOP FOLLOW-UP VISIT: CPT | Performed by: THORACIC SURGERY (CARDIOTHORACIC VASCULAR SURGERY)

## 2024-06-26 PROCEDURE — 97116 GAIT TRAINING THERAPY: CPT

## 2024-06-26 PROCEDURE — 82375 ASSAY CARBOXYHB QUANT: CPT

## 2024-06-26 PROCEDURE — 85025 COMPLETE CBC W/AUTO DIFF WBC: CPT | Performed by: THORACIC SURGERY (CARDIOTHORACIC VASCULAR SURGERY)

## 2024-06-26 PROCEDURE — 25810000003 LACTATED RINGERS SOLUTION: Performed by: THORACIC SURGERY (CARDIOTHORACIC VASCULAR SURGERY)

## 2024-06-26 PROCEDURE — 25010000002 VASOPRESSIN 20 UNIT/ML SOLUTION 1 ML VIAL: Performed by: THORACIC SURGERY (CARDIOTHORACIC VASCULAR SURGERY)

## 2024-06-26 PROCEDURE — 25010000002 CEFAZOLIN PER 500 MG: Performed by: THORACIC SURGERY (CARDIOTHORACIC VASCULAR SURGERY)

## 2024-06-26 PROCEDURE — 83050 HGB METHEMOGLOBIN QUAN: CPT

## 2024-06-26 PROCEDURE — 82948 REAGENT STRIP/BLOOD GLUCOSE: CPT

## 2024-06-26 PROCEDURE — 25010000002 PHENYLEPHRINE 10 MG/ML SOLUTION: Performed by: THORACIC SURGERY (CARDIOTHORACIC VASCULAR SURGERY)

## 2024-06-26 PROCEDURE — 82803 BLOOD GASES ANY COMBINATION: CPT

## 2024-06-26 PROCEDURE — 94664 DEMO&/EVAL PT USE INHALER: CPT

## 2024-06-26 PROCEDURE — 25010000002 VANCOMYCIN 10 G RECONSTITUTED SOLUTION: Performed by: THORACIC SURGERY (CARDIOTHORACIC VASCULAR SURGERY)

## 2024-06-26 PROCEDURE — 25010000002 ALBUMIN HUMAN 5% PER 50 ML: Performed by: THORACIC SURGERY (CARDIOTHORACIC VASCULAR SURGERY)

## 2024-06-26 PROCEDURE — 80048 BASIC METABOLIC PNL TOTAL CA: CPT | Performed by: THORACIC SURGERY (CARDIOTHORACIC VASCULAR SURGERY)

## 2024-06-26 PROCEDURE — 25010000002 AMIODARONE IN DEXTROSE 5% 360-4.14 MG/200ML-% SOLUTION: Performed by: THORACIC SURGERY (CARDIOTHORACIC VASCULAR SURGERY)

## 2024-06-26 PROCEDURE — P9041 ALBUMIN (HUMAN),5%, 50ML: HCPCS | Performed by: THORACIC SURGERY (CARDIOTHORACIC VASCULAR SURGERY)

## 2024-06-26 PROCEDURE — 71045 X-RAY EXAM CHEST 1 VIEW: CPT

## 2024-06-26 PROCEDURE — 83735 ASSAY OF MAGNESIUM: CPT | Performed by: THORACIC SURGERY (CARDIOTHORACIC VASCULAR SURGERY)

## 2024-06-26 PROCEDURE — 25010000002 FENTANYL CITRATE (PF) 50 MCG/ML SOLUTION: Performed by: THORACIC SURGERY (CARDIOTHORACIC VASCULAR SURGERY)

## 2024-06-26 PROCEDURE — 80069 RENAL FUNCTION PANEL: CPT | Performed by: THORACIC SURGERY (CARDIOTHORACIC VASCULAR SURGERY)

## 2024-06-26 PROCEDURE — 93005 ELECTROCARDIOGRAM TRACING: CPT | Performed by: THORACIC SURGERY (CARDIOTHORACIC VASCULAR SURGERY)

## 2024-06-26 PROCEDURE — 25010000002 ONDANSETRON PER 1 MG: Performed by: THORACIC SURGERY (CARDIOTHORACIC VASCULAR SURGERY)

## 2024-06-26 PROCEDURE — 63710000001 INSULIN LISPRO (HUMAN) PER 5 UNITS: Performed by: THORACIC SURGERY (CARDIOTHORACIC VASCULAR SURGERY)

## 2024-06-26 PROCEDURE — 25810000003 SODIUM CHLORIDE 0.9 % SOLUTION: Performed by: THORACIC SURGERY (CARDIOTHORACIC VASCULAR SURGERY)

## 2024-06-26 PROCEDURE — 25010000002 ACETAMINOPHEN 10 MG/ML SOLUTION: Performed by: THORACIC SURGERY (CARDIOTHORACIC VASCULAR SURGERY)

## 2024-06-26 PROCEDURE — 97162 PT EVAL MOD COMPLEX 30 MIN: CPT

## 2024-06-26 PROCEDURE — 25010000002 ENOXAPARIN PER 10 MG: Performed by: THORACIC SURGERY (CARDIOTHORACIC VASCULAR SURGERY)

## 2024-06-26 RX ORDER — FERROUS SULFATE 325(65) MG
325 TABLET ORAL
COMMUNITY

## 2024-06-26 RX ORDER — NITROGLYCERIN 0.4 MG/1
0.4 TABLET SUBLINGUAL
COMMUNITY

## 2024-06-26 RX ORDER — DEXTROSE MONOHYDRATE 25 G/50ML
25 INJECTION, SOLUTION INTRAVENOUS
Status: DISCONTINUED | OUTPATIENT
Start: 2024-06-26 | End: 2024-07-01 | Stop reason: HOSPADM

## 2024-06-26 RX ORDER — CHLORHEXIDINE GLUCONATE 500 MG/1
1 CLOTH TOPICAL EVERY 24 HOURS
Status: DISCONTINUED | OUTPATIENT
Start: 2024-06-27 | End: 2024-06-28

## 2024-06-26 RX ORDER — ALBUMIN, HUMAN INJ 5% 5 %
500 SOLUTION INTRAVENOUS ONCE
Status: COMPLETED | OUTPATIENT
Start: 2024-06-26 | End: 2024-06-26

## 2024-06-26 RX ORDER — INSULIN LISPRO 100 [IU]/ML
2-9 INJECTION, SOLUTION INTRAVENOUS; SUBCUTANEOUS
Status: DISCONTINUED | OUTPATIENT
Start: 2024-06-26 | End: 2024-07-01 | Stop reason: HOSPADM

## 2024-06-26 RX ORDER — NICOTINE POLACRILEX 4 MG
15 LOZENGE BUCCAL
Status: DISCONTINUED | OUTPATIENT
Start: 2024-06-26 | End: 2024-07-01 | Stop reason: HOSPADM

## 2024-06-26 RX ORDER — ALLOPURINOL 300 MG/1
300 TABLET ORAL DAILY
COMMUNITY

## 2024-06-26 RX ADMIN — ACETAMINOPHEN 1000 MG: 10 INJECTION, SOLUTION INTRAVENOUS at 03:56

## 2024-06-26 RX ADMIN — PREGABALIN 25 MG: 25 CAPSULE ORAL at 18:16

## 2024-06-26 RX ADMIN — AMIODARONE HYDROCHLORIDE 400 MG: 200 TABLET ORAL at 18:49

## 2024-06-26 RX ADMIN — AMIODARONE HYDROCHLORIDE 400 MG: 200 TABLET ORAL at 08:31

## 2024-06-26 RX ADMIN — PHENYLEPHRINE HYDROCHLORIDE 0.6 MCG/KG/MIN: 10 INJECTION INTRAVENOUS at 19:26

## 2024-06-26 RX ADMIN — ACETAMINOPHEN 1000 MG: 500 TABLET, FILM COATED ORAL at 19:30

## 2024-06-26 RX ADMIN — ONDANSETRON 4 MG: 2 INJECTION INTRAMUSCULAR; INTRAVENOUS at 13:25

## 2024-06-26 RX ADMIN — PHENYLEPHRINE HYDROCHLORIDE 0.5 MCG/KG/MIN: 10 INJECTION INTRAVENOUS at 00:25

## 2024-06-26 RX ADMIN — AMIODARONE HYDROCHLORIDE 0.5 MG/MIN: 1.8 INJECTION, SOLUTION INTRAVENOUS at 21:15

## 2024-06-26 RX ADMIN — ALBUTEROL SULFATE 1.25 MG: 2.5 SOLUTION RESPIRATORY (INHALATION) at 14:32

## 2024-06-26 RX ADMIN — ENOXAPARIN SODIUM 40 MG: 100 INJECTION SUBCUTANEOUS at 08:32

## 2024-06-26 RX ADMIN — ALBUTEROL SULFATE 1.25 MG: 2.5 SOLUTION RESPIRATORY (INHALATION) at 19:07

## 2024-06-26 RX ADMIN — SODIUM CHLORIDE, POTASSIUM CHLORIDE, SODIUM LACTATE AND CALCIUM CHLORIDE 1000 ML: 600; 310; 30; 20 INJECTION, SOLUTION INTRAVENOUS at 10:24

## 2024-06-26 RX ADMIN — BISACODYL 10 MG: 5 TABLET, COATED ORAL at 08:31

## 2024-06-26 RX ADMIN — Medication 1 APPLICATION: at 20:36

## 2024-06-26 RX ADMIN — CEFAZOLIN 2 G: 2 INJECTION, POWDER, FOR SOLUTION INTRAMUSCULAR; INTRAVENOUS at 18:17

## 2024-06-26 RX ADMIN — POLYETHYLENE GLYCOL 3350 17 G: 17 POWDER, FOR SOLUTION ORAL at 08:32

## 2024-06-26 RX ADMIN — SODIUM CHLORIDE 1250 MG: 9 INJECTION, SOLUTION INTRAVENOUS at 14:20

## 2024-06-26 RX ADMIN — OXYCODONE HYDROCHLORIDE 10 MG: 5 TABLET ORAL at 09:09

## 2024-06-26 RX ADMIN — CEFAZOLIN 2 G: 2 INJECTION, POWDER, FOR SOLUTION INTRAMUSCULAR; INTRAVENOUS at 11:23

## 2024-06-26 RX ADMIN — OXYCODONE HYDROCHLORIDE 5 MG: 5 TABLET ORAL at 05:43

## 2024-06-26 RX ADMIN — OXYCODONE HYDROCHLORIDE 5 MG: 5 TABLET ORAL at 19:34

## 2024-06-26 RX ADMIN — ASPIRIN 162 MG: 81 TABLET, CHEWABLE ORAL at 08:31

## 2024-06-26 RX ADMIN — CLOPIDOGREL BISULFATE 75 MG: 75 TABLET, FILM COATED ORAL at 18:16

## 2024-06-26 RX ADMIN — BISACODYL 10 MG: 5 TABLET, COATED ORAL at 20:36

## 2024-06-26 RX ADMIN — CEFAZOLIN 2 G: 2 INJECTION, POWDER, FOR SOLUTION INTRAMUSCULAR; INTRAVENOUS at 01:14

## 2024-06-26 RX ADMIN — FENTANYL CITRATE 25 MCG: 50 INJECTION, SOLUTION INTRAMUSCULAR; INTRAVENOUS at 01:13

## 2024-06-26 RX ADMIN — IPRATROPIUM BROMIDE 0.5 MG: 0.5 SOLUTION RESPIRATORY (INHALATION) at 19:07

## 2024-06-26 RX ADMIN — AMIODARONE HYDROCHLORIDE 1 MG/MIN: 1.8 INJECTION, SOLUTION INTRAVENOUS at 00:25

## 2024-06-26 RX ADMIN — ATORVASTATIN CALCIUM 20 MG: 10 TABLET, FILM COATED ORAL at 20:37

## 2024-06-26 RX ADMIN — VASOPRESSIN 0.03 UNITS/MIN: 20 INJECTION, SOLUTION INTRAVENOUS at 11:19

## 2024-06-26 RX ADMIN — IPRATROPIUM BROMIDE 0.5 MG: 0.5 SOLUTION RESPIRATORY (INHALATION) at 06:29

## 2024-06-26 RX ADMIN — METOPROLOL TARTRATE 12.5 MG: 25 TABLET, FILM COATED ORAL at 08:31

## 2024-06-26 RX ADMIN — PREGABALIN 25 MG: 25 CAPSULE ORAL at 05:41

## 2024-06-26 RX ADMIN — ALBUMIN (HUMAN) 500 ML: 12.5 INJECTION, SOLUTION INTRAVENOUS at 02:42

## 2024-06-26 RX ADMIN — SODIUM CHLORIDE 500 ML: 900 INJECTION, SOLUTION INTRAVENOUS at 00:13

## 2024-06-26 RX ADMIN — ACETAMINOPHEN 1000 MG: 10 INJECTION, SOLUTION INTRAVENOUS at 13:37

## 2024-06-26 RX ADMIN — LIDOCAINE 2 PATCH: 4 PATCH TOPICAL at 08:32

## 2024-06-26 RX ADMIN — ALBUTEROL SULFATE 1.25 MG: 2.5 SOLUTION RESPIRATORY (INHALATION) at 06:29

## 2024-06-26 RX ADMIN — Medication 1 APPLICATION: at 08:31

## 2024-06-26 RX ADMIN — IPRATROPIUM BROMIDE 0.5 MG: 0.5 SOLUTION RESPIRATORY (INHALATION) at 14:32

## 2024-06-26 RX ADMIN — ONDANSETRON 4 MG: 2 INJECTION INTRAMUSCULAR; INTRAVENOUS at 07:05

## 2024-06-26 RX ADMIN — FENTANYL CITRATE 50 MCG: 50 INJECTION, SOLUTION INTRAMUSCULAR; INTRAVENOUS at 00:35

## 2024-06-26 RX ADMIN — CHLORHEXIDINE GLUCONATE 0.12% ORAL RINSE 15 ML: 1.2 LIQUID ORAL at 08:32

## 2024-06-26 RX ADMIN — INSULIN LISPRO 2 UNITS: 100 INJECTION, SOLUTION INTRAVENOUS; SUBCUTANEOUS at 18:16

## 2024-06-26 RX ADMIN — METOPROLOL TARTRATE 12.5 MG: 25 TABLET, FILM COATED ORAL at 21:37

## 2024-06-26 RX ADMIN — CHLORHEXIDINE GLUCONATE 0.12% ORAL RINSE 15 ML: 1.2 LIQUID ORAL at 20:36

## 2024-06-26 RX ADMIN — PANTOPRAZOLE SODIUM 40 MG: 40 TABLET, DELAYED RELEASE ORAL at 08:31

## 2024-06-26 NOTE — PLAN OF CARE
Goal Outcome Evaluation:   Patient stood and ambulated 200' with Min assist and 3 short standing rests. Patient nauseated by end of walk. Spent quite a bit of time with patient and girlfriend discussing restrictions and POC. Patient tolerated walk very well. Very motivated.

## 2024-06-26 NOTE — THERAPY EVALUATION
Patient Name: Jeff Alatorre  : 1940    MRN: [id number]                              Today's Date: 2024       Admit Date: 2024    Visit Dx:     ICD-10-CM ICD-9-CM   1. Impaired functional mobility and activity tolerance [Z74.09]  Z74.09 V49.89   2. Coronary artery disease of native artery of native heart with stable angina pectoris  I25.118 414.01     413.9     Patient Active Problem List   Diagnosis    Hx of adenomatous colonic polyps    Adenomatous polyps    Idiopathic gout of multiple sites    Primary hypertension    Type 2 diabetes mellitus without complication, without long-term current use of insulin    Hyperlipidemia LDL goal <100    Erectile dysfunction due to arterial insufficiency    Anemia    B12 deficiency    Stage 3a chronic kidney disease (CKD)    Seasonal allergic rhinitis    ANYI (obstructive sleep apnea)    Carotid artery disease    ROSALES (dyspnea on exertion)    Coronary artery disease of native artery of native heart with stable angina pectoris    Bilateral carotid artery stenosis    History of left-sided carotid endarterectomy    Stenosis of right subclavian artery    CAD (coronary artery disease)     Past Medical History:   Diagnosis Date    Arthritis     Chronic kidney disease     Coronary artery disease of native artery of native heart with stable angina pectoris 2024    Erectile dysfunction     Gout     History of diverticulitis     HTN (hypertension)     Hx of adenomatous colonic polyps 10/13/2020    Hypercholesteremia     Low testosterone     Polycythemia vera 10/13/2020    Squamous cell carcinoma of skin 10/2021    top of head    Type 2 diabetes mellitus without complication, without long-term current use of insulin 2022     Past Surgical History:   Procedure Laterality Date    CARDIAC CATHETERIZATION Left 2024    Procedure: Coronary angiography;  Surgeon: Zaid Contreras MD;  Location:  [facility] CATH INVASIVE LOCATION;  Service: Cardiology;  Laterality:  Left;    CARDIAC CATHETERIZATION Left 05/20/2024    Procedure: Percutaneous Coronary Intervention;  Surgeon: Zaid Contreras MD;  Location: [facility] CATH INVASIVE LOCATION;  Service: Cardiology;  Laterality: Left;    CAROTID ENDARTERECTOMY Left     CATARACT EXTRACTION, BILATERAL      COLON SURGERY      COLONOSCOPY      COLONOSCOPY N/A 07/27/2021    Procedure: COLONOSCOPY WITH ANESTHESIA;  Surgeon: Luciano Alatorre DO;  Location: [facility] ENDOSCOPY;  Service: Gastroenterology;  Laterality: N/A;  preop; hx of polyps  postop; diverticulosis   PCP Devon Moore     CORONARY ARTERY BYPASS GRAFT N/A 6/25/2024    Procedure: CORONARY ARTERY BYPASS GRAFTING x4, LEFT INTERNAL MAMMARY ARTERY GRAFT, RIGHT LEG ENDOSCOPIC VEIN HARVEST, TRANSESOPHAGEAL ECHOCARDIOGRAM, APPLICATION OF PREVENA;  Surgeon: Jose F Kim MD;  Location:  [facility] OR;  Service: Cardiothoracic;  Laterality: N/A;    PENILE PROSTHESIS IMPLANT N/A 03/14/2023    Procedure: 3-PIECE INFLATABLE PENILE PROSTHESIS PLACEMENT;  Surgeon: Garcia Santana MD;  Location:  [facility] OR;  Service: Urology;  Laterality: N/A;    VASECTOMY        General Information       Row Name 06/26/24 0938          Physical Therapy Time and Intention    Document Type evaluation;other (see comments)  see MAR  -JE     Mode of Treatment physical therapy  -JE       Row Name 06/26/24 0938          General Information    Patient Profile Reviewed yes  -JE     Prior Level of Function independent:;all household mobility;community mobility;ADL's;home management;cooking;cleaning;driving;shopping;using stairs  -JE     Existing Precautions/Restrictions fall;oxygen therapy device and L/min;sternal  -JE     Barriers to Rehab medically complex;physical barrier  -JE       Row Name 06/26/24 0938          Living Environment    People in Home significant other  -JE     Name(s) of People in Home Neisha Eileen BARROS  -MAGALYS       Row Name 06/26/24 0938          Home Main Entrance    Number of Stairs, Main  Entrance three  -     Stair Railings, Main Entrance railing on left side (ascending)  L sided rail at 1st 2 steps going up onto the porch  -       Row Name 06/26/24 0938          Stairs Within Home, Primary    Stairs, Within Home, Primary has a 2nd story and a basement (but does not have to go to the other levels of the home at this time)  -       Row Name 06/26/24 0938          Cognition    Orientation Status (Cognition) oriented x 4  -       Row Name 06/26/24 0938          Safety Issues, Functional Mobility    Safety Issues Affecting Function (Mobility) friction/shear risk;safety precaution awareness  -     Impairments Affecting Function (Mobility) balance;endurance/activity tolerance;pain;shortness of breath;strength  -               User Key  (r) = Recorded By, (t) = Taken By, (c) = Cosigned By      Initials Name Provider Type    Indira Soriano, PT Physical Therapist                   Mobility       Row Name 06/26/24 0938          Bed Mobility    Comment, (Bed Mobility) pt sitting up in chair and returned to chair at end of visit  -       Row Name 06/26/24 0938          Sit-Stand Transfer    Sit-Stand [place] (Transfers) minimum assist (75% patient effort);verbal cues  -       Row Name 06/26/24 0938          Gait/Stairs (Locomotion)    Woods Level (Gait) minimum assist (75% patient effort);verbal cues  -     Distance in Feet (Gait) 60  -     Deviations/Abnormal Patterns (Gait) gait speed decreased;stride length decreased  -     Bilateral Gait Deviations forward flexed posture;heel strike decreased  -     Comment, (Gait/Stairs) pt required atleast 4 standing rest w/ gait activity  -               User Key  (r) = Recorded By, (t) = Taken By, (c) = Cosigned By      Initials Name Provider Type    Indira Soriano, PT Physical Therapist                   Obj/Interventions       Row Name 06/26/24 0938          Range of Motion Comprehensive    Comment, General Range of  Motion AROM all 4 extremities WFLs  -       Row Name 06/26/24 0938          Strength Comprehensive (MMT)    Comment, General Manual Muscle Testing (MMT) Assessment no formal strength assessment, however moves B U/LEs against gravity w/out difficulty  -       Row Name 06/26/24 0938          Balance    Balance Assessment sitting static balance;sitting dynamic balance;sit to stand dynamic balance;standing static balance;standing dynamic balance  -     Static Sitting Balance independent  -     Dynamic Sitting Balance independent  -     Position, Sitting Balance supported;sitting in chair  -     Sit to Stand Dynamic Balance minimal assist;verbal cues  -     Static Standing Balance minimal assist;contact guard  -     Dynamic Standing Balance minimal assist;verbal cues  -     Position/Device Used, Standing Balance unsupported  -       Row Name 06/26/24 0938          Sensory Assessment (Somatosensory)    Sensory Assessment (Somatosensory) sensation intact  -               User Key  (r) = Recorded By, (t) = Taken By, (c) = Cosigned By      Initials Name Provider Type    Indira Soriano, PT Physical Therapist                   Goals/Plan       [facility] Name 06/26/24 0938          Bed Mobility Goal 1 (PT)    Activity/Assistive Device (Bed Mobility Goal 1, PT) bridging;rolling to left;rolling to right;scooting;sit to supine/supine to sit  -     [place] Level/Cues Needed (Bed Mobility Goal 1, PT) contact guard required  -     Time Frame (Bed Mobility Goal 1, PT) long term goal (LTG);10 days  -     Progress/Outcomes (Bed Mobility Goal 1, PT) goal ongoing  -[facility] Name 06/26/24 0938          Transfer Goal 1 (PT)    Activity/Assistive Device (Transfer Goal 1, PT) sit-to-stand/stand-to-sit;bed-to-chair/chair-to-bed  -     [place] Level/Cues Needed (Transfer Goal 1, PT) standby assist  -     Time Frame (Transfer Goal 1, PT) long term goal (LTG);10 days  -     Progress/Outcome (Transfer  Goal 1, PT) goal ongoing  -[employer]       Row Name 06/26/24 0938          Gait Training Goal 1 (PT)    Activity/Assistive Device (Gait Training Goal 1, PT) gait (walking locomotion);increase energy conservation;increase endurance/gait distance;improve balance and speed;diminish gait deviation;decrease fall risk  -JE     [place] Level (Gait Training Goal 1, PT) standby assist  -JE     Distance (Gait Training Goal 1, PT) 300 ft+  -JE     Time Frame (Gait Training Goal 1, PT) long term goal (LTG);10 days  -JE     Progress/Outcome (Gait Training Goal 1, PT) goal ongoing  -[employer]       Row Name 06/26/24 0938          Stairs Goal 1 (PT)    Activity/Assistive Device (Stairs Goal 1, PT) ascending stairs;descending stairs;using handrail, left;step-to-step;decrease fall risk;improve balance and speed  -JE     [place] Level/Cues Needed (Stairs Goal 1, PT) contact guard required  -JE     Number of Stairs (Stairs Goal 1, PT) 3  -JE     Time Frame (Stairs Goal 1, PT) long term goal (LTG);10 days  -JE     Progress/Outcome (Stairs Goal 1, PT) goal ongoing  -       Row Name 06/26/24 0938          Patient Education Goal (PT)    Activity (Patient Education Goal, PT) sternal precautions, warm up/cool down ex, energy conservation techniques, breathing ex  -JE     [place]/Cues/Accuracy (Memory Goal 2, PT) demonstrates adequately;independent;verbalizes understanding  -JE     Time Frame (Patient Education Goal, PT) long term goal (LTG);10 days  -JE     Progress/Outcome (Patient Education Goal, PT) goal ongoing  -[employer]       Row Name 06/26/24 0938          Therapy Assessment/Plan (PT)    Planned Therapy Interventions (PT) balance training;bed mobility training;gait training;home exercise program;patient/family education;postural re-education;ROM (range of motion);stair training;transfer training;other (see comments)  sternal precautions, breathing ex, energy conservation techniques, warm up/cool down ex, safety/falls prevention, skin  protection/pressure relief  -               User Key  (r) = Recorded By, (t) = Taken By, (c) = Cosigned By      Initials Name Provider Type    Indira Soriano, PT Physical Therapist                   Clinical Impression       Row Name 06/26/24 1035          Pain    Pretreatment Pain Rating 7/10  -     Posttreatment Pain Rating 5/10  -     Pain Location - other (see comments)  at chest tube site  -     Pain Intervention(s) Ambulation/increased activity;Repositioned;Rest  -       Row Name 06/26/24 1035          Plan of Care Review    Plan of Care Reviewed With patient;significant other  -     Progress improving  -     Outcome Evaluation PT eval completed.  Pt pleasant and agreeable to therapy.  Supportive SO present during visit.  Pt reports pain at chest tube site primarily.  Able to move B U/LEs actively w/out difficulty.  Performs tfers sit to/from stand w/ min assist.  Gait w/ min assist and followed w/ chair.  Demonstrates flexed posture, decrease gait speed step length, heel strike and required at least 4 standing rest w/ activity.  Pt education for pacing activity and improved deep breathing as well as sternal precautions while performing functional mobility.  Pt will benefit from continued PT services to improve knowledge.  to increase strength, balance and I and to progress mobility reducing fall risk.  Anticipate pt to return home w/ assist at discharge.  Will follow for progress and needs.  -       Row Name 06/26/24 1034          Therapy Assessment/Plan (PT)    Patient/Family Therapy Goals Statement (PT) to improve mobility  -     Rehab Potential (PT) good, to achieve stated therapy goals  -     Criteria for Skilled Interventions Met (PT) yes;meets criteria;skilled treatment is necessary  -     Therapy Frequency (PT) 2 times/day  -     Predicted Duration of Therapy Intervention (PT) until discharge or goals achieved  -       Row Name 06/26/24 1039          Vital Signs    Post  Systolic BP Rehab 94  -JE     Post Treatment Diastolic BP 54  -JE     Posttreatment Heart Rate (beats/min) 80  -JE     Posttreatment Resp Rate (breaths/min) 18  -JE     O2 Delivery Pre Treatment nasal cannula  -JE     O2 Delivery Intra Treatment nasal cannula  -JE     Post SpO2 (%) 98  -JE     O2 Delivery Post Treatment nasal cannula  -JE     Pre Patient Position Sitting  -JE     Intra Patient Position Standing  -JE     Post Patient Position Sitting  -JE       Row Name 06/26/24 1035          Positioning and Restraints    Pre-Treatment Position sitting in chair/recliner  -JE     Post Treatment Position chair  -JE     In Chair reclined;notified nsg;call light within reach;encouraged to call for assist;with family/caregiver;RUE elevated;LUE elevated;waffle cushion;legs elevated;patient within staff view  -JE               User Key  (r) = Recorded By, (t) = Taken By, (c) = Cosigned By      Initials Name Provider Type    JE Indira Jones, PT Physical Therapist                   Outcome Measures       Row Name 06/26/24 0800          How much help from another person do you currently need...    Turning from your back to your side while in flat bed without using bedrails? 1  -HB     Moving from lying on back to sitting on the side of a flat bed without bedrails? 1  -HB     Moving to and from a bed to a chair (including a wheelchair)? 1  -HB     Standing up from a chair using your arms (e.g., wheelchair, bedside chair)? 1  -HB     Climbing 3-5 steps with a railing? 1  -HB     To walk in hospital room? 1  -HB     AM-PAC 6 Clicks Score (PT) 6  -HB     Highest Level of Mobility Goal 2 --> Bed activities/dependent transfer  -               User Key  (r) = Recorded By, (t) = Taken By, (c) = Cosigned By      Initials Name Provider Type    HB Jeremiah Eubanks, RN Registered Nurse                                 Physical Therapy Education       Title: PT OT SLP Therapies (Done)       Topic: Physical Therapy (Done)       Point:  Mobility training (Done)       Learning Progress Summary             Patient Acceptance, E,TB,D, VU,DU,NR by  at 6/26/2024 1047    Comment: education re: purpose of PT/importance of activity, safety/falls prevention, sternal precautions, improved tech w/ tfers, deep breathing and pacing activity   Significant Other Acceptance, E,TB,D, VU,DU,NR by  at 6/26/2024 1047    Comment: education re: purpose of PT/importance of activity, safety/falls prevention, sternal precautions, improved tech w/ tfers, deep breathing and pacing activity                         Point: Home exercise program (Done)       Learning Progress Summary             Patient Acceptance, E,TB,D, VU,DU,NR by  at 6/26/2024 1047    Comment: education re: purpose of PT/importance of activity, safety/falls prevention, sternal precautions, improved tech w/ tfers, deep breathing and pacing activity   Significant Other Acceptance, E,TB,D, VU,DU,NR by  at 6/26/2024 1047    Comment: education re: purpose of PT/importance of activity, safety/falls prevention, sternal precautions, improved tech w/ tfers, deep breathing and pacing activity                         Point: Body mechanics (Done)       Learning Progress Summary             Patient Acceptance, E,TB,D, VU,DU,NR by  at 6/26/2024 1047    Comment: education re: purpose of PT/importance of activity, safety/falls prevention, sternal precautions, improved tech w/ tfers, deep breathing and pacing activity   Significant Other Acceptance, E,TB,D, VU,DU,NR by  at 6/26/2024 1047    Comment: education re: purpose of PT/importance of activity, safety/falls prevention, sternal precautions, improved tech w/ tfers, deep breathing and pacing activity                         Point: Precautions (Done)       Learning Progress Summary             Patient Acceptance, E,TB,D, VU,DU,NR by  at 6/26/2024 1047    Comment: education re: purpose of PT/importance of activity, safety/falls prevention, sternal  precautions, improved tech w/ tfers, deep breathing and pacing activity   Significant Other Acceptance, E,TB,D, VU,DU,NR by MAGALYS at 6/26/2024 1047    Comment: education re: purpose of PT/importance of activity, safety/falls prevention, sternal precautions, improved tech w/ tfers, deep breathing and pacing activity                                         User Key       Initials Effective Dates Name Provider Type Discipline     08/02/18 -  Indira Jones, PT Physical Therapist PT                  PT Recommendation and Plan  Planned Therapy Interventions (PT): balance training, bed mobility training, gait training, home exercise program, patient/family education, postural re-education, ROM (range of motion), stair training, transfer training, other (see comments) (sternal precautions, breathing ex, energy conservation techniques, warm up/cool down ex, safety/falls prevention, skin protection/pressure relief)  Plan of Care Reviewed With: patient, significant other  Progress: improving  Outcome Evaluation: PT eval completed.  Pt pleasant and agreeable to therapy.  Supportive SO present during visit.  Pt reports pain at chest tube site primarily.  Able to move B U/LEs actively w/out difficulty.  Performs tfers sit to/from stand w/ min assist.  Gait w/ min assist and followed w/ chair.  Demonstrates flexed posture, decrease gait speed step length, heel strike and required at least 4 standing rest w/ activity.  Pt education for pacing activity and improved deep breathing as well as sternal precautions while performing functional mobility.  Pt will benefit from continued PT services to improve knowledge.  to increase strength, balance and I and to progress mobility reducing fall risk.  Anticipate pt to return home w/ assist at discharge.  Will follow for progress and needs.     Time Calculation:         PT Charges       Row Name 06/26/24 1548 06/26/24 1049          Time Calculation    Start Time 4585 -SHANNON 0938  -JE      Stop Time 1331  -SHANNON 1049  -     Time Calculation (min) 41 min  -SHANNON 71 min  -JE     PT Received On -- 06/26/24  -MAGALYS     PT Goal Re-Cert Due Date -- 07/06/24  -        Timed Charges    88043 - Gait Training Minutes  41  -SHANNON --        Total Minutes    Timed Charges Total Minutes 41  -SHANNON --      Total Minutes 41  -SHANNON --               User Key  (r) = Recorded By, (t) = Taken By, (c) = Cosigned By      Initials Name Provider Type    Mae Taylor, PTA Physical Therapist Assistant    Indira Soriano, PT Physical Therapist                  Therapy Charges for Today       Code Description Service Date Service Provider Modifiers Qty    47525985031 HC PT EVAL MOD COMPLEXITY 4 6/26/2024 Indira Jones, PT GP 1    78213899606 HC GAIT TRAINING EA 15 MIN 6/26/2024 Indira Jones, PT GP 1            PT G-Codes  AM-PAC 6 Clicks Score (PT): 6  PT Discharge Summary  Anticipated Discharge Disposition (PT): home with assist    Indira Jones, PT  6/26/2024

## 2024-06-26 NOTE — BRIEF OP NOTE
Jeff NITZA Landry  6/25/2024    Pre-op Diagnosis:   Coronary artery disease of native artery of native heart with stable angina pectoris [I25.118]  Type 2 Diabetes mellitus  Chronic anemia  Chronic kidney disease, Stage IIIa  Carotid artery stenosis post left carotid artery endarterectomy  Peripheral arterial disease  Hypertension       Post-Op Diagnosis Codes:  Same    Procedure(s):  CORONARY ARTERY BYPASS GRAFTING x4 (LIMA/LAD, RSVG/PDA, RSVG/OM, and RSVG/D)  RIGHT LEG ENDOSCOPIC VEIN HARVEST  APPLICATION OF PREVENA INCISION MANAGEMENT SYSTEM        Surgeon(s):  Jose F Kim MD    Anesthesia: General    Staff:   Circulator: Bakari Cabrera RN  Perfusionist: Karishma Gomez  Scrub Person: Radha Mcdermott; Nabor Herrera; Pacheco Banks         Estimated Blood Loss: CELL SAVER    Urine Voided: 270 mL    Specimens:                Specimens       ID Source Type Tests Collected By Collected At Frozen?    1 Blood, Arterial Line Blood CBC AND DIFFERENTIAL  FIBRINOGEN  PROTIME-INR  APTT   Jose F Kim MD 6/25/24 1901                   Drains:   Closed/Suction Drain Left Pleural Bulb 19 Fr. (Active)   Dressing Status Clean;Dry 06/25/24 1858   Status To bulb suction 06/25/24 1858       Urethral Catheter Temperature probe;Silicone 16 Fr. (Active)       Y Chest Tube 1 and 2 1 Right Mediastinal 24 Fr. 2 Left Mediastinal 24 Fr. (Active)   Dressing Type 1 Gauze;Transparent 06/25/24 1858   Dressing Status 1 Clean;Dry 06/25/24 1858   Dressing Intervention 1 New dressing 06/25/24 1858   Securement 1 tubing anchored to body distal to insertion site with tape 06/25/24 1858   Dressing Type 2 Gauze;Transparent 06/25/24 1858   Dressing Status 2 Clean;Dry 06/25/24 1858   Dressing Intervention 2 New dressing 06/25/24 1858   Securement 2 tubing anchored to body distal to insertion site with tape 06/25/24 1858       Findings: SEE OP NOTE        Complications: NONE          Jose F Kim MD     Date: 6/25/2024  Time: 19:17  CDT

## 2024-06-26 NOTE — PROGRESS NOTES
"Pharmacy Dosing Service  Pharmacokinetics  Vancomycin Initial Evaluation  Assessment/Action/Plan:  Current Order: Vancomycin 1250 mg IVPB every 24 hours  Current end date:06/27/2024  Additional antimicrobial agent(s): Cefazolin  MRSA Nasal PCR ordered: No    Vancomycin dosage initiated based on population pharmacokinetic parameters. Pharmacy will continue to follow daily and adjust dose accordingly.     Subjective:  Jeff Alatorre is a 84 y.o. male with a Vancomycin \"Pharmacy to Dose\" consult for the treatment of surgical prophylaxis , day 1 of 2 of treatment.    AUC Model Data:  Loading dose: N/A  Regimen: 1250 mg IV every 24 hours.  Start time: 02:00 on 06/26/2024  Exposure target: AUC24 (range) 400-600 mg/L.hr   AUC24,ss: 552 mg/L.hr  PAUC*: 83 %  Ctrough,ss: 17.6 mg/L  Pconc*: 38 %  Tox.: 13 %      Objective:  Ht:  ; Wt: 87.5 kg (192 lb 14.4 oz)  Estimated Creatinine Clearance: 43 mL/min (A) (by C-G formula based on SCr of 1.39 mg/dL (H)).   Creatinine   Date Value Ref Range Status   06/24/2024 1.39 (H) 0.76 - 1.27 mg/dL Final   06/13/2024 1.90 (H) 0.60 - 1.30 mg/dL Final     Comment:     Serial Number: [id number]:  [id number]   06/04/2024 1.60 (H) 0.60 - 1.30 mg/dL Final     Comment:     Serial Number: [id number]:  [id number]   05/20/2024 1.29 (H) 0.76 - 1.27 mg/dL Final   04/30/2024 1.26 0.76 - 1.27 mg/dL Final      Lab Results   Component Value Date    WBC 9.27 06/25/2024    WBC 6.54 06/24/2024    WBC 8.17 05/20/2024      Baseline culture results:  Microbiology Results (last 10 days)       ** No results found for the last 240 hours. **            Indira Garber, PharmNADIA  06/25/24 20:12 CDT    "

## 2024-06-26 NOTE — PROGRESS NOTES
"Patient name: Jeff Alatorre  Patient : 1940  VISIT # [id number]  MR #[id number]    Procedure:Procedure(s):  Coronary artery bypass grafting x 4 (LIMA/LAD, RSVG/PDA, RSVG/OM, and RSVG/D), right leg endoscopic vein harvest, application of Prevena incision management system  Procedure Date:2024  POD:1 Day Post-Op    Subjective   No chief complaint on file.    Extubated overnight.  Up to the chair on 4 L/min nasal cannula.  Due to walk with physical therapy.  Rates pain 6 out of 10.  Received 1 unit PRBCs last night.  Hemoglobin 9.1, hematocrit 27.1.  Creatinine 1.60 today    Telemetry: A paced 90 bpm  IV drips: Insulin, vasopressin, nicky, IVF  Hemodynamics reviewed, hemodynamically stable     Objective     Visit Vitals  /52   Pulse 80   Temp 97.8 °F (36.6 °C) (Core)   Resp 24   Ht 174 cm (68.5\")   Wt 91.2 kg (201 lb)   SpO2 98%   BMI 30.11 kg/m²   Preoperative weight: 192 pounds    Intake/Output Summary (Last 24 hours) at 2024 0912  Last data filed at 2024 0800  Gross per 24 hour   Intake 7013.3 ml   Output 3395 ml   Net 3618.3 ml   Mediastinal chest tube: 140 mL over 24 hours, serosanguineous  Sammy drain: 160 mL over 24 hours, serosanguineous    Lab:     CBC:  Results from last 7 days   Lab Units 24   WBC 10*3/mm3 12.76* 10.28 9.26   HEMATOCRIT % 27.1* 27.2* 23.5*   PLATELETS 10*3/mm3 111* 99* 116*          BMP:  Results from last 7 days   Lab Units 24   SODIUM mmol/L 142 142 144   POTASSIUM mmol/L 4.6 4.2 4.3   CHLORIDE mmol/L 109* 109* 112*   CO2 mmol/L 23.0 25.0 25.0   GLUCOSE mg/dL 163* 124* 99   BUN mg/dL 28* 28* 27*   CREATININE mg/dL 1.60* 1.49* 1.36*          COAG:  Results from last 7 days   Lab Units 24   INR  1.25*   APTT seconds 37.2*       IMAGES:       Imaging Results (Last 24 Hours)       Procedure Component Value Units Date/Time    XR Chest 1 View [[id number]] Collected: " 06/26/24 0659     Updated: 06/26/24 0705    Narrative:      EXAMINATION: XR CHEST 1 VW-     6/26/2024 2:20 AM     HISTORY: Post-Op Heart Surgery; I25.118-Atherosclerotic heart disease of  native coronary artery with other forms of angina pectoris     A frontal projection of the chest is compared with the previous study  dated 6/25/2024.     The lungs are poorly expanded, worse since the previous study.     There is interval removal of the endotracheal tube.     The right internal jugular approach Ceres-Sanjay catheter is in place.  Distal end is in the proximal right pulmonary artery. The left-sided  chest tube and mediastinal drain are in place.     There is no visible pneumothorax.     There are atelectatic changes in the lower lungs, left more than the  right.     The heart size is not optimally evaluated due to the AP projection.  There is evidence of prior cardiac surgery.     No acute bony abnormality.       Impression:      1. Poor lung expansion. Atelectatic changes more pronounced in the left  lower lung.  2. The interval removal of the endotracheal tube. The remaining tubes  and lines are in place.        This report was signed and finalized on 6/26/2024 7:01 AM by Dr. Inga Campbell MD.       XR Chest 1 View [[id number]] Collected: 06/25/24 2009     Updated: 06/25/24 2013    Narrative:      XR CHEST 1 VW- 6/25/2024 7:08 PM     HISTORY: Post-Op Check Line & Tube Placement; I25.118-Atherosclerotic  heart disease of native coronary artery with other forms of angina  pectoris       COMPARISON: Chest x-ray dated 6/24/2024     FINDINGS:  Upright frontal radiograph of the chest was obtained     Postop coronary bypass. Endotracheal tube is in good position. Left  lower lobe is mostly atelectatic. Left upper lobe discoid atelectasis.  No pneumothorax or pleural effusion. Ceres-Sanjay catheter tip is in the  main pulmonary outflow tract. Pulmonary vasculature are nondilated.       Impression:      1.  Postop coronary  bypass. ET tube is in good position. No  pneumothorax.  2.  Left lower lobe is mostly atelectatic.     This report was signed and finalized on 6/25/2024 8:10 PM by Dr Bassem Borja.           Chest x-ray: Basilar atelectasis, poor lung expansion, no visible pneumothorax.      Physical Exam:  General: Alert, oriented. No apparent distress.   Cardiovascular: Regular rate and rhythm without murmur, rubs, or gallops.    Pulmonary: Clear to auscultation bilaterally without wheezing, rubs, or rales.  Chest: Sternotomy incision clean, dry, and intact. Sternum stable. No clicks.  Mediastinal chest tube x 2, Sammy drain x 1. Fluid serosanguineous   Abdomen: Soft, non distended, and non tender.  Extremities: Warm, moves all extremities.  Bilateral Ace wraps in place.     Neurologic:  Grossly intact with no focal deficits.          Impression:  Coronary artery disease of native artery of native heart with stable angina pectoris  Type 2 Diabetes mellitus  Chronic anemia  Chronic kidney disease, Stage IIIa  Carotid artery stenosis post left carotid artery endarterectomy  Peripheral arterial disease  Hypertension    Plan:  Monitor CVP   Monitor cardiac output through [employer]  DC amiodarone drip, continue p.o. amiodarone  Decrease pacer rate to 80 bpm  Routine postcardiac surgery care  Encourage pulmonary toilet ambulation  Start bowel regimen  Keep in ICU  Discussed with patient and nursing      BRYANT Yu  06/26/24  09:12 CDT

## 2024-06-26 NOTE — CONSULTS
Inpatient Nutrition Services  Patient Name:  Jeff Alatorre  YOB: 1940  MRN: [id number]  Admit Date:  6/25/2024  Assessment Date:  6/26/2024   Reason for Assessment       Row Name 06/26/24 0950          Reason for Assessment    Reason For Assessment physician consult     Diagnosis cardiac disease;diabetes diagnosis/complications;renal disease;pulmonary disease     Identified At Risk by Screening Criteria need for education  s/p CABG               Nutrition/Diet History       Row Name 06/26/24 0951          Nutrition/Diet History    Typical Intake (Food/Fluid/EN/PN) Pt extubated and remains NPO. RN setting up pt to work with PTA this morning. Visitor/friend in room at this time. Will provide edu when out of critical care.     Food Intolerance(s) NKFA per EMR          Labs/Tests/Procedures/Meds       Row Name 06/26/24 0952          Labs/Procedures/Meds    Lab Results Comments A1C 6.4%        Diagnostic Tests/Procedures    Diagnostic Test/Procedures Comments 6/25 CABG          Estimated/Assessed Needs - Anthropometrics       Row Name 06/26/24 0630          Anthropometrics    Weight 91.2 kg (201 lb)         Problem/Interventions:   Problem 1       Row Name 06/26/24 0952          Nutrition Diagnoses Problem 1    Problem 1 Knowledge Deficit     Etiology (related to) Medical Diagnosis     Cardiac CABG;CAD;Other (comment)  POD 1     Signs/Symptoms (evidenced by) Potential Information Deficit          Education/Evaluation       Row Name 06/26/24 0978          Education    Education Will Instruct as appropriate;Education not appropriate at this time     Please explain Defer until post ICU        Monitor/Evaluation    Monitor Per protocol     Education Follow-up Reinforce PRN              Electronically signed by:  Jessica Erickson RDN, LD  06/26/24 09:53 CDT

## 2024-06-26 NOTE — ANESTHESIA POSTPROCEDURE EVALUATION
Patient: Jeff Alatorre    Procedure Summary       Date: 06/25/24 Room / Location:  [facility]  /  [facility]    Anesthesia Start: 1252 Anesthesia Stop: 1946    Procedure: CORONARY ARTERY BYPASS GRAFTING x4, LEFT INTERNAL MAMMARY ARTERY GRAFT, RIGHT LEG ENDOSCOPIC VEIN HARVEST, TRANSESOPHAGEAL ECHOCARDIOGRAM, APPLICATION OF PREVENA (Chest) Diagnosis:       Coronary artery disease of native artery of native heart with stable angina pectoris      (Coronary artery disease of native artery of native heart with stable angina pectoris [I25.118])    Surgeons: Jose F Kim MD Provider: Dave Vivas CRNA    Anesthesia Type: general, Perry, CVL, PAC ASA Status: 4            Anesthesia Type: general, Perry, CVL, PAC    Vitals  Vitals Value Taken Time   /61 06/25/24 2101   Temp 96 °F (35.6 °C) 06/25/24 2100   Pulse 80 06/25/24 2104   Resp 18 06/25/24 2100   SpO2 99 % 06/25/24 2104   Vitals shown include unfiled device data.        Post Anesthesia Care and Evaluation    Patient location during evaluation: ICU  Patient participation: waiting for patient participation  Post-procedure mental status: sedated on vent post op coronary bypass.  Pain management: adequate    Airway patency: patent  Anesthetic complications: No anesthetic complications    Cardiovascular status: acceptable  Respiratory status: acceptable and intubated  Hydration status: acceptable    Comments: Blood pressure 114/64, pulse 80, temperature 96 °F (35.6 °C), temperature source Core, resp. rate 18, weight 87.5 kg (192 lb 14.4 oz), SpO2 99%.

## 2024-06-26 NOTE — PAYOR COMM NOTE
"ADMIT INPT 6-25-24  [id number]    [phone]    Jeffery Mcfarland (84 y.o. Male)       Date of Birth   1940    Social Security Number       Address   [address] [place] [address]    Home Phone   [phone]    MRN   [id number]       Shinto   Lutheran    Marital Status   Single                            Admission Date   6/25/24    Admission Type   Elective    Admitting Provider   Jose F Kim MD    Attending Provider   Jose F Kim MD    Department, Room/Bed   [facility] INTENSIVE CARE, I011/1       Discharge Date       Discharge Disposition       Discharge Destination                                 Attending Provider: Jose F Kim MD    Allergies: No Known Allergies    Isolation: None   Infection: None   Code Status: CPR    Ht: 174 cm (68.5\")   Wt: 87.5 kg (192 lb 14.4 oz)    Admission Cmt: None   Principal Problem: Coronary artery disease of native artery of native heart with stable angina pectoris [I25.118]                   Active Insurance as of 6/25/2024       Primary Coverage       Payor Plan Insurance Group Employer/Plan Group    HUMANA MEDICARE REPLACEMENT HUMANA MED ADV PPO [id number]       Payor Plan Address Payor Plan Phone Number Payor Plan Fax Number Effective Dates    [address] [address] [phone]  12/1/2023 - None Entered    Formerly [facility] [id number]         Subscriber Name Subscriber Birth Date Member ID       JEFFERY MCFARLAND 1940 [id number]                     Emergency Contacts        (Rel.) Home Phone Work Phone Mobile Phone    Iain Mcfarland (Son) -- -- [phone]    Neisha Lazo (Friend) -- -- [phone]                 History & Physical        Jose F Kim MD at 06/25/24 3587            H&P reviewed. The patient was examined and there are no changes to the H&P.            Electronically signed by Jose F Kim MD at 06/25/24 7320   Source Note: H&P (View-Only)            Chief Complaint   Patient presents " with    Coronary Artery Disease     New patient referred from Dr. Contreras         Subjective    History of Present Illness  The patient is an 84-year-old male who presents for evaluation of multiple medical concerns. He is accompanied by his close female friend.    The patient reports experiencing chest pain and dyspnea, both at rest and during physical exertion. He recalls an episode of dyspnea while ascending mountains, but denies any associated pain. Despite these symptoms, he maintains an active lifestyle, walking approximately a mile daily, lifting up to 50 pounds, and engaging in hiking without any significant issues. His friend expresses concern regarding his ability to travel post-surgery. The patient's friend inquires about the timeline for scheduling the surgery due to concerns about potential myocardial infarction. The patient experiences chest tightness and jaw pain, which typically lasts for 4 to 5 minutes before subsiding. If the pain does not persist beyond 5 minutes, he resorts to nitroglycerin. He also reports edema in his left leg, which was more pronounced yesterday after hiking on rocks. The patient's friend inquires about the duration of the surgery, whether the patient would be admitted to the intensive care unit, whether he should stay in the waiting room, and whether he should stay in a hotel for the initial night post-surgery. The patient's friend also notes occasional depression, which she queries as unusual given his cardiac condition. The patient denies any history of depression.    Supplemental Information  He has gout. He has had 2 knee replacements.   He quit smoking 40 years ago.    Review of Systems       Past Medical History:   Diagnosis Date    Arthritis     Chronic kidney disease     Coronary artery disease of native artery of native heart with stable angina pectoris 5/23/2024    Erectile dysfunction     Gout     History of diverticulitis     HTN (hypertension)     Hx of adenomatous  colonic polyps 10/13/2020    Hypercholesteremia     Low testosterone     Polycythemia vera 10/13/2020    Squamous cell carcinoma of skin 10/2021    top of head    Type 2 diabetes mellitus without complication, without long-term current use of insulin 12/01/2022     Past Surgical History:   Procedure Laterality Date    CARDIAC CATHETERIZATION Left 5/20/2024    Procedure: Coronary angiography;  Surgeon: Zaid Contreras MD;  Location: [facility] CATH INVASIVE LOCATION;  Service: Cardiology;  Laterality: Left;    CARDIAC CATHETERIZATION Left 5/20/2024    Procedure: Percutaneous Coronary Intervention;  Surgeon: Zaid Contreras MD;  Location: [facility] CATH INVASIVE LOCATION;  Service: Cardiology;  Laterality: Left;    CAROTID ENDARTERECTOMY Left     CATARACT EXTRACTION, BILATERAL      COLON SURGERY      COLONOSCOPY      COLONOSCOPY N/A 07/27/2021    Procedure: COLONOSCOPY WITH ANESTHESIA;  Surgeon: Luciano Alatorre DO;  Location: [facility] ENDOSCOPY;  Service: Gastroenterology;  Laterality: N/A;  preop; hx of polyps  postop; diverticulosis   PCP Devon Moore     HEMORROIDECTOMY      HERNIA REPAIR      PENILE PROSTHESIS IMPLANT N/A 03/14/2023    Procedure: 3-PIECE INFLATABLE PENILE PROSTHESIS PLACEMENT;  Surgeon: Garcia Santana MD;  Location: [facility] OR;  Service: Urology;  Laterality: N/A;    VASECTOMY       Family History   Problem Relation Age of Onset    No Known Problems Mother     Heart attack Father     No Known Problems Maternal Grandmother     No Known Problems Maternal Grandfather     No Known Problems Paternal Grandmother     No Known Problems Paternal Grandfather     No Known Problems Son     No Known Problems Daughter     Parkinsonism Brother     Colon cancer Neg Hx     Colon polyps Neg Hx     Esophageal cancer Neg Hx      Social History     Tobacco Use    Smoking status: Former     Current packs/day: 0.00     Average packs/day: 1 pack/day for 40.0 years (40.0 ttl pk-yrs)     Types: Cigarettes     Start date:  "     Quit date:      Years since quittin.4     Passive exposure: Past    Smokeless tobacco: Never    Tobacco comments:     N/A   Vaping Use    Vaping status: Never Used   Substance Use Topics    Alcohol use: Not Currently     Comment: Quite 20+ yrs ago    Drug use: Never     Current Outpatient Medications   Medication Sig Dispense Refill    acetaminophen (TYLENOL) 500 MG tablet Take 1 tablet by mouth Daily As Needed for Mild Pain.      allopurinol (ZYLOPRIM) 300 MG tablet TAKE ONE TABLET BY MOUTH EVERY DAY 90 tablet 3    aspirin 81 MG EC tablet 1 tablet.      atorvastatin (LIPITOR) 40 MG tablet Take 1 tablet by mouth Every Night. 30 tablet 11    cyanocobalamin 1000 MCG/ML injection INJECT 1ML AS DIRECTED EVERY 30 DAYS 1 mL 6    Ferrous Sulfate (IRON PO) Take  by mouth. OTC      fluticasone (FLONASE) 50 MCG/ACT nasal spray 2 sprays into the nostril(s) as directed by provider Daily. 16 g 2    isosorbide mononitrate (IMDUR) 30 MG 24 hr tablet Take 1 tablet by mouth Daily. 30 tablet 11    losartan (COZAAR) 100 MG tablet Take 1 tablet by mouth Daily. 90 tablet 3    metFORMIN (GLUCOPHAGE) 500 MG tablet TAKE ONE TABLET BY MOUTH TWICE A DAY WITH MEALS 180 tablet 3    nitroglycerin (NITROSTAT) 0.4 MG SL tablet 1 under the tongue as needed for angina, may repeat q5mins for up three doses 25 tablet 1    ranolazine (Ranexa) 500 MG 12 hr tablet Take 1 tablet by mouth 2 (Two) Times a Day. 60 tablet 11    Syringe 25G X 1\" 3 ML misc Inject 1000 mcg (1mL) Cyanocobalamin IM every 28 days 6 each 0    amLODIPine (NORVASC) 5 MG tablet Take 1 tablet by mouth Daily. 90 tablet 2     Current Facility-Administered Medications   Medication Dose Route Frequency Provider Last Rate Last Admin    cyanocobalamin injection 1,000 mcg  1,000 mcg Intramuscular Q28 Days Nella Landry APRN   1,000 mcg at 24 1313     Facility-Administered Medications Ordered in Other Visits   Medication Dose Route Frequency Provider Last Rate Last " "Admin    cyanocobalamin injection 1,000 mcg  1,000 mcg Intramuscular Once Enriqueta RaoBRYANT         Allergies:  Patient has no known allergies.    Objective     Vital Signs  Visit Vitals  BP 98/58 (BP Location: Right arm, Patient Position: Sitting, Cuff Size: Adult)   Pulse 72   Ht 177 cm (69.69\")   Wt 84.4 kg (186 lb)   SpO2 97%   BMI 26.93 kg/m²         Physical Exam  Physical Exam  Patient appears younger than his stated age, in no acute distress, appropriate, and alert.  Lungs are clear to auscultation bilaterally. No wheezing, rubs, or rales.  Heart has a regular rate and rhythm without murmurs, rubs, or gallops.  Abdomen is soft, nondistended, and nontender.  Extremities show no clubbing or cyanosis. There is some edema in the left lower extremity compared to the right, but it is not significant. They report it is better today compared to yesterday after he hiked for a mile.    Results Review:     Results  Laboratory Studies  A1c is 6.    Imaging  Heart catheterization shows multiple blockages in the LAD and circumflex.  I reviewed the patient's new clinical results.  Discussed with patient and family      Assessment & Plan      Diagnoses and all orders for this visit:    1. Coronary artery disease of native artery of native heart with stable angina pectoris (Primary)  -     Adult Transthoracic Echo Complete W/ Cont if Necessary Per Protocol; Future  -     CT angiogram chest w contrast; Future  -     US Carotid Bilateral; Future  -     US Vein Mapping Bilateral; Future  -     US Ankle / Brachial Indices Extremity Complete; Future  -     Blood Gas, Arterial -; Future  -     Complete PFT - Pre & Post Bronchodilator; Future    2. Other disorders of arteries, arterioles and capillaries in diseases classified elsewhere  -     US Carotid Bilateral; Future    3. Other specified peripheral vascular diseases  -     US Ankle / Brachial Indices Extremity Complete; Future    4. Stage 3a chronic kidney disease " (CKD)      Assessment & Plan  1. Multivessel coronary disease, well-controlled diabetes mellitus, previous carotid endarterectomy on the left, more recent ultrasound surveillance, past history of tobacco use, remote.  The patient was thoroughly informed about the advantages and disadvantages of various treatment options, including medical management only, PCI, or surgical revascularization. Given his diabetes and the complexity of his coronary artery disease, it is agreed that he would be the most suitable candidate for coronary artery bypass grafting. Given his underlying chronic kidney disease, he remains at a low risk for hemodialysis. The operative conduct and expected postoperative course were thoroughly discussed. The rationale behind the recommendation for coronary artery bypass grafting was also discussed. Pre and post-operative pulmonary function tests, bilateral ultrasound, carotid duplex, a CT of the chest, 2D complete echocardiogram and ABIs, and bilateral vein mapping of the lower extremities will be obtained. All queries were addressed to the best of my ability, and they expressed understanding of the risks. The STS risk calculator was utilized for risk discussion. The patient, a non-smoker, and I commended him for this purpose. I will continue to process his care. I discussed with him and his friend that currently he is under an elective pathway with concerning coronary disease, and I would like to expedite his testing where possible. Should he experience any changes in his symptomatology, he is to contact my office promptly or present to the ER to escalate his urgency.    Transcribed from ambient dictation for Jose F Kim MD by Jose F Kim MD.  05/29/24   14:20 CDT    Patient or patient representative verbalized consent for the use of Ambient Listening during the visit with  Jose F Kim MD for chart documentation. 6/16/2024  08:23 CDT    Electronically signed by Jose F Kim  MD LEROY at 06/16/24 7160                 Jose F Kim MD at 05/29/24 5871            Chief Complaint   Patient presents with    Coronary Artery Disease     New patient referred from Dr. Contreras         Subjective    History of Present Illness  The patient is an 84-year-old male who presents for evaluation of multiple medical concerns. He is accompanied by his close female friend.    The patient reports experiencing chest pain and dyspnea, both at rest and during physical exertion. He recalls an episode of dyspnea while ascending mountains, but denies any associated pain. Despite these symptoms, he maintains an active lifestyle, walking approximately a mile daily, lifting up to 50 pounds, and engaging in hiking without any significant issues. His friend expresses concern regarding his ability to travel post-surgery. The patient's friend inquires about the timeline for scheduling the surgery due to concerns about potential myocardial infarction. The patient experiences chest tightness and jaw pain, which typically lasts for 4 to 5 minutes before subsiding. If the pain does not persist beyond 5 minutes, he resorts to nitroglycerin. He also reports edema in his left leg, which was more pronounced yesterday after hiking on rocks. The patient's friend inquires about the duration of the surgery, whether the patient would be admitted to the intensive care unit, whether he should stay in the waiting room, and whether he should stay in a hotel for the initial night post-surgery. The patient's friend also notes occasional depression, which she queries as unusual given his cardiac condition. The patient denies any history of depression.    Supplemental Information  He has gout. He has had 2 knee replacements.   He quit smoking 40 years ago.    Review of Systems       Past Medical History:   Diagnosis Date    Arthritis     Chronic kidney disease     Coronary artery disease of native artery of native heart with stable angina  pectoris 5/23/2024    Erectile dysfunction     Gout     History of diverticulitis     HTN (hypertension)     Hx of adenomatous colonic polyps 10/13/2020    Hypercholesteremia     Low testosterone     Polycythemia vera 10/13/2020    Squamous cell carcinoma of skin 10/2021    top of head    Type 2 diabetes mellitus without complication, without long-term current use of insulin 12/01/2022     Past Surgical History:   Procedure Laterality Date    CARDIAC CATHETERIZATION Left 5/20/2024    Procedure: Coronary angiography;  Surgeon: Zaid Contreras MD;  Location: [facility] CATH INVASIVE LOCATION;  Service: Cardiology;  Laterality: Left;    CARDIAC CATHETERIZATION Left 5/20/2024    Procedure: Percutaneous Coronary Intervention;  Surgeon: Zaid Contreras MD;  Location: [facility] CATH INVASIVE LOCATION;  Service: Cardiology;  Laterality: Left;    CAROTID ENDARTERECTOMY Left     CATARACT EXTRACTION, BILATERAL      COLON SURGERY      COLONOSCOPY      COLONOSCOPY N/A 07/27/2021    Procedure: COLONOSCOPY WITH ANESTHESIA;  Surgeon: Luciano Alatorre DO;  Location: [facility] ENDOSCOPY;  Service: Gastroenterology;  Laterality: N/A;  preop; hx of polyps  postop; diverticulosis   PCP Devon Moore     HEMORROIDECTOMY      HERNIA REPAIR      PENILE PROSTHESIS IMPLANT N/A 03/14/2023    Procedure: 3-PIECE INFLATABLE PENILE PROSTHESIS PLACEMENT;  Surgeon: Garcia Santana MD;  Location: [facility] OR;  Service: Urology;  Laterality: N/A;    VASECTOMY       Family History   Problem Relation Age of Onset    No Known Problems Mother     Heart attack Father     No Known Problems Maternal Grandmother     No Known Problems Maternal Grandfather     No Known Problems Paternal Grandmother     No Known Problems Paternal Grandfather     No Known Problems Son     No Known Problems Daughter     Parkinsonism Brother     Colon cancer Neg Hx     Colon polyps Neg Hx     Esophageal cancer Neg Hx      Social History     Tobacco Use    Smoking status: Former      "Current packs/day: 0.00     Average packs/day: 1 pack/day for 40.0 years (40.0 ttl pk-yrs)     Types: Cigarettes     Start date:      Quit date:      Years since quittin.4     Passive exposure: Past    Smokeless tobacco: Never    Tobacco comments:     N/A   Vaping Use    Vaping status: Never Used   Substance Use Topics    Alcohol use: Not Currently     Comment: Quite 20+ yrs ago    Drug use: Never     Current Outpatient Medications   Medication Sig Dispense Refill    acetaminophen (TYLENOL) 500 MG tablet Take 1 tablet by mouth Daily As Needed for Mild Pain.      allopurinol (ZYLOPRIM) 300 MG tablet TAKE ONE TABLET BY MOUTH EVERY DAY 90 tablet 3    aspirin 81 MG EC tablet 1 tablet.      atorvastatin (LIPITOR) 40 MG tablet Take 1 tablet by mouth Every Night. 30 tablet 11    cyanocobalamin 1000 MCG/ML injection INJECT 1ML AS DIRECTED EVERY 30 DAYS 1 mL 6    Ferrous Sulfate (IRON PO) Take  by mouth. OTC      fluticasone (FLONASE) 50 MCG/ACT nasal spray 2 sprays into the nostril(s) as directed by provider Daily. 16 g 2    isosorbide mononitrate (IMDUR) 30 MG 24 hr tablet Take 1 tablet by mouth Daily. 30 tablet 11    losartan (COZAAR) 100 MG tablet Take 1 tablet by mouth Daily. 90 tablet 3    metFORMIN (GLUCOPHAGE) 500 MG tablet TAKE ONE TABLET BY MOUTH TWICE A DAY WITH MEALS 180 tablet 3    nitroglycerin (NITROSTAT) 0.4 MG SL tablet 1 under the tongue as needed for angina, may repeat q5mins for up three doses 25 tablet 1    ranolazine (Ranexa) 500 MG 12 hr tablet Take 1 tablet by mouth 2 (Two) Times a Day. 60 tablet 11    Syringe 25G X 1\" 3 ML misc Inject 1000 mcg (1mL) Cyanocobalamin IM every 28 days 6 each 0    amLODIPine (NORVASC) 5 MG tablet Take 1 tablet by mouth Daily. 90 tablet 2     Current Facility-Administered Medications   Medication Dose Route Frequency Provider Last Rate Last Admin    cyanocobalamin injection 1,000 mcg  1,000 mcg Intramuscular Q28 Days Nella Landry APRN   1,000 mcg at " "05/30/24 1313     Facility-Administered Medications Ordered in Other Visits   Medication Dose Route Frequency Provider Last Rate Last Admin    cyanocobalamin injection 1,000 mcg  1,000 mcg Intramuscular Once Enriqueta Rao APRN         Allergies:  Patient has no known allergies.    Objective     Vital Signs  Visit Vitals  BP 98/58 (BP Location: Right arm, Patient Position: Sitting, Cuff Size: Adult)   Pulse 72   Ht 177 cm (69.69\")   Wt 84.4 kg (186 lb)   SpO2 97%   BMI 26.93 kg/m²         Physical Exam  Physical Exam  Patient appears younger than his stated age, in no acute distress, appropriate, and alert.  Lungs are clear to auscultation bilaterally. No wheezing, rubs, or rales.  Heart has a regular rate and rhythm without murmurs, rubs, or gallops.  Abdomen is soft, nondistended, and nontender.  Extremities show no clubbing or cyanosis. There is some edema in the left lower extremity compared to the right, but it is not significant. They report it is better today compared to yesterday after he hiked for a mile.    Results Review:     Results  Laboratory Studies  A1c is 6.    Imaging  Heart catheterization shows multiple blockages in the LAD and circumflex.  I reviewed the patient's new clinical results.  Discussed with patient and family      Assessment & Plan      Diagnoses and all orders for this visit:    1. Coronary artery disease of native artery of native heart with stable angina pectoris (Primary)  -     Adult Transthoracic Echo Complete W/ Cont if Necessary Per Protocol; Future  -     CT angiogram chest w contrast; Future  -     US Carotid Bilateral; Future  -     US Vein Mapping Bilateral; Future  -     US Ankle / Brachial Indices Extremity Complete; Future  -     Blood Gas, Arterial -; Future  -     Complete PFT - Pre & Post Bronchodilator; Future    2. Other disorders of arteries, arterioles and capillaries in diseases classified elsewhere  -     US Carotid Bilateral; Future    3. Other specified " peripheral vascular diseases  -     US Ankle / Brachial Indices Extremity Complete; Future    4. Stage 3a chronic kidney disease (CKD)      Assessment & Plan  1. Multivessel coronary disease, well-controlled diabetes mellitus, previous carotid endarterectomy on the left, more recent ultrasound surveillance, past history of tobacco use, remote.  The patient was thoroughly informed about the advantages and disadvantages of various treatment options, including medical management only, PCI, or surgical revascularization. Given his diabetes and the complexity of his coronary artery disease, it is agreed that he would be the most suitable candidate for coronary artery bypass grafting. Given his underlying chronic kidney disease, he remains at a low risk for hemodialysis. The operative conduct and expected postoperative course were thoroughly discussed. The rationale behind the recommendation for coronary artery bypass grafting was also discussed. Pre and post-operative pulmonary function tests, bilateral ultrasound, carotid duplex, a CT of the chest, 2D complete echocardiogram and ABIs, and bilateral vein mapping of the lower extremities will be obtained. All queries were addressed to the best of my ability, and they expressed understanding of the risks. The STS risk calculator was utilized for risk discussion. The patient, a non-smoker, and I commended him for this purpose. I will continue to process his care. I discussed with him and his friend that currently he is under an elective pathway with concerning coronary disease, and I would like to expedite his testing where possible. Should he experience any changes in his symptomatology, he is to contact my office promptly or present to the ER to escalate his urgency.    Transcribed from ambient dictation for Jose F Kim MD by Jose F Kim MD.  05/29/24   14:20 CDT    Patient or patient representative verbalized consent for the use of Ambient Listening during  the visit with  Jose F Kim MD for chart documentation. 2024  08:23 CDT    Electronically signed by Jose F Kim MD at 24 0824       Facility-Administered Medications as of 2024   Medication Dose Route Frequency Provider Last Rate Last Admin    acetaminophen (OFIRMEV) injection 1,000 mg  1,000 mg Intravenous Q8H Jose F Kim MD   1,000 mg at 24 0356    acetaminophen (TYLENOL) tablet 1,000 mg  1,000 mg Oral Q6H Jose F Kim MD        Or    acetaminophen (TYLENOL) 160 MG/5ML oral solution 1,000 mg  1,000 mg Oral Q6H Jose F Kim MD        Or    acetaminophen (TYLENOL) suppository 650 mg  650 mg Rectal Q6H Jose F Kim MD        [COMPLETED] acetaminophen (TYLENOL) tablet 1,000 mg  1,000 mg Oral Once Lucy Zurita APRN   1,000 mg at 24 1102    [COMPLETED] albumin human 5 % solution 500 mL  500 mL Intravenous Once Jose F Kim MD   500 mL at 24 0242    albuterol (PROVENTIL) nebulizer solution 0.083% 2.5 mg/3mL  2.5 mg Nebulization Q4H PRN Jose F Kim MD        albuterol (PROVENTIL) nebulizer solution 0.5% 2.5 mg/0.5mL  1.25 mg Nebulization 4x Daily - RT Jose F Kim MD   1.25 mg at 24    And    ipratropium (ATROVENT) nebulizer solution 0.5 mg  0.5 mg Nebulization 4x Daily - RT Jose F Kim MD   0.5 mg at 24    [] aminocaproic acid (AMICAR) 15 g in sodium chloride 0.9 % 300 mL infusion  1 g/hr Intravenous Continuous Jose F Kim MD   Stopped at 24 2308    amiodarone (PACERONE) tablet 400 mg  400 mg Oral BID With Meals Jose F Kim MD        amiodarone 360 mg in 200 mL D5W infusion  1 mg/min Intravenous Continuous Jose F Kim MD   Stopped at 24    Followed by    amiodarone 360 mg in 200 mL D5W infusion  0.5 mg/min Intravenous Continuous Jose F Kim MD        aspirin chewable tablet 162 mg  162 mg Oral Once Jose F Kim MD        [START ON  6/27/2024] aspirin EC tablet 81 mg  81 mg Oral Daily Jose F Kim MD        atorvastatin (LIPITOR) tablet 20 mg  20 mg Oral Nightly Jose F Kim MD        bisacodyl (DULCOLAX) EC tablet 10 mg  10 mg Oral BID Jose F Kim MD        Calcium Replacement - Follow Nurse / BPA Driven Protocol   Does not apply PRN Jose F Kim MD        [COMPLETED] ceFAZolin 2000 mg IVPB in 100 mL NS (MBP)  2,000 mg Intravenous Once Lucy Zurita APRN   2,000 mg at 06/25/24 1814    ceFAZolin 2000 mg IVPB in 100 mL NS (MBP)  2 g Intravenous Q8H Jose F Kim MD   2 g at 06/26/24 0114    chlorhexidine (PERIDEX) 0.12 % solution 15 mL  15 mL Mouth/Throat Q12H Jose F Kim MD   15 mL at 06/25/24 2053    clevidipine (CLEVIPREX) infusion 0.5 mg/mL  2-32 mg/hr Intravenous Continuous PRN Jose F Kim MD        clopidogrel (PLAVIX) tablet 75 mg  75 mg Oral Daily Jose F Kim MD        cyanocobalamin injection 1,000 mcg  1,000 mcg Intramuscular Once Enriqueta Rao APRN        dexmedetomidine (PRECEDEX) 400 mcg in 100 mL NS infusion  0.2-1.5 mcg/kg/hr Intravenous Titrated Jose F Kim MD   Stopped at 06/26/24 0015    dextrose (D50W) (25 g/50 mL) IV injection 10-50 mL  10-50 mL Intravenous Q15 Min PRN Jose F Kim MD        dextrose (GLUTOSE) oral gel 15 g  15 g Oral Q15 Min PRN Jose F Kim MD        dextrose 5 % with KCl 20 mEq/L infusion  30 mL/hr Intravenous Continuous Jose F Kim MD 30 mL/hr at 06/25/24 2025 30 mL/hr at 06/25/24 2025    And    dextrose 5 % with KCl 20 mEq/L infusion  30 mL/hr Intravenous Continuous Jose F Kim MD 30 mL/hr at 06/25/24 2025 30 mL/hr at 06/25/24 2025    Enoxaparin Sodium (LOVENOX) syringe 40 mg  40 mg Subcutaneous Daily Jose F Kim MD        fentaNYL citrate (PF) (SUBLIMAZE) injection 25 mcg  25 mcg Intravenous Q30 Min PRN Jose F Kim MD   25 mcg at 06/26/24 0113    And    naloxone (NARCAN) injection 0.4 mg  0.4 mg  Intravenous Q5 Min PRN Jose F Kim MD        fentaNYL citrate (PF) (SUBLIMAZE) injection 50 mcg  50 mcg Intravenous Q30 Min PRN Jose F Kim MD   50 mcg at 06/26/24 0035    glucagon (GLUCAGEN) injection 1 mg  1 mg Intramuscular Q15 Min PRN Jose F Kim MD        Hold medication   Does not apply Continuous PRN Jose F Kim MD        insulin regular (HumuLIN R,NovoLIN R) 100 Units in sodium chloride 0.9 % 100 mL (1 Units/mL) infusion  0-100 Units/hr Intravenous Titrated Jose F Kim MD 1.6 mL/hr at 06/26/24 0436 1.6 Units/hr at 06/26/24 0436    [COMPLETED] lactated ringers bolus 1,000 mL  1,000 mL Intravenous Once Jose F Kim MD 1,000 mL/hr at 06/25/24 2027 1,000 mL at 06/25/24 2027    Lidocaine 4 % 2 patch  2 patch Transdermal Q24H Jose F Kim MD   2 patch at 06/25/24 2024    Magnesium Cardiology Dose Replacement - Follow Nurse / BPA Driven Protocol   Does not apply PRN Jose F Kim MD        meperidine (DEMEROL) injection 25 mg  25 mg Intravenous Q1H PRN Jose F Kim MD        metoprolol tartrate (LOPRESSOR) tablet 12.5 mg  12.5 mg Oral Q12H Jose F Kim MD        [COMPLETED] midazolam (VERSED) injection 1 mg  1 mg Intravenous Once Jimmy Montero MD   1 mg at 06/25/24 1118    [COMPLETED] mupirocin (BACTROBAN) 2 % nasal ointment   Each Nare Once Lucy Zurita APRN   1 Application at 06/25/24 1117    mupirocin (BACTROBAN) 2 % nasal ointment   Each Nare Q12H Jose F Kim MD   1 Application at 06/25/24 2053    niCARdipine (CARDENE) 25 mg in 250 mL NS infusion kit  5-15 mg/hr Intravenous Continuous PRN Jose F Kim MD        nitroglycerin (TRIDIL) 200 mcg/ml infusion  5 mcg/min Intravenous Continuous Jose F Kim MD 1.5 mL/hr at 06/25/24 2024 5 mcg/min at 06/25/24 2024    ondansetron (ZOFRAN) injection 4 mg  4 mg Intravenous Q6H PRN Jose F Kim MD        oxyCODONE (ROXICODONE) immediate release tablet 10 mg  10 mg Oral Q3H PRN  Jose F Kim MD        oxyCODONE (ROXICODONE) immediate release tablet 5 mg  5 mg Oral Q3H PRN Jose F Kim MD        [COMPLETED] pantoprazole (PROTONIX) injection 40 mg  40 mg Intravenous Once Jose F Kim MD   40 mg at 06/25/24 2053    Followed by    pantoprazole (PROTONIX) EC tablet 40 mg  40 mg Oral Q AM Jose F Kim MD        Pharmacy Consult   Does not apply Continuous PRN Jose F Kim MD        phenylephrine (SEB-SYNEPHRINE) 50 mg in 250 mL NS infusion  0.5-3 mcg/kg/min Intravenous Continuous PRN Jose F Kim MD 13.13 mL/hr at 06/26/24 0433 0.5 mcg/kg/min at 06/26/24 0433    Phosphorus Replacement - Follow Nurse / BPA Driven Protocol   Does not apply PRN Jose F Kim MD        polyethylene glycol (MIRALAX) packet 17 g  17 g Oral Daily Jose F Kim MD        Potassium Replacement - Follow Nurse / BPA Driven Protocol   Does not apply PRN Jose F Kim MD        [COMPLETED] pregabalin (LYRICA) capsule 25 mg  25 mg Oral Once Lucy Zurita APRN   25 mg at 06/25/24 1102    pregabalin (LYRICA) capsule 25 mg  25 mg Oral Q12H Jose F Kim MD   25 mg at 06/26/24 0541    propofol (DIPRIVAN) infusion 10 mg/mL 100 mL  5-50 mcg/kg/min Intravenous Continuous PRN Jose F Kim MD        [COMPLETED] sodium chloride 0.9 % bolus 500 mL  500 mL Intravenous Once Jose F Kim MD 1,000 mL/hr at 06/25/24 2320 500 mL at 06/25/24 2320    [COMPLETED] sodium chloride 0.9 % bolus 500 mL  500 mL Intravenous Once Jose F Kim MD 1,000 mL/hr at 06/26/24 0013 500 mL at 06/26/24 0013    vancomycin 1250 mg/250 mL 0.9% NS IVPB ([facility])  1,250 mg Intravenous Q24H Jose F Kim MD         Orders (all)        Start     Ordered    06/30/24 0600  XR Chest PA & Lateral  1 Time Imaging         06/25/24 1946 06/28/24 0600  XR Chest PA & Lateral  1 Time Imaging         06/25/24 1946 06/28/24 0300  Comprehensive Metabolic Panel  Once         06/25/24 1946     "06/27/24 1200  Remove All Dressings 48 Hours Post-Op  Once         06/25/24 1946 06/27/24 1200  Remove Midsternal Dressing on POD 3. Patient May Shower With Dressing On. If Dressing Becomes Loose or Wet Remove on POD 2  Once         06/25/24 1946 06/27/24 1200  Leave Incisions Open to Air Unless Draining or Edges Are Not Approximated  Continuous         06/25/24 1946 06/27/24 0900  aspirin EC tablet 81 mg  Daily         06/25/24 1946 06/27/24 0600  Wound Care  Daily       06/25/24 1946 06/26/24 2100  atorvastatin (LIPITOR) tablet 20 mg  Nightly         06/25/24 1946 06/26/24 1930  acetaminophen (TYLENOL) tablet 1,000 mg  Every 6 Hours        Placed in \"Or\" Linked Group    06/25/24 1946 06/26/24 1930  acetaminophen (TYLENOL) 160 MG/5ML oral solution 1,000 mg  Every 6 Hours        Placed in \"Or\" Linked Group    06/25/24 1946 06/26/24 1930  acetaminophen (TYLENOL) suppository 650 mg  Every 6 Hours        Placed in \"Or\" Linked Group    06/25/24 1946 06/26/24 1800  clopidogrel (PLAVIX) tablet 75 mg  Daily         06/25/24 1946 06/26/24 1400  Intake & Output  Every Shift       06/25/24 1946 06/26/24 1300  vancomycin 1250 mg/250 mL 0.9% NS IVPB ([facility])  Every 24 Hours         06/25/24 1959 06/26/24 1200  Vital Signs  Every 4 Hours      Comments: Perform Vitals Less Frequently Only if Patient Stable      06/25/24 1946 06/26/24 0900  Enoxaparin Sodium (LOVENOX) syringe 40 mg  Daily         06/25/24 1917 06/26/24 0900  aspirin chewable tablet 162 mg  Once         06/25/24 1946 06/26/24 0900  metoprolol tartrate (LOPRESSOR) tablet 12.5 mg  Every 12 Hours Scheduled         06/25/24 1946 06/26/24 0900  bisacodyl (DULCOLAX) EC tablet 10 mg  2 Times Daily         06/25/24 1946 06/26/24 0900  polyethylene glycol (MIRALAX) packet 17 g  Daily         06/25/24 1946 06/26/24 0800  Wean & Extubate Per Rapid Wean and Extubation Protocol  Every Morning,   Status:  Canceled       06/25/24 " "1946 06/26/24 0800  Wean FiO2 per Respiratory Therapist for SpO2  for SpO2 >92%, until at 30 % FiO2  Every Morning,   Status:  Canceled      Comments: Wean FiO2 per Respiratory Therapist for SpO2  for SpO2 >92%, until at 30 % FiO2    06/25/24 1946 06/26/24 0800  Wean Respiratory Rate by a minimum of 2 every hour if indicated until the rate of 4 is achieved.  Every Morning,   Status:  Canceled      Comments: Wean Respiratory Rate by a minimum of 2 every hour if indicated until the rate of 4 is achieved.    06/25/24 1946 06/26/24 0800  amiodarone (PACERONE) tablet 400 mg  2 Times Daily With Meals         06/25/24 1946 06/26/24 0800  pantoprazole (PROTONIX) EC tablet 40 mg  Every Early Morning        Placed in \"Followed by\" Linked Group    06/25/24 1946 06/26/24 0625  amiodarone 360 mg in 200 mL D5W infusion  Continuous        Placed in \"Followed by\" Linked Group    06/25/24 1946 06/26/24 0600  CBC & Differential  Daily       06/25/24 1946 06/26/24 0600  Renal Function Panel  Daily       06/25/24 1946 06/26/24 0600  XR Chest 1 View  Daily       06/25/24 1946 06/26/24 0600  ECG 12 Lead Drug Monitoring  Daily       06/25/24 1946 06/26/24 0600  pregabalin (LYRICA) capsule 25 mg  Every 12 Hours         06/25/24 1946 06/26/24 0600  CBC Auto Differential  PROCEDURE ONCE         06/25/24 2201 06/26/24 0546  POC Glucose Once  PROCEDURE ONCE        Comments: Complete no more than 45 minutes prior to patient eating      06/26/24 0534    06/26/24 0447  POC Glucose Once  PROCEDURE ONCE        Comments: Complete no more than 45 minutes prior to patient eating      06/26/24 0436    06/26/24 0409  Calcium, Ionized  PROCEDURE ONCE         06/26/24 0406    06/26/24 0355  Calcium, Ionized  Once         06/26/24 0354    06/26/24 0345  POC Glucose Once  PROCEDURE ONCE        Comments: Complete no more than 45 minutes prior to patient eating      06/26/24 0333    06/26/24 0330  albumin human 5 % solution " "500 mL  Once         06/26/24 0238    06/26/24 0300  Magnesium  Once         06/25/24 1946    06/26/24 0300  Protime-INR  Once,   Status:  Canceled         06/25/24 1946 06/26/24 0231  Renal Function Panel  Once         06/26/24 0230    06/26/24 0220  POC Glucose Once  PROCEDURE ONCE        Comments: Complete no more than 45 minutes prior to patient eating      06/26/24 0208    06/26/24 0200  ceFAZolin 2000 mg IVPB in 100 mL NS (MBP)  Every 8 Hours         06/25/24 1946    06/26/24 0153  Blood Gas, Arterial -  PROCEDURE ONCE         06/26/24 0150    06/26/24 0116  POC Glucose Once  PROCEDURE ONCE        Comments: Complete no more than 45 minutes prior to patient eating      06/26/24 0104    06/26/24 0100  sodium chloride 0.9 % bolus 500 mL  Once         06/26/24 0005    06/26/24 0019  POC Glucose Once  PROCEDURE ONCE        Comments: Complete no more than 45 minutes prior to patient eating      06/26/24 0008    06/26/24 0015  sodium chloride 0.9 % bolus 500 mL  Once         06/25/24 2317    06/25/24 2315  POC Glucose Once  PROCEDURE ONCE        Comments: Complete no more than 45 minutes prior to patient eating      06/25/24 2303    06/25/24 2313  Blood Gas, Arterial -  PROCEDURE ONCE         06/25/24 2310    06/25/24 2312  Blood Gas, Arterial -  Once        Comments: if no ABG has been obtained in the previous 4 hours during Vent Liberation      06/25/24 1946 06/25/24 2218  POC Glucose Once  PROCEDURE ONCE        Comments: Complete no more than 45 minutes prior to patient eating      06/25/24 2206    06/25/24 2200  Ambulate Patient  4 Times Daily       06/25/24 1946 06/25/24 2100  chlorhexidine (PERIDEX) 0.12 % solution 15 mL  Every 12 Hours Scheduled         06/25/24 1946 06/25/24 2100  mupirocin (BACTROBAN) 2 % nasal ointment  Every 12 Hours         06/25/24 1946 06/25/24 2100  amiodarone 360 mg in 200 mL D5W infusion  Continuous        Placed in \"Followed by\" Linked Group    06/25/24 1946    " "06/25/24 2041  Verify Informed Consent for Blood Product Administration  Once         06/25/24 2041 06/25/24 2041  Prepare RBC, 1 Units  Blood - Once,   Status:  Canceled         06/25/24 2041 06/25/24 2040  Transfuse RBC Infuse Each Unit Over: 3.5H  Transfusion         06/25/24 2041 06/25/24 2030  acetaminophen (OFIRMEV) injection 1,000 mg  Every 8 Hours         06/25/24 1946 06/25/24 2030  ipratropium-albuterol (DUO-NEB) nebulizer solution 1.5 mL  4 Times Daily - RT,   Status:  Discontinued         06/25/24 1946 06/25/24 2030  pantoprazole (PROTONIX) injection 40 mg  Once        Placed in \"Followed by\" Linked Group    06/25/24 1946 06/25/24 2030  albuterol (PROVENTIL) nebulizer solution 0.5% 2.5 mg/0.5mL  4 Times Daily - RT        Placed in \"And\" Linked Group    06/25/24 1950 06/25/24 2030  ipratropium (ATROVENT) nebulizer solution 0.5 mg  4 Times Daily - RT        Placed in \"And\" Linked Group    06/25/24 1950 06/25/24 2000  Vital Signs Every Hour Until 24 Hours Post Op  Every Hour      Comments: Perform Vitals Less Frequently Only if Patient Stable      06/25/24 1946 06/25/24 2000  Check Peripheral Pulses Every 1 Hour x4  Every Hour       06/25/24 1946 06/25/24 2000  Check Peripheral Pulses Every 4 Hours  Every 4 Hours       06/25/24 1946 06/25/24 2000  Intake & Output Every 15 Minutes x2, Every 30 Minutes x4  Every Hour       06/25/24 1946 06/25/24 2000  Intake & Output Every Hour Until 24 Hours Post Op  Every Hour       06/25/24 1946 06/25/24 2000  Cardiac Output Parameters On Admission, Then Every 1 Hour x4  Every Hour       06/25/24 1946 06/25/24 2000  Cardiac Output Parameters Every 2 Hours Until 24 Hours Post Op  Every Hour       06/25/24 1946 06/25/24 2000  Incentive Spirometry  Every Hour While Awake       06/25/24 1946 06/25/24 2000  Nurse and RT to Assess Patient for Readiness to Wean Criteria as Follows:  Every Hour,   Status:  Canceled      Comments: " Patient is awake and following commands, Patient is spontaneously breathing, respiratory rate <25/min, Patient's hemodynamics are stable, Patient has no evidence of clinically significant bleeding, SpO2 >92% on <30% FiO2, PEEP < or = to 8.    06/25/24 1946 06/25/24 1958  Calcium, Ionized  PROCEDURE ONCE         06/25/24 1955 06/25/24 1956  lactated ringers bolus 1,000 mL  Once         06/25/24 1954 06/25/24 1956  Blood Gas, Arterial -  PROCEDURE ONCE         06/25/24 1952 06/25/24 1951  ECG 12 Lead Other; cabg  Once         06/25/24 1950    06/25/24 1948  nitroglycerin (TRIDIL) 200 mcg/ml infusion  Continuous         06/25/24 1946 06/25/24 1948  chlorhexidine (PERIDEX) 0.12 % solution 15 mL  Every 12 Hours,   Status:  Discontinued         06/25/24 1946 06/25/24 1948  insulin regular (HumuLIN R,NovoLIN R) 100 Units in sodium chloride 0.9 % 100 mL (1 Units/mL) infusion  Titrated        Note to Pharmacy: Upon initiation of Glucommander insulin drip, make sure all other insulin orders and anti-diabetic medications have been discontinued.    06/25/24 1946 06/25/24 1948  dexmedetomidine (PRECEDEX) 400 mcg in 100 mL NS infusion  Titrated         06/25/24 1946 06/25/24 1948  aminocaproic acid (AMICAR) 15 g in sodium chloride 0.9 % 300 mL infusion  Continuous         06/25/24 1946 06/25/24 1948  Lidocaine 4 % 2 patch  Every 24 Hours Scheduled         06/25/24 1946 06/25/24 1947  Inpatient Admission  Once         06/25/24 1946 06/25/24 1947  Assess Need for Indwelling Urinary Catheter - Follow Removal Protocol  Continuous        Comments: Follow Protocol As Outlined in Process Instructions (Open Order Report to View Full Instructions)    06/25/24 1946 06/25/24 1947  Urinary Catheter Care  Every Shift       06/25/24 1946 06/25/24 1947  Code Status and Medical Interventions:  Continuous         06/25/24 1946 06/25/24 1947  Vital Signs & Post-Op Checks Every 15 Minutes x2, Every 30  "Minutes x4  Per Hospital Policy        Comments: Perform Vitals Less Frequently Only if Patient Stable    06/25/24 1946 06/25/24 1947  Daily Weights  Daily       06/25/24 1946 06/25/24 1947  Continuous Cardiac Monitoring  Continuous        Comments: Follow Standing Orders As Outlined in Process Instructions (Open Order Report to View Full Instructions)    06/25/24 1946 06/25/24 1947  Telemetry - Maintain IV Access  Continuous         06/25/24 1946 06/25/24 1947  Telemetry - Place Orders & Notify Provider of Results When Patient Experiences Acute Chest Pain, Dysrhythmia or Respiratory Distress  Until Discontinued         06/25/24 1946 06/25/24 1947  Continuous Pulse Oximetry  Continuous         06/25/24 1946 06/25/24 1947  Discontinue NG After Extubation  Once         06/25/24 1946 06/25/24 1947  Continue Indwelling Urinary Catheter  Once        Placed in \"And\" Linked Group    06/25/24 1946 06/25/24 1947  Assess Need for Indwelling Urinary Catheter - Follow Removal Protocol  Continuous,   Status:  Canceled        Comments: Indwelling Urinary Catheter Removal Criteria  Discontinue Indwelling Urinary Catheter Unless One of the Following is Present  Urinary Retention or Obstruction  Chronic Miller Catheter Use  End of Life  Critical Illness with Strict I/O   Tract or Abdominal Surgery  Stage 3/4 Sacral / Perineal Wound  Required Activity Restriction: Trauma  Required Activity Restriction: Spine Surgery  If Patient is Being Followed by Urology Contact Them PRIOR to Removal  Do Not Remove Indwelling Urinary Catheter Order is Present with a CLINICAL REASON to Maintain the Catheter. Provider is Required to Include a Clinical Reason to Maintain a Urinary Catheter    Chronic Miller Catheter Use (Present on Admission)  Assess for Continued Need & Document Medical Necessity  If Infection is Suspected, Contact the Provider       Placed in \"And\" Linked Group    06/25/24 1946 06/25/24 1947  Chest & " Mediastinal Tubes to 20cm Continuous Suction  Once         06/25/24 1946 06/25/24 1947  Begin Rewarming with Thermal Unit As Needed For Temp Less Than 96F  Once         06/25/24 1946 06/25/24 1947  ACE Wraps to Vein [place] [facility] Site for 24 Hours, Then Apply CHIP Hose  Continuous         06/25/24 1946 06/25/24 1947  Monitor QTc  Every Shift       06/25/24 1946 06/25/24 1947  Notify Provider - QTc 500 milliseconds or Greater  Until Discontinued         06/25/24 1946 06/25/24 1947  Wound Dressing  Continuous         06/25/24 1946 06/25/24 1947  Wound Dressing  Continuous         06/25/24 1946 06/25/24 1947  Notify Surgeon if Drainage From Surgical Incision Site  Until Discontinued         06/25/24 1946 06/25/24 1947  ISOLATE PACER WIRES  Continuous         06/25/24 1946 06/25/24 1947  Turn Patient Every 2 Hours or Begin Bed Rotation Once Hemodynamically Stable  Now Then Every 2 Hours         06/25/24 1946 06/25/24 1947  Advance PROM to AROM  Now Then Every 4 Hours         06/25/24 1946 06/25/24 1947  Up in Chair for Meals  Until Discontinued         06/25/24 1946 06/25/24 1947  Notify Provider - Urine Output  Until Discontinued         06/25/24 1946 06/25/24 1947  Notify Provider - Chest Tube Output  Until Discontinued         06/25/24 1946 06/25/24 1947  Notify Provider (Specify)  Until Discontinued         06/25/24 1946 06/25/24 1947  Cardiac Sternal Precautions - Maintain at All Times & Teach Patient  Continuous        Comments: Maintain Sternal Precautions at All Times & Teach Patient    06/25/24 1946 06/25/24 1947  Fall Precautions  Continuous         06/25/24 1946 06/25/24 1947  Oxygen Therapy- Nasal Cannula; 2 LPM  Continuous         06/25/24 1946 06/25/24 1947  Ventilator - Vent Mode: Alternate (Custom) Mode: See Comments  Continuous,   Status:  Canceled        Comments: Initial Mechanical Ventilation Settings upon Arrival to the ICU: SIMV, Tidal Volume 5-7  mL/kg ideal body weight based on height formula, Respiratory Rate 14-16/min, PEEP 5 cmH2O if <90kg, PEEP 8 cmH2O if >90kg, FiO2 60%.    06/25/24 1946 06/25/24 1947  Ventilator - Vent Mode: Alternate (Custom) Mode: See Comments  Continuous,   Status:  Canceled        Comments: RT adjusts Mechanical Ventilation as needed to achieve: pH 7.35-7.45, pCO2 34-45 mmHg, PaO2 > 90 mmHg, SpO2 > 92%.    06/25/24 1946 06/25/24 1947  Extubate Patient If  All of the Following Criteria are Met:  Once        Comments: Patient tolerated Spontaneous Breathing Trial for 20 minutes, respiratory rate < 25/min, SpO2 >94%, patient remains hemodynamically stable, patient is awake and following commands, RSBI f/vt < 90, Elevate HOB > 35 degrees, Vital Capacity: 10-15 mL/kg, NIF >-25 cm H2O, minute ventilation <14 l/min, Obtain ABG: pH of 7.33 to 7.55, confirm with nurse if ok to extubate. Prior to extubation, Propofol must be off, ok to proceed with Precedex with provider order. If criteria NOT met: Wait 1 hour, reassess. If still does not meet criteria: call Physician.    06/25/24 1946 06/25/24 1947  Notify Physician if Vapotherm Mask Needed to Keep SpO2 Greater Than 90% or Continuing Respiratory Distress  Until Discontinued        Comments: Notify Physician if Vapotherm mask needed to keep SpO2 >90% or continuing respiratory distress.r    06/25/24 1946 06/25/24 1947  Call Physician if Racemic needed.  Until Discontinued        Comments: Call Physician if Racemic needed.    06/25/24 1946 06/25/24 1947  RT To Evaluate & Demonstrate Use of IS  Once        Comments: RT To Evaluate & Demonstrate Use of IS    06/25/24 1946 06/25/24 1947  XR Chest 1 View  1 Time Imaging         06/25/24 1946 06/25/24 1947  ECG 12 Lead Drug Monitoring  STAT         06/25/24 1946 06/25/24 1947  Protime-INR  STAT         06/25/24 1946 06/25/24 1947  aPTT  STAT         06/25/24 1946 06/25/24 1947  Calcium, Ionized  STAT         06/25/24  1946 06/25/24 1947  Blood Gas, Arterial -  STAT         06/25/24 1946 06/25/24 1947  CBC (No Diff)  Now Then Every 4 Hours       06/25/24 1946 06/25/24 1947  Renal Function Panel  Now Then Every 4 Hours       06/25/24 1946 06/25/24 1947  Draw Labs and Notify Provider if Chest Tube Output Greater Than 100mL/hr  Until Discontinued         06/25/24 1946 06/25/24 1947  Diet Instructions to Nursing  Until Discontinued        Comments: Advance diet on POD 1 if off insulin drip    06/25/24 1946 06/25/24 1947  Advance Diet As Tolerated -  Until Discontinued         06/25/24 1946 06/25/24 1947  Cardiac Rehab Evaluation  Once        Provider:  (Not yet assigned)    06/25/24 1946 06/25/24 1947  Consult to Case Management /   Once        Provider:  (Not yet assigned)    06/25/24 1946 06/25/24 1947  PT Consult: Eval & Treat  Once         06/25/24 1946 06/25/24 1947  Consult to Diabetes Educator  Once        Provider:  (Not yet assigned)    06/25/24 1946 06/25/24 1947  Inpatient Nutrition Consult  Once        Provider:  (Not yet assigned)    06/25/24 1946 06/25/24 1947  RN to Release PRN POC Glucose Orders Per Glucommander  Continuous         06/25/24 1946 06/25/24 1947  RN to Order STAT Glucose for BG Less Than 10 mg/dl or Greater Than 600 mg/dl  Continuous        Comments: Do not delay treatment of unstable patient in order to obtain glucose sample from Lab.   Inform provider of results.    06/25/24 1946 06/25/24 1947  Prior to Initiating Glucommander™, Ensure All Prior Insulin Orders are Discontinued  Once         06/25/24 1946 06/25/24 1947  PRIOR to START of Intravenous Insulin Infusion, Notify Provider if K+ is Less Than 3.3, for Extra Potassium Orders, if Indicated. Do Not Start Insulin Drip if K+ Less Than 3.3.  Per Order Details         06/25/24 1946 06/25/24 1947  Use a Dedicated Line for Insulin Infusion (If Possible).  May Use a Carrier Fluid of NS at O  "Rate if Insulin Rate is Insufficient to Maintain IV Patency.  Prime IV Line With Insulin Infusion  Continuous         06/25/24 1946 06/25/24 1947  If Insulin Infusion is Discontinued, Glucommander Must Also be Discontinued. If Insulin Infusion is Re-Ordered Discontinue Previous Settings in Glucommander & Start New  Per Order Details         06/25/24 1946 06/25/24 1947  Patient is on Glucommander  Continuous         06/25/24 1946 06/25/24 1947  Notify Provider  Continuous        Comments: Open Order Report to View Parameters Requiring Provider Notification    06/25/24 1946 06/25/24 1947  If Patient Has Diet Order or Bolus Tube Feeds Utilize the Start Meal / Meal Bolus Feature in Glucommander  Continuous         06/25/24 1946 06/25/24 1947  Prevena Dressing to Remain in Place Until Removed By Provider  Until Discontinued         06/25/24 1946 06/25/24 1947  NPO Diet NPO Type: Strict NPO  Diet Effective Now         06/25/24 1946 06/25/24 1946  Urinary Catheter Care  Every Shift,   Status:  Canceled      Placed in \"And\" Linked Group    06/25/24 1946 06/25/24 1946  dextrose 5 % with KCl 20 mEq/L infusion  Continuous        Placed in \"And\" Linked Group    06/25/24 1946 06/25/24 1946  dextrose 5 % with KCl 20 mEq/L infusion  Continuous        Placed in \"And\" Linked Group    06/25/24 1946 06/25/24 1946  ondansetron (ZOFRAN) injection 4 mg  Every 6 Hours PRN         06/25/24 1946 06/25/24 1946  oxyCODONE (ROXICODONE) immediate release tablet 5 mg  Every 3 Hours PRN         06/25/24 1946 06/25/24 1946  fentaNYL citrate (PF) (SUBLIMAZE) injection 25 mcg  Every 30 Minutes PRN        Placed in \"And\" Linked Group    06/25/24 1946 06/25/24 1946  naloxone (NARCAN) injection 0.4 mg  Every 5 Minutes PRN        Placed in \"And\" Linked Group    06/25/24 1946 06/25/24 1946  oxyCODONE (ROXICODONE) immediate release tablet 10 mg  Every 3 Hours PRN         06/25/24 1946 06/25/24 1946  fentaNYL " citrate (PF) (SUBLIMAZE) injection 50 mcg  Every 30 Minutes PRN         06/25/24 1946 06/25/24 1946  Hold medication  Continuous PRN         06/25/24 1946 06/25/24 1946  phenylephrine (SEB-SYNEPHRINE) 50 mg in 250 mL NS infusion  Continuous PRN         06/25/24 1946 06/25/24 1946  niCARdipine (CARDENE) 25 mg in 250 mL NS infusion kit  Continuous PRN         06/25/24 1946 06/25/24 1946  clevidipine (CLEVIPREX) infusion 0.5 mg/mL  Continuous PRN         06/25/24 1946 06/25/24 1946  dextrose (GLUTOSE) oral gel 15 g  Every 15 Minutes PRN         06/25/24 1946 06/25/24 1946  dextrose (D50W) (25 g/50 mL) IV injection 10-50 mL  Every 15 Minutes PRN         06/25/24 1946 06/25/24 1946  glucagon (GLUCAGEN) injection 1 mg  Every 15 Minutes PRN         06/25/24 1946 06/25/24 1946  propofol (DIPRIVAN) infusion 10 mg/mL 100 mL  Continuous PRN         06/25/24 1946 06/25/24 1946  meperidine (DEMEROL) injection 25 mg  Every 1 Hour PRN         06/25/24 1946 06/25/24 1946  Potassium Replacement - Follow Nurse / BPA Driven Protocol  As Needed         06/25/24 1946 06/25/24 1946  Magnesium Cardiology Dose Replacement - Follow Nurse / BPA Driven Protocol  As Needed         06/25/24 1946 06/25/24 1946  Phosphorus Replacement - Follow Nurse / BPA Driven Protocol  As Needed         06/25/24 1946 06/25/24 1946  Calcium Replacement - Follow Nurse / BPA Driven Protocol  As Needed         06/25/24 1946 06/25/24 1946  Blood Gas, Arterial -  As Needed,   Status:  Canceled      Comments: Obtain ABG as needed for ventilator changes      06/25/24 1946 06/25/24 1946  Begin Weaning When Criteria Met:  As Needed,   Status:  Canceled      Comments: If all of the weaning criteria are met, Respiratory Therapist is to initiate spontaneous breathing trial with PSV pressure support mode with PEEP 5 cmH2O if <90kg, PEEP 8 cmH2O if > or = 90kg. If criteria are not met, RT or nurse is to re-evaluate in 15-30  minutes.    06/25/24 1946 06/25/24 1946  albuterol (PROVENTIL) nebulizer solution 0.083% 2.5 mg/3mL  Every 4 Hours PRN         06/25/24 1946 06/25/24 1917  Target Arousal Level RASS -1 to -2  Continuous         06/25/24 1917 06/25/24 1916  Pharmacy Consult  Continuous PRN         06/25/24 1917 06/25/24 1912  Inpatient Admission  Once         06/25/24 1917    06/25/24 1902  CBC Auto Differential  PROCEDURE ONCE         06/25/24 1901    06/25/24 1901  CBC & Differential  RELEASE UPON ORDERING         06/25/24 1901    06/25/24 1901  Fibrinogen  RELEASE UPON ORDERING         06/25/24 1901    06/25/24 1901  Protime-INR  RELEASE UPON ORDERING         06/25/24 1901    06/25/24 1901  aPTT  RELEASE UPON ORDERING         06/25/24 1901    06/25/24 1847  Transfuse Pheresed Platelets  Status:  Canceled       06/25/24 1847    06/25/24 1846  Vasopressin (PITRESSIN) 20 Units in sodium chloride 0.9 % 100 mL infusion  Continuous,   Status:  Discontinued         06/25/24 1844    06/25/24 1831  Transfuse Fresh Frozen Plasma  Status:  Canceled       06/25/24 1831    06/25/24 1743  OR Blood Pickup  Once,   Status:  Canceled         06/25/24 1742    06/25/24 1400  Instruct Patient on Coughing, Deep Breathing and Incentive Spirometry  Every 4 Hours While Awake,   Status:  Canceled       06/25/24 1026    06/25/24 1354  electrolyte-A 1,000 mL with heparin (porcine) 1,000 Units mixture  As Needed,   Status:  Discontinued         06/25/24 1425    06/25/24 1354  sodium chloride 500 mL with papaverine 2 mL mixture  As Needed,   Status:  Discontinued         06/25/24 1426 06/25/24 1354  sodium chloride 10 mL with thrombin 20,000 Units, vancomycin 1,000 mg mixture  As Needed,   Status:  Discontinued         06/25/24 1426    06/25/24 1354  methylene blue 50 MG/10ML  As Needed,   Status:  Discontinued         06/25/24 1427    06/25/24 1354  sodium chloride (NS) irrigation solution  As Needed,   Status:  Discontinued         06/25/24  1427    06/25/24 1354  sterile water irrigation solution  As Needed,   Status:  Discontinued         06/25/24 1428    06/25/24 1314  OR Blood Pickup  Once,   Status:  Canceled         06/25/24 1313    06/25/24 1300  Insert Indwelling Urinary Catheter  Once,   Status:  Canceled        Comments: Follow Protocol As Outlined in Process Instructions (Open Order Report to View Full Instructions)    06/25/24 1313    06/25/24 1114  sodium chloride 0.9 % flush 3 mL  Every 12 Hours Scheduled,   Status:  Discontinued         06/25/24 1112    06/25/24 1114  lactated ringers infusion  Continuous,   Status:  Discontinued         06/25/24 1112    06/25/24 1114  midazolam (VERSED) injection 1 mg  Once         06/25/24 1112    06/25/24 1113  Oxygen Therapy- Nasal Cannula; Titrate 1-6 LPM Per SpO2; 90 - 95%  Continuous,   Status:  Canceled         06/25/24 1112    06/25/24 1113  Continuous Pulse Oximetry  Continuous,   Status:  Canceled         06/25/24 1112    06/25/24 1113  Insert Peripheral IV  Once,   Status:  Canceled         06/25/24 1112    06/25/24 1113  Saline Lock & Maintain IV Access  Continuous,   Status:  Canceled         06/25/24 1112    06/25/24 1113  Oxygen Therapy- Nasal Cannula; Titrate 1-6 LPM Per SpO2; 90 - 95%  Continuous,   Status:  Canceled         06/25/24 1112    06/25/24 1112  Vital Signs - Per Anesthesia Protocol  As Needed,   Status:  Canceled       06/25/24 1112    06/25/24 1112  sodium chloride 0.9 % flush 3-10 mL  As Needed,   Status:  Discontinued         06/25/24 1112    06/25/24 1112  sodium chloride 0.9 % infusion 40 mL  As Needed,   Status:  Discontinued         06/25/24 1112    06/25/24 1112  midazolam (VERSED) injection 0.5 mg  Every 10 Minutes PRN,   Status:  Discontinued         06/25/24 1112    06/25/24 1106  POC Glucose Once  PROCEDURE ONCE        Comments: Complete no more than 45 minutes prior to patient eating      06/25/24 1054    06/25/24 1030  lactated ringers infusion 1,000 mL   Continuous,   Status:  Discontinued         06/25/24 1028    06/25/24 1030  lactated ringers infusion 1,000 mL  Continuous,   Status:  Discontinued         06/25/24 1028    06/25/24 1029  Follow Anesthesia Guidelines / Protocol  Once,   Status:  Canceled         06/25/24 1028    06/25/24 1029  Please Insert a Second Peripheral IV If Indicated For Procedure (All DaVinci Cases, Gastric Bypass, Sleeve Surgery, AAA, Any Vascular Bypass, Carotid, Sigmoidectomy, Colectomy (Lap Assisted), Colon Resection, Nissen, Exploratory Laparotomy, Spinal...  Once,   Status:  Canceled        Comments: Please Insert a Second Peripheral IV If Indicated For Procedure (All DaVinci Cases, Gastric Bypass, Sleeve Surgery, AAA, Any Vascular Bypass, Carotid, Sigmoidectomy, Colectomy (Lap Assisted), Colon Resection, Nissen, Exploratory Laparotomy, Spinal Fusion, Craniotomy, Thoracotomy, CABG, TAVR, Valve Surgery or Mediastinoscopy, Femoral Endarterectomy, Carotid Endarterectomy, Anterior Lumbar Exposure, or all Aneurysm Repairs    06/25/24 1028    06/25/24 1029  Insert Peripheral IV  Once,   Status:  Canceled         06/25/24 1028    06/25/24 1029  Maintain IV Access  Continuous,   Status:  Canceled         06/25/24 1028    06/25/24 1029  Insert Peripheral IV  Once,   Status:  Canceled         06/25/24 1028    06/25/24 1029  Maintain IV Access  Continuous,   Status:  Canceled         06/25/24 1028    06/25/24 1029  POC Glucose STAT  STAT,   Status:  Canceled        Comments: Complete no more than 45 minutes prior to patient eating      06/25/24 1028    06/25/24 1029  Notify Anesthesia if Blood Sugar Greater Than 180  Once,   Status:  Canceled         06/25/24 1028    06/25/24 1028  sodium chloride 0.9 % flush 10 mL  As Needed,   Status:  Discontinued         06/25/24 1028    06/25/24 1028  lidocaine PF 1% (XYLOCAINE) injection 0.5 mL  Once As Needed,   Status:  Discontinued         06/25/24 1028    06/25/24 1028  sodium chloride 0.9 % flush 3  mL  As Needed,   Status:  Discontinued         06/25/24 1028    06/25/24 1028  sodium chloride 0.9 % flush 3 mL  Every 12 Hours Scheduled,   Status:  Discontinued         06/25/24 1026    06/25/24 1028  insulin regular (HumuLIN R,NovoLIN R) 100 Units in sodium chloride 0.9 % 100 mL (1 Units/mL) infusion  Titrated,   Status:  Discontinued        Note to Pharmacy: Upon initiation of Glucommander insulin drip, make sure all other insulin orders and anti-diabetic medications have been discontinued.    06/25/24 1026    06/25/24 1028  ceFAZolin 2000 mg IVPB in 100 mL NS (MBP)  Once         06/25/24 1026    06/25/24 1028  acetaminophen (TYLENOL) tablet 1,000 mg  Once         06/25/24 1026    06/25/24 1028  mupirocin (BACTROBAN) 2 % nasal ointment  Once         06/25/24 1026    06/25/24 1028  pregabalin (LYRICA) capsule 25 mg  Once         06/25/24 1026    06/25/24 1027  Follow Anesthesia Guidelines / Protocol  Once,   Status:  Canceled         06/25/24 1026    06/25/24 1027  STS Risk Score has been calculated and discussed with the patient and family  Continuous,   Status:  Canceled         06/25/24 1026    06/25/24 1027  Obtain Informed Consent  Once,   Status:  Canceled         06/25/24 1026    06/25/24 1027  Verify NPO Status  Continuous,   Status:  Canceled         06/25/24 1026    06/25/24 1027  Place Sequential Compression Device  Once         06/25/24 1026    06/25/24 1027  Clip Hair Chin to Ankles  Once,   Status:  Canceled         06/25/24 1026    06/25/24 1027  Notify Physician - Standard  Until Discontinued,   Status:  Canceled         06/25/24 1026    06/25/24 1027  Type & Screen  STAT,   Status:  Canceled        Comments: Repeat type and screen morning of surgery if over 72 hours since last test.      06/25/24 1026    06/25/24 1027  Prepare RBC, 2 Units  Blood - Once         06/25/24 1026    06/25/24 1027  Prepare Platelet Pheresis, 1 Units  Blood - Once         06/25/24 1026    06/25/24 1027  Prepare Fresh  Frozen Plasma, 2 Units  Blood - Once         06/25/24 1026    06/25/24 1027  Insert Peripheral IV x2  Once,   Status:  Canceled         06/25/24 1026    06/25/24 1027  Saline Lock & Maintain IV Access  Continuous,   Status:  Canceled         06/25/24 1026    06/25/24 1026  sodium chloride 0.9 % flush 3-10 mL  As Needed,   Status:  Discontinued         06/25/24 1026    06/25/24 1026  sodium chloride 0.9 % flush 30 mL  Once As Needed,   Status:  Discontinued         06/25/24 1026    06/25/24 1026  sodium chloride 0.9 % infusion  Continuous PRN,   Status:  Discontinued         06/25/24 1026    06/25/24 1026  dextrose (GLUTOSE) oral gel 15 g  Every 15 Minutes PRN,   Status:  Discontinued         06/25/24 1026    06/25/24 1026  dextrose (D50W) (25 g/50 mL) IV injection 10-50 mL  Every 15 Minutes PRN,   Status:  Discontinued         06/25/24 1026    06/25/24 1026  glucagon (GLUCAGEN) injection 1 mg  Every 15 Minutes PRN,   Status:  Discontinued         06/25/24 1026    06/25/24 1026  Chlorhexidine Gluconate Cloth 2 % pads  Every 12 Hours PRN,   Status:  Discontinued         06/25/24 1026    06/25/24 1021  NPO Diet NPO Type: Sips with Meds  Diet Effective Midnight,   Status:  Canceled         06/25/24 1020    06/25/24 1020  Chlorhexidine Gluconate Cloth 2 % pads  Every 12 Hours PRN,   Status:  Discontinued         06/25/24 1020    06/20/24 0000  mupirocin (BACTROBAN) 2 % ointment         06/25/24 1037    Unscheduled  Check Peripheral Pulses As Needed  As Needed       06/25/24 1946    Unscheduled  Cardiac Output Parameters PRN Until 24 Hours Post-Op  As Needed       06/25/24 1946    Unscheduled  Pacemaker Settings - Initiate for Heart Rate Less Than 60 And / Or Hemodynamically Unstable  As Needed       06/25/24 1946    Unscheduled  Insert Nasogastric Tube If Indicated & Not Already in Place  As Needed      Comments: Indications: Nausea, Vomiting, Prolonged Intubation or to Administer Medications  Attach to Low Wall Suction  if Any Residual    06/25/24 1946    Unscheduled  Wound Care  As Needed       06/25/24 1946    Unscheduled  Dangle at Bedside After Extubation  As Needed       06/25/24 1946    Unscheduled  Up in Chair As Tolerated After Extubation  As Needed       06/25/24 1946    Unscheduled  Blood Gas, Arterial -  As Needed      Comments: Prior to Extubation      06/25/24 1946    Unscheduled  Oxygen Therapy- Nasal Cannula; Titrate 1-6 LPM Per SpO2; 90 - 95%  Continuous PRN       06/25/24 1946    Unscheduled  CBC & Differential  As Needed        Comments: Chest Tube Drainage Greater Than 100mL/hr      06/25/24 1946    Unscheduled  aPTT  As Needed        Comments: Chest Tube Drainage Greater Than 100mL/hr      06/25/24 1946    Unscheduled  Protime-INR  As Needed        Comments: Chest Tube Drainage Greater Than 100mL/hr      06/25/24 1946    Unscheduled  Fibrin Split Products  As Needed        Comments: Chest Tube Drainage Greater Than 100mL/hr      06/25/24 1946    Unscheduled  Fibrinogen  As Needed        Comments: Chest Tube Drainage Greater Than 100mL/hr      06/25/24 1946    Unscheduled  Treat Hypoglycemia As Recommended By Glucommander™ & Notify Provider of Treatment  As Needed      Comments: Follow Hypoglycemia Orders As Outlined in Process Instructions (Open Order Report to View Full Instructions)  Notify Provider Any Time Hypoglycemia Treatment is Administered    06/25/24 1946    Unscheduled  If Insulin Infusion is Paused - Follow Glucommander Instructions  As Needed       06/25/24 1946    Unscheduled  POC Glucose PRN  As Needed      Comments: Glucommander recommended POC testing will vary between 15 minutes and 2 hours.      06/25/24 1946                  Ventilator/Non-Invasive Ventilation Settings (From admission, onward)       Start     Ordered    06/25/24 1947  Ventilator - Vent Mode: Alternate (Custom) Mode: See Comments  (Initial Vent Settings upon arrival to ICU - PAD)  Continuous,   Status:  Canceled         Comments: Initial Mechanical Ventilation Settings upon Arrival to the ICU: SIMV, Tidal Volume 5-7 mL/kg ideal body weight based on height formula, Respiratory Rate 14-16/min, PEEP 5 cmH2O if <90kg, PEEP 8 cmH2O if >90kg, FiO2 60%.   Question:  Vent Mode  Answer:  Alternate (Custom) Mode: See Comments    06/25/24 1946 06/25/24 1947  Ventilator - Vent Mode: Alternate (Custom) Mode: See Comments  (Within 30 Minutes Arrival to ICU - PAD)  Continuous,   Status:  Canceled        Comments: RT adjusts Mechanical Ventilation as needed to achieve: pH 7.35-7.45, pCO2 34-45 mmHg, PaO2 > 90 mmHg, SpO2 > 92%.   Question:  Vent Mode  Answer:  Alternate (Custom) Mode: See Comments    06/25/24 1946                     Operative/Procedure Notes (all)        Jose F Kim MD at 06/25/24 1352  Version 1 of 1           Jeff Alatorre  6/25/2024    Pre-op Diagnosis:   Coronary artery disease of native artery of native heart with stable angina pectoris [I25.118]  Type 2 Diabetes mellitus  Chronic anemia  Chronic kidney disease, Stage IIIa  Carotid artery stenosis post left carotid artery endarterectomy  Peripheral arterial disease  Hypertension       Post-Op Diagnosis Codes:  Same    Procedure(s):  CORONARY ARTERY BYPASS GRAFTING x4 (LIMA/LAD, RSVG/PDA, RSVG/OM, and RSVG/D)  RIGHT LEG ENDOSCOPIC VEIN HARVEST  APPLICATION OF PREVENA INCISION MANAGEMENT SYSTEM        Surgeon(s):  Jose F Kim MD    Anesthesia: General    Staff:   Circulator: Bakari Cabrera RN  Perfusionist: Karishma Gomez  Scrub Person: Radha Mcdermott; Nabor Herrera; Pacheco Banks         Estimated Blood Loss: CELL SAVER    Urine Voided: 270 mL    Specimens:                Specimens       ID Source Type Tests Collected By Collected At Frozen?    1 Blood, Arterial Line Blood CBC AND DIFFERENTIAL  FIBRINOGEN  PROTIME-INR  APTT   Jose F Kim MD 6/25/24 1901                   Drains:   Closed/Suction Drain Left Pleural Bulb 19 Fr. (Active)   Dressing  Status Clean;Dry 06/25/24 1858   Status To bulb suction 06/25/24 1858       Urethral Catheter Temperature probe;Silicone 16 Fr. (Active)       Y Chest Tube 1 and 2 1 Right Mediastinal 24 Fr. 2 Left Mediastinal 24 Fr. (Active)   Dressing Type 1 Gauze;Transparent 06/25/24 1858   Dressing Status 1 Clean;Dry 06/25/24 1858   Dressing Intervention 1 New dressing 06/25/24 1858   Securement 1 tubing anchored to body distal to insertion site with tape 06/25/24 1858   Dressing Type 2 Gauze;Transparent 06/25/24 1858   Dressing Status 2 Clean;Dry 06/25/24 1858   Dressing Intervention 2 New dressing 06/25/24 1858   Securement 2 tubing anchored to body distal to insertion site with tape 06/25/24 1858       Findings: SEE OP NOTE        Complications: NONE          Jose F Kmi MD     Date: 6/25/2024  Time: 19:17 CDT          Electronically signed by Jose F Kim MD at 06/25/24 1922       Physician Progress Notes (last 24 hours)  Notes from 06/25/24 0602 through 06/26/24 0602   No notes of this type exist for this encounter.       Consult Notes (last 24 hours)  Notes from 06/25/24 0602 through 06/26/24 0602   No notes of this type exist for this encounter.

## 2024-06-26 NOTE — CASE MANAGEMENT/SOCIAL WORK
Discharge Planning Assessment  [facility]     Patient Name: Jeff Alatorre  MRN: [id number]  Today's Date: 6/26/2024    Admit Date: 6/25/2024        Discharge Needs Assessment       Row Name 06/26/24 1051       Living Environment    People in Home significant other    Name(s) of People in Home Neisha    Current Living Arrangements home    Primary Care Provided by self    Provides Primary Care For no one    Family Caregiver if Needed significant other    Family Caregiver Names Neisha    Able to Return to Prior Arrangements yes       Resource/Environmental Concerns    Resource/Environmental Concerns none       Transition Planning    Patient/Family Anticipates Transition to home with family    Transportation Anticipated family or friend will provide       Discharge Needs Assessment    Readmission Within the Last 30 Days no previous admission in last 30 days    Equipment Currently Used at Home none    Concerns to be Addressed no discharge needs identified    Equipment Needed After Discharge none    Discharge Coordination/Progress spoke to patient who lives with significant other; she will be with patient at DC; patient has been independent at home prior to illness; has RX coverage and PCP; will follow for DC needs                   Discharge Plan    No documentation.                 Continued Care and Services - Admitted Since 6/25/2024    No active coordination exists for this encounter.          Demographic Summary    No documentation.                  Functional Status    No documentation.                  Psychosocial    No documentation.                  Abuse/Neglect    No documentation.                  Legal    No documentation.                  Substance Abuse    No documentation.                  Patient Forms    No documentation.                     Lucy Briones RN

## 2024-06-27 ENCOUNTER — APPOINTMENT (OUTPATIENT)
Dept: GENERAL RADIOLOGY | Facility: HOSPITAL | Age: 84
DRG: 236 | End: 2024-06-27
Payer: MEDICARE

## 2024-06-27 LAB
ALBUMIN SERPL-MCNC: 3.6 G/DL (ref 3.5–5.2)
ANION GAP SERPL CALCULATED.3IONS-SCNC: 11 MMOL/L (ref 5–15)
ANION GAP SERPL CALCULATED.3IONS-SCNC: 11 MMOL/L (ref 5–15)
ARTERIAL PATENCY WRIST A: ABNORMAL
ATMOSPHERIC PRESS: 750 MMHG
BASE EXCESS BLDA CALC-SCNC: -2.1 MMOL/L (ref 0–2)
BASOPHILS # BLD AUTO: 0.04 10*3/MM3 (ref 0–0.2)
BASOPHILS NFR BLD AUTO: 0.3 % (ref 0–1.5)
BDY SITE: ABNORMAL
BH BB BLOOD EXPIRATION DATE: NORMAL
BH BB BLOOD TYPE BARCODE: 5100
BH BB BLOOD TYPE BARCODE: 6200
BH BB BLOOD TYPE BARCODE: 9500
BH BB BLOOD TYPE BARCODE: 9500
BH BB BLOOD TYPE BARCODE: NORMAL
BH BB DISPENSE STATUS: NORMAL
BH BB PRODUCT CODE: NORMAL
BH BB UNIT NUMBER: NORMAL
BODY TEMPERATURE: 37
BUN SERPL-MCNC: 28 MG/DL (ref 8–23)
BUN SERPL-MCNC: 29 MG/DL (ref 8–23)
BUN/CREAT SERPL: 19.2 (ref 7–25)
BUN/CREAT SERPL: 19.7 (ref 7–25)
CA-I BLD-MCNC: 4.7 MG/DL (ref 4.6–5.4)
CALCIUM SPEC-SCNC: 8.5 MG/DL (ref 8.6–10.5)
CALCIUM SPEC-SCNC: 8.6 MG/DL (ref 8.6–10.5)
CHLORIDE SERPL-SCNC: 103 MMOL/L (ref 98–107)
CHLORIDE SERPL-SCNC: 106 MMOL/L (ref 98–107)
CO2 SERPL-SCNC: 21 MMOL/L (ref 22–29)
CO2 SERPL-SCNC: 22 MMOL/L (ref 22–29)
COHGB MFR BLD: 1 % (ref 0–5)
CREAT SERPL-MCNC: 1.42 MG/DL (ref 0.76–1.27)
CREAT SERPL-MCNC: 1.51 MG/DL (ref 0.76–1.27)
CROSSMATCH INTERPRETATION: NORMAL
CROSSMATCH INTERPRETATION: NORMAL
DEPRECATED RDW RBC AUTO: 58.8 FL (ref 37–54)
EGFRCR SERPLBLD CKD-EPI 2021: 45.3 ML/MIN/1.73
EGFRCR SERPLBLD CKD-EPI 2021: 48.7 ML/MIN/1.73
EOSINOPHIL # BLD AUTO: 0.25 10*3/MM3 (ref 0–0.4)
EOSINOPHIL NFR BLD AUTO: 2 % (ref 0.3–6.2)
ERYTHROCYTE [DISTWIDTH] IN BLOOD BY AUTOMATED COUNT: 17.2 % (ref 12.3–15.4)
GAS FLOW AIRWAY: 3 LPM
GLUCOSE BLDC GLUCOMTR-MCNC: 145 MG/DL (ref 70–130)
GLUCOSE BLDC GLUCOMTR-MCNC: 146 MG/DL (ref 70–130)
GLUCOSE BLDC GLUCOMTR-MCNC: 150 MG/DL (ref 70–130)
GLUCOSE BLDC GLUCOMTR-MCNC: 160 MG/DL (ref 70–130)
GLUCOSE SERPL-MCNC: 135 MG/DL (ref 65–99)
GLUCOSE SERPL-MCNC: 148 MG/DL (ref 65–99)
HCO3 BLDA-SCNC: 22.2 MMOL/L (ref 20–26)
HCT VFR BLD AUTO: 28.6 % (ref 37.5–51)
HCT VFR BLD CALC: 29.6 % (ref 38–51)
HGB BLD-MCNC: 9.4 G/DL (ref 13–17.7)
HGB BLDA-MCNC: 9.7 G/DL (ref 14–18)
IMM GRANULOCYTES # BLD AUTO: 0.08 10*3/MM3 (ref 0–0.05)
IMM GRANULOCYTES NFR BLD AUTO: 0.6 % (ref 0–0.5)
LYMPHOCYTES # BLD AUTO: 1.14 10*3/MM3 (ref 0.7–3.1)
LYMPHOCYTES NFR BLD AUTO: 9.1 % (ref 19.6–45.3)
Lab: ABNORMAL
MCH RBC QN AUTO: 31.3 PG (ref 26.6–33)
MCHC RBC AUTO-ENTMCNC: 32.9 G/DL (ref 31.5–35.7)
MCV RBC AUTO: 95.3 FL (ref 79–97)
METHGB BLD QL: 0.7 % (ref 0–3)
MODALITY: ABNORMAL
MONOCYTES # BLD AUTO: 1.08 10*3/MM3 (ref 0.1–0.9)
MONOCYTES NFR BLD AUTO: 8.6 % (ref 5–12)
NEUTROPHILS NFR BLD AUTO: 10 10*3/MM3 (ref 1.7–7)
NEUTROPHILS NFR BLD AUTO: 79.4 % (ref 42.7–76)
NRBC BLD AUTO-RTO: 0 /100 WBC (ref 0–0.2)
OXYHGB MFR BLDV: 91.9 % (ref 94–99)
PCO2 BLDA: 35 MM HG (ref 35–45)
PCO2 TEMP ADJ BLD: 35 MM HG (ref 35–45)
PH BLDA: 7.41 PH UNITS (ref 7.35–7.45)
PH, TEMP CORRECTED: 7.41 PH UNITS (ref 7.35–7.45)
PHOSPHATE SERPL-MCNC: 4.1 MG/DL (ref 2.5–4.5)
PLATELET # BLD AUTO: 117 10*3/MM3 (ref 140–450)
PMV BLD AUTO: 10.7 FL (ref 6–12)
PO2 BLDA: 64.2 MM HG (ref 83–108)
PO2 TEMP ADJ BLD: 64.2 MM HG (ref 83–108)
POTASSIUM BLDA-SCNC: 4 MMOL/L (ref 3.5–5.2)
POTASSIUM SERPL-SCNC: 4 MMOL/L (ref 3.5–5.2)
POTASSIUM SERPL-SCNC: 4.2 MMOL/L (ref 3.5–5.2)
QT INTERVAL: 174 MS
QT INTERVAL: 376 MS
QT INTERVAL: 380 MS
QT INTERVAL: 382 MS
QT INTERVAL: 406 MS
QTC INTERVAL: 283 MS
QTC INTERVAL: 390 MS
QTC INTERVAL: 429 MS
QTC INTERVAL: 431 MS
QTC INTERVAL: 459 MS
RBC # BLD AUTO: 3 10*6/MM3 (ref 4.14–5.8)
SAO2 % BLDCOA: 93.5 % (ref 94–99)
SODIUM BLDA-SCNC: 138 MMOL/L (ref 136–145)
SODIUM SERPL-SCNC: 136 MMOL/L (ref 136–145)
SODIUM SERPL-SCNC: 138 MMOL/L (ref 136–145)
UNIT  ABO: NORMAL
UNIT  RH: NORMAL
VENTILATOR MODE: ABNORMAL
WBC NRBC COR # BLD AUTO: 12.59 10*3/MM3 (ref 3.4–10.8)

## 2024-06-27 PROCEDURE — 25810000003 SODIUM CHLORIDE 0.9 % SOLUTION: Performed by: THORACIC SURGERY (CARDIOTHORACIC VASCULAR SURGERY)

## 2024-06-27 PROCEDURE — 86920 COMPATIBILITY TEST SPIN: CPT

## 2024-06-27 PROCEDURE — 83050 HGB METHEMOGLOBIN QUAN: CPT

## 2024-06-27 PROCEDURE — 36430 TRANSFUSION BLD/BLD COMPNT: CPT

## 2024-06-27 PROCEDURE — 85025 COMPLETE CBC W/AUTO DIFF WBC: CPT | Performed by: THORACIC SURGERY (CARDIOTHORACIC VASCULAR SURGERY)

## 2024-06-27 PROCEDURE — 25010000002 CEFAZOLIN PER 500 MG: Performed by: THORACIC SURGERY (CARDIOTHORACIC VASCULAR SURGERY)

## 2024-06-27 PROCEDURE — 71045 X-RAY EXAM CHEST 1 VIEW: CPT

## 2024-06-27 PROCEDURE — 94799 UNLISTED PULMONARY SVC/PX: CPT

## 2024-06-27 PROCEDURE — P9016 RBC LEUKOCYTES REDUCED: HCPCS

## 2024-06-27 PROCEDURE — 25010000002 VANCOMYCIN 10 G RECONSTITUTED SOLUTION: Performed by: THORACIC SURGERY (CARDIOTHORACIC VASCULAR SURGERY)

## 2024-06-27 PROCEDURE — 93010 ELECTROCARDIOGRAM REPORT: CPT | Performed by: INTERNAL MEDICINE

## 2024-06-27 PROCEDURE — 63710000001 INSULIN LISPRO (HUMAN) PER 5 UNITS: Performed by: THORACIC SURGERY (CARDIOTHORACIC VASCULAR SURGERY)

## 2024-06-27 PROCEDURE — 93005 ELECTROCARDIOGRAM TRACING: CPT | Performed by: THORACIC SURGERY (CARDIOTHORACIC VASCULAR SURGERY)

## 2024-06-27 PROCEDURE — 94761 N-INVAS EAR/PLS OXIMETRY MLT: CPT

## 2024-06-27 PROCEDURE — 80069 RENAL FUNCTION PANEL: CPT | Performed by: THORACIC SURGERY (CARDIOTHORACIC VASCULAR SURGERY)

## 2024-06-27 PROCEDURE — 94664 DEMO&/EVAL PT USE INHALER: CPT

## 2024-06-27 PROCEDURE — 80048 BASIC METABOLIC PNL TOTAL CA: CPT | Performed by: THORACIC SURGERY (CARDIOTHORACIC VASCULAR SURGERY)

## 2024-06-27 PROCEDURE — 25010000002 ENOXAPARIN PER 10 MG: Performed by: THORACIC SURGERY (CARDIOTHORACIC VASCULAR SURGERY)

## 2024-06-27 PROCEDURE — 97116 GAIT TRAINING THERAPY: CPT

## 2024-06-27 PROCEDURE — 82375 ASSAY CARBOXYHB QUANT: CPT

## 2024-06-27 PROCEDURE — 99024 POSTOP FOLLOW-UP VISIT: CPT | Performed by: THORACIC SURGERY (CARDIOTHORACIC VASCULAR SURGERY)

## 2024-06-27 PROCEDURE — 86900 BLOOD TYPING SEROLOGIC ABO: CPT

## 2024-06-27 PROCEDURE — 82948 REAGENT STRIP/BLOOD GLUCOSE: CPT

## 2024-06-27 PROCEDURE — 82805 BLOOD GASES W/O2 SATURATION: CPT

## 2024-06-27 RX ADMIN — ACETAMINOPHEN 1000 MG: 500 TABLET, FILM COATED ORAL at 01:08

## 2024-06-27 RX ADMIN — PREGABALIN 25 MG: 25 CAPSULE ORAL at 06:47

## 2024-06-27 RX ADMIN — ALBUTEROL SULFATE 1.25 MG: 2.5 SOLUTION RESPIRATORY (INHALATION) at 06:35

## 2024-06-27 RX ADMIN — ENOXAPARIN SODIUM 40 MG: 100 INJECTION SUBCUTANEOUS at 08:30

## 2024-06-27 RX ADMIN — AMIODARONE HYDROCHLORIDE 400 MG: 200 TABLET ORAL at 17:33

## 2024-06-27 RX ADMIN — CHLORHEXIDINE GLUCONATE 0.12% ORAL RINSE 15 ML: 1.2 LIQUID ORAL at 20:27

## 2024-06-27 RX ADMIN — ALBUTEROL SULFATE 1.25 MG: 2.5 SOLUTION RESPIRATORY (INHALATION) at 18:16

## 2024-06-27 RX ADMIN — BISACODYL 10 MG: 5 TABLET, COATED ORAL at 20:26

## 2024-06-27 RX ADMIN — AMIODARONE HYDROCHLORIDE 400 MG: 200 TABLET ORAL at 08:30

## 2024-06-27 RX ADMIN — Medication 1 APPLICATION: at 08:30

## 2024-06-27 RX ADMIN — IPRATROPIUM BROMIDE 0.5 MG: 0.5 SOLUTION RESPIRATORY (INHALATION) at 06:36

## 2024-06-27 RX ADMIN — PREGABALIN 25 MG: 25 CAPSULE ORAL at 17:33

## 2024-06-27 RX ADMIN — ALBUTEROL SULFATE 1.25 MG: 2.5 SOLUTION RESPIRATORY (INHALATION) at 14:23

## 2024-06-27 RX ADMIN — PANTOPRAZOLE SODIUM 40 MG: 40 TABLET, DELAYED RELEASE ORAL at 06:47

## 2024-06-27 RX ADMIN — IPRATROPIUM BROMIDE 0.5 MG: 0.5 SOLUTION RESPIRATORY (INHALATION) at 18:16

## 2024-06-27 RX ADMIN — IPRATROPIUM BROMIDE 0.5 MG: 0.5 SOLUTION RESPIRATORY (INHALATION) at 09:53

## 2024-06-27 RX ADMIN — METOPROLOL TARTRATE 12.5 MG: 25 TABLET, FILM COATED ORAL at 08:30

## 2024-06-27 RX ADMIN — OXYCODONE HYDROCHLORIDE 5 MG: 5 TABLET ORAL at 04:46

## 2024-06-27 RX ADMIN — CLOPIDOGREL BISULFATE 75 MG: 75 TABLET, FILM COATED ORAL at 17:33

## 2024-06-27 RX ADMIN — CHLORHEXIDINE GLUCONATE 1 APPLICATION: 500 CLOTH TOPICAL at 04:45

## 2024-06-27 RX ADMIN — INSULIN LISPRO 2 UNITS: 100 INJECTION, SOLUTION INTRAVENOUS; SUBCUTANEOUS at 20:27

## 2024-06-27 RX ADMIN — ACETAMINOPHEN 1000 MG: 500 TABLET, FILM COATED ORAL at 06:47

## 2024-06-27 RX ADMIN — METOPROLOL TARTRATE 12.5 MG: 25 TABLET, FILM COATED ORAL at 20:26

## 2024-06-27 RX ADMIN — CHLORHEXIDINE GLUCONATE 0.12% ORAL RINSE 15 ML: 1.2 LIQUID ORAL at 08:30

## 2024-06-27 RX ADMIN — INSULIN LISPRO 2 UNITS: 100 INJECTION, SOLUTION INTRAVENOUS; SUBCUTANEOUS at 17:33

## 2024-06-27 RX ADMIN — ALBUTEROL SULFATE 1.25 MG: 2.5 SOLUTION RESPIRATORY (INHALATION) at 09:53

## 2024-06-27 RX ADMIN — IPRATROPIUM BROMIDE 0.5 MG: 0.5 SOLUTION RESPIRATORY (INHALATION) at 14:23

## 2024-06-27 RX ADMIN — ASPIRIN 81 MG: 81 TABLET, COATED ORAL at 08:29

## 2024-06-27 RX ADMIN — ACETAMINOPHEN 1000 MG: 500 TABLET, FILM COATED ORAL at 13:06

## 2024-06-27 RX ADMIN — LIDOCAINE 2 PATCH: 4 PATCH TOPICAL at 08:30

## 2024-06-27 RX ADMIN — ATORVASTATIN CALCIUM 20 MG: 10 TABLET, FILM COATED ORAL at 20:26

## 2024-06-27 RX ADMIN — ACETAMINOPHEN 1000 MG: 500 TABLET, FILM COATED ORAL at 20:26

## 2024-06-27 RX ADMIN — POLYETHYLENE GLYCOL 3350 17 G: 17 POWDER, FOR SOLUTION ORAL at 08:30

## 2024-06-27 RX ADMIN — SODIUM CHLORIDE 1250 MG: 9 INJECTION, SOLUTION INTRAVENOUS at 12:58

## 2024-06-27 RX ADMIN — CEFAZOLIN 2 G: 2 INJECTION, POWDER, FOR SOLUTION INTRAMUSCULAR; INTRAVENOUS at 10:27

## 2024-06-27 RX ADMIN — BISACODYL 10 MG: 5 TABLET, COATED ORAL at 08:30

## 2024-06-27 RX ADMIN — CEFAZOLIN 2 G: 2 INJECTION, POWDER, FOR SOLUTION INTRAMUSCULAR; INTRAVENOUS at 01:08

## 2024-06-27 NOTE — PLAN OF CARE
Goal Outcome Evaluation:      Patient needs min assist to stand. Ambulates 200' with CGA. No rests. SATs on 3 lpm 96 -93%. HR 80 -82. Worked on purse lip breathing.

## 2024-06-27 NOTE — PLAN OF CARE
Goal Outcome Evaluation:  Plan of Care Reviewed With: patient           Outcome Evaluation: Pt A&Ox4, requiring 4L nc, on nicky at 0.4 and tolerating well. Atrially paced at 80. Pt had rythm change early in the evening. Pt went into a-fib at a controlled rate. Provider was notified and order for amiodarone gtt was given. Amiodarone currently infusing at 0.5. Nitro infusing at 5. Good cardiac output this shift. A-febrile. 170 out of mediastinal, 40 out of SHANNON. Good urine output. Pt was  able to ambulate unit X1 without any distress. Safety maintained. Call light within reach.

## 2024-06-27 NOTE — OP NOTE
Patient Name:  Jeff Alatorre  YOB: 1940    Date of Procedure:  6/25/2024    Preoperative Diagnosis:   1.  Coronary artery disease of native artery of native heart with stable angina pectoris [I25.118]  2.  Type 2 diabetes mellitus  3.  Chronic kidney disease, stage IIIa  4.  Obstructive sleep apnea  5.  Carotid artery disease post left carotid artery endarterectomy  6.  Peripheral artery disease  7.  Hypertension    Postoperative Diagnosis:    Same      Procedures Performed:  1. Coronary artery bypass grafting-4 vessel (left internal mammary artery/left anterior descending, reverse saphenous vein graft/obtuse marginal, reverse saphenous vein graft/diagonal artery, and reverse saphenous vein graft/posterior descending artery)  2. Right leg endoscopic vein harvest  3. Application of Prevena Incision management system      Surgeon:  Jose F Kim MD  Assistants:   Nabor Herrera  Anesthesia Staff:  CRNA: Dave Vivas CRNA; Jimmy Montero MD  Anesthesia Type:  General    Estimated Blood Loss:  Minimal (Cell Saver)  Drains:  1. 19 [place] Sammy drain-left pleural space  2. 24 [place] Sammy drains-anterior and posterior mediastinum    Specimens:  None     Operative Findings:  Excellent arterial conduit. Good venous conduit. The LAD at site of grafting measured 2.5 mm in size and had mild atherosclerotic disease burden. Post bypass grafting had excellent arterial runoff.  The dominant OM artery measured 2.2 mm in size and had mild atherosclerotic disease burden.  Post bypass grafting had excellent arterial runoff. The Diagonal artery at site of grafting measured 1.5 mm in size and had moderate atherosclerotic disease burden. Post bypass grafting had excellent arterial runoff.The posterior descending artery measured 1.8 mm in size and had excellent arterial runoff with moderate atherosclerotic disease burden.  Transesophageal echocardiography salient findings include preserved left ventricular  function with no significant valvular dysfunction.        Operative description in detail:  The patient was taken to the operative suite where he was placed in a supine position.  Induction of general anesthesia and placement of a single-lumen endotracheal tube was performed without remark.  Appropriate arterial and venous access was established without remark.  Through the previously placed right internal jugular central venous line, a pulmonary artery catheter was floated into position.  The patient was then prepped and draped in the usual and sterile surgical fashion.  A timeout was performed.  Perioperative antibiotics were administered. Beta blocker was given.    A two team approach was utilized to harvest both the left internal mammary artery and the greater saphenous vein.   Briefly the right greater saphenous vein was taken at the level of the knee medially and taken in a prograde fashion for an anticipated length of vein to the fossa ovalis.  Blunt dissection was performed and each branch was taken using the [email or web address] device.  A counter stab incision was made with both proximal and distal control obtained.  The vein was extracted from a hemostatic tunnel.  The leg was closed in a layered fashion with Vicryl suture.  The vein was prepared without remark.      While the vein was being harvested, median sternotomy was performed by me. Pericardial fat in midline from the level of the innominate vein to the level of the diaphragm was divided in midline.  A Rultract device was used to expose the posterior sternal table.  The left internal mammary artery was taken down using a standard pedicle technique with a combination of electrocautery and/or clips to control all side branches.  At that time, the patient was systemically anticoagulated with IV heparin.  A 19 [place] Sammy drain was placed in the left pleural space.  After suitable time of circulating heparin, clips were placed doubly onto the mammary artery  distally and it was divided proximal to the previously placed clips.  It had excellent arterial inflow.  The mammary artery was controlled distally.  The mammary artery harvest bed was hemostatic.  The Rultract device was removed from the sterile field and a [place] retractor was used for exposure.  The mammary artery was prepared for bypass grafting and deemed excellent.  A pericardial well was created. I elected to cannulate the heart centrally accessing distally the ascending aorta and the right atrial appendage.  Each cannula was placed in continuity with the appropriate pump line. Retrograde autologous prime was completed as indicated.  A combined cardioplegia/aortic root vent set was secured with a horizontal mattress 4-0 Prolene suture.  With an appropriate ACT and all in readiness, cardiopulmonary bypass was commenced.  I dissected out the obtuse marginal, diagonal, PDA, and the LAD for suitable sites for bypass grafting.  With that, I proceeded to apply the aortic cross-clamp and administered cardioplegia utilizing a warm induction strategy.  Upon achieving electrical-mechanical arrest, cold blood potassium cardioplegia was administered to a total standard dose.  We did implement systemic hypothermia mild and applied topical hypothermia to the ventricle.  At appropriate intervals, doses of cardioplegia were administered throughout the conduct of bypass grafting.     I directed my attention towards the PDA.  A coronary arteriotomy was made and augmented to size with French scissors.  It is as per operative findings.  The anastomosis was constructed in an end-to-side orientation with running 7-0 Prolene suture.  With that its proximal anastomosis was  constructed following aortotomy with 11 blade and augmented size with 4 mm arterial punch subsequently.  This was constructed in a side aorta end vein graft fashion with running 6-0 Prolene suture. An AC  was placed.  The graft was assessed for lay which  was excellent. It is without tension or torsion.     To that end I directed my attention towards the obtuse marginal.  A coronary arteriotomy was made and augmented to size with French scissors.  It is as per operative findings.  The anastomosis was constructed in an end-to-side orientation with running 7-0 Prolene suture.  With that its proximal anastomosis was constructed following aortotomy with 11 blade and augmented size with 4 mm arterial punch subsequently.  This was constructed in a side aorta end vein graft fashion with running 6-0 Prolene suture. An AC  was placed.  The graft was assessed for lay which was excellent.  It is without tension or torsion.     I directed my attention towards the diagonal.  A coronary arteriotomy was made and augmented to size with French scissors.  It is as per operative findings.  The anastomosis was constructed in an end-to-side orientation with running 7-0 Prolene suture.  With that its proximal anastomosis was  constructed following aortotomy with 11 blade and augmented size with 4 mm arterial punch subsequently.  This was constructed in a side aorta end vein graft fashion with running 6-0 Prolene suture. An AC  was placed.  The graft was assessed for lay which was excellent. It is without tension or torsion.     During a dose of cardioplegia, a pericardial slit left lateral was made while dividing associate pericardiophrenic fat and vasculature while being mindful of the lay of the phrenic nerve.  As such I proceeded to obtain control proximally onto the mammary artery and spatulated it distally.  I grafted this onto the LAD following coronary arteriotomy using previous described techniques.  The anastomosis was constructed in an end-to-side orientation with running 7-0 Prolene suture.  It is as per operative findings and the anastomosis was hemostatic.  I did tack the mammary artery pedicle to the anterior aspect heart with 6-0 Prolene sutures.    With that  being accomplished, a terminal hotshot was administered.  The patient was placed in Trendelenburg position.  Upon completion of terminal hotshot and placement of temporary epicardial atrial and ventricular pacing wires, with the aortic vent on high and pump flows diminished, the aortic cross-clamp was released.  With that, full support was implemented.  A perfusable rhythm was obtained after cardioversion.  A nonworking beating phase was implemented.  Ventilation restored.  I surveyed each graft and each anastomosis was hemostatic and had excellent lay.  With all in readiness, the heart was allowed to fill.  De-airing maneuvers were performed as guided by transesophageal echocardiography.  With that the heart was decompressed and we removed the aortic root vent/cardioplegia cannula set.  Its associated pursestring suture was tied securely and this was reinforced as per matter of routine. The table was now placed in neutral position.  With all in readiness, we proceeded to wean from and separate from cardiopulmonary bypass.  I did decannulate the venous line and snared down its associated pursestring suture.  Systemic intravenous protamine was administered.  All associated blood volume was returned to the patient. With continued good hemodynamics, I decannulated the arterial line and tied down its associated pursestring sutures.  At this time I tied down the previously snared pursestring suture.  The mediastinum was drained with 24 Wallisian Sammy drains placed anteriorly and posteriorly.  I surveyed the chest and hemostasis was pristine.  With that I impregnated the sternal edges with vancomycin, thrombin, and Gelfoam paste.  The sternum was reapproximated with stainless sterile wires placed in an interrupted fashion.  In layers anatomically the soft tissue planes were reapproximated. Instruments, sharps, and sponge counts were reported as correct.  The Prevena incision management system was applied to the sternotomy  incision.        Complications: None     Disposition: Transferred to ICU in stable and guarded condition.    Jose F Kim MD

## 2024-06-27 NOTE — THERAPY TREATMENT NOTE
Acute Care - Physical Therapy Treatment Note  [facility]     Patient Name: Jeff Alatorre  : 1940  MRN: [id number]  Today's Date: 2024      Visit Dx:     ICD-10-CM ICD-9-CM   1. Impaired functional mobility and activity tolerance [Z74.09]  Z74.09 V49.89   2. Coronary artery disease of native artery of native heart with stable angina pectoris  I25.118 414.01     413.9     Patient Active Problem List   Diagnosis    Hx of adenomatous colonic polyps    Adenomatous polyps    Idiopathic gout of multiple sites    Primary hypertension    Type 2 diabetes mellitus without complication, without long-term current use of insulin    Hyperlipidemia LDL goal <100    Erectile dysfunction due to arterial insufficiency    Anemia    B12 deficiency    Stage 3a chronic kidney disease (CKD)    Seasonal allergic rhinitis    ANYI (obstructive sleep apnea)    Carotid artery disease    ROSALES (dyspnea on exertion)    Coronary artery disease of native artery of native heart with stable angina pectoris    Bilateral carotid artery stenosis    History of left-sided carotid endarterectomy    Stenosis of right subclavian artery    CAD (coronary artery disease)     Past Medical History:   Diagnosis Date    Arthritis     Chronic kidney disease     Coronary artery disease of native artery of native heart with stable angina pectoris 2024    Erectile dysfunction     Gout     History of diverticulitis     HTN (hypertension)     Hx of adenomatous colonic polyps 10/13/2020    Hypercholesteremia     Low testosterone     Polycythemia vera 10/13/2020    Squamous cell carcinoma of skin 10/2021    top of head    Type 2 diabetes mellitus without complication, without long-term current use of insulin 2022     Past Surgical History:   Procedure Laterality Date    CARDIAC CATHETERIZATION Left 2024    Procedure: Coronary angiography;  Surgeon: Zaid Contreras MD;  Location: [facility] INVASIVE LOCATION;  Service: Cardiology;   Laterality: Left;    CARDIAC CATHETERIZATION Left 05/20/2024    Procedure: Percutaneous Coronary Intervention;  Surgeon: Zaid Contreras MD;  Location:  [facility] CATH INVASIVE LOCATION;  Service: Cardiology;  Laterality: Left;    CAROTID ENDARTERECTOMY Left     CATARACT EXTRACTION, BILATERAL      COLON SURGERY      COLONOSCOPY      COLONOSCOPY N/A 07/27/2021    Procedure: COLONOSCOPY WITH ANESTHESIA;  Surgeon: Luciano Alatorre DO;  Location: [facility] ENDOSCOPY;  Service: Gastroenterology;  Laterality: N/A;  preop; hx of polyps  postop; diverticulosis   PCP Devon Moore     CORONARY ARTERY BYPASS GRAFT N/A 6/25/2024    Procedure: CORONARY ARTERY BYPASS GRAFTING x4, LEFT INTERNAL MAMMARY ARTERY GRAFT, RIGHT LEG ENDOSCOPIC VEIN HARVEST, TRANSESOPHAGEAL ECHOCARDIOGRAM, APPLICATION OF PREVENA;  Surgeon: Jose F Kim MD;  Location:  [facility] OR;  Service: Cardiothoracic;  Laterality: N/A;    PENILE PROSTHESIS IMPLANT N/A 03/14/2023    Procedure: 3-PIECE INFLATABLE PENILE PROSTHESIS PLACEMENT;  Surgeon: Garcia Santana MD;  Location:  [facility] OR;  Service: Urology;  Laterality: N/A;    VASECTOMY       PT Assessment (Last 12 Hours)       PT Evaluation and Treatment       Row Name 06/27/24 0750          Physical Therapy Time and Intention    Subjective Information complains of;weakness;pain  -SHANNON     Document Type therapy note (daily note)  -     Mode of Treatment physical therapy  -       Row Name 06/27/24 0750          General Information    Existing Precautions/Restrictions fall;oxygen therapy device and L/min;sternal  external pacemaker  -       Row Name 06/27/24 0750          Pain    Pretreatment Pain Rating 3/10  -SHANNON     Posttreatment Pain Rating 4/10  -SHANNON     Pain Location - chest  -SHANNON       Row Name 06/27/24 0750          Sit-Stand Transfer    Sit-Stand [place] (Transfers) verbal cues;minimum assist (75% patient effort)  -SHANNON       Row Name 06/27/24 0750          Stand-Sit Transfer    Stand-Sit  [place] (Transfers) verbal cues;contact guard  -SHANNON       Row Name 06/27/24 0750          Gait/Stairs (Locomotion)    [place] Level (Gait) contact guard  -SHANNON     Distance in Feet (Gait) 200  -SHANNON     Deviations/Abnormal Patterns (Gait) nani decreased  -SHANNON     Bilateral Gait Deviations forward flexed posture  -SHANNON       Row Name 06/27/24 0750          Motor Skills    Comments, Therapeutic Exercise protocol x 15 - 20  -SHANNON       Row Name             Wound 06/25/24 1415 midline sternal Incision    Wound - Properties Group Placement Date: 06/25/24  -SF Placement Time: 1415  -SF Present on Original Admission: N  -SF Orientation: midline  -SF Location: sternal  -SF Primary Wound Type: Incision  -SF    Retired Wound - Properties Group Placement Date: 06/25/24  -SF Placement Time: 1415  -SF Present on Original Admission: N  -SF Orientation: midline  -SF Location: sternal  -SF Primary Wound Type: Incision  -SF    Retired Wound - Properties Group Date first assessed: 06/25/24  -SF Time first assessed: 1415  -SF Present on Original Admission: N  -SF Location: sternal  -SF Primary Wound Type: Incision  -SF      Row Name             Wound 06/25/24 1354 Left lower leg Incision    Wound - Properties Group Placement Date: 06/25/24  -SF Placement Time: 1354  -SF Present on Original Admission: N  -SF Side: Left  -SF Orientation: lower  -SF Location: leg  -SF Primary Wound Type: Incision  -SF    Retired Wound - Properties Group Placement Date: 06/25/24  -SF Placement Time: 1354  -SF Present on Original Admission: N  -SF Side: Left  -SF Orientation: lower  -SF Location: leg  -SF Primary Wound Type: Incision  -SF    Retired Wound - Properties Group Date first assessed: 06/25/24  -SF Time first assessed: 1354  -SF Present on Original Admission: N  -SF Side: Left  -SF Location: leg  -SF Primary Wound Type: Incision  -SF      Row Name             Wound 06/25/24 1415 Right lower leg Incision    Wound - Properties Group Placement  Date: 06/25/24  -SF Placement Time: 1415 -SF Side: Right  -SF Orientation: lower  -SF Location: leg  -SF Primary Wound Type: Incision  -SF    Retired Wound - Properties Group Placement Date: 06/25/24 -SF Placement Time: 1415 -SF Side: Right  -SF Orientation: lower  -SF Location: leg  -SF Primary Wound Type: Incision  -SF    Retired Wound - Properties Group Date first assessed: 06/25/24  -SF Time first assessed: 1415 -SF Side: Right  -SF Location: leg  -SF Primary Wound Type: Incision  -SF      Row Name             NPWT (Negative Pressure Wound Therapy) 06/25/24 1900 STERNUM    NPWT (Negative Pressure Wound Therapy) - Properties Group Placement Date: 06/25/24  -SF Placement Time: 1900 -SF Location: STERNUM  -SF Additional Comments: PREVENA  -SF    Retired NPWT (Negative Pressure Wound Therapy) - Properties Group Placement Date: 06/25/24  -SF Placement Time: 1900 -SF Location: STERNUM  -SF Additional Comments: PREVENA  -SF    Retired NPWT (Negative Pressure Wound Therapy) - Properties Group Placement Date: 06/25/24  -SF Placement Time: 1900 -SF Location: STERNUM  -SF Additional Comments: PREVENA  -SF      Row Name 06/27/24 0750          Vital Signs    Pre Systolic BP Rehab 105  -SHANNON     Pre Treatment Diastolic BP 62  -SHANNON     Pretreatment Heart Rate (beats/min) 80  -SHANNON     Posttreatment Heart Rate (beats/min) 80  -SHANNON     Pre SpO2 (%) 96  3 lpm  -SHANNON     Post SpO2 (%) 94  -SHANNON     O2 Delivery Post Treatment --  3 lpm  -SHANNON       Row Name 06/27/24 0750          Positioning and Restraints    Pre-Treatment Position sitting in chair/recliner  -SHANNON     In Chair sitting;call light within reach;encouraged to call for assist;with nsg  breakfast  -SHANNON               User Key  (r) = Recorded By, (t) = Taken By, (c) = Cosigned By      Initials Name Provider Type    Mae Taylor PTA Physical Therapist Assistant    Bakari Chery, RN Registered Nurse                    Physical Therapy Education       Title: PT OT SLP  Therapies (Done)       Topic: Physical Therapy (Done)       Point: Mobility training (Done)       Learning Progress Summary             Patient Acceptance, E,TB,D, VU,DU,NR by  at 6/26/2024 1047    Comment: education re: purpose of PT/importance of activity, safety/falls prevention, sternal precautions, improved tech w/ tfers, deep breathing and pacing activity   Significant Other Acceptance, E,TB,D, VU,DU,NR by  at 6/26/2024 1047    Comment: education re: purpose of PT/importance of activity, safety/falls prevention, sternal precautions, improved tech w/ tfers, deep breathing and pacing activity                         Point: Home exercise program (Done)       Learning Progress Summary             Patient Acceptance, E,D, DU by SHANNON at 6/27/2024 0825    Comment: purse lip breathing    Acceptance, E,TB,D, VU,DU,NR by  at 6/26/2024 1047    Comment: education re: purpose of PT/importance of activity, safety/falls prevention, sternal precautions, improved tech w/ tfers, deep breathing and pacing activity   Significant Other Acceptance, E,TB,D, VU,DU,NR by  at 6/26/2024 1047    Comment: education re: purpose of PT/importance of activity, safety/falls prevention, sternal precautions, improved tech w/ tfers, deep breathing and pacing activity                         Point: Body mechanics (Done)       Learning Progress Summary             Patient Acceptance, E,TB,D, VU,DU,NR by  at 6/26/2024 1047    Comment: education re: purpose of PT/importance of activity, safety/falls prevention, sternal precautions, improved tech w/ tfers, deep breathing and pacing activity   Significant Other Acceptance, E,TB,D, VU,DU,NR by  at 6/26/2024 1047    Comment: education re: purpose of PT/importance of activity, safety/falls prevention, sternal precautions, improved tech w/ tfers, deep breathing and pacing activity                         Point: Precautions (Done)       Learning Progress Summary             Patient Acceptance,  E,TB,NADIA, PAIGE,OTILIO,NR by  at 6/26/2024 1047    Comment: education re: purpose of PT/importance of activity, safety/falls prevention, sternal precautions, improved tech w/ tfers, deep breathing and pacing activity   Significant Other Acceptance, E,TB,D, PAIGE,OTILIO,NR by  at 6/26/2024 1047    Comment: education re: purpose of PT/importance of activity, safety/falls prevention, sternal precautions, improved tech w/ tfers, deep breathing and pacing activity                                         User Key       Initials Effective Dates Name Provider Type [facility] 02/03/23 -  Mae Santiago PTA Physical Therapist Assistant PT     08/02/18 -  Indira Jones, PT Physical Therapist PT                  PT Recommendation and Plan         Outcome Measures       [facility] Name 06/27/24 0800             How much help from another person do you currently need...    Turning from your back to your side while in flat bed without using bedrails? 2  -SHANNON      Moving from lying on back to sitting on the side of a flat bed without bedrails? 2  -SHANNON      Moving to and from a bed to a chair (including a wheelchair)? 3  -SHANNON      Standing up from a chair using your arms (e.g., wheelchair, bedside chair)? 3  -SHANNON      Climbing 3-5 steps with a railing? 3  -SHANNON      To walk in hospital room? 3  -SHANNON      AM-PAC 6 Clicks Score (PT) 16  -SHANNON      Highest Level of Mobility Goal 5 --> Static standing  -SHANNON                User Key  (r) = Recorded By, (t) = Taken By, (c) = Cosigned By      Initials Name Provider Type    SHANNON Mae Santiago PTA Physical Therapist Assistant                     Time Calculation:    PT Charges       Row Name 06/27/24 0827             Time Calculation    Start Time 0750  -SHANNON      Stop Time 0817  -SHANNON      Time Calculation (min) 27 min  -SHANNON         Timed Charges    92472 - Gait Training Minutes  27  -SHANNON         Total Minutes    Timed Charges Total Minutes 27  -SHANNON       Total Minutes 27  -SHANNON                User Key  (r) =  Recorded By, (t) = Taken By, (c) = Cosigned By      Initials Name Provider Type    SHANNON Mae aSntiago, NELSON Physical Therapist Assistant                  Therapy Charges for Today       Code Description Service Date Service Provider Modifiers Qty    [id number] HC GAIT TRAINING EA 15 MIN 6/26/2024 Mae Santiago PTA GP 3    [id number] HC GAIT TRAINING EA 15 MIN 6/27/2024 Mae Santiago PTA GP 2            PT G-Codes  AM-PAC 6 Clicks Score (PT): 16    Mae Santiago PTA  6/27/2024

## 2024-06-27 NOTE — PROGRESS NOTES
"Patient name: Jeff Alatorre  Patient : 1940  VISIT # [id number]  MR #[id number]    Procedure:Procedure(s):  Coronary artery bypass grafting x 4 (LIMA/LAD, RSVG/PDA, RSVG/OM, and RSVG/D), right leg endoscopic vein harvest, application of Prevena incision management system  Procedure Date:2024  POD:2 Days Post-Op    Subjective   No chief complaint on file.    Up to the chair.  Tolerating 4 L/min nasal cannula.  Pain is well-controlled.  Patient reports that he slept well last night.  Walked 200 feet with 3 standing rest with physical therapy yesterday.  Weight is up 2 pounds over the last 24 hours and he is 11 pounds above his baseline weight.  Creatinine 1.51 today.  Hemoglobin 9.4.  Hematocrit 28.6.    Telemetry: A paced 90 bpm  IV drips: Amiodarone, nicky, IVF  Hemodynamics reviewed, hemodynamically stable     Objective     Visit Vitals  /62   Pulse 80   Temp 98.2 °F (36.8 °C) (Oral)   Resp 18   Ht 174 cm (68.5\")   Wt 92.2 kg (203 lb 3.2 oz)   SpO2 96%   BMI 30.44 kg/m²   Preoperative weight: 192 pounds    Intake/Output Summary (Last 24 hours) at 2024 0826  Last data filed at 2024 0430  Gross per 24 hour   Intake 2064.14 ml   Output 1970 ml   Net 94.14 ml   Mediastinal chest tube: 310 mL over 24 hours, serosanguineous  Sammy drain: 95 mL over 24 hours, serosanguineous    Lab:     CBC:  Results from last 7 days   Lab Units 24  0520 24  1845 24  0232   WBC 10*3/mm3 12.59* 10.72 12.76*   HEMATOCRIT % 28.6* 28.1* 27.1*   PLATELETS 10*3/mm3 117* 100* 111*          BMP:  Results from last 7 days   Lab Units 24  0520 24  1845 24  1312 24  0232   SODIUM mmol/L 136 138  --  142   SODIUM, ARTERIAL mmol/L  --   --  140  --    POTASSIUM mmol/L 4.2 4.1  --  4.6   CHLORIDE mmol/L 103 105  --  109*   CO2 mmol/L 22.0 20.0*  --  23.0   GLUCOSE mg/dL 135* 159*  --  163*   BUN mg/dL 29* 28*  --  28*   CREATININE mg/dL 1.51* 1.45*  --  1.60*          " COAG:  Results from last 7 days   Lab Units 06/25/24 1958   INR  1.25*   APTT seconds 37.2*       IMAGES:       Imaging Results (Last 24 Hours)       Procedure Component Value Units Date/Time    XR Chest 1 View [[id number]] Collected: 06/27/24 0700     Updated: 06/27/24 0704    Narrative:      EXAMINATION: XR CHEST 1 VW-  6/27/2024 7:01 AM     HISTORY: Postop heart surgery     FINDINGS: Today's exam is compared to previous study 1 day earlier. The  [place] catheter has been removed with the right IJ introducer  remaining in place. No pneumothorax. Bibasilar subsegmental atelectasis  is present. There is no effusion or free air present.       Impression:      1. [place] catheter removed without complication.  2. Bibasilar subsegmental atelectasis.     This report was signed and finalized on 6/27/2024 7:01 AM by Dr. Vishnu Lau MD.           Chest x-ray: Basilar atelectasis, poor lung expansion, no visible pneumothorax.      Physical Exam:  General: Alert, oriented. No apparent distress.   Cardiovascular: Regular rate and rhythm without murmur, rubs, or gallops.    Pulmonary: Clear to auscultation bilaterally without wheezing, rubs, or rales.  Chest: Sternotomy incision clean, dry, and intact. Sternum stable. No clicks.   Abdomen: Soft, non distended, and non tender.  Extremities: Warm, moves all extremities.  Bilateral Ace wraps in place.     Neurologic:  Grossly intact with no focal deficits.          Impression:  Coronary artery disease of native artery of native heart with stable angina pectoris  Type 2 Diabetes mellitus  Chronic anemia  Chronic kidney disease, Stage IIIa  Carotid artery stenosis post left carotid artery endarterectomy  Peripheral arterial disease  Hypertension    Plan:  Continue Amiodarone gtt until bag is completed  Wean Chi gtt as tolerated  Transduce CVP   Keep pacer at a rate of 80 bpm  Routine postcardiac surgery care  Encourage pulmonary toilet ambulation  Continue bowel  regimen  Keep in ICU for now, possible transfer to  later today  Discussed with patient and nursing      Lucy Valverde, BRYANT  06/27/24  08:26 CDT

## 2024-06-27 NOTE — NURSING NOTE
[facility]  INPATIENT WOUND & OSTOMY CARE    Today's Date: 06/27/24    Patient Name: Jeff Alatorre  MRN: [id number]  CSN: [id number]  PCP: Jimmy Curtis MD  Attending Provider: Jose F Kim MD  Length of Stay: 2    I placed pressure injury prevention measure orders per protocol due to patient being at risk for skin breakdown and being admitted to the unit.     Apply silicone foam border dressing per protocol to sacral spine/bilateral heels for protection.  Nursing to change dressing every 3 days and PRN if soiled. Nursing is to peel back dressing with every assessment to assess skin underneath dressing. No barrier cream under dressing.     Patient's bed has a comfort glide with an absorbant pad. Wedges are at bedside for nursing to utilizing for turning. Patient is currently sitting on a waffle cushion to help with offloading.     Please reach out to wound care nurse if any skin issues arise.     This document has been electronically signed by Mayra Subramanian RN on 6/27/2024 06:26 CDT

## 2024-06-27 NOTE — PLAN OF CARE
Goal Outcome Evaluation:      Patient stood with min assist. Ambulated 200' with CGA/ Decreased nani. Pain at 4/10. Encouraged spirometer.

## 2024-06-27 NOTE — PLAN OF CARE
Goal Outcome Evaluation:              Outcome Evaluation: Pt A0 x 4, O2 titrated to 2L sat. in the 90's tolerating well. Atrial paced at 60, but is pacing above in the 80's. Pt is off all pressors and drips. 1 U PRBCs given pt. ambulated unit fully 3 times during shift and tolerated well. Urine ouput 590mL SHANNON 38mL and MS 60mL. Pt. on moderate sliding scale. Safety maintained. Call light within reach.           Problem: Asthma Comorbidity  Goal: Maintenance of Asthma Control  6/27/2024 1833 by Jeremiah Eubanks RN  Outcome: Ongoing, Progressing  6/27/2024 1833 by Jeremiah Eubanks RN  Outcome: Ongoing, Not Progressing  6/27/2024 1831 by Jeremiah Eubanks RN  Outcome: Ongoing, Progressing  6/27/2024 1829 by Jeremiah Eubanks RN  Outcome: Ongoing, Progressing     Problem: Behavioral Health Comorbidity  Goal: Maintenance of Behavioral Health Symptom Control  6/27/2024 1833 by Jeremiah Eubanks RN  Outcome: Ongoing, Progressing  6/27/2024 1833 by Jeremiah Eubanks RN  Outcome: Ongoing, Not Progressing  6/27/2024 1831 by Jeremiah Eubanks RN  Outcome: Ongoing, Progressing  6/27/2024 1829 by Jeremiah Eubanks RN  Outcome: Ongoing, Progressing     Problem: COPD (Chronic Obstructive Pulmonary Disease) Comorbidity  Goal: Maintenance of COPD Symptom Control  6/27/2024 1833 by Jeremiah Eubanks RN  Outcome: Ongoing, Progressing  6/27/2024 1833 by Jeremiah Eubanks RN  Outcome: Ongoing, Not Progressing  6/27/2024 1831 by Jeremiah Eubanks RN  Outcome: Ongoing, Progressing  6/27/2024 1829 by Jeremiah Eubanks RN  Outcome: Ongoing, Progressing     Problem: Diabetes Comorbidity  Goal: Blood Glucose Level Within Targeted Range  6/27/2024 1833 by Jeremiah Eubanks RN  Outcome: Ongoing, Progressing  6/27/2024 1833 by Jeremiah Eubanks RN  Outcome: Ongoing, Not Progressing  6/27/2024 1831 by Jeremiah Eubanks RN  Outcome: Ongoing, Progressing  6/27/2024 1829 by Jeremiah Eubanks RN  Outcome: Ongoing, Progressing  Intervention: Monitor and Manage Glycemia  Recent Flowsheet  Documentation  Taken 6/27/2024 0800 by Jeremiah Eubanks RN  Glycemic Management: blood glucose monitored     Problem: Heart Failure Comorbidity  Goal: Maintenance of Heart Failure Symptom Control  6/27/2024 1833 by Jeremiah Eubanks RN  Outcome: Ongoing, Progressing  6/27/2024 1833 by Jeremiah Eubanks RN  Outcome: Ongoing, Not Progressing  6/27/2024 1831 by Jeremiah Eubanks RN  Outcome: Ongoing, Progressing  6/27/2024 1829 by Jeremiah Eubanks RN  Outcome: Ongoing, Progressing     Problem: Hypertension Comorbidity  Goal: Blood Pressure in Desired Range  6/27/2024 1833 by Jeremiah Eubanks RN  Outcome: Ongoing, Progressing  6/27/2024 1833 by Jeremiah Eubanks RN  Outcome: Ongoing, Not Progressing  6/27/2024 1831 by Jeremiah Eubanks RN  Outcome: Ongoing, Progressing  6/27/2024 1829 by Jeremiah Eubanks RN  Outcome: Ongoing, Progressing     Problem: Obstructive Sleep Apnea Risk or Actual Comorbidity Management  Goal: Unobstructed Breathing During Sleep  6/27/2024 1833 by Jeremiah Eubanks RN  Outcome: Ongoing, Progressing  6/27/2024 1833 by Jeremiah Eubanks RN  Outcome: Ongoing, Not Progressing  6/27/2024 1831 by Jeremiah Eubanks RN  Outcome: Ongoing, Progressing  6/27/2024 1829 by Jeremiah Eubanks RN  Outcome: Ongoing, Progressing     Problem: Osteoarthritis Comorbidity  Goal: Maintenance of Osteoarthritis Symptom Control  6/27/2024 1833 by Jeremiah Eubanks RN  Outcome: Ongoing, Progressing  6/27/2024 1833 by Jeremiah Eubanks RN  Outcome: Ongoing, Not Progressing  6/27/2024 1831 by Jeremiah Eubanks RN  Outcome: Ongoing, Progressing  6/27/2024 1829 by Jeremiha Eubanks RN  Outcome: Ongoing, Progressing  Intervention: Maintain Osteoarthritis Symptom Control  Recent Flowsheet Documentation  Taken 6/27/2024 1300 by Jeremiah Eubanks RN  Activity Management: up in chair  Taken 6/27/2024 1100 by Jeremiah Eubanks RN  Activity Management: up in chair  Taken 6/27/2024 1000 by Jeremiah Eubanks RN  Activity Management: up in chair  Taken 6/27/2024 0900 by Jeremiah Eubanks  RN  Activity Management: up in chair  Taken 6/27/2024 0800 by Jeremiah Eubanks RN  Activity Management: up in chair  Taken 6/27/2024 0700 by Jeremiah Eubanks RN  Activity Management: up in chair     Problem: Pain Chronic (Persistent) (Comorbidity Management)  Goal: Acceptable Pain Control and Functional Ability  6/27/2024 1833 by Jeremiah Eubanks RN  Outcome: Ongoing, Progressing  6/27/2024 1833 by Jeremiah Eubanks RN  Outcome: Ongoing, Not Progressing  6/27/2024 1831 by Jeremiah Eubanks RN  Outcome: Ongoing, Progressing  6/27/2024 1829 by Jeremiah Eubanks RN  Outcome: Ongoing, Progressing     Problem: Seizure Disorder Comorbidity  Goal: Maintenance of Seizure Control  6/27/2024 1833 by Jeremiah Eubanks RN  Outcome: Ongoing, Progressing  6/27/2024 1833 by Jeremiah Eubanks RN  Outcome: Ongoing, Not Progressing  6/27/2024 1831 by Jeremiah Eubanks RN  Outcome: Ongoing, Progressing  6/27/2024 1829 by Jeremiah Eubanks RN  Outcome: Ongoing, Progressing     Problem: Skin Injury Risk Increased  Goal: Skin Health and Integrity  6/27/2024 1833 by Jeremiah Eubanks RN  Outcome: Ongoing, Progressing  6/27/2024 1833 by Jeremiah Eubanks RN  Outcome: Ongoing, Not Progressing  6/27/2024 1831 by Jeremiah Eubanks RN  Outcome: Ongoing, Progressing  6/27/2024 1829 by Jeremiah Eubanks RN  Outcome: Ongoing, Progressing  Intervention: Optimize Skin Protection  Recent Flowsheet Documentation  Taken 6/27/2024 1700 by Jeremiah Eubanks RN  Head of Bed ([facility]) Positioning: HOB at 20-30 degrees  Taken 6/27/2024 1500 by Jeremiah Eubanks RN  Head of Bed ([facility]) Positioning: HOB at 20-30 degrees  Taken 6/27/2024 1300 by Jeremiah Eubanks RN  Head of Bed ([facility]) Positioning: HOB at 20-30 degrees  Taken 6/27/2024 1100 by Jeremiah Eubanks RN  Head of Bed ([facility]) Positioning: HOB at 20-30 degrees  Taken 6/27/2024 0800 by Jeremiah Eubanks RN  Pressure Reduction Techniques: frequent weight shift encouraged  Pressure Reduction Devices: pressure-redistributing mattress utilized  Skin Protection:    adhesive use limited   tubing/devices free from skin contact     Problem: Fall Injury Risk  Goal: Absence of Fall and Fall-Related Injury  6/27/2024 1833 by Jeremiah Eubanks RN  Outcome: Ongoing, Progressing  6/27/2024 1833 by Jeremiah Eubanks RN  Outcome: Ongoing, Not Progressing  6/27/2024 1831 by Jeremiah Eubanks RN  Outcome: Ongoing, Progressing  6/27/2024 1829 by Jeremiah Eubanks RN  Outcome: Ongoing, Progressing  Intervention: Promote Injury-Free Environment  Recent Flowsheet Documentation  Taken 6/27/2024 1700 by Jeremiah Eubanks RN  Safety Promotion/Fall Prevention: safety round/check completed  Taken 6/27/2024 1600 by Jeremiah Eubanks RN  Safety Promotion/Fall Prevention: safety round/check completed  Taken 6/27/2024 1500 by Jeremiah Eubanks RN  Safety Promotion/Fall Prevention: safety round/check completed  Taken 6/27/2024 1400 by Jeremiah Eubanks RN  Safety Promotion/Fall Prevention: safety round/check completed  Taken 6/27/2024 1300 by Jeremiah Eubanks RN  Safety Promotion/Fall Prevention: safety round/check completed  Taken 6/27/2024 1200 by Jeremiah Eubanks RN  Safety Promotion/Fall Prevention: safety round/check completed  Taken 6/27/2024 1100 by Jeremiah Eubanks RN  Safety Promotion/Fall Prevention: safety round/check completed  Taken 6/27/2024 1000 by Jeremiah Eubanks RN  Safety Promotion/Fall Prevention: safety round/check completed  Taken 6/27/2024 0900 by Jeremiah Eubanks RN  Safety Promotion/Fall Prevention: safety round/check completed  Taken 6/27/2024 0800 by Jeremiah Eubanks RN  Safety Promotion/Fall Prevention: safety round/check completed  Taken 6/27/2024 0700 by Jeremiah Eubanks RN  Safety Promotion/Fall Prevention: safety round/check completed     Problem: Adult Inpatient Plan of Care  Goal: Plan of Care Review  6/27/2024 1844 by Jeremiah Eubanks RN  Flowsheets (Taken 6/27/2024 1833)  Outcome Evaluation: Pt A0 x 4, O2 titrated to 2L sat. in the 90's tolerating well. Atrial paced at 60, but is pacing above in the 80's.  Pt is off all pressors and drips. 1 U PRBCs given pt. ambulated unit fully 3 times during shift and tolerated well. Urine ouput 590mL SHANNON 38mL and MS 60mL. Pt. on moderate sliding scale. Safety maintained. Call light within reach.  6/27/2024 1833 by Jeremiah Eubanks, RN  Outcome: Ongoing, Progressing  Flowsheets (Taken 6/27/2024 1833)  Outcome Evaluation: Pt A0 x 4, O2 titrated to 2L sat. in the 90's tolerating well. Atrial paced at 60, but is pacing above in the 80's. Pt is off all pressors and drips. 1 U PRBCs given pt. ambulated unit fully 3 times during shift and tolerated well. Urine ouput 590mL SHANNON 38mL and MS 60mL. Pt. on moderate sliding scale. Safety maintained. Call light within reach.  6/27/2024 1833 by Jeremiah Eubanks, RN  Outcome: Ongoing, Not Progressing  Flowsheets (Taken 6/27/2024 1833)  Outcome Evaluation: Pt A0 x 4, O2 titrated to 2L sat. in the 90's tolerating well. Atrial paced at 60, but is pacing above in the 80's. Pt is off all pressors and drips. 1 U PRBCs given pt. ambulated unit fully 3 times during shift and tolerated well. Urine ouput 590mL SHANNON 38mL and MS 60mL. Pt. on moderate sliding scale. Safety maintained. Call light within reach.

## 2024-06-27 NOTE — PLAN OF CARE
Goal Outcome Evaluation:              Outcome Evaluation: vss, pt taken off glucamander and moved to moderate sliding scale, pt titrated to 2L tolerating well sat. 90's, 1 U PRBC's given, Nitro 5mcg, titrating drips off, pt. now paced atrial paced at 80 bpm.    Problem: Adult Inpatient Plan of Care  Goal: Plan of Care Review  6/26/2024 1926 by Jeremiah Eubanks RN  Outcome: Ongoing, Progressing  Flowsheets (Taken 6/26/2024 1926)  Outcome Evaluation: vss, pt taken off glucamander and moved to moderate sliding scale, pt titrated to 2L tolerating well sat. 90's, 1 U PRBC's given, Nitro 5mcg, titrating drips off, pt. now paced atrial paced at 80 bpm.  6/26/2024 1926 by Jeremiah Eubanks RN  Outcome: Ongoing, Progressing  Flowsheets  Taken 6/26/2024 1926  Outcome Evaluation: vss, pt taken off glucamander and moved to moderate sliding scale, pt titrated to 2L tolerating well sat. 90's, 1 U PRBC's given, Nitro 5mcg, titrating drips off, pt. now paced atrial paced at 80 bpm.  Taken 6/26/2024 1912  Outcome Evaluation: vss, pt moved off glucamander to moderate sliding scale,  Goal: Patient-Specific Goal (Individualized)  6/26/2024 1926 by Jeremiah Eubanks RN  Outcome: Ongoing, Progressing  6/26/2024 1926 by Jeremiah Eubanks RN  Outcome: Ongoing, Progressing  Goal: Absence of Hospital-Acquired Illness or Injury  6/26/2024 1926 by Jeremiah Eubanks RN  Outcome: Ongoing, Progressing  6/26/2024 1926 by Jeremiah Eubanks RN  Outcome: Ongoing, Progressing  Intervention: Identify and Manage Fall Risk  Recent Flowsheet Documentation  Taken 6/26/2024 1800 by Jeremiah Eubanks RN  Safety Promotion/Fall Prevention: safety round/check completed  Taken 6/26/2024 1700 by Jeremiah Eubanks RN  Safety Promotion/Fall Prevention: safety round/check completed  Taken 6/26/2024 1600 by Jeremiah Eubanks RN  Safety Promotion/Fall Prevention: safety round/check completed  Taken 6/26/2024 1500 by Jeremiah Eubanks RN  Safety Promotion/Fall Prevention: safety round/check  completed  Taken 6/26/2024 1415 by Jeremiah Eubanks RN  Safety Promotion/Fall Prevention: safety round/check completed  Taken 6/26/2024 1300 by Jeremiah Eubanks RN  Safety Promotion/Fall Prevention: safety round/check completed  Taken 6/26/2024 1200 by Jeremiah Eubanks RN  Safety Promotion/Fall Prevention: safety round/check completed  Taken 6/26/2024 1100 by Jeremiah Eubanks RN  Safety Promotion/Fall Prevention: safety round/check completed  Taken 6/26/2024 1000 by Jeremiah Eubanks RN  Safety Promotion/Fall Prevention: safety round/check completed  Taken 6/26/2024 0900 by Jeremiah Eubanks RN  Safety Promotion/Fall Prevention: safety round/check completed  Taken 6/26/2024 0800 by Jeremiah Eubanks RN  Safety Promotion/Fall Prevention: safety round/check completed  Taken 6/26/2024 0700 by Jeremiah Eubanks RN  Safety Promotion/Fall Prevention: safety round/check completed  Intervention: Prevent Skin Injury  Recent Flowsheet Documentation  Taken 6/26/2024 1700 by Jeremiah Eubanks RN  Body Position: weight shifting  Taken 6/26/2024 1600 by Jeremiah Eubanks RN  Skin Protection: adhesive use limited  Taken 6/26/2024 1500 by Jeremiah Eubanks RN  Body Position: weight shifting  Taken 6/26/2024 1300 by Jeremiah Eubanks RN  Body Position: weight shifting  Taken 6/26/2024 1200 by Jeremiah Eubanks RN  Skin Protection:   adhesive use limited   skin-to-skin areas padded   skin-to-device areas padded   tubing/devices free from skin contact  Taken 6/26/2024 1100 by Jeremiah Eubanks RN  Body Position: weight shifting  Taken 6/26/2024 0900 by Jeremiah Eubanks RN  Body Position: weight shifting  Taken 6/26/2024 0800 by Jeremiah Eubanks RN  Skin Protection:   adhesive use limited   skin-to-device areas padded   skin-to-skin areas padded   tubing/devices free from skin contact  Taken 6/26/2024 0700 by Jeremiah Eubanks RN  Body Position: weight shifting  Intervention: Prevent and Manage VTE (Venous Thromboembolism) Risk  Recent Flowsheet Documentation  Taken 6/26/2024  1800 by Jeremiah Eubanks RN  Activity Management: up in chair  Taken 6/26/2024 1700 by Jeremiah Eubanks RN  Activity Management: up in chair  Taken 6/26/2024 1500 by Jeremiah Eubanks RN  Activity Management: up in chair  Taken 6/26/2024 1300 by Jeremiah Eubanks RN  Activity Management: up in chair  Taken 6/26/2024 1200 by Jeremiah Euabnks RN  Activity Management: up in chair  Taken 6/26/2024 1100 by Jeremiah Eubanks RN  Activity Management: up in chair  Taken 6/26/2024 0900 by Jeremiah Eubanks RN  Activity Management: up in chair  Taken 6/26/2024 0800 by Jeremiah Eubanks RN  VTE Prevention/Management: (see MAR) other (see comments)  Taken 6/26/2024 0700 by Jeremiah Eubanks RN  Activity Management: up in chair  Goal: Optimal Comfort and Wellbeing  6/26/2024 1926 by Jeremiah Eubanks RN  Outcome: Ongoing, Progressing  6/26/2024 1926 by Jeremiah Eubanks RN  Outcome: Ongoing, Progressing  Intervention: Monitor Pain and Promote Comfort  Recent Flowsheet Documentation  Taken 6/26/2024 0800 by Jeremiah Eubanks RN  Pain Management Interventions: see MAR  Intervention: Provide Person-Centered Care  Recent Flowsheet Documentation  Taken 6/26/2024 1600 by Jeremiah Eubanks RN  Trust Relationship/Rapport:   care explained   questions answered  Taken 6/26/2024 1200 by Jeremiah Eubanks RN  Trust Relationship/Rapport:   care explained   emotional support provided   thoughts/feelings acknowledged  Taken 6/26/2024 0800 by Jeremiah Eubanks RN  Trust Relationship/Rapport:   care explained   emotional support provided   thoughts/feelings acknowledged  Goal: Readiness for Transition of Care  6/26/2024 1926 by Jeremiah Eubanks RN  Outcome: Ongoing, Progressing  6/26/2024 1926 by Jeremiah Eubanks RN  Outcome: Ongoing, Progressing     Problem: Skin Injury Risk Increased  Goal: Skin Health and Integrity  6/26/2024 1926 by Jeremiah Eubanks RN  Outcome: Ongoing, Progressing  6/26/2024 1926 by Jeremiah Eubanks RN  Outcome: Ongoing, Progressing  Intervention: Optimize Skin  Protection  Recent Flowsheet Documentation  Taken 6/26/2024 1700 by Jeremiah Eubanks RN  Head of Bed ([facility]) Positioning: HOB at 30-45 degrees  Taken 6/26/2024 1600 by Jeremiah Eubanks RN  Skin Protection: adhesive use limited  Taken 6/26/2024 1500 by Jeremiah Eubanks RN  Head of Bed ([facility]) Positioning: HOB at 30-45 degrees  Taken 6/26/2024 1300 by Jeremiah Eubanks RN  Head of Bed ([facility]) Positioning: HOB at 30-45 degrees  Taken 6/26/2024 1200 by Jeremiah Eubanks RN  Skin Protection:   adhesive use limited   skin-to-skin areas padded   skin-to-device areas padded   tubing/devices free from skin contact  Taken 6/26/2024 1100 by Jeremiah Eubanks RN  Head of Bed ([facility]) Positioning: HOB at 30-45 degrees  Taken 6/26/2024 0900 by Jeremiah Eubanks RN  Head of Bed ([facility]) Positioning: HOB at 30-45 degrees  Taken 6/26/2024 0800 by Jeremiah Eubanks RN  Skin Protection:   adhesive use limited   skin-to-device areas padded   skin-to-skin areas padded   tubing/devices free from skin contact  Taken 6/26/2024 0700 by Jeremiah Eubanks RN  Head of Bed ([facility]) Positioning: HOB at 30-45 degrees     Problem: Diabetes Comorbidity  Goal: Blood Glucose Level Within Targeted Range  6/26/2024 1926 by Jeremiah Eubanks RN  Outcome: Ongoing, Progressing  6/26/2024 1926 by Jeremiah Eubanks RN  Outcome: Ongoing, Progressing     Problem: Hypertension Comorbidity  Goal: Blood Pressure in Desired Range  6/26/2024 1926 by Jeremiah Eubanks RN  Outcome: Ongoing, Progressing  6/26/2024 1926 by Jeremiah Eubanks RN  Outcome: Ongoing, Progressing     Problem: Fall Injury Risk  Goal: Absence of Fall and Fall-Related Injury  6/26/2024 1926 by Jeremiah Eubanks RN  Outcome: Ongoing, Progressing  6/26/2024 1926 by Jeremiah Eubanks RN  Outcome: Ongoing, Progressing  Intervention: Promote Injury-Free Environment  Recent Flowsheet Documentation  Taken 6/26/2024 1800 by Jeremiah Eubanks RN  Safety Promotion/Fall Prevention: safety round/check completed  Taken 6/26/2024 1700 by Jeremiah Eubanks RN  Safety  Promotion/Fall Prevention: safety round/check completed  Taken 6/26/2024 1600 by Jeremiah Eubanks RN  Safety Promotion/Fall Prevention: safety round/check completed  Taken 6/26/2024 1500 by Jeremiah Eubanks RN  Safety Promotion/Fall Prevention: safety round/check completed  Taken 6/26/2024 1415 by Jeremiah Eubanks RN  Safety Promotion/Fall Prevention: safety round/check completed  Taken 6/26/2024 1300 by Jeremiah Eubanks RN  Safety Promotion/Fall Prevention: safety round/check completed  Taken 6/26/2024 1200 by Jeremiah Eubanks RN  Safety Promotion/Fall Prevention: safety round/check completed  Taken 6/26/2024 1100 by Jeremiah Eubanks RN  Safety Promotion/Fall Prevention: safety round/check completed  Taken 6/26/2024 1000 by Jeremiah Eubanks RN  Safety Promotion/Fall Prevention: safety round/check completed  Taken 6/26/2024 0900 by Jeremiah Eubanks RN  Safety Promotion/Fall Prevention: safety round/check completed  Taken 6/26/2024 0800 by Jeremiah Eubanks RN  Safety Promotion/Fall Prevention: safety round/check completed  Taken 6/26/2024 0700 by Jeremiah Eubanks RN  Safety Promotion/Fall Prevention: safety round/check completed

## 2024-06-28 ENCOUNTER — APPOINTMENT (OUTPATIENT)
Dept: GENERAL RADIOLOGY | Facility: HOSPITAL | Age: 84
DRG: 236 | End: 2024-06-28
Payer: MEDICARE

## 2024-06-28 LAB
ALBUMIN SERPL-MCNC: 3.2 G/DL (ref 3.5–5.2)
ALBUMIN/GLOB SERPL: 1.6 G/DL
ALP SERPL-CCNC: 73 U/L (ref 39–117)
ALT SERPL W P-5'-P-CCNC: <5 U/L (ref 1–41)
ANION GAP SERPL CALCULATED.3IONS-SCNC: 10 MMOL/L (ref 5–15)
ANION GAP SERPL CALCULATED.3IONS-SCNC: 14 MMOL/L (ref 5–15)
AST SERPL-CCNC: 15 U/L (ref 1–40)
BASOPHILS # BLD AUTO: 0.02 10*3/MM3 (ref 0–0.2)
BASOPHILS NFR BLD AUTO: 0.2 % (ref 0–1.5)
BH BB BLOOD EXPIRATION DATE: NORMAL
BH BB BLOOD TYPE BARCODE: 5100
BH BB DISPENSE STATUS: NORMAL
BH BB PRODUCT CODE: NORMAL
BH BB UNIT NUMBER: NORMAL
BILIRUB SERPL-MCNC: 0.6 MG/DL (ref 0–1.2)
BUN SERPL-MCNC: 28 MG/DL (ref 8–23)
BUN SERPL-MCNC: 28 MG/DL (ref 8–23)
BUN/CREAT SERPL: 18.8 (ref 7–25)
BUN/CREAT SERPL: 19.4 (ref 7–25)
CALCIUM SPEC-SCNC: 8.4 MG/DL (ref 8.6–10.5)
CALCIUM SPEC-SCNC: 9.1 MG/DL (ref 8.6–10.5)
CHLORIDE SERPL-SCNC: 106 MMOL/L (ref 98–107)
CHLORIDE SERPL-SCNC: 107 MMOL/L (ref 98–107)
CO2 SERPL-SCNC: 20 MMOL/L (ref 22–29)
CO2 SERPL-SCNC: 22 MMOL/L (ref 22–29)
CREAT SERPL-MCNC: 1.44 MG/DL (ref 0.76–1.27)
CREAT SERPL-MCNC: 1.49 MG/DL (ref 0.76–1.27)
CROSSMATCH INTERPRETATION: NORMAL
DEPRECATED RDW RBC AUTO: 58.3 FL (ref 37–54)
EGFRCR SERPLBLD CKD-EPI 2021: 46 ML/MIN/1.73
EGFRCR SERPLBLD CKD-EPI 2021: 47.9 ML/MIN/1.73
EOSINOPHIL # BLD AUTO: 0.21 10*3/MM3 (ref 0–0.4)
EOSINOPHIL NFR BLD AUTO: 2.4 % (ref 0.3–6.2)
ERYTHROCYTE [DISTWIDTH] IN BLOOD BY AUTOMATED COUNT: 16.7 % (ref 12.3–15.4)
GLOBULIN UR ELPH-MCNC: 2 GM/DL
GLUCOSE BLDC GLUCOMTR-MCNC: 120 MG/DL (ref 70–130)
GLUCOSE BLDC GLUCOMTR-MCNC: 126 MG/DL (ref 70–130)
GLUCOSE BLDC GLUCOMTR-MCNC: 144 MG/DL (ref 70–130)
GLUCOSE SERPL-MCNC: 135 MG/DL (ref 65–99)
GLUCOSE SERPL-MCNC: 144 MG/DL (ref 65–99)
HCT VFR BLD AUTO: 30.4 % (ref 37.5–51)
HGB BLD-MCNC: 9.9 G/DL (ref 13–17.7)
IMM GRANULOCYTES # BLD AUTO: 0.05 10*3/MM3 (ref 0–0.05)
IMM GRANULOCYTES NFR BLD AUTO: 0.6 % (ref 0–0.5)
LYMPHOCYTES # BLD AUTO: 0.83 10*3/MM3 (ref 0.7–3.1)
LYMPHOCYTES NFR BLD AUTO: 9.4 % (ref 19.6–45.3)
MCH RBC QN AUTO: 31.3 PG (ref 26.6–33)
MCHC RBC AUTO-ENTMCNC: 32.6 G/DL (ref 31.5–35.7)
MCV RBC AUTO: 96.2 FL (ref 79–97)
MONOCYTES # BLD AUTO: 0.64 10*3/MM3 (ref 0.1–0.9)
MONOCYTES NFR BLD AUTO: 7.3 % (ref 5–12)
NEUTROPHILS NFR BLD AUTO: 7.06 10*3/MM3 (ref 1.7–7)
NEUTROPHILS NFR BLD AUTO: 80.1 % (ref 42.7–76)
NRBC BLD AUTO-RTO: 0 /100 WBC (ref 0–0.2)
PLATELET # BLD AUTO: 101 10*3/MM3 (ref 140–450)
PMV BLD AUTO: 10.5 FL (ref 6–12)
POTASSIUM SERPL-SCNC: 4 MMOL/L (ref 3.5–5.2)
POTASSIUM SERPL-SCNC: 4.5 MMOL/L (ref 3.5–5.2)
PROT SERPL-MCNC: 5.2 G/DL (ref 6–8.5)
RBC # BLD AUTO: 3.16 10*6/MM3 (ref 4.14–5.8)
SODIUM SERPL-SCNC: 139 MMOL/L (ref 136–145)
SODIUM SERPL-SCNC: 140 MMOL/L (ref 136–145)
UNIT  ABO: NORMAL
UNIT  RH: NORMAL
WBC NRBC COR # BLD AUTO: 8.81 10*3/MM3 (ref 3.4–10.8)

## 2024-06-28 PROCEDURE — 93005 ELECTROCARDIOGRAM TRACING: CPT | Performed by: THORACIC SURGERY (CARDIOTHORACIC VASCULAR SURGERY)

## 2024-06-28 PROCEDURE — 93010 ELECTROCARDIOGRAM REPORT: CPT | Performed by: EMERGENCY MEDICINE

## 2024-06-28 PROCEDURE — 25010000002 ENOXAPARIN PER 10 MG: Performed by: THORACIC SURGERY (CARDIOTHORACIC VASCULAR SURGERY)

## 2024-06-28 PROCEDURE — 71046 X-RAY EXAM CHEST 2 VIEWS: CPT

## 2024-06-28 PROCEDURE — 82948 REAGENT STRIP/BLOOD GLUCOSE: CPT

## 2024-06-28 PROCEDURE — 97116 GAIT TRAINING THERAPY: CPT

## 2024-06-28 PROCEDURE — 94761 N-INVAS EAR/PLS OXIMETRY MLT: CPT

## 2024-06-28 PROCEDURE — 94799 UNLISTED PULMONARY SVC/PX: CPT

## 2024-06-28 PROCEDURE — 25010000002 FUROSEMIDE PER 20 MG

## 2024-06-28 PROCEDURE — 85025 COMPLETE CBC W/AUTO DIFF WBC: CPT | Performed by: THORACIC SURGERY (CARDIOTHORACIC VASCULAR SURGERY)

## 2024-06-28 PROCEDURE — 99024 POSTOP FOLLOW-UP VISIT: CPT | Performed by: THORACIC SURGERY (CARDIOTHORACIC VASCULAR SURGERY)

## 2024-06-28 PROCEDURE — 25010000002 AMIODARONE IN DEXTROSE 5% 360-4.14 MG/200ML-% SOLUTION: Performed by: SURGERY

## 2024-06-28 PROCEDURE — 93005 ELECTROCARDIOGRAM TRACING: CPT | Performed by: SURGERY

## 2024-06-28 PROCEDURE — 80053 COMPREHEN METABOLIC PANEL: CPT | Performed by: THORACIC SURGERY (CARDIOTHORACIC VASCULAR SURGERY)

## 2024-06-28 RX ORDER — FUROSEMIDE 10 MG/ML
20 INJECTION INTRAMUSCULAR; INTRAVENOUS ONCE
Status: COMPLETED | OUTPATIENT
Start: 2024-06-28 | End: 2024-06-28

## 2024-06-28 RX ORDER — METOPROLOL TARTRATE 50 MG/1
25 TABLET, FILM COATED ORAL EVERY 12 HOURS SCHEDULED
Status: DISCONTINUED | OUTPATIENT
Start: 2024-06-28 | End: 2024-07-01 | Stop reason: HOSPADM

## 2024-06-28 RX ADMIN — CHLORHEXIDINE GLUCONATE 0.12% ORAL RINSE 15 ML: 1.2 LIQUID ORAL at 09:32

## 2024-06-28 RX ADMIN — ASPIRIN 81 MG: 81 TABLET, COATED ORAL at 09:32

## 2024-06-28 RX ADMIN — ENOXAPARIN SODIUM 40 MG: 100 INJECTION SUBCUTANEOUS at 09:03

## 2024-06-28 RX ADMIN — FUROSEMIDE 20 MG: 10 INJECTION, SOLUTION INTRAMUSCULAR; INTRAVENOUS at 16:33

## 2024-06-28 RX ADMIN — ACETAMINOPHEN 1000 MG: 500 TABLET, FILM COATED ORAL at 15:41

## 2024-06-28 RX ADMIN — CLOPIDOGREL BISULFATE 75 MG: 75 TABLET, FILM COATED ORAL at 17:21

## 2024-06-28 RX ADMIN — IPRATROPIUM BROMIDE 0.5 MG: 0.5 SOLUTION RESPIRATORY (INHALATION) at 10:16

## 2024-06-28 RX ADMIN — POLYETHYLENE GLYCOL 3350 17 G: 17 POWDER, FOR SOLUTION ORAL at 09:04

## 2024-06-28 RX ADMIN — IPRATROPIUM BROMIDE 0.5 MG: 0.5 SOLUTION RESPIRATORY (INHALATION) at 14:19

## 2024-06-28 RX ADMIN — METOPROLOL TARTRATE 12.5 MG: 25 TABLET, FILM COATED ORAL at 16:33

## 2024-06-28 RX ADMIN — ALBUTEROL SULFATE 1.25 MG: 2.5 SOLUTION RESPIRATORY (INHALATION) at 19:31

## 2024-06-28 RX ADMIN — AMIODARONE HYDROCHLORIDE 400 MG: 200 TABLET ORAL at 17:20

## 2024-06-28 RX ADMIN — PREGABALIN 25 MG: 25 CAPSULE ORAL at 20:24

## 2024-06-28 RX ADMIN — ATORVASTATIN CALCIUM 20 MG: 10 TABLET, FILM COATED ORAL at 20:24

## 2024-06-28 RX ADMIN — BISACODYL 10 MG: 5 TABLET, COATED ORAL at 20:25

## 2024-06-28 RX ADMIN — OXYCODONE HYDROCHLORIDE 5 MG: 5 TABLET ORAL at 17:26

## 2024-06-28 RX ADMIN — BISACODYL 10 MG: 5 TABLET, COATED ORAL at 09:05

## 2024-06-28 RX ADMIN — ALBUTEROL SULFATE 1.25 MG: 2.5 SOLUTION RESPIRATORY (INHALATION) at 06:09

## 2024-06-28 RX ADMIN — AMIODARONE HYDROCHLORIDE 400 MG: 200 TABLET ORAL at 09:04

## 2024-06-28 RX ADMIN — ACETAMINOPHEN 1000 MG: 500 TABLET, FILM COATED ORAL at 09:02

## 2024-06-28 RX ADMIN — ACETAMINOPHEN 1000 MG: 500 TABLET, FILM COATED ORAL at 02:24

## 2024-06-28 RX ADMIN — PANTOPRAZOLE SODIUM 40 MG: 40 TABLET, DELAYED RELEASE ORAL at 05:41

## 2024-06-28 RX ADMIN — OXYCODONE HYDROCHLORIDE 5 MG: 5 TABLET ORAL at 20:25

## 2024-06-28 RX ADMIN — IPRATROPIUM BROMIDE 0.5 MG: 0.5 SOLUTION RESPIRATORY (INHALATION) at 19:30

## 2024-06-28 RX ADMIN — ALBUTEROL SULFATE 1.25 MG: 2.5 SOLUTION RESPIRATORY (INHALATION) at 14:19

## 2024-06-28 RX ADMIN — IPRATROPIUM BROMIDE 0.5 MG: 0.5 SOLUTION RESPIRATORY (INHALATION) at 06:09

## 2024-06-28 RX ADMIN — METOPROLOL TARTRATE 25 MG: 50 TABLET, FILM COATED ORAL at 20:24

## 2024-06-28 RX ADMIN — METOPROLOL TARTRATE 12.5 MG: 25 TABLET, FILM COATED ORAL at 09:03

## 2024-06-28 RX ADMIN — CHLORHEXIDINE GLUCONATE 0.12% ORAL RINSE 15 ML: 1.2 LIQUID ORAL at 20:25

## 2024-06-28 RX ADMIN — ALBUTEROL SULFATE 1.25 MG: 2.5 SOLUTION RESPIRATORY (INHALATION) at 10:16

## 2024-06-28 RX ADMIN — CHLORHEXIDINE GLUCONATE 1 APPLICATION: 500 CLOTH TOPICAL at 04:00

## 2024-06-28 RX ADMIN — ACETAMINOPHEN 1000 MG: 500 TABLET, FILM COATED ORAL at 20:24

## 2024-06-28 RX ADMIN — LIDOCAINE 2 PATCH: 4 PATCH TOPICAL at 09:04

## 2024-06-28 RX ADMIN — AMIODARONE HYDROCHLORIDE 1 MG/MIN: 1.8 INJECTION, SOLUTION INTRAVENOUS at 21:23

## 2024-06-28 RX ADMIN — FUROSEMIDE 20 MG: 10 INJECTION, SOLUTION INTRAMUSCULAR; INTRAVENOUS at 13:23

## 2024-06-28 RX ADMIN — PREGABALIN 25 MG: 25 CAPSULE ORAL at 05:41

## 2024-06-28 NOTE — PLAN OF CARE
Goal Outcome Evaluation:   Practiced sit to stand. Continues to need min assist. Ambulated 260' with Min to CGA. Narrow base of support, inconsistent gait speed.  HR 83 -87.

## 2024-06-28 NOTE — PLAN OF CARE
Goal Outcome Evaluation:      Pt remained up in chair all day.  Ambulated with  ft in am and 300 ft in afternoon.  MST tubes dc/d                                         Patient states no history

## 2024-06-28 NOTE — PLAN OF CARE
Goal Outcome Evaluation:   Patient walked 200 feet. Pain controled with PO Tylenol. Afebrile. UOP adequate.  Problem: Asthma Comorbidity  Goal: Maintenance of Asthma Control  Outcome: Ongoing, Progressing     Problem: Behavioral Health Comorbidity  Goal: Maintenance of Behavioral Health Symptom Control  Outcome: Ongoing, Progressing     Problem: COPD (Chronic Obstructive Pulmonary Disease) Comorbidity  Goal: Maintenance of COPD Symptom Control  Outcome: Ongoing, Progressing     Problem: Diabetes Comorbidity  Goal: Blood Glucose Level Within Targeted Range  Outcome: Ongoing, Progressing     Problem: Heart Failure Comorbidity  Goal: Maintenance of Heart Failure Symptom Control  Outcome: Ongoing, Progressing     Problem: Hypertension Comorbidity  Goal: Blood Pressure in Desired Range  Outcome: Ongoing, Progressing     Problem: Obstructive Sleep Apnea Risk or Actual Comorbidity Management  Goal: Unobstructed Breathing During Sleep  Outcome: Ongoing, Progressing     Problem: Osteoarthritis Comorbidity  Goal: Maintenance of Osteoarthritis Symptom Control  Outcome: Ongoing, Progressing  Intervention: Maintain Osteoarthritis Symptom Control  Recent Flowsheet Documentation  Taken 6/28/2024 0609 by Sabi Booker RN  Activity Management: up in chair  Taken 6/28/2024 0500 by Sabi Booker RN  Activity Management: up in chair  Taken 6/28/2024 0400 by Sabi Booker RN  Activity Management: (cxr) ambulated outside room  Taken 6/28/2024 0300 by Sabi Booker RN  Activity Management: bedrest  Taken 6/28/2024 0200 by Sabi Booker RN  Activity Management: bedrest  Taken 6/28/2024 0100 by Sabi Booker RN  Activity Management: bedrest  Taken 6/28/2024 0000 by Sabi Booker RN  Activity Management: bedrest  Taken 6/27/2024 2300 by Sabi Booker RN  Activity Management: bedrest  Taken 6/27/2024 2200 by Sabi Booker RN  Activity Management: bedrest  Taken 6/27/2024 2100 by Sabi Booker RN  Activity Management:   ambulated  outside room   back to bed  Taken 6/27/2024 2026 by Sabi Booker RN  Activity Management: up in chair  Taken 6/27/2024 1900 by Sabi Booker RN  Activity Management: up in chair     Problem: Pain Chronic (Persistent) (Comorbidity Management)  Goal: Acceptable Pain Control and Functional Ability  Outcome: Ongoing, Progressing     Problem: Seizure Disorder Comorbidity  Goal: Maintenance of Seizure Control  Outcome: Ongoing, Progressing     Problem: Skin Injury Risk Increased  Goal: Skin Health and Integrity  Outcome: Ongoing, Progressing  Intervention: Optimize Skin Protection  Recent Flowsheet Documentation  Taken 6/27/2024 2026 by Sabi Booker RN  Pressure Reduction Techniques: weight shift assistance provided  Pressure Reduction Devices: pressure-redistributing mattress utilized  Skin Protection:   drying agents applied   skin-to-skin areas padded   skin-to-device areas padded     Problem: Fall Injury Risk  Goal: Absence of Fall and Fall-Related Injury  Outcome: Ongoing, Progressing  Intervention: Promote Injury-Free Environment  Recent Flowsheet Documentation  Taken 6/28/2024 0609 by Sabi Booker RN  Safety Promotion/Fall Prevention: safety round/check completed  Taken 6/28/2024 0500 by Sabi Booker RN  Safety Promotion/Fall Prevention: safety round/check completed  Taken 6/28/2024 0400 by Sabi Booker RN  Safety Promotion/Fall Prevention: safety round/check completed  Taken 6/28/2024 0300 by Sabi Booker RN  Safety Promotion/Fall Prevention: safety round/check completed  Taken 6/28/2024 0200 by Sabi Booker RN  Safety Promotion/Fall Prevention: safety round/check completed  Taken 6/28/2024 0100 by Sabi Booker RN  Safety Promotion/Fall Prevention: safety round/check completed  Taken 6/28/2024 0000 by Sabi Booker RN  Safety Promotion/Fall Prevention: safety round/check completed  Taken 6/27/2024 2300 by Sabi Booker RN  Safety Promotion/Fall Prevention: safety round/check completed  Taken  6/27/2024 2200 by Sabi Booker, RN  Safety Promotion/Fall Prevention: safety round/check completed  Taken 6/27/2024 2100 by Sabi Booker, ROGER  Safety Promotion/Fall Prevention: safety round/check completed  Taken 6/27/2024 2026 by Sabi Booker, RN  Safety Promotion/Fall Prevention: safety round/check completed  Taken 6/27/2024 1900 by Sabi Booker RN  Safety Promotion/Fall Prevention: safety round/check completed     Problem: Adult Inpatient Plan of Care  Goal: Plan of Care Review  Outcome: Ongoing, Progressing   .

## 2024-06-28 NOTE — PLAN OF CARE
Goal Outcome Evaluation:  Plan of Care Reviewed With: other (see comments)        Progress: improving  Outcome Evaluation: Nutrition follow up. Pt is up in the chair but sleeping. Per RN had a restless night. He is ordered a healthy heart, C.CHO diet. No po intake recorded. 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 His weight is up 13# since surgery, to receive lasix. He has a wound vac to sternal incision. He will transfer to  when a bed is available. Still not appropriate for heart healthy diet education today, will offer once he moves out of ICU. Cont to follow.

## 2024-06-28 NOTE — PLAN OF CARE
Goal Outcome Evaluation:      Patient stood and ambulated 200' with Min to CGA. Inconsistent gait speed. HR 88 - 94. /82 - 151/78.      Time to start walking farther and will work on sit to stand.

## 2024-06-28 NOTE — CASE MANAGEMENT/SOCIAL WORK
Continued Stay Note  LUKE Rios     Patient Name: Jeff Alatorre  MRN: [id number]  Today's Date: 6/28/2024    Admit Date: 6/25/2024    Plan: Home   Discharge Plan       Row Name 06/28/24 0946       Plan    Plan Home    Plan Comments Patient plans to return home upon discharge.  SW following for any needs.                   Discharge Codes    No documentation.                       ANGELA Mccabe

## 2024-06-28 NOTE — THERAPY TREATMENT NOTE
Acute Care - Physical Therapy Treatment Note  [facility]     Patient Name: Jeff Alatorre  : 1940  MRN: [id number]  Today's Date: 2024      Visit Dx:     ICD-10-CM ICD-9-CM   1. Impaired functional mobility and activity tolerance [Z74.09]  Z74.09 V49.89   2. Coronary artery disease of native artery of native heart with stable angina pectoris  I25.118 414.01     413.9     Patient Active Problem List   Diagnosis    Hx of adenomatous colonic polyps    Adenomatous polyps    Idiopathic gout of multiple sites    Primary hypertension    Type 2 diabetes mellitus without complication, without long-term current use of insulin    Hyperlipidemia LDL goal <100    Erectile dysfunction due to arterial insufficiency    Anemia    B12 deficiency    Stage 3a chronic kidney disease (CKD)    Seasonal allergic rhinitis    ANYI (obstructive sleep apnea)    Carotid artery disease    ROSALES (dyspnea on exertion)    Coronary artery disease of native artery of native heart with stable angina pectoris    Bilateral carotid artery stenosis    History of left-sided carotid endarterectomy    Stenosis of right subclavian artery    CAD (coronary artery disease)     Past Medical History:   Diagnosis Date    Arthritis     Chronic kidney disease     Coronary artery disease of native artery of native heart with stable angina pectoris 2024    Erectile dysfunction     Gout     History of diverticulitis     HTN (hypertension)     Hx of adenomatous colonic polyps 10/13/2020    Hypercholesteremia     Low testosterone     Polycythemia vera 10/13/2020    Squamous cell carcinoma of skin 10/2021    top of head    Type 2 diabetes mellitus without complication, without long-term current use of insulin 2022     Past Surgical History:   Procedure Laterality Date    CARDIAC CATHETERIZATION Left 2024    Procedure: Coronary angiography;  Surgeon: Zaid Contreras MD;  Location: [facility] INVAS[facility] LOC[facility];  Service: Cardiology;   Laterality: Left;    CARDIAC CATHETERIZATION Left 05/20/2024    Procedure: Percutaneous Coronary Intervention;  Surgeon: Zaid Contreras MD;  Location:  [facility] CATH INVASIVE LOCATION;  Service: Cardiology;  Laterality: Left;    CAROTID ENDARTERECTOMY Left     CATARACT EXTRACTION, BILATERAL      COLON SURGERY      COLONOSCOPY      COLONOSCOPY N/A 07/27/2021    Procedure: COLONOSCOPY WITH ANESTHESIA;  Surgeon: Luciano Alatorre DO;  Location: [facility] ENDOSCOPY;  Service: Gastroenterology;  Laterality: N/A;  preop; hx of polyps  postop; diverticulosis   PCP Devon oMore     CORONARY ARTERY BYPASS GRAFT N/A 6/25/2024    Procedure: CORONARY ARTERY BYPASS GRAFTING x4, LEFT INTERNAL MAMMARY ARTERY GRAFT, RIGHT LEG ENDOSCOPIC VEIN HARVEST, TRANSESOPHAGEAL ECHOCARDIOGRAM, APPLICATION OF PREVENA;  Surgeon: Jose F Kim MD;  Location:  [facility] OR;  Service: Cardiothoracic;  Laterality: N/A;    PENILE PROSTHESIS IMPLANT N/A 03/14/2023    Procedure: 3-PIECE INFLATABLE PENILE PROSTHESIS PLACEMENT;  Surgeon: Garcia Santana MD;  Location:  [facility] OR;  Service: Urology;  Laterality: N/A;    VASECTOMY       PT Assessment (Last 12 Hours)       PT Evaluation and Treatment       Row Name 06/28/24 0730          Physical Therapy Time and Intention    Subjective Information no complaints  -     Document Type therapy note (daily note)  -     Mode of Treatment physical therapy  -       Row Name 06/28/24 0730          General Information    Existing Precautions/Restrictions fall;oxygen therapy device and L/min  ext pacer, prevena  -       Row Name 06/28/24 0730          Pain    Pretreatment Pain Rating 2/10  -SHANNON     Posttreatment Pain Rating 4/10  -     Pain Location incisional  -     Pain Location - chest  -       Row Name 06/28/24 0730          Sit-Stand Transfer    Sit-Stand [place] (Transfers) verbal cues;minimum assist (75% patient effort)  -       Row Name 06/28/24 0730          Stand-Sit Transfer     Stand-Sit [place] (Transfers) verbal cues;contact guard  -SHANNON       Row Name 06/28/24 0730          Gait/Stairs (Locomotion)    [place] Level (Gait) verbal cues;minimum assist (75% patient effort)  -SHANNON     Distance in Feet (Gait) 200  -SHANNON     Deviations/Abnormal Patterns (Gait) nani decreased  inconsistent speed  -SHANNON     Bilateral Gait Deviations --  chronic forward neck  -SHANNON       Row Name 06/28/24 0730          Motor Skills    Comments, Therapeutic Exercise warm ups x15  -SHANNON       Row Name             Wound 06/25/24 1415 midline sternal Incision    Wound - Properties Group Placement Date: 06/25/24  -SF Placement Time: 1415  -SF Present on Original Admission: N  -SF Orientation: midline  -SF Location: sternal  -SF Primary Wound Type: Incision  -SF    Retired Wound - Properties Group Placement Date: 06/25/24  -SF Placement Time: 1415  -SF Present on Original Admission: N  -SF Orientation: midline  -SF Location: sternal  -SF Primary Wound Type: Incision  -SF    Retired Wound - Properties Group Date first assessed: 06/25/24  -SF Time first assessed: 1415  -SF Present on Original Admission: N  -SF Location: sternal  -SF Primary Wound Type: Incision  -SF      Row Name             Wound 06/25/24 1354 Left lower leg Incision    Wound - Properties Group Placement Date: 06/25/24  -SF Placement Time: 1354  -SF Present on Original Admission: N  -SF Side: Left  -SF Orientation: lower  -SF Location: leg  -SF Primary Wound Type: Incision  -SF    Retired Wound - Properties Group Placement Date: 06/25/24  -SF Placement Time: 1354  -SF Present on Original Admission: N  -SF Side: Left  -SF Orientation: lower  -SF Location: leg  -SF Primary Wound Type: Incision  -SF    Retired Wound - Properties Group Date first assessed: 06/25/24  -SF Time first assessed: 1354  -SF Present on Original Admission: N  -SF Side: Left  -SF Location: leg  -SF Primary Wound Type: Incision  -SF      Row Name             Wound 06/25/24 1415 Right  lower leg Incision    Wound - Properties Group Placement Date: 06/25/24  -SF Placement Time: 1415 -SF Side: Right  -SF Orientation: lower  -SF Location: leg  -SF Primary Wound Type: Incision  -SF    Retired Wound - Properties Group Placement Date: 06/25/24  -SF Placement Time: 1415  -SF Side: Right  -SF Orientation: lower  -SF Location: leg  -SF Primary Wound Type: Incision  -SF    Retired Wound - Properties Group Date first assessed: 06/25/24  -SF Time first assessed: 1415  -SF Side: Right  -SF Location: leg  -SF Primary Wound Type: Incision  -SF      Row Name             NPWT (Negative Pressure Wound Therapy) 06/25/24 1900 STERNUM    NPWT (Negative Pressure Wound Therapy) - Properties Group Placement Date: 06/25/24  -SF Placement Time: 1900 -SF Location: [place]ERN[place]  -SF Additional Comments: PREVENA  -SF    Retired NPWT (Negative Pressure Wound Therapy) - Properties Group Placement Date: 06/25/24  -SF Placement Time: 1900 -SF Location: STERNUM  -SF Additional Comments: PREVENA  -SF    Retired NPWT (Negative Pressure Wound Therapy) - Properties Group Placement Date: 06/25/24  -SF Placement Time: 1900 -SF Location: STERNUM  -SF Additional Comments: PREVENA  -SF      Row Name 06/28/24 0730          Vital Signs    Pre Systolic BP Rehab 120  -SHANNON     Pre Treatment Diastolic BP 82  -SHANNON     Post Systolic BP Rehab 151  -SHANNON     Post Treatment Diastolic BP 78  -SHANNON     Pretreatment Heart Rate (beats/min) 88  -SHANNON     Posttreatment Heart Rate (beats/min) 94  -SHANNON     Pre SpO2 (%) 93  -SHANNON     O2 Delivery Pre Treatment --  2 lpm  -SHANNON     Post SpO2 (%) 92  -SHANNON       Row Name 06/28/24 0730          Positioning and Restraints    Pre-Treatment Position sitting in chair/recliner  -SHANNON     In Chair sitting;call light within reach;encouraged to call for assist  breakfast  -SHANNON               User Key  (r) = Recorded By, (t) = Taken By, (c) = Cosigned By      Initials Name Provider Type    Mae Taylor, PTA Physical Therapist  Assistant    Bakari Chery RN Registered Nurse                    Physical Therapy Education       Title: PT OT SLP Therapies (In Progress)       Topic: Physical Therapy (In Progress)       Point: Mobility training (In Progress)       Learning Progress Summary             Patient Acceptance, E,D, NR by SHANNON at 6/28/2024 0804    Comment: sit to stand    Acceptance, E,TB,D, VU,DU,NR by  at 6/26/2024 1047    Comment: education re: purpose of PT/importance of activity, safety/falls prevention, sternal precautions, improved tech w/ tfers, deep breathing and pacing activity   Significant Other Acceptance, E,TB,D, VU,DU,NR by  at 6/26/2024 1047    Comment: education re: purpose of PT/importance of activity, safety/falls prevention, sternal precautions, improved tech w/ tfers, deep breathing and pacing activity                         Point: Home exercise program (Done)       Learning Progress Summary             Patient Acceptance, E,D, DU by SHANNON at 6/27/2024 0825    Comment: purse lip breathing    Acceptance, E,TB,D, VU,DU,NR by  at 6/26/2024 1047    Comment: education re: purpose of PT/importance of activity, safety/falls prevention, sternal precautions, improved tech w/ tfers, deep breathing and pacing activity   Significant Other Acceptance, E,TB,D, VU,DU,NR by  at 6/26/2024 1047    Comment: education re: purpose of PT/importance of activity, safety/falls prevention, sternal precautions, improved tech w/ tfers, deep breathing and pacing activity                         Point: Body mechanics (Done)       Learning Progress Summary             Patient Acceptance, E,TB,D, VU,DU,NR by  at 6/26/2024 1047    Comment: education re: purpose of PT/importance of activity, safety/falls prevention, sternal precautions, improved tech w/ tfers, deep breathing and pacing activity   Significant Other Acceptance, E,TB,D, VU,DU,NR by  at 6/26/2024 1047    Comment: education re: purpose of PT/importance of activity,  safety/falls prevention, sternal precautions, improved tech w/ tfers, deep breathing and pacing activity                         Point: Precautions (Done)       Learning Progress Summary             Patient Acceptance, E,TB,D, PAIGE,OTILIO,NR by  at 6/26/2024 1047    Comment: education re: purpose of PT/importance of activity, safety/falls prevention, sternal precautions, improved tech w/ tfers, deep breathing and pacing activity   Significant Other Acceptance, E,TB,D, PAIGE,OTILIO,NR by  at 6/26/2024 1047    Comment: education re: purpose of PT/importance of activity, safety/falls prevention, sternal precautions, improved tech w/ tfers, deep breathing and pacing activity                                         User Key       Initials Effective Dates Name Provider Type Discipline     02/03/23 -  Mae Santiago PTA Physical Therapist Assistant PT     08/02/18 -  Indira Jones, PT Physical Therapist PT                  PT Recommendation and Plan         Outcome Measures       Row Name 06/28/24 0800 06/27/24 0800          How much help from another person do you currently need...    Turning from your back to your side while in flat bed without using bedrails? 2  -SHANNON 2  -SHANNON     Moving from lying on back to sitting on the side of a flat bed without bedrails? 2  -SHANNON 2  -SHANNON     Moving to and from a bed to a chair (including a wheelchair)? 3  -SHANNON 3  -SHANNON     Standing up from a chair using your arms (e.g., wheelchair, bedside chair)? 3  -SHANNON 3  -SHANNON     Climbing 3-5 steps with a railing? 2  -SHANNON 3  -SHANNON     To walk in hospital room? 3  -SHANNON 3  -SHANNON     AM-PAC 6 Clicks Score (PT) 15  -SHANNON 16  -SHANNON     Highest Level of Mobility Goal 4 --> Transfer to chair/commode  -SHANNON 5 --> Static standing  -SHANNON               User Key  (r) = Recorded By, (t) = Taken By, (c) = Cosigned By      Initials Name Provider Type    Mae Taylor PTA Physical Therapist Assistant                     Time Calculation:    PT Charges       Row Name 06/28/24  0805             Time Calculation    Start Time 0730  -SHANNON      Stop Time 0755  -SHANNON      Time Calculation (min) 25 min  -SHANNON         Timed Charges    15754 - Gait Training Minutes  25  -SHANNON         Total Minutes    Timed Charges Total Minutes 25  -SHANNON       Total Minutes 25  -SHANNON                User Key  (r) = Recorded By, (t) = Taken By, (c) = Cosigned By      Initials Name Provider Type    Mae Taylor, NELSON Physical Therapist Assistant                  Therapy Charges for Today       Code Description Service Date Service Provider Modifiers Qty    10769769079 HC GAIT TRAINING EA 15 MIN 6/27/2024 Mae Santiago, NELSON GP 2    53611536591 HC GAIT TRAINING EA 15 MIN 6/27/2024 Mae Santiago, NELSON GP 2    23853779313 HC GAIT TRAINING EA 15 MIN 6/28/2024 Mae Santiago, NELSON GP 2            PT G-Codes  AM-PAC 6 Clicks Score (PT): 15    Mae Santiago PTA  6/28/2024

## 2024-06-28 NOTE — NURSING NOTE
[facility]  INPATIENT WOUND & OSTOMY CARE    Today's Date: 06/28/24    Patient Name: Jeff Alatorre  MRN: [id number]  CSN: [id number]  PCP: Jimmy Curtis MD  Attending Provider: Jose F Kim MD  Length of Stay: 3    Rounded on patient during multidisciplinary rounds. Patient is currently sleeping in bed. He had a restless night per nursing. He is currently in soft wrist restraints.     Patient has kicked off one silicone heel dressing. I removed both dressings to assess skin. Bilateral heels are intact. I reapplied bilateral heel dressings per protocol. I applied socks over dressings.    Patient has sacral spine dressing in place. He had a small bowel movement. Cleaned patient up and repositioned in bed.     Apply silicone foam border dressing per protocol to sacral spine/bilateral heels for protection.  Nursing to change dressing every 3 days and PRN if soiled. Nursing is to peel back dressing with every assessment to assess skin underneath dressing. No barrier cream under dressing.     Patient is currently on a comfort glide with an absorbant pad. Wedges are at bedside for nursing to utilizing for turning.    Patient is going to be intubated in preparation for procedures. I gave his nurse Whit a pair of offloading heel boots to place after he is intubated.        All pressure ulcer prevention measures have been ordered per protocol.       This document has been electronically signed by Mayra Subramanian RN on 6/28/2024 11:12 CDT

## 2024-06-29 LAB
ANION GAP SERPL CALCULATED.3IONS-SCNC: 12 MMOL/L (ref 5–15)
BUN SERPL-MCNC: 33 MG/DL (ref 8–23)
BUN/CREAT SERPL: 23.4 (ref 7–25)
CALCIUM SPEC-SCNC: 9 MG/DL (ref 8.6–10.5)
CHLORIDE SERPL-SCNC: 104 MMOL/L (ref 98–107)
CO2 SERPL-SCNC: 25 MMOL/L (ref 22–29)
CREAT SERPL-MCNC: 1.41 MG/DL (ref 0.76–1.27)
DEPRECATED RDW RBC AUTO: 58.9 FL (ref 37–54)
EGFRCR SERPLBLD CKD-EPI 2021: 49.1 ML/MIN/1.73
ERYTHROCYTE [DISTWIDTH] IN BLOOD BY AUTOMATED COUNT: 16.6 % (ref 12.3–15.4)
GLUCOSE BLDC GLUCOMTR-MCNC: 121 MG/DL (ref 70–130)
GLUCOSE BLDC GLUCOMTR-MCNC: 123 MG/DL (ref 70–130)
GLUCOSE BLDC GLUCOMTR-MCNC: 134 MG/DL (ref 70–130)
GLUCOSE BLDC GLUCOMTR-MCNC: 139 MG/DL (ref 70–130)
GLUCOSE SERPL-MCNC: 139 MG/DL (ref 65–99)
HCT VFR BLD AUTO: 34.7 % (ref 37.5–51)
HGB BLD-MCNC: 11.4 G/DL (ref 13–17.7)
MCH RBC QN AUTO: 32 PG (ref 26.6–33)
MCHC RBC AUTO-ENTMCNC: 32.9 G/DL (ref 31.5–35.7)
MCV RBC AUTO: 97.5 FL (ref 79–97)
PLATELET # BLD AUTO: 132 10*3/MM3 (ref 140–450)
PMV BLD AUTO: 11.3 FL (ref 6–12)
POTASSIUM SERPL-SCNC: 3.7 MMOL/L (ref 3.5–5.2)
QT INTERVAL: 404 MS
QTC INTERVAL: 404 MS
RBC # BLD AUTO: 3.56 10*6/MM3 (ref 4.14–5.8)
SODIUM SERPL-SCNC: 141 MMOL/L (ref 136–145)
WBC NRBC COR # BLD AUTO: 10.1 10*3/MM3 (ref 3.4–10.8)

## 2024-06-29 PROCEDURE — 25010000002 FUROSEMIDE PER 20 MG: Performed by: SURGERY

## 2024-06-29 PROCEDURE — 97116 GAIT TRAINING THERAPY: CPT

## 2024-06-29 PROCEDURE — 25010000002 AMIODARONE IN DEXTROSE 5% 360-4.14 MG/200ML-% SOLUTION: Performed by: SURGERY

## 2024-06-29 PROCEDURE — 82948 REAGENT STRIP/BLOOD GLUCOSE: CPT

## 2024-06-29 PROCEDURE — 93005 ELECTROCARDIOGRAM TRACING: CPT | Performed by: SURGERY

## 2024-06-29 PROCEDURE — 80048 BASIC METABOLIC PNL TOTAL CA: CPT | Performed by: SURGERY

## 2024-06-29 PROCEDURE — 99024 POSTOP FOLLOW-UP VISIT: CPT | Performed by: SURGERY

## 2024-06-29 PROCEDURE — 93010 ELECTROCARDIOGRAM REPORT: CPT | Performed by: EMERGENCY MEDICINE

## 2024-06-29 PROCEDURE — 25010000002 ENOXAPARIN PER 10 MG: Performed by: THORACIC SURGERY (CARDIOTHORACIC VASCULAR SURGERY)

## 2024-06-29 PROCEDURE — 85027 COMPLETE CBC AUTOMATED: CPT | Performed by: SURGERY

## 2024-06-29 RX ORDER — BISACODYL 10 MG
10 SUPPOSITORY, RECTAL RECTAL ONCE
Status: COMPLETED | OUTPATIENT
Start: 2024-06-29 | End: 2024-06-29

## 2024-06-29 RX ORDER — FUROSEMIDE 10 MG/ML
20 INJECTION INTRAMUSCULAR; INTRAVENOUS ONCE
Status: COMPLETED | OUTPATIENT
Start: 2024-06-29 | End: 2024-06-29

## 2024-06-29 RX ADMIN — POLYETHYLENE GLYCOL 3350 17 G: 17 POWDER, FOR SOLUTION ORAL at 08:50

## 2024-06-29 RX ADMIN — PREGABALIN 25 MG: 25 CAPSULE ORAL at 17:26

## 2024-06-29 RX ADMIN — CLOPIDOGREL BISULFATE 75 MG: 75 TABLET, FILM COATED ORAL at 17:26

## 2024-06-29 RX ADMIN — ENOXAPARIN SODIUM 40 MG: 100 INJECTION SUBCUTANEOUS at 08:50

## 2024-06-29 RX ADMIN — METOPROLOL TARTRATE 25 MG: 50 TABLET, FILM COATED ORAL at 08:49

## 2024-06-29 RX ADMIN — PREGABALIN 25 MG: 25 CAPSULE ORAL at 05:27

## 2024-06-29 RX ADMIN — OXYCODONE HYDROCHLORIDE 5 MG: 5 TABLET ORAL at 05:14

## 2024-06-29 RX ADMIN — ACETAMINOPHEN 1000 MG: 500 TABLET, FILM COATED ORAL at 12:43

## 2024-06-29 RX ADMIN — LIDOCAINE 2 PATCH: 4 PATCH TOPICAL at 08:50

## 2024-06-29 RX ADMIN — AMIODARONE HYDROCHLORIDE 400 MG: 200 TABLET ORAL at 08:49

## 2024-06-29 RX ADMIN — ACETAMINOPHEN 1000 MG: 500 TABLET, FILM COATED ORAL at 21:17

## 2024-06-29 RX ADMIN — ASPIRIN 81 MG: 81 TABLET, COATED ORAL at 08:49

## 2024-06-29 RX ADMIN — METOPROLOL TARTRATE 25 MG: 50 TABLET, FILM COATED ORAL at 21:17

## 2024-06-29 RX ADMIN — BISACODYL 10 MG: 10 SUPPOSITORY RECTAL at 08:49

## 2024-06-29 RX ADMIN — FUROSEMIDE 20 MG: 10 INJECTION, SOLUTION INTRAMUSCULAR; INTRAVENOUS at 11:49

## 2024-06-29 RX ADMIN — ACETAMINOPHEN 1000 MG: 500 TABLET, FILM COATED ORAL at 08:49

## 2024-06-29 RX ADMIN — AMIODARONE HYDROCHLORIDE 400 MG: 200 TABLET ORAL at 17:26

## 2024-06-29 RX ADMIN — ATORVASTATIN CALCIUM 20 MG: 10 TABLET, FILM COATED ORAL at 21:17

## 2024-06-29 RX ADMIN — BISACODYL 10 MG: 5 TABLET, COATED ORAL at 08:50

## 2024-06-29 RX ADMIN — AMIODARONE HYDROCHLORIDE 0.5 MG/MIN: 1.8 INJECTION, SOLUTION INTRAVENOUS at 02:58

## 2024-06-29 RX ADMIN — ACETAMINOPHEN 1000 MG: 500 TABLET, FILM COATED ORAL at 01:39

## 2024-06-29 NOTE — PLAN OF CARE
Goal Outcome Evaluation:  Plan of Care Reviewed With: patient        Progress: improving  Outcome Evaluation: Pt. agreeable to therapy. Hewas able to stand with CGA, but required cues for technique and to maintain balance once standing. Pt. was able to walk 300' with 2 standing rests-CGA-MIN x 1. Pt's O2 sat remained in the 90's while on RA. Will continue to work on progressive ambulation.

## 2024-06-29 NOTE — THERAPY TREATMENT NOTE
Acute Care - Physical Therapy Treatment Note  [facility]     Patient Name: Jeff Alatorre  : 1940  MRN: [id number]  Today's Date: 2024      Visit Dx:     ICD-10-CM ICD-9-CM   1. Impaired functional mobility and activity tolerance [Z74.09]  Z74.09 V49.89   2. Coronary artery disease of native artery of native heart with stable angina pectoris  I25.118 414.01     413.9     Patient Active Problem List   Diagnosis    Hx of adenomatous colonic polyps    Adenomatous polyps    Idiopathic gout of multiple sites    Primary hypertension    Type 2 diabetes mellitus without complication, without long-term current use of insulin    Hyperlipidemia LDL goal <100    Erectile dysfunction due to arterial insufficiency    Anemia    B12 deficiency    Stage 3a chronic kidney disease (CKD)    Seasonal allergic rhinitis    ANYI (obstructive sleep apnea)    Carotid artery disease    ROSALES (dyspnea on exertion)    Coronary artery disease of native artery of native heart with stable angina pectoris    Bilateral carotid artery stenosis    History of left-sided carotid endarterectomy    Stenosis of right subclavian artery    CAD (coronary artery disease)     Past Medical History:   Diagnosis Date    Arthritis     Chronic kidney disease     Coronary artery disease of native artery of native heart with stable angina pectoris 2024    Erectile dysfunction     Gout     History of diverticulitis     HTN (hypertension)     Hx of adenomatous colonic polyps 10/13/2020    Hypercholesteremia     Low testosterone     Polycythemia vera 10/13/2020    Squamous cell carcinoma of skin 10/2021    top of head    Type 2 diabetes mellitus without complication, without long-term current use of insulin 2022     Past Surgical History:   Procedure Laterality Date    CARDIAC CATHETERIZATION Left 2024    Procedure: Coronary angiography;  Surgeon: Zaid Contreras MD;  Location: [facility] INVASIVE LOCATION;  Service: Cardiology;   Laterality: Left;    CARDIAC CATHETERIZATION Left 05/20/2024    Procedure: Percutaneous Coronary Intervention;  Surgeon: Zaid Contreras MD;  Location:  [facility] CATH INVASIVE LOCATION;  Service: Cardiology;  Laterality: Left;    CAROTID ENDARTERECTOMY Left     CATARACT EXTRACTION, BILATERAL      COLON SURGERY      COLONOSCOPY      COLONOSCOPY N/A 07/27/2021    Procedure: COLONOSCOPY WITH ANESTHESIA;  Surgeon: Luciano Alatorre DO;  Location:  [facility] ENDOSCOPY;  Service: Gastroenterology;  Laterality: N/A;  preop; hx of polyps  postop; diverticulosis   PCP Devon Moore     CORONARY ARTERY BYPASS GRAFT N/A 6/25/2024    Procedure: CORONARY ARTERY BYPASS GRAFTING x4, LEFT INTERNAL MAMMARY ARTERY GRAFT, RIGHT LEG ENDOSCOPIC VEIN HARVEST, TRANSESOPHAGEAL ECHOCARDIOGRAM, APPLICATION OF PREVENA;  Surgeon: Jose F Kim MD;  Location:  [facility] OR;  Service: Cardiothoracic;  Laterality: N/A;    PENILE PROSTHESIS IMPLANT N/A 03/14/2023    Procedure: 3-PIECE INFLATABLE PENILE PROSTHESIS PLACEMENT;  Surgeon: Garcia Santana MD;  Location:  [facility] OR;  Service: Urology;  Laterality: N/A;    VASECTOMY       PT Assessment (Last 12 Hours)       PT Evaluation and Treatment       Row Name 06/29/24 1500 06/29/24 1106       Physical Therapy Time and Intention    Subjective Information no complaints  - no complaints  -    Document Type therapy note (daily note)  - therapy note (daily note)  -    Mode of Treatment physical therapy  - physical therapy  -      Row Name 06/29/24 1500 06/29/24 1106       General Information    Existing Precautions/Restrictions fall;sternal  prevena  - fall;sternal  prevena  -      Row Name 06/29/24 1500 06/29/24 1106       Pain    Pretreatment Pain Rating 0/10 - no pain  - 0/10 - no pain  -    Posttreatment Pain Rating 0/10 - no pain  - 0/10 - no pain  -      Row Name 06/29/24 1500 06/29/24 1106       Bed Mobility    Comment, (Bed Mobility) chair  - chair  -      Row Name  06/29/24 1500 06/29/24 1106       Transfers    Comment, (Transfers) discussed recliners at home.  -MF min assist to scoot back in chair  -MF      Row Name 06/29/24 1500 06/29/24 1106       Sit-Stand Transfer    Sit-Stand [place] (Transfers) verbal cues;contact guard  -MF verbal cues;contact guard  -MF      Row Name 06/29/24 1500 06/29/24 1106       Stand-Sit Transfer    Stand-Sit [place] (Transfers) verbal cues;contact guard  -MF verbal cues;contact guard  -MF      Row Name 06/29/24 1500 06/29/24 1106       Gait/Stairs (Locomotion)    [place] Level (Gait) verbal cues;contact guard  -MF verbal cues;contact guard;minimum assist (75% patient effort)  -MF    Distance in Feet (Gait) 350  1 standing rest  -  2 standing rests  -MF    Deviations/Abnormal Patterns (Gait) nani decreased;stride length decreased  -MF nani decreased;stride length decreased  -MF    Bilateral Gait Deviations forward flexed posture  -MF forward flexed posture  -MF      Row Name 06/29/24 1500 06/29/24 1106       Motor Skills    Comments, Therapeutic Exercise cardiac warm ups x 15  -MF cardiac warm ups x 15  -MF      Row Name             Wound 06/25/24 1415 midline sternal Incision    Wound - Properties Group Placement Date: 06/25/24  -SF Placement Time: 1415  -SF Present on Original Admission: N  -SF Orientation: midline  -SF Location: sternal  -SF Primary Wound Type: Incision  -SF    Retired Wound - Properties Group Placement Date: 06/25/24  -SF Placement Time: 1415  -SF Present on Original Admission: N  -SF Orientation: midline  -SF Location: sternal  -SF Primary Wound Type: Incision  -SF    Retired Wound - Properties Group Date first assessed: 06/25/24  -SF Time first assessed: 1415  -SF Present on Original Admission: N  -SF Location: sternal  -SF Primary Wound Type: Incision  -SF      Row Name             Wound 06/25/24 1354 Left lower leg Incision    Wound - Properties Group Placement Date: 06/25/24  -SF Placement  Time: 1354  -SF Present on Original Admission: N  -SF Side: Left  -SF Orientation: lower  -SF Location: leg  -SF Primary Wound Type: Incision  -SF    Retired Wound - Properties Group Placement Date: 06/25/24  -SF Placement Time: 1354  -SF Present on Original Admission: N  -SF Side: Left  -SF Orientation: lower  -SF Location: leg  -SF Primary Wound Type: Incision  -SF    Retired Wound - Properties Group Date first assessed: 06/25/24  -SF Time first assessed: 1354  -SF Present on Original Admission: N  -SF Side: Left  -SF Location: leg  -SF Primary Wound Type: Incision  -SF      Row Name             Wound 06/25/24 1415 Right lower leg Incision    Wound - Properties Group Placement Date: 06/25/24  -SF Placement Time: 1415  -SF Side: Right  -SF Orientation: lower  -SF Location: leg  -SF Primary Wound Type: Incision  -SF    Retired Wound - Properties Group Placement Date: 06/25/24  -SF Placement Time: 1415  -SF Side: Right  -SF Orientation: lower  -SF Location: leg  -SF Primary Wound Type: Incision  -SF    Retired Wound - Properties Group Date first assessed: 06/25/24  -SF Time first assessed: 1415  -SF Side: Right  -SF Location: leg  -SF Primary Wound Type: Incision  -SF      Row Name             NPWT (Negative Pressure Wound Therapy) 06/25/24 1900 STERNUM    NPWT (Negative Pressure Wound Therapy) - Properties Group Placement Date: 06/25/24  -SF Placement Time: 1900  -SF Location: STERNUM  -SF Additional Comments: PREVENA  -SF    Retired NPWT (Negative Pressure Wound Therapy) - Properties Group Placement Date: 06/25/24  -SF Placement Time: 1900  -SF Location: STERNUM  -SF Additional Comments: PREVENA  -SF    Retired NPWT (Negative Pressure Wound Therapy) - Properties Group Placement Date: 06/25/24  -SF Placement Time: 1900  -SF Location: STERNUM  -SF Additional Comments: PREVENA  -SF      Row Name 06/29/24 1106          Plan of Care Review    Plan of Care Reviewed With patient  -MF     Progress improving  -MF      Outcome Evaluation Pt. agreeable to therapy. Hewas able to stand with CGA, but required cues for technique and to maintain balance once standing. Pt. was able to walk 300' with 2 standing rests-CGA-MIN x 1. Pt's O2 sat remained in the 90's while on RA. Will continue to work on progressive ambulation.  -       Row Name 06/29/24 1500 06/29/24 1106       Vital Signs    Pretreatment Heart Rate (beats/min) -- 66  -MF    Pre SpO2 (%) -- 93  -MF    O2 Delivery Pre Treatment -- room air  -MF    Intra SpO2 (%) -- 97  -MF    O2 Delivery Intra Treatment -- room air  -MF    Post SpO2 (%) 94  -MF 96  -MF    O2 Delivery Post Treatment room air  -MF room air  -MF    Pre Patient Position -- Sitting  -MF    Intra Patient Position -- Standing  -MF    Post Patient Position -- Sitting  -MF      Row Name 06/29/24 1500 06/29/24 1106       Positioning and Restraints    Pre-Treatment Position sitting in chair/recliner  -MF sitting in chair/recliner  -    Post Treatment Position chair  -MF chair  -MF    In Chair reclined;call light within reach;encouraged to call for assist;with family/caregiver  - reclined;call light within reach;encouraged to call for assist;with family/caregiver;RUE elevated;LUE elevated  -              User Key  (r) = Recorded By, (t) = Taken By, (c) = Cosigned By      Initials Name Provider Type    Mary Gallagher PTA Physical Therapist Assistant    Bakari Chery, RN Registered Nurse                    Physical Therapy Education       Title: PT OT SLP Therapies (In Progress)       Topic: Physical Therapy (In Progress)       Point: Mobility training (In Progress)       Learning Progress Summary             Patient Acceptance, E,D, NR by SHANNON at 6/28/2024 0804    Comment: sit to stand    Acceptance, E,TB,D, VU,DU,NR by MAGALYS at 6/26/2024 1047    Comment: education re: purpose of PT/importance of activity, safety/falls prevention, sternal precautions, improved tech w/ tfers, deep breathing and pacing  activity   Significant Other Acceptance, E,TB,D, VU,DU,NR by  at 6/26/2024 1047    Comment: education re: purpose of PT/importance of activity, safety/falls prevention, sternal precautions, improved tech w/ tfers, deep breathing and pacing activity                         Point: Home exercise program (Done)       Learning Progress Summary             Patient Acceptance, E,D, DU by SHANNON at 6/27/2024 0825    Comment: purse lip breathing    Acceptance, E,TB,D, VU,DU,NR by MAGALYS at 6/26/2024 1047    Comment: education re: purpose of PT/importance of activity, safety/falls prevention, sternal precautions, improved tech w/ tfers, deep breathing and pacing activity   Significant Other Acceptance, E,TB,D, VU,DU,NR by  at 6/26/2024 1047    Comment: education re: purpose of PT/importance of activity, safety/falls prevention, sternal precautions, improved tech w/ tfers, deep breathing and pacing activity                         Point: Body mechanics (Done)       Learning Progress Summary             Patient Acceptance, E,TB,D, VU,DU,NR by  at 6/26/2024 1047    Comment: education re: purpose of PT/importance of activity, safety/falls prevention, sternal precautions, improved tech w/ tfers, deep breathing and pacing activity   Significant Other Acceptance, E,TB,D, VU,DU,NR by  at 6/26/2024 1047    Comment: education re: purpose of PT/importance of activity, safety/falls prevention, sternal precautions, improved tech w/ tfers, deep breathing and pacing activity                         Point: Precautions (Done)       Learning Progress Summary             Patient Acceptance, E,TB,D, VU,DU,NR by  at 6/26/2024 1047    Comment: education re: purpose of PT/importance of activity, safety/falls prevention, sternal precautions, improved tech w/ tfers, deep breathing and pacing activity   Significant Other Acceptance, E,TB,D, VU,DU,NR by  at 6/26/2024 1047    Comment: education re: purpose of PT/importance of activity, safety/falls  prevention, sternal precautions, improved tech w/ tfers, deep breathing and pacing activity                                         User Key       Initials Effective Dates Name Provider Type Discipline    SHANNON 02/03/23 -  Mae Santiago, NELSON Physical Therapist Assistant PT    MAGALYS 08/02/18 -  Indira Jones, PT Physical Therapist PT                  PT Recommendation and Plan     Plan of Care Reviewed With: patient  Progress: improving  Outcome Evaluation: Pt. agreeable to therapy. Hewas able to stand with CGA, but required cues for technique and to maintain balance once standing. Pt. was able to walk 300' with 2 standing rests-CGA-MIN x 1. Pt's O2 sat remained in the 90's while on RA. Will continue to work on progressive ambulation.   Outcome Measures       Row Name 06/29/24 1106 06/28/24 0800 06/27/24 0800       How much help from another person do you currently need...    Turning from your back to your side while in flat bed without using bedrails? 3  -MF 2  -SHANNON 2  -SHANNON    Moving from lying on back to sitting on the side of a flat bed without bedrails? 3  -MF 2  -SHANNON 2  -SHANNON    Moving to and from a bed to a chair (including a wheelchair)? 3  -MF 3  -SHANNON 3  -SHANNON    Standing up from a chair using your arms (e.g., wheelchair, bedside chair)? 3  -MF 3  -SHANNON 3  -SHANNON    Climbing 3-5 steps with a railing? 2  -MF 2  -SHANNON 3  -SHANNON    To walk in hospital room? 3  -MF 3  -SHANNON 3  -SHANNON    AM-PAC 6 Clicks Score (PT) 17  -MF 15  -SHANNON 16  -SHANNON    Highest Level of Mobility Goal 5 --> Static standing  -MF 4 --> Transfer to chair/commode  -SHANNON 5 --> Static standing  -SHANNON       Functional Assessment    Outcome Measure Options AM-PAC 6 Clicks Basic Mobility (PT)  - -- --              User Key  (r) = Recorded By, (t) = Taken By, (c) = Cosigned By      Initials Name Provider Type    Mae Taylor, NELSON Physical Therapist Assistant    Mary Gallagher, NELSON Physical Therapist Assistant                     Time Calculation:    PT Charges        Row Name 06/29/24 1523 06/29/24 1132          Time Calculation    Start Time 1500  -MF 1106  -MF     Stop Time 1518  -MF 1132  -MF     Time Calculation (min) 18 min  -MF 26 min  -MF     PT Received On 06/29/24  -MF 06/29/24  -MF        Time Calculation- PT    Total Timed Code Minutes- PT 18 minute(s)  -MF 26 minute(s)  -MF        Timed Charges    45954 - Gait Training Minutes  18  -MF 26  -MF        Total Minutes    Timed Charges Total Minutes 18  -MF 26  -MF      Total Minutes 18  -MF 26  -MF               User Key  (r) = Recorded By, (t) = Taken By, (c) = Cosigned By      Initials Name Provider Type    Mary Gallagher PTA Physical Therapist Assistant                  Therapy Charges for Today       Code Description Service Date Service Provider Modifiers Qty    40109135121 HC GAIT TRAINING EA 15 MIN 6/29/2024 Mary Macedo PTA GP 2    16208263290 HC GAIT TRAINING EA 15 MIN 6/29/2024 Mary Macedo PTA GP 1            PT G-Codes  Outcome Measure Options: AM-PAC 6 Clicks Basic Mobility (PT)  AM-PAC 6 Clicks Score (PT): 17    Mary Macedo PTA  6/29/2024

## 2024-06-29 NOTE — PROGRESS NOTES
"    [facility] Cardiothoracic Surgery  PROGRESS NOTE   CC: Postop bypass    Subjective:  Remains on oxygen but improving.  Pain is well-controlled.  Making adequate urine.  A-fib overnight with rates in the 120s with amiodarone drip    ROS: No fevers or chills hemodynamically stable    Objective:      /64 (BP Location: Left arm, Patient Position: Sitting)   Pulse 67   Temp 97.4 °F (36.3 °C) (Oral)   Resp 18   Ht 174 cm (68.5\")   Wt 91 kg (200 lb 9.6 oz)   SpO2 93%   BMI 30.05 kg/m²       Intake/Output Summary (Last 24 hours) at 6/29/2024 1330  Last data filed at 6/29/2024 1314  Gross per 24 hour   Intake 270 ml   Output 2085 ml   Net -1815 ml       CT Output: Left pleural cayenne 130    PE:  Vitals:    06/29/24 1112   BP: 106/64   Pulse: 67   Resp: 18   Temp: 97.4 °F (36.3 °C)   SpO2: 93%     GENERAL: NAD, resting comfortably, normal color  CARDIOVASCULAR: regular, regular rate, sinus, dressing clean dry and intact  PULMONARY: Normal bilateral breath sounds, no labored breathing  ABDOMEN: soft, nontender/nondistended  EXTREMITIES: mild peripheral edema, normal pulses, normal ROM        Lab Results (last 72 hours)       Procedure Component Value Units Date/Time    POC Glucose Once [[id number]]  (Abnormal) Collected: 06/29/24 1111    Specimen: Blood Updated: 06/29/24 1122     Glucose 134 mg/dL      Comment: : [id number] Kiet BrittanyMeter ID: [id number]       Basic Metabolic Panel [[id number]]  (Abnormal) Collected: 06/29/24 0846    Specimen: Blood Updated: 06/29/24 0926     Glucose 139 mg/dL      BUN 33 mg/dL      Creatinine 1.41 mg/dL      Sodium 141 mmol/L      Potassium 3.7 mmol/L      Chloride 104 mmol/L      CO2 25.0 mmol/L      Calcium 9.0 mg/dL      BUN/Creatinine Ratio 23.4     Anion Gap 12.0 mmol/L      eGFR 49.1 mL/min/1.73     Narrative:      GFR Normal >60  Chronic Kidney Disease <60  Kidney Failure <15    The GFR formula is only valid for adults with stable renal " function between ages 18 and 70.    CBC (No Diff) [[id number]]  (Abnormal) Collected: 06/29/24 0846    Specimen: Blood Updated: 06/29/24 0910     WBC 10.10 10*3/mm3      RBC 3.56 10*6/mm3      Hemoglobin 11.4 g/dL      Hematocrit 34.7 %      MCV 97.5 fL      MCH 32.0 pg      MCHC 32.9 g/dL      RDW 16.6 %      RDW-SD 58.9 fl      MPV 11.3 fL      Platelets 132 10*3/mm3     POC Glucose Once [[id number]]  (Abnormal) Collected: 06/29/24 0743    Specimen: Blood Updated: 06/29/24 0755     Glucose 139 mg/dL      Comment: : [id number] Kiet LevintanyMeter ID: [id number]       POC Glucose Once [[id number]]  (Abnormal) Collected: 06/28/24 1822    Specimen: Blood Updated: 06/28/24 1833     Glucose 144 mg/dL      Comment: : [id number] Pretty MathewMeter ID: [id number]       Basic Metabolic Panel [[id number]]  (Abnormal) Collected: 06/28/24 1447    Specimen: Blood Updated: 06/28/24 1519     Glucose 144 mg/dL      BUN 28 mg/dL      Creatinine 1.49 mg/dL      Sodium 140 mmol/L      Potassium 4.5 mmol/L      Comment: Slight hemolysis detected by analyzer. Result may be falsely elevated.        Chloride 106 mmol/L      CO2 20.0 mmol/L      Calcium 9.1 mg/dL      BUN/Creatinine Ratio 18.8     Anion Gap 14.0 mmol/L      eGFR 46.0 mL/min/1.73     Narrative:      GFR Normal >60  Chronic Kidney Disease <60  Kidney Failure <15    The GFR formula is only valid for adults with stable renal function between ages 18 and 70.    POC Glucose Once [[id number]]  (Normal) Collected: 06/28/24 1107    Specimen: Blood Updated: 06/28/24 1118     Glucose 120 mg/dL      Comment: : [id number] Jurgen VazquezMeter ID: [id number]       POC Glucose Once [[id number]]  (Normal) Collected: 06/28/24 0726    Specimen: Blood Updated: 06/28/24 0737     Glucose 126 mg/dL      Comment: : [id number] Jurgen VazquezMeter ID: [id number]       Comprehensive Metabolic Panel [[id number]]  (Abnormal) Collected: 06/28/24 0352    Specimen: Blood Updated: 06/28/24 7255      Glucose 135 mg/dL      BUN 28 mg/dL      Creatinine 1.44 mg/dL      Sodium 139 mmol/L      Potassium 4.0 mmol/L      Chloride 107 mmol/L      CO2 22.0 mmol/L      Calcium 8.4 mg/dL      Total Protein 5.2 g/dL      Albumin 3.2 g/dL      ALT (SGPT) <5 U/L      AST (SGOT) 15 U/L      Alkaline Phosphatase 73 U/L      Total Bilirubin 0.6 mg/dL      Globulin 2.0 gm/dL      A/G Ratio 1.6 g/dL      BUN/Creatinine Ratio 19.4     Anion Gap 10.0 mmol/L      eGFR 47.9 mL/min/1.73     Narrative:      GFR Normal >60  Chronic Kidney Disease <60  Kidney Failure <15    The GFR formula is only valid for adults with stable renal function between ages 18 and 70.    CBC & Differential [[id number]]  (Abnormal) Collected: 06/28/24 0352    Specimen: Blood Updated: 06/28/24 0401    Narrative:      The following orders were created for panel order CBC & Differential.  Procedure                               Abnormality         Status                     ---------                               -----------         ------                     CBC Auto Differential[[id number]]        Abnormal            Final result                 Please view results for these tests on the individual orders.    CBC Auto Differential [[id number]]  (Abnormal) Collected: 06/28/24 0352    Specimen: Blood Updated: 06/28/24 0401     WBC 8.81 10*3/mm3      RBC 3.16 10*6/mm3      Hemoglobin 9.9 g/dL      Hematocrit 30.4 %      MCV 96.2 fL      MCH 31.3 pg      MCHC 32.6 g/dL      RDW 16.7 %      RDW-SD 58.3 fl      MPV 10.5 fL      Platelets 101 10*3/mm3      Neutrophil % 80.1 %      Lymphocyte % 9.4 %      Monocyte % 7.3 %      Eosinophil % 2.4 %      Basophil % 0.2 %      Immature Grans % 0.6 %      Neutrophils, Absolute 7.06 10*3/mm3      Lymphocytes, Absolute 0.83 10*3/mm3      Monocytes, Absolute 0.64 10*3/mm3      Eosinophils, Absolute 0.21 10*3/mm3      Basophils, Absolute 0.02 10*3/mm3      Immature Grans, Absolute 0.05 10*3/mm3      nRBC 0.0 /100 WBC     POC  Glucose Once [[id number]]  (Abnormal) Collected: 06/27/24 2020    Specimen: Blood Updated: 06/27/24 2031     Glucose 150 mg/dL      Comment: : [id number] Ade CelestinMeter ID: [id number]       Basic Metabolic Panel [[id number]]  (Abnormal) Collected: 06/27/24 1728    Specimen: Blood Updated: 06/27/24 1822     Glucose 148 mg/dL      BUN 28 mg/dL      Creatinine 1.42 mg/dL      Sodium 138 mmol/L      Potassium 4.0 mmol/L      Chloride 106 mmol/L      CO2 21.0 mmol/L      Calcium 8.6 mg/dL      BUN/Creatinine Ratio 19.7     Anion Gap 11.0 mmol/L      eGFR 48.7 mL/min/1.73     Narrative:      GFR Normal >60  Chronic Kidney Disease <60  Kidney Failure <15    The GFR formula is only valid for adults with stable renal function between ages 18 and 70.    POC Glucose Once [[id number]]  (Abnormal) Collected: 06/27/24 1723    Specimen: Blood Updated: 06/27/24 1734     Glucose 160 mg/dL      Comment: : [id number] Raimundo JeremiahMeter ID: [id number]       POC Glucose Once [[id number]]  (Abnormal) Collected: 06/27/24 1106    Specimen: Blood Updated: 06/27/24 1116     Glucose 146 mg/dL      Comment: : [id number] Ida RudolphMeter ID: [id number]       Blood Gas, Arterial With Co-Ox [[id number]]  (Abnormal) Collected: 06/27/24 0950    Specimen: Arterial Blood Updated: 06/27/24 0953     Site Arterial Line     Jose's Test N/A     pH, Arterial 7.410 pH units      pCO2, Arterial 35.0 mm Hg      pO2, Arterial 64.2 mm Hg      Comment: 84 Value below reference range        HCO3, Arterial 22.2 mmol/L      Base Excess, Arterial -2.1 mmol/L      Comment: 84 Value below reference range        O2 Saturation, Arterial 93.5 %      Comment: 84 Value below reference range        Hemoglobin, Blood Gas 9.7 g/dL      Comment: 84 Value below reference range        Hematocrit, Blood Gas 29.6 %      Comment: 84 Value below reference range        Oxyhemoglobin 91.9 %      Comment: 84 Value below reference range        Methemoglobin 0.70 %       Carboxyhemoglobin 1.0 %      Temperature 37.0     Sodium, Arterial 138 mmol/L      Potassium, Arterial 4.0 mmol/L      Ionized Calcium 4.70 mg/dL      Barometric Pressure for Blood Gas 750 mmHg      Modality Nasal Cannula     Flow Rate 3.0 lpm      Ventilator Mode NA     Collected by [id number]     Comment: Meter: [id number]     :  Haley Daley, RRT        pH, Temp Corrected 7.410 pH Units      pCO2, Temperature Corrected 35.0 mm Hg      pO2, Temperature Corrected 64.2 mm Hg     POC Glucose Once [[id number]]  (Abnormal) Collected: 06/27/24 0651    Specimen: Blood Updated: 06/27/24 0702     Glucose 145 mg/dL      Comment: : [id number] Barron Cantudiane ID: [id number]       Renal Function Panel [[id number]]  (Abnormal) Collected: 06/27/24 0520    Specimen: Blood Updated: 06/27/24 0606     Glucose 135 mg/dL      BUN 29 mg/dL      Creatinine 1.51 mg/dL      Sodium 136 mmol/L      Potassium 4.2 mmol/L      Chloride 103 mmol/L      CO2 22.0 mmol/L      Calcium 8.5 mg/dL      Albumin 3.6 g/dL      Phosphorus 4.1 mg/dL      Anion Gap 11.0 mmol/L      BUN/Creatinine Ratio 19.2     eGFR 45.3 mL/min/1.73     Narrative:      GFR Normal >60  Chronic Kidney Disease <60  Kidney Failure <15    The GFR formula is only valid for adults with stable renal function between ages 18 and 70.    CBC & Differential [[id number]]  (Abnormal) Collected: 06/27/24 0520    Specimen: Blood Updated: 06/27/24 0541    Narrative:      The following orders were created for panel order CBC & Differential.  Procedure                               Abnormality         Status                     ---------                               -----------         ------                     CBC Auto Differential[[id number]]        Abnormal            Final result                 Please view results for these tests on the individual orders.    CBC Auto Differential [[id number]]  (Abnormal) Collected: 06/27/24 0520    Specimen: Blood Updated:  06/27/24 0541     WBC 12.59 10*3/mm3      RBC 3.00 10*6/mm3      Hemoglobin 9.4 g/dL      Hematocrit 28.6 %      MCV 95.3 fL      MCH 31.3 pg      MCHC 32.9 g/dL      RDW 17.2 %      RDW-SD 58.8 fl      MPV 10.7 fL      Platelets 117 10*3/mm3      Neutrophil % 79.4 %      Lymphocyte % 9.1 %      Monocyte % 8.6 %      Eosinophil % 2.0 %      Basophil % 0.3 %      Immature Grans % 0.6 %      Neutrophils, Absolute 10.00 10*3/mm3      Lymphocytes, Absolute 1.14 10*3/mm3      Monocytes, Absolute 1.08 10*3/mm3      Eosinophils, Absolute 0.25 10*3/mm3      Basophils, Absolute 0.04 10*3/mm3      Immature Grans, Absolute 0.08 10*3/mm3      nRBC 0.0 /100 WBC     POC Glucose Once [[id number]]  (Abnormal) Collected: 06/26/24 2043    Specimen: Blood Updated: 06/26/24 2056     Glucose 147 mg/dL      Comment: : [id number] Barron Moreno [facility] ID: [id number]       Basic Metabolic Panel [[id number]]  (Abnormal) Collected: 06/26/24 1845    Specimen: Blood Updated: 06/26/24 1922     Glucose 159 mg/dL      BUN 28 mg/dL      Creatinine 1.45 mg/dL      Sodium 138 mmol/L      Potassium 4.1 mmol/L      Chloride 105 mmol/L      CO2 20.0 mmol/L      Calcium 8.7 mg/dL      BUN/Creatinine Ratio 19.3     Anion Gap 13.0 mmol/L      eGFR 47.5 mL/min/1.73     Narrative:      GFR Normal >60  Chronic Kidney Disease <60  Kidney Failure <15    The GFR formula is only valid for adults with stable renal function between ages 18 and 70.    CBC & Differential [[id number]]  (Abnormal) Collected: 06/26/24 1845    Specimen: Blood Updated: 06/26/24 1902    Narrative:      The following orders were created for panel order CBC & Differential.  Procedure                               Abnormality         Status                     ---------                               -----------         ------                     CBC Auto Differential[[id number]]        Abnormal            Final result                 Please view results for these tests on the individual  orders.    CBC Auto Differential [[id number]]  (Abnormal) Collected: 06/26/24 1845    Specimen: Blood Updated: 06/26/24 1902     WBC 10.72 10*3/mm3      RBC 2.95 10*6/mm3      Hemoglobin 9.1 g/dL      Hematocrit 28.1 %      MCV 95.3 fL      MCH 30.8 pg      MCHC 32.4 g/dL      RDW 16.6 %      RDW-SD 57.7 fl      MPV 10.6 fL      Platelets 100 10*3/mm3      Neutrophil % 88.3 %      Lymphocyte % 5.1 %      Monocyte % 5.8 %      Eosinophil % 0.2 %      Basophil % 0.2 %      Immature Grans % 0.4 %      Neutrophils, Absolute 9.47 10*3/mm3      Lymphocytes, Absolute 0.55 10*3/mm3      Monocytes, Absolute 0.62 10*3/mm3      Eosinophils, Absolute 0.02 10*3/mm3      Basophils, Absolute 0.02 10*3/mm3      Immature Grans, Absolute 0.04 10*3/mm3      nRBC 0.0 /100 WBC     POC Glucose Once [[id number]]  (Abnormal) Collected: 06/26/24 1747    Specimen: Blood Updated: 06/26/24 1758     Glucose 191 mg/dL      Comment: : [id number] Brown HunterMeter ID: [id number]       POC Glucose Once [[id number]]  (Abnormal) Collected: 06/26/24 1349    Specimen: Blood Updated: 06/26/24 1401     Glucose 179 mg/dL      Comment: : [id number] Brown HunterMeter ID: [id number]                   CXR: Stable without significant effusion    Assessment & Plan     84-year-old male now 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  Overall doing well.  Weight is up 5 pounds from baseline, he lost 5 pounds yesterday with 1 dose of Lasix.  A-fib overnight but quickly converted with IV amiodarone    DC nebs  Creatinine stable 1.4, 1 dose of Lasix today  Keep left pleural drain today  Chest x-ray tomorrow  Out of bed walking  Possibly home in 1 to 2 days    John Nick MD  Cardiothoracic Surgeon

## 2024-06-29 NOTE — PLAN OF CARE
Goal Outcome Evaluation:  Plan of Care Reviewed With: patient        Progress: improving  Outcome Evaluation: VSS. Denies pain this shift. NSR 64-75 on tele. IV Lasix given x1 with good results. Pt has had x2 large BM. Amio gtt stopped. Safety maintained. Plan of care ongoing.

## 2024-06-29 NOTE — PLAN OF CARE
Goal Outcome Evaluation:              Outcome Evaluation: Pt converted to afib @ 2026, amio protocol began, converted back to sinus 2255. Amio gtt still running. Pt appeared to rest comfortably during the night. Safety maintained.  S 64-85 (afib 123-140)

## 2024-06-29 NOTE — THERAPY TREATMENT NOTE
Acute Care - Physical Therapy Treatment Note  [facility]     Patient Name: Jeff Alatorre  : 1940  MRN: [id number]  Today's Date: 2024      Visit Dx:     ICD-10-CM ICD-9-CM   1. Impaired functional mobility and activity tolerance [Z74.09]  Z74.09 V49.89   2. Coronary artery disease of native artery of native heart with stable angina pectoris  I25.118 414.01     413.9     Patient Active Problem List   Diagnosis    Hx of adenomatous colonic polyps    Adenomatous polyps    Idiopathic gout of multiple sites    Primary hypertension    Type 2 diabetes mellitus without complication, without long-term current use of insulin    Hyperlipidemia LDL goal <100    Erectile dysfunction due to arterial insufficiency    Anemia    B12 deficiency    Stage 3a chronic kidney disease (CKD)    Seasonal allergic rhinitis    ANYI (obstructive sleep apnea)    Carotid artery disease    ROSALES (dyspnea on exertion)    Coronary artery disease of native artery of native heart with stable angina pectoris    Bilateral carotid artery stenosis    History of left-sided carotid endarterectomy    Stenosis of right subclavian artery    CAD (coronary artery disease)     Past Medical History:   Diagnosis Date    Arthritis     Chronic kidney disease     Coronary artery disease of native artery of native heart with stable angina pectoris 2024    Erectile dysfunction     Gout     History of diverticulitis     HTN (hypertension)     Hx of adenomatous colonic polyps 10/13/2020    Hypercholesteremia     Low testosterone     Polycythemia vera 10/13/2020    Squamous cell carcinoma of skin 10/2021    top of head    Type 2 diabetes mellitus without complication, without long-term current use of insulin 2022     Past Surgical History:   Procedure Laterality Date    CARDIAC CATHETERIZATION Left 2024    Procedure: Coronary angiography;  Surgeon: Zaid Contreras MD;  Location: [facility] INVASIVE LOCATION;  Service: Cardiology;   Laterality: Left;    CARDIAC CATHETERIZATION Left 05/20/2024    Procedure: Percutaneous Coronary Intervention;  Surgeon: Zaid Contreras MD;  Location:  [facility] CATH INVASIVE LOCATION;  Service: Cardiology;  Laterality: Left;    CAROTID ENDARTERECTOMY Left     CATARACT EXTRACTION, BILATERAL      COLON SURGERY      COLONOSCOPY      COLONOSCOPY N/A 07/27/2021    Procedure: COLONOSCOPY WITH ANESTHESIA;  Surgeon: Luciano Alatorre DO;  Location: [facility] ENDOSCOPY;  Service: Gastroenterology;  Laterality: N/A;  preop; hx of polyps  postop; diverticulosis   PCP Devon Moore     CORONARY ARTERY BYPASS GRAFT N/A 6/25/2024    Procedure: CORONARY ARTERY BYPASS GRAFTING x4, LEFT INTERNAL MAMMARY ARTERY GRAFT, RIGHT LEG ENDOSCOPIC VEIN HARVEST, TRANSESOPHAGEAL ECHOCARDIOGRAM, APPLICATION OF PREVENA;  Surgeon: Jose F Kim MD;  Location:  [facility] OR;  Service: Cardiothoracic;  Laterality: N/A;    PENILE PROSTHESIS IMPLANT N/A 03/14/2023    Procedure: 3-PIECE INFLATABLE PENILE PROSTHESIS PLACEMENT;  Surgeon: Garcia Santana MD;  Location:  [facility] OR;  Service: Urology;  Laterality: N/A;    VASECTOMY       PT Assessment (Last 12 Hours)       PT Evaluation and Treatment       [facility] Name 06/29/24 1106          Physical Therapy Time and Intention    Subjective Information no complaints  -     Document Type therapy note (daily note)  -     Mode of Treatment physical therapy  -[facility] Name 06/29/24 1106          General Information    Existing Precautions/Restrictions fall;sternal  prevena  -[facility] Name 06/29/24 1106          Pain    Pretreatment Pain Rating 0/10 - no pain  -     Posttreatment Pain Rating 0/10 - no pain  -       Row Name 06/29/24 1106          Bed Mobility    Comment, (Bed Mobility) chair  -       Row Name 06/29/24 1106          Transfers    Comment, (Transfers) min assist to scoot back in chair  -       Row Name 06/29/24 1106          Sit-Stand Transfer    Sit-Stand [place] (Transfers)  verbal cues;contact guard  -MF       Row Name 06/29/24 1106          Stand-Sit Transfer    Stand-Sit [place] (Transfers) verbal cues;contact guard  -MF       Row Name 06/29/24 1106          Gait/Stairs (Locomotion)    [place] Level (Gait) verbal cues;contact guard;minimum assist (75% patient effort)  -MF     Distance in Feet (Gait) 300  2 standing rests  -MF     Deviations/Abnormal Patterns (Gait) nani decreased;stride length decreased  -MF     Bilateral Gait Deviations forward flexed posture  -MF       Row Name 06/29/24 1106          Motor Skills    Comments, Therapeutic Exercise cardiac warm ups x 15  -MF       Row Name             Wound 06/25/24 1415 midline sternal Incision    Wound - Properties Group Placement Date: 06/25/24  -SF Placement Time: 1415  -SF Present on Original Admission: N  -SF Orientation: midline  -SF Location: sternal  -SF Primary Wound Type: Incision  -SF    Retired Wound - Properties Group Placement Date: 06/25/24  -SF Placement Time: 1415  -SF Present on Original Admission: N  -SF Orientation: midline  -SF Location: sternal  -SF Primary Wound Type: Incision  -SF    Retired Wound - Properties Group Date first assessed: 06/25/24  -SF Time first assessed: 1415  -SF Present on Original Admission: N  -SF Location: sternal  -SF Primary Wound Type: Incision  -SF      Row Name             Wound 06/25/24 1354 Left lower leg Incision    Wound - Properties Group Placement Date: 06/25/24  -SF Placement Time: 1354  -SF Present on Original Admission: N  -SF Side: Left  -SF Orientation: lower  -SF Location: leg  -SF Primary Wound Type: Incision  -SF    Retired Wound - Properties Group Placement Date: 06/25/24  -SF Placement Time: 1354  -SF Present on Original Admission: N  -SF Side: Left  -SF Orientation: lower  -SF Location: leg  -SF Primary Wound Type: Incision  -SF    Retired Wound - Properties Group Date first assessed: 06/25/24  -SF Time first assessed: 1354  -SF Present on Original  Admission: N  -SF Side: Left  -SF Location: leg  -SF Primary Wound Type: Incision  -SF      Row Name             Wound 06/25/24 1415 Right lower leg Incision    Wound - Properties Group Placement Date: 06/25/24  -SF Placement Time: 1415  -SF Side: Right  -SF Orientation: lower  -SF Location: leg  -SF Primary Wound Type: Incision  -SF    Retired Wound - Properties Group Placement Date: 06/25/24  -SF Placement Time: 1415  -SF Side: Right  -SF Orientation: lower  -SF Location: leg  -SF Primary Wound Type: Incision  -SF    Retired Wound - Properties Group Date first assessed: 06/25/24  -SF Time first assessed: 1415  -SF Side: Right  -SF Location: leg  -SF Primary Wound Type: Incision  -SF      Row Name             NPWT (Negative Pressure Wound Therapy) 06/25/24 1900 STERNUM    NPWT (Negative Pressure Wound Therapy) - Properties Group Placement Date: 06/25/24  -SF Placement Time: 1900  -SF Location: STERNUM  -SF Additional Comments: PREVENA  -SF    Retired NPWT (Negative Pressure Wound Therapy) - Properties Group Placement Date: 06/25/24  -SF Placement Time: 1900  -SF Location: STERNUM  -SF Additional Comments: PREVENA  -SF    Retired NPWT (Negative Pressure Wound Therapy) - Properties Group Placement Date: 06/25/24  -SF Placement Time: 1900  -SF Location: STERNUM  -SF Additional Comments: PREVENA  -SF      Row Name 06/29/24 1106          Plan of Care Review    Plan of Care Reviewed With patient  -MF     Progress improving  -     Outcome Evaluation Pt. agreeable to therapy. Hewas able to stand with CGA, but required cues for technique and to maintain balance once standing. Pt. was able to walk 300' with 2 standing rests-CGA-MIN x 1. Pt's O2 sat remained in the 90's while on RA. Will continue to work on progressive ambulation.  -       Row Name 06/29/24 1106          Vital Signs    Pretreatment Heart Rate (beats/min) 66  -     Pre SpO2 (%) 93  -MF     O2 Delivery Pre Treatment room air  -     Intra SpO2 (%) 97   -MF     O2 Delivery Intra Treatment room air  -MF     Post SpO2 (%) 96  -MF     O2 Delivery Post Treatment room air  -MF     Pre Patient Position Sitting  -MF     Intra Patient Position Standing  -MF     Post Patient Position Sitting  -MF       Row Name 06/29/24 1106          Positioning and Restraints    Pre-Treatment Position sitting in chair/recliner  -MF     Post Treatment Position chair  -MF     In Chair reclined;call light within reach;encouraged to call for assist;with family/caregiver;RUE elevated;LUE elevated  -MF               User Key  (r) = Recorded By, (t) = Taken By, (c) = Cosigned By      Initials Name Provider Type    Mary Gallagher PTA Physical Therapist Assistant    Bakari Chery, RN Registered Nurse                    Physical Therapy Education       Title: PT OT SLP Therapies (In Progress)       Topic: Physical Therapy (In Progress)       Point: Mobility training (In Progress)       Learning Progress Summary             Patient Acceptance, E,D, NR by SHANNON at 6/28/2024 0804    Comment: sit to stand    Acceptance, E,TB,D, VU,DU,NR by MAGALYS at 6/26/2024 1047    Comment: education re: purpose of PT/importance of activity, safety/falls prevention, sternal precautions, improved tech w/ tfers, deep breathing and pacing activity   Significant Other Acceptance, E,TB,D, VU,DU,NR by MAGALYS at 6/26/2024 1047    Comment: education re: purpose of PT/importance of activity, safety/falls prevention, sternal precautions, improved tech w/ tfers, deep breathing and pacing activity                         Point: Home exercise program (Done)       Learning Progress Summary             Patient Acceptance, E,D, DU by SHANNON at 6/27/2024 0825    Comment: purse lip breathing    Acceptance, E,TB,D, VU,DU,NR by MAGALYS at 6/26/2024 1047    Comment: education re: purpose of PT/importance of activity, safety/falls prevention, sternal precautions, improved tech w/ tfers, deep breathing and pacing activity   Significant Other  Acceptance, E,TB,D, VU,DU,NR by  at 6/26/2024 1047    Comment: education re: purpose of PT/importance of activity, safety/falls prevention, sternal precautions, improved tech w/ tfers, deep breathing and pacing activity                         Point: Body mechanics (Done)       Learning Progress Summary             Patient Acceptance, E,TB,D, VU,DU,NR by  at 6/26/2024 1047    Comment: education re: purpose of PT/importance of activity, safety/falls prevention, sternal precautions, improved tech w/ tfers, deep breathing and pacing activity   Significant Other Acceptance, E,TB,D, VU,DU,NR by  at 6/26/2024 1047    Comment: education re: purpose of PT/importance of activity, safety/falls prevention, sternal precautions, improved tech w/ tfers, deep breathing and pacing activity                         Point: Precautions (Done)       Learning Progress Summary             Patient Acceptance, E,TB,D, VU,DU,NR by  at 6/26/2024 1047    Comment: education re: purpose of PT/importance of activity, safety/falls prevention, sternal precautions, improved tech w/ tfers, deep breathing and pacing activity   Significant Other Acceptance, E,TB,D, VU,DU,NR by  at 6/26/2024 1047    Comment: education re: purpose of PT/importance of activity, safety/falls prevention, sternal precautions, improved tech w/ tfers, deep breathing and pacing activity                                         User Key       Initials Effective Dates Name Provider Type Discipline     02/03/23 -  Mae Santiago PTA Physical Therapist Assistant PT     08/02/18 -  Indira Jones, PT Physical Therapist PT                  PT Recommendation and Plan     Plan of Care Reviewed With: patient  Progress: improving  Outcome Evaluation: Pt. agreeable to therapy. Hewas able to stand with CGA, but required cues for technique and to maintain balance once standing. Pt. was able to walk 300' with 2 standing rests-CGA-MIN x 1. Pt's O2 sat remained in the 90's  while on RA. Will continue to work on progressive ambulation.   Outcome Measures       Row Name 06/29/24 1106 06/28/24 0800 06/27/24 0800       How much help from another person do you currently need...    Turning from your back to your side while in flat bed without using bedrails? 3  -MF 2  -SHANNON 2  -SHANNON    Moving from lying on back to sitting on the side of a flat bed without bedrails? 3  -MF 2  -SHANNON 2  -SHANNON    Moving to and from a bed to a chair (including a wheelchair)? 3  -MF 3  -SHANNON 3  -SHANNON    Standing up from a chair using your arms (e.g., wheelchair, bedside chair)? 3  -MF 3  -SHANNON 3  -SHANNON    Climbing 3-5 steps with a railing? 2  -MF 2  -SHANNON 3  -SHANNON    To walk in hospital room? 3  -MF 3  -SHANNON 3  -SHANNON    AM-PAC 6 Clicks Score (PT) 17  -MF 15  -SHANNON 16  -SHANNON    Highest Level of Mobility Goal 5 --> Static standing  -MF 4 --> Transfer to chair/commode  -SHANNON 5 --> Static standing  -SHANNON       Functional Assessment    Outcome Measure Options AM-[facility] 6 Clicks Basic Mobility (PT)  - -- --              User Key  (r) = Recorded By, (t) = Taken By, (c) = Cosigned By      Initials Name Provider Type    Mae Taylor, NELSON Physical Therapist Assistant    Mary Gallagher PTA Physical Therapist Assistant                     Time Calculation:    PT Charges       Row Name 06/29/24 1132             Time Calculation    Start Time 1106  -      Stop Time 1132  -      Time Calculation (min) 26 min  -      PT Received On 06/29/24  -         Time Calculation- PT    Total Timed Code Minutes- PT 26 minute(s)  -MF         Timed Charges    07888 - Gait Training Minutes  26  -MF         Total Minutes    Timed Charges Total Minutes 26  -MF       Total Minutes 26  -MF                User Key  (r) = Recorded By, (t) = Taken By, (c) = Cosigned By      Initials Name Provider Type    Mary Gallagher PTA Physical Therapist Assistant                  Therapy Charges for Today       Code Description Service Date Service Provider  Modifiers Qty    [id number] HC GAIT TRAINING EA 15 MIN 6/29/2024 Mary Macedo, PTA GP 2            PT G-Codes  Outcome Measure Options: AM-PAC 6 Clicks Basic Mobility (PT)  AM-PAC 6 Clicks Score (PT): 17    Mary Macedo PTA  6/29/2024

## 2024-06-30 ENCOUNTER — APPOINTMENT (OUTPATIENT)
Dept: GENERAL RADIOLOGY | Facility: HOSPITAL | Age: 84
DRG: 236 | End: 2024-06-30
Payer: MEDICARE

## 2024-06-30 LAB
ANION GAP SERPL CALCULATED.3IONS-SCNC: 10 MMOL/L (ref 5–15)
BASOPHILS # BLD AUTO: 0.03 10*3/MM3 (ref 0–0.2)
BASOPHILS NFR BLD AUTO: 0.4 % (ref 0–1.5)
BUN SERPL-MCNC: 31 MG/DL (ref 8–23)
BUN/CREAT SERPL: 21.7 (ref 7–25)
CALCIUM SPEC-SCNC: 8.7 MG/DL (ref 8.6–10.5)
CHLORIDE SERPL-SCNC: 103 MMOL/L (ref 98–107)
CO2 SERPL-SCNC: 26 MMOL/L (ref 22–29)
CREAT SERPL-MCNC: 1.43 MG/DL (ref 0.76–1.27)
DEPRECATED RDW RBC AUTO: 56.7 FL (ref 37–54)
EGFRCR SERPLBLD CKD-EPI 2021: 48.3 ML/MIN/1.73
EOSINOPHIL # BLD AUTO: 0.3 10*3/MM3 (ref 0–0.4)
EOSINOPHIL NFR BLD AUTO: 3.8 % (ref 0.3–6.2)
ERYTHROCYTE [DISTWIDTH] IN BLOOD BY AUTOMATED COUNT: 16.2 % (ref 12.3–15.4)
GLUCOSE BLDC GLUCOMTR-MCNC: 107 MG/DL (ref 70–130)
GLUCOSE BLDC GLUCOMTR-MCNC: 119 MG/DL (ref 70–130)
GLUCOSE BLDC GLUCOMTR-MCNC: 135 MG/DL (ref 70–130)
GLUCOSE BLDC GLUCOMTR-MCNC: 136 MG/DL (ref 70–130)
GLUCOSE SERPL-MCNC: 118 MG/DL (ref 65–99)
HCT VFR BLD AUTO: 34.3 % (ref 37.5–51)
HGB BLD-MCNC: 11.1 G/DL (ref 13–17.7)
IMM GRANULOCYTES # BLD AUTO: 0.04 10*3/MM3 (ref 0–0.05)
IMM GRANULOCYTES NFR BLD AUTO: 0.5 % (ref 0–0.5)
LYMPHOCYTES # BLD AUTO: 0.87 10*3/MM3 (ref 0.7–3.1)
LYMPHOCYTES NFR BLD AUTO: 11.1 % (ref 19.6–45.3)
MCH RBC QN AUTO: 30.9 PG (ref 26.6–33)
MCHC RBC AUTO-ENTMCNC: 32.4 G/DL (ref 31.5–35.7)
MCV RBC AUTO: 95.5 FL (ref 79–97)
MONOCYTES # BLD AUTO: 0.57 10*3/MM3 (ref 0.1–0.9)
MONOCYTES NFR BLD AUTO: 7.3 % (ref 5–12)
NEUTROPHILS NFR BLD AUTO: 6.02 10*3/MM3 (ref 1.7–7)
NEUTROPHILS NFR BLD AUTO: 76.9 % (ref 42.7–76)
NRBC BLD AUTO-RTO: 0 /100 WBC (ref 0–0.2)
PLATELET # BLD AUTO: 159 10*3/MM3 (ref 140–450)
PMV BLD AUTO: 10.3 FL (ref 6–12)
POTASSIUM SERPL-SCNC: 3.6 MMOL/L (ref 3.5–5.2)
QT INTERVAL: 344 MS
QT INTERVAL: 374 MS
QT INTERVAL: 468 MS
QT INTERVAL: 474 MS
QTC INTERVAL: 501 MS
QTC INTERVAL: 507 MS
QTC INTERVAL: 516 MS
QTC INTERVAL: 526 MS
RBC # BLD AUTO: 3.59 10*6/MM3 (ref 4.14–5.8)
SODIUM SERPL-SCNC: 139 MMOL/L (ref 136–145)
WBC NRBC COR # BLD AUTO: 7.83 10*3/MM3 (ref 3.4–10.8)

## 2024-06-30 PROCEDURE — 93005 ELECTROCARDIOGRAM TRACING: CPT | Performed by: SURGERY

## 2024-06-30 PROCEDURE — 85025 COMPLETE CBC W/AUTO DIFF WBC: CPT | Performed by: SURGERY

## 2024-06-30 PROCEDURE — 25010000002 ENOXAPARIN PER 10 MG: Performed by: THORACIC SURGERY (CARDIOTHORACIC VASCULAR SURGERY)

## 2024-06-30 PROCEDURE — 93010 ELECTROCARDIOGRAM REPORT: CPT | Performed by: EMERGENCY MEDICINE

## 2024-06-30 PROCEDURE — 99024 POSTOP FOLLOW-UP VISIT: CPT | Performed by: SURGERY

## 2024-06-30 PROCEDURE — 82948 REAGENT STRIP/BLOOD GLUCOSE: CPT

## 2024-06-30 PROCEDURE — 25010000002 FUROSEMIDE PER 20 MG: Performed by: SURGERY

## 2024-06-30 PROCEDURE — 80048 BASIC METABOLIC PNL TOTAL CA: CPT | Performed by: SURGERY

## 2024-06-30 PROCEDURE — 97530 THERAPEUTIC ACTIVITIES: CPT

## 2024-06-30 PROCEDURE — 97116 GAIT TRAINING THERAPY: CPT

## 2024-06-30 PROCEDURE — 71046 X-RAY EXAM CHEST 2 VIEWS: CPT

## 2024-06-30 RX ORDER — FUROSEMIDE 10 MG/ML
20 INJECTION INTRAMUSCULAR; INTRAVENOUS
Status: DISCONTINUED | OUTPATIENT
Start: 2024-06-30 | End: 2024-07-01 | Stop reason: HOSPADM

## 2024-06-30 RX ORDER — POTASSIUM CHLORIDE 750 MG/1
30 CAPSULE, EXTENDED RELEASE ORAL 2 TIMES DAILY WITH MEALS
Status: DISCONTINUED | OUTPATIENT
Start: 2024-06-30 | End: 2024-07-01 | Stop reason: HOSPADM

## 2024-06-30 RX ADMIN — METOPROLOL TARTRATE 25 MG: 50 TABLET, FILM COATED ORAL at 09:15

## 2024-06-30 RX ADMIN — ACETAMINOPHEN 1000 MG: 500 TABLET, FILM COATED ORAL at 09:13

## 2024-06-30 RX ADMIN — FUROSEMIDE 20 MG: 10 INJECTION, SOLUTION INTRAMUSCULAR; INTRAVENOUS at 17:58

## 2024-06-30 RX ADMIN — ACETAMINOPHEN 1000 MG: 500 TABLET, FILM COATED ORAL at 01:40

## 2024-06-30 RX ADMIN — METOPROLOL TARTRATE 25 MG: 50 TABLET, FILM COATED ORAL at 21:23

## 2024-06-30 RX ADMIN — ATORVASTATIN CALCIUM 20 MG: 10 TABLET, FILM COATED ORAL at 21:33

## 2024-06-30 RX ADMIN — CLOPIDOGREL BISULFATE 75 MG: 75 TABLET, FILM COATED ORAL at 17:58

## 2024-06-30 RX ADMIN — AMIODARONE HYDROCHLORIDE 400 MG: 200 TABLET ORAL at 10:28

## 2024-06-30 RX ADMIN — AMIODARONE HYDROCHLORIDE 400 MG: 200 TABLET ORAL at 17:58

## 2024-06-30 RX ADMIN — ASPIRIN 81 MG: 81 TABLET, COATED ORAL at 09:13

## 2024-06-30 RX ADMIN — POTASSIUM CHLORIDE 30 MEQ: 750 CAPSULE, EXTENDED RELEASE ORAL at 10:28

## 2024-06-30 RX ADMIN — FUROSEMIDE 20 MG: 10 INJECTION, SOLUTION INTRAMUSCULAR; INTRAVENOUS at 10:28

## 2024-06-30 RX ADMIN — PREGABALIN 25 MG: 25 CAPSULE ORAL at 17:59

## 2024-06-30 RX ADMIN — ENOXAPARIN SODIUM 40 MG: 100 INJECTION SUBCUTANEOUS at 09:14

## 2024-06-30 RX ADMIN — POTASSIUM CHLORIDE 30 MEQ: 750 CAPSULE, EXTENDED RELEASE ORAL at 17:59

## 2024-06-30 RX ADMIN — ACETAMINOPHEN 1000 MG: 500 TABLET, FILM COATED ORAL at 21:32

## 2024-06-30 RX ADMIN — LIDOCAINE 2 PATCH: 4 PATCH TOPICAL at 09:14

## 2024-06-30 RX ADMIN — PREGABALIN 25 MG: 25 CAPSULE ORAL at 05:19

## 2024-06-30 RX ADMIN — ACETAMINOPHEN 1000 MG: 500 TABLET, FILM COATED ORAL at 14:02

## 2024-06-30 NOTE — PROGRESS NOTES
"    [facility] Cardiothoracic Surgery  PROGRESS NOTE   CC: Postop CABG    Subjective:  Doing better today feels better.  Weight is down 1 pound he is 199 baselines 192 hemodynamically stable and tolerated the Lasix yesterday with a stable creatinine    ROS: No fevers or chills hemodynamically stable    Objective:      BP 97/49 (BP Location: Right arm, Patient Position: Sitting)   Pulse 65   Temp 98.1 °F (36.7 °C) (Oral)   Resp 16   Ht 174 cm (68.5\")   Wt 90.4 kg (199 lb 3.2 oz)   SpO2 94%   BMI 29.84 kg/m²       Intake/Output Summary (Last 24 hours) at 6/30/2024 1158  Last data filed at 6/30/2024 1030  Gross per 24 hour   Intake 750 ml   Output 1965 ml   Net -1215 ml       CT Output: Left pleural: 75 serous    PE:  Vitals:    06/30/24 1123   BP: 97/49   Pulse: 65   Resp: 16   Temp: 98.1 °F (36.7 °C)   SpO2: 94%     GENERAL: NAD, resting comfortably, normal color  CARDIOVASCULAR: regular, regular rate, sinus, incision clean dry and intact  PULMONARY: Normal bilateral breath sounds, no labored breathing  ABDOMEN: soft, nontender/nondistended  EXTREMITIES: mild peripheral edema, normal pulses, normal ROM        Lab Results (last 72 hours)       Procedure Component Value Units Date/Time    POC Glucose Once [[id number]]  (Abnormal) Collected: 06/30/24 1126    Specimen: Blood Updated: 06/30/24 1137     Glucose 135 mg/dL      Comment: : [id number] Kite BrittanyMeter ID: [id number]       Basic Metabolic Panel [[id number]]  (Abnormal) Collected: 06/30/24 0804    Specimen: Blood Updated: 06/30/24 0842     Glucose 118 mg/dL      BUN 31 mg/dL      Creatinine 1.43 mg/dL      Sodium 139 mmol/L      Potassium 3.6 mmol/L      Chloride 103 mmol/L      CO2 26.0 mmol/L      Calcium 8.7 mg/dL      BUN/Creatinine Ratio 21.7     Anion Gap 10.0 mmol/L      eGFR 48.3 mL/min/1.73     Narrative:      GFR Normal >60  Chronic Kidney Disease <60  Kidney Failure <15    The GFR formula is only valid for adults with " stable renal function between ages 18 and 70.    CBC & Differential [[id number]]  (Abnormal) Collected: 06/30/24 0804    Specimen: Blood Updated: 06/30/24 0823    Narrative:      The following orders were created for panel order CBC & Differential.  Procedure                               Abnormality         Status                     ---------                               -----------         ------                     CBC Auto Differential[[id number]]        Abnormal            Final result                 Please view results for these tests on the individual orders.    CBC Auto Differential [[id number]]  (Abnormal) Collected: 06/30/24 0804    Specimen: Blood Updated: 06/30/24 0823     WBC 7.83 10*3/mm3      RBC 3.59 10*6/mm3      Hemoglobin 11.1 g/dL      Hematocrit 34.3 %      MCV 95.5 fL      MCH 30.9 pg      MCHC 32.4 g/dL      RDW 16.2 %      RDW-SD 56.7 fl      MPV 10.3 fL      Platelets 159 10*3/mm3      Neutrophil % 76.9 %      Lymphocyte % 11.1 %      Monocyte % 7.3 %      Eosinophil % 3.8 %      Basophil % 0.4 %      Immature Grans % 0.5 %      Neutrophils, Absolute 6.02 10*3/mm3      Lymphocytes, Absolute 0.87 10*3/mm3      Monocytes, Absolute 0.57 10*3/mm3      Eosinophils, Absolute 0.30 10*3/mm3      Basophils, Absolute 0.03 10*3/mm3      Immature Grans, Absolute 0.04 10*3/mm3      nRBC 0.0 /100 WBC     POC Glucose Once [[id number]]  (Normal) Collected: 06/30/24 0737    Specimen: Blood Updated: 06/30/24 0748     Glucose 107 mg/dL      Comment: : [id number] ACE BrittanyMeter ID: [id number]       POC Glucose Once [[id number]]  (Normal) Collected: 06/29/24 1944    Specimen: Blood Updated: 06/29/24 1955     Glucose 123 mg/dL      Comment: : [id number] Addison GuillaumeandraMeter ID: [id number]       POC Glucose Once [[id number]]  (Normal) Collected: 06/29/24 1640    Specimen: Blood Updated: 06/29/24 1651     Glucose 121 mg/dL      Comment: : [id number] ACE BrittanyMeter ID: [id number]       POC  Glucose Once [[id number]]  (Abnormal) Collected: 06/29/24 1111    Specimen: Blood Updated: 06/29/24 1122     Glucose 134 mg/dL      Comment: : [id number] Kiet BrittanyMeter ID: [id number]       Basic Metabolic Panel [[id number]]  (Abnormal) Collected: 06/29/24 0846    Specimen: Blood Updated: 06/29/24 0926     Glucose 139 mg/dL      BUN 33 mg/dL      Creatinine 1.41 mg/dL      Sodium 141 mmol/L      Potassium 3.7 mmol/L      Chloride 104 mmol/L      CO2 25.0 mmol/L      Calcium 9.0 mg/dL      BUN/Creatinine Ratio 23.4     Anion Gap 12.0 mmol/L      eGFR 49.1 mL/min/1.73     Narrative:      GFR Normal >60  Chronic Kidney Disease <60  Kidney Failure <15    The GFR formula is only valid for adults with stable renal function between ages 18 and 70.    CBC (No Diff) [[id number]]  (Abnormal) Collected: 06/29/24 0846    Specimen: Blood Updated: 06/29/24 0910     WBC 10.10 10*3/mm3      RBC 3.56 10*6/mm3      Hemoglobin 11.4 g/dL      Hematocrit 34.7 %      MCV 97.5 fL      MCH 32.0 pg      MCHC 32.9 g/dL      RDW 16.6 %      RDW-SD 58.9 fl      MPV 11.3 fL      Platelets 132 10*3/mm3     POC Glucose Once [[id number]]  (Abnormal) Collected: 06/29/24 0743    Specimen: Blood Updated: 06/29/24 0755     Glucose 139 mg/dL      Comment: : [id number] Heart HealthtanyMeter ID: [id number]       POC Glucose Once [[id number]]  (Abnormal) Collected: 06/28/24 1822    Specimen: Blood Updated: 06/28/24 1833     Glucose 144 mg/dL      Comment: : [id number] Pretty Rios ID: [id number]       Basic Metabolic Panel [[id number]]  (Abnormal) Collected: 06/28/24 1447    Specimen: Blood Updated: 06/28/24 1519     Glucose 144 mg/dL      BUN 28 mg/dL      Creatinine 1.49 mg/dL      Sodium 140 mmol/L      Potassium 4.5 mmol/L      Comment: Slight hemolysis detected by analyzer. Result may be falsely elevated.        Chloride 106 mmol/L      CO2 20.0 mmol/L      Calcium 9.1 mg/dL      BUN/Creatinine Ratio 18.8     Anion Gap 14.0  mmol/L      eGFR 46.0 mL/min/1.73     Narrative:      GFR Normal >60  Chronic Kidney Disease <60  Kidney Failure <15    The GFR formula is only valid for adults with stable renal function between ages 18 and 70.    POC Glucose Once [[id number]]  (Normal) Collected: 06/28/24 1107    Specimen: Blood Updated: 06/28/24 1118     Glucose 120 mg/dL      Comment: : [id number] Jurgen FalcononMeter ID: [id number]       POC Glucose Once [[id number]]  (Normal) Collected: 06/28/24 0726    Specimen: Blood Updated: 06/28/24 0737     Glucose 126 mg/dL      Comment: : [id number] Seaman AaronMeter ID: [id number]       Comprehensive Metabolic Panel [[id number]]  (Abnormal) Collected: 06/28/24 0352    Specimen: Blood Updated: 06/28/24 0419     Glucose 135 mg/dL      BUN 28 mg/dL      Creatinine 1.44 mg/dL      Sodium 139 mmol/L      Potassium 4.0 mmol/L      Chloride 107 mmol/L      CO2 22.0 mmol/L      Calcium 8.4 mg/dL      Total Protein 5.2 g/dL      Albumin 3.2 g/dL      ALT (SGPT) <5 U/L      AST (SGOT) 15 U/L      Alkaline Phosphatase 73 U/L      Total Bilirubin 0.6 mg/dL      Globulin 2.0 gm/dL      A/G Ratio 1.6 g/dL      BUN/Creatinine Ratio 19.4     Anion Gap 10.0 mmol/L      eGFR 47.9 mL/min/1.73     Narrative:      GFR Normal >60  Chronic Kidney Disease <60  Kidney Failure <15    The GFR formula is only valid for adults with stable renal function between ages 18 and 70.    CBC & Differential [[id number]]  (Abnormal) Collected: 06/28/24 0352    Specimen: Blood Updated: 06/28/24 0401    Narrative:      The following orders were created for panel order CBC & Differential.  Procedure                               Abnormality         Status                     ---------                               -----------         ------                     CBC Auto Differential[[id number]]        Abnormal            Final result                 Please view results for these tests on the individual orders.    CBC Auto Differential [[id number]]   (Abnormal) Collected: 06/28/24 0352    Specimen: Blood Updated: 06/28/24 0401     WBC 8.81 10*3/mm3      RBC 3.16 10*6/mm3      Hemoglobin 9.9 g/dL      Hematocrit 30.4 %      MCV 96.2 fL      MCH 31.3 pg      MCHC 32.6 g/dL      RDW 16.7 %      RDW-SD 58.3 fl      MPV 10.5 fL      Platelets 101 10*3/mm3      Neutrophil % 80.1 %      Lymphocyte % 9.4 %      Monocyte % 7.3 %      Eosinophil % 2.4 %      Basophil % 0.2 %      Immature Grans % 0.6 %      Neutrophils, Absolute 7.06 10*3/mm3      Lymphocytes, Absolute 0.83 10*3/mm3      Monocytes, Absolute 0.64 10*3/mm3      Eosinophils, Absolute 0.21 10*3/mm3      Basophils, Absolute 0.02 10*3/mm3      Immature Grans, Absolute 0.05 10*3/mm3      nRBC 0.0 /100 WBC     POC Glucose Once [[id number]]  (Abnormal) Collected: 06/27/24 2020    Specimen: Blood Updated: 06/27/24 2031     Glucose 150 mg/dL      Comment: : [id number] Ade EmilyMeter ID: [id number]       Basic Metabolic Panel [[id number]]  (Abnormal) Collected: 06/27/24 1728    Specimen: Blood Updated: 06/27/24 1822     Glucose 148 mg/dL      BUN 28 mg/dL      Creatinine 1.42 mg/dL      Sodium 138 mmol/L      Potassium 4.0 mmol/L      Chloride 106 mmol/L      CO2 21.0 mmol/L      Calcium 8.6 mg/dL      BUN/Creatinine Ratio 19.7     Anion Gap 11.0 mmol/L      eGFR 48.7 mL/min/1.73     Narrative:      GFR Normal >60  Chronic Kidney Disease <60  Kidney Failure <15    The GFR formula is only valid for adults with stable renal function between ages 18 and 70.    POC Glucose Once [[id number]]  (Abnormal) Collected: 06/27/24 1723    Specimen: Blood Updated: 06/27/24 1734     Glucose 160 mg/dL      Comment: : [id number] Raimundo HunterMeter ID: [id number]                     Assessment & Plan     84-year-old male now postop day 5 from CABG x 4.  He is overall doing very well.  No more A-fib since yesterday.  Weight is down 1 pound going to intensify his diuretics today his creatinine has been stable.    Twice daily Lasix  starting today  Replace potassium  DC left pleural Sammy drain  Anticipate home in 1 to 2 days    John Nick MD  Cardiothoracic Surgeon

## 2024-06-30 NOTE — PLAN OF CARE
Goal Outcome Evaluation:  Plan of Care Reviewed With: patient        Progress: improving  Outcome Evaluation: Pt. agreeable to therapy and continues to make improvement. Pt. was CGA for transfers even though he usually requires 2 attempts to fully stand. Pt. walked 400' with 1 standing rest. Practiced stair climbing-3 steps. Pt. did MIN assist and had LOB posteriorly. Will  practice them again prior to discharge. Will continue to work towards independence.

## 2024-06-30 NOTE — THERAPY TREATMENT NOTE
Acute Care - Physical Therapy Treatment Note  [facility]     Patient Name: Jeff Alatorre  : 1940  MRN: [id number]  Today's Date: 2024      Visit Dx:     ICD-10-CM ICD-9-CM   1. Impaired functional mobility and activity tolerance [Z74.09]  Z74.09 V49.89   2. Coronary artery disease of native artery of native heart with stable angina pectoris  I25.118 414.01     413.9     Patient Active Problem List   Diagnosis    Hx of adenomatous colonic polyps    Adenomatous polyps    Idiopathic gout of multiple sites    Primary hypertension    Type 2 diabetes mellitus without complication, without long-term current use of insulin    Hyperlipidemia LDL goal <100    Erectile dysfunction due to arterial insufficiency    Anemia    B12 deficiency    Stage 3a chronic kidney disease (CKD)    Seasonal allergic rhinitis    ANYI (obstructive sleep apnea)    Carotid artery disease    ROSALES (dyspnea on exertion)    Coronary artery disease of native artery of native heart with stable angina pectoris    Bilateral carotid artery stenosis    History of left-sided carotid endarterectomy    Stenosis of right subclavian artery    CAD (coronary artery disease)     Past Medical History:   Diagnosis Date    Arthritis     Chronic kidney disease     Coronary artery disease of native artery of native heart with stable angina pectoris 2024    Erectile dysfunction     Gout     History of diverticulitis     HTN (hypertension)     Hx of adenomatous colonic polyps 10/13/2020    Hypercholesteremia     Low testosterone     Polycythemia vera 10/13/2020    Squamous cell carcinoma of skin 10/2021    top of head    Type 2 diabetes mellitus without complication, without long-term current use of insulin 2022     Past Surgical History:   Procedure Laterality Date    CARDIAC CATHETERIZATION Left 2024    Procedure: Coronary angiography;  Surgeon: Zaid Contreras MD;  Location: [facility] INVASIVE LOCATION;  Service: Cardiology;   Laterality: Left;    CARDIAC CATHETERIZATION Left 05/20/2024    Procedure: Percutaneous Coronary Intervention;  Surgeon: Zaid Contreras MD;  Location:  [facility] CATH INVASIVE LOCATION;  Service: Cardiology;  Laterality: Left;    CAROTID ENDARTERECTOMY Left     CATARACT EXTRACTION, BILATERAL      COLON SURGERY      COLONOSCOPY      COLONOSCOPY N/A 07/27/2021    Procedure: COLONOSCOPY WITH ANESTHESIA;  Surgeon: Luciano Alatorre DO;  Location:  [facility] ENDOSCOPY;  Service: Gastroenterology;  Laterality: N/A;  preop; hx of polyps  postop; diverticulosis   PCP Devon Moore     CORONARY ARTERY BYPASS GRAFT N/A 6/25/2024    Procedure: CORONARY ARTERY BYPASS GRAFTING x4, LEFT INTERNAL MAMMARY ARTERY GRAFT, RIGHT LEG ENDOSCOPIC VEIN HARVEST, TRANSESOPHAGEAL ECHOCARDIOGRAM, APPLICATION OF PREVENA;  Surgeon: Jose F Kim MD;  Location:  [facility] OR;  Service: Cardiothoracic;  Laterality: N/A;    PENILE PROSTHESIS IMPLANT N/A 03/14/2023    Procedure: 3-PIECE INFLATABLE PENILE PROSTHESIS PLACEMENT;  Surgeon: Garcia Santana MD;  Location:  [facility] OR;  Service: Urology;  Laterality: N/A;    VASECTOMY       PT Assessment (Last 12 Hours)       PT Evaluation and Treatment       Row Name 06/30/24 1335 06/30/24 1004       Physical Therapy Time and Intention    Subjective Information no complaints  - no complaints  -    Document Type therapy note (daily note)  - therapy note (daily note)  -    Mode of Treatment physical therapy  - physical therapy  -      Row Name 06/30/24 1335 06/30/24 1004       General Information    Existing Precautions/Restrictions fall;sternal  prevena  - fall;sternal  prevena  -      Row Name 06/30/24 1335 06/30/24 1004       Pain    Pretreatment Pain Rating 0/10 - no pain  - 0/10 - no pain  -    Posttreatment Pain Rating 0/10 - no pain  - 3/10  -    Pain Location -- incisional  -    Pain Location - -- chest  -    Pain Intervention(s) -- Repositioned;Ambulation/increased  activity  -      Row Name 06/30/24 1335 06/30/24 1004       Bed Mobility    Rolling Right [place] (Bed Mobility) verbal cues;contact guard  - --    Supine-Sit [place] (Bed Mobility) verbal cues;minimum assist (75% patient effort)  -MF --    Sit-Supine [place] (Bed Mobility) verbal cues;contact guard  - --    Comment, (Bed Mobility) practiced bed mobility twice from flat bed.  - chair  -      Row Name 06/30/24 1335          Transfers    Comment, (Transfers) cues for transfer technique-rocking and keeping head up.  -       Row Name 06/30/24 1335 06/30/24 1004       Sit-Stand Transfer    Sit-Stand [place] (Transfers) verbal cues;contact guard;standby assist  - verbal cues;contact guard  -      Row Name 06/30/24 1335 06/30/24 1004       Stand-Sit Transfer    Stand-Sit [place] (Transfers) contact guard  - contact guard  -      Row Name 06/30/24 1335 06/30/24 1004       Gait/Stairs (Locomotion)    [place] Level (Gait) contact guard  - verbal cues;contact guard  -MF    Distance in Feet (Gait) 425  1 standing rest  -  1 standing rest  -    Deviations/Abnormal Patterns (Gait) nani decreased;stride length decreased  -MF nani decreased;stride length decreased  -    Bilateral Gait Deviations forward flexed posture  -MF forward flexed posture  -    [place] Level (Stairs) -- verbal cues;contact guard;minimum assist (75% patient effort)  -    Handrail Location (Stairs) -- right side (ascending);left side (descending)  -    Number of Steps (Stairs) -- 3  -MF    Ascending Technique (Stairs) -- step-to-step  -MF    Descending Technique (Stairs) -- step-to-step  -    Stairs, Safety Issues -- loses balance backward  -      Row Name 06/30/24 1004          Motor Skills    Comments, Therapeutic Exercise cardiac warm ups x 10  -       Row Name             Wound 06/25/24 1415 midline sternal Incision    Wound - Properties Group Placement Date: 06/25/24  -SF  Placement Time: 1415  -SF Present on Original Admission: N  -SF Orientation: midline  -SF Location: sternal  -SF Primary Wound Type: Incision  -SF    Retired Wound - Properties Group Placement Date: 06/25/24  -SF Placement Time: 1415  -SF Present on Original Admission: N  -SF Orientation: midline  -SF Location: sternal  -SF Primary Wound Type: Incision  -SF    Retired Wound - Properties Group Date first assessed: 06/25/24  -SF Time first assessed: 1415  -SF Present on Original Admission: N  -SF Location: sternal  -SF Primary Wound Type: Incision  -SF      Row Name             Wound 06/25/24 1354 Left lower leg Incision    Wound - Properties Group Placement Date: 06/25/24  -SF Placement Time: 1354  -SF Present on Original Admission: N  -SF Side: Left  -SF Orientation: lower  -SF Location: leg  -SF Primary Wound Type: Incision  -SF    Retired Wound - Properties Group Placement Date: 06/25/24  -SF Placement Time: 1354  -SF Present on Original Admission: N  -SF Side: Left  -SF Orientation: lower  -SF Location: leg  -SF Primary Wound Type: Incision  -SF    Retired Wound - Properties Group Date first assessed: 06/25/24  -SF Time first assessed: 1354  -SF Present on Original Admission: N  -SF Side: Left  -SF Location: leg  -SF Primary Wound Type: Incision  -SF      Row Name             Wound 06/25/24 1415 Right lower leg Incision    Wound - Properties Group Placement Date: 06/25/24  -SF Placement Time: 1415  -SF Side: Right  -SF Orientation: lower  -SF Location: leg  -SF Primary Wound Type: Incision  -SF    Retired Wound - Properties Group Placement Date: 06/25/24  -SF Placement Time: 1415  -SF Side: Right  -SF Orientation: lower  -SF Location: leg  -SF Primary Wound Type: Incision  -SF    Retired Wound - Properties Group Date first assessed: 06/25/24  -SF Time first assessed: 1415  -SF Side: Right  -SF Location: leg  -SF Primary Wound Type: Incision  -SF      Row Name             NPWT (Negative Pressure Wound Therapy)  06/25/24 1900 [place]    NPWT (Negative Pressure Wound Therapy) - Properties Group Placement Date: 06/25/24 -SF Placement Time: 1900 -SF Location: STERNUM  -SF Additional Comments: PAULINO COLEY    Retired NPWT (Negative Pressure Wound Therapy) - Properties Group Placement Date: 06/25/24 -SF Placement Time: 1900 -SF Location: STERNUM  -SF Additional Comments: PAULINO COLEY    Retired NPWT (Negative Pressure Wound Therapy) - Properties Group Placement Date: 06/25/24 -SF Placement Time: 1900 -SF Location: STERNUM  -SF Additional Comments: PAULINO  -MACKENZIE      Row Name 06/30/24 1004          Plan of Care Review    Plan of Care Reviewed With patient  -MF     Progress improving  -     Outcome Evaluation Pt. agreeable to therapy and continues to make improvement. Pt. was CGA for transfers even though he usually requires 2 attempts to fully stand. Pt. walked 400' with 1 standing rest. Practiced stair climbing-3 steps. Pt. did MIN assist and had LOB posteriorly. Will  practice them again prior to discharge. Will continue to work towards independence.  -       Row Name 06/30/24 1004          Vital Signs    Intra SpO2 (%) 93  -     O2 Delivery Intra Treatment room air  -       Row Name 06/30/24 1335 06/30/24 1004       Positioning and Restraints    Pre-Treatment Position sitting in chair/recliner  - sitting in chair/recliner  -    Post Treatment Position chair  - chair  -MF    In Chair reclined;call light within reach;encouraged to call for assist;RUE elevated;LUE elevated  - reclined;call light within reach;encouraged to call for assist;notified [facility]  -              User Key  (r) = Recorded By, (t) = Taken By, (c) = Cosigned By      Initials Name Provider Type    Mary Gallagher PTA Physical Therapist Assistant    Bakari Chery, RN Registered Nurse                    Physical Therapy Education       Title: PT OT SLP Therapies (In Progress)       Topic: Physical Therapy (In Progress)        Point: Mobility training (In Progress)       Learning Progress Summary             Patient Acceptance, E,D, NR by SHANNON at 6/28/2024 0804    Comment: sit to stand    Acceptance, E,TB,D, VU,DU,NR by MAGALYS at 6/26/2024 1047    Comment: education re: purpose of PT/importance of activity, safety/falls prevention, sternal precautions, improved tech w/ tfers, deep breathing and pacing activity   Significant Other Acceptance, E,TB,D, VU,DU,NR by  at 6/26/2024 1047    Comment: education re: purpose of PT/importance of activity, safety/falls prevention, sternal precautions, improved tech w/ tfers, deep breathing and pacing activity                         Point: Home exercise program (Done)       Learning Progress Summary             Patient Acceptance, E,D, DU by SHANNON at 6/27/2024 0825    Comment: purse lip breathing    Acceptance, E,TB,D, VU,DU,NR by MAGALYS at 6/26/2024 1047    Comment: education re: purpose of PT/importance of activity, safety/falls prevention, sternal precautions, improved tech w/ tfers, deep breathing and pacing activity   Significant Other Acceptance, E,TB,D, VU,DU,NR by  at 6/26/2024 1047    Comment: education re: purpose of PT/importance of activity, safety/falls prevention, sternal precautions, improved tech w/ tfers, deep breathing and pacing activity                         Point: Body mechanics (Done)       Learning Progress Summary             Patient Acceptance, E,TB,D, VU,DU,NR by  at 6/26/2024 1047    Comment: education re: purpose of PT/importance of activity, safety/falls prevention, sternal precautions, improved tech w/ tfers, deep breathing and pacing activity   Significant Other Acceptance, E,TB,D, VU,DU,NR by  at 6/26/2024 1047    Comment: education re: purpose of PT/importance of activity, safety/falls prevention, sternal precautions, improved tech w/ tfers, deep breathing and pacing activity                         Point: Precautions (Done)       Learning Progress Summary              Patient Acceptance, E,TB,D, VU,DU,NR by  at 6/26/2024 1047    Comment: education re: purpose of PT/importance of activity, safety/falls prevention, sternal precautions, improved tech w/ tfers, deep breathing and pacing activity   Significant Other Acceptance, E,TB,D, VU,DU,NR by  at 6/26/2024 1047    Comment: education re: purpose of PT/importance of activity, safety/falls prevention, sternal precautions, improved tech w/ tfers, deep breathing and pacing activity                                         User Key       Initials Effective Dates Name Provider Type Discipline     02/03/23 -  Mae Santiago, PTA Physical Therapist Assistant PT     08/02/18 -  Indira Jones, PT Physical Therapist PT                  PT Recommendation and Plan     Plan of Care Reviewed With: patient  Progress: improving  Outcome Evaluation: Pt. agreeable to therapy and continues to make improvement. Pt. was CGA for transfers even though he usually requires 2 attempts to fully stand. Pt. walked 400' with 1 standing rest. Practiced stair climbing-3 steps. Pt. did MIN assist and had LOB posteriorly. Will  practice them again prior to discharge. Will continue to work towards independence.   Outcome Measures       Row Name 06/30/24 1004 06/29/24 1106 06/28/24 0800       How much help from another person do you currently need...    Turning from your back to your side while in flat bed without using bedrails? 3  -MF 3  -MF 2  -SHANNON    Moving from lying on back to sitting on the side of a flat bed without bedrails? 3  -MF 3  -MF 2  -SHANNON    Moving to and from a bed to a chair (including a wheelchair)? 3  -MF 3  -MF 3  -SHANNON    Standing up from a chair using your arms (e.g., wheelchair, bedside chair)? 3  -MF 3  -MF 3  -SHANNON    Climbing 3-5 steps with a railing? 3  -MF 2  -MF 2  -SHANNON    To walk in hospital room? 3  -MF 3  -MF 3  -SHANNON    AM-PAC 6 Clicks Score (PT) 18  -MF 17  -MF 15  -SHANNON    Highest Level of Mobility Goal 6 --> Walk 10 steps or  more  -MF 5 --> Static standing  -MF 4 --> Transfer to chair/commode  -SHANNON       Functional Assessment    Outcome Measure Options AM-PAC 6 Clicks Basic Mobility (PT)  -MF AM-PAC 6 Clicks Basic Mobility (PT)  -MF --              User Key  (r) = Recorded By, (t) = Taken By, (c) = Cosigned By      Initials Name Provider Type    Mae Taylor, PTA Physical Therapist Assistant    Mary Gallagher, NELSON Physical Therapist Assistant                     Time Calculation:    PT Charges       Row Name 06/30/24 1409 06/30/24 1038          Time Calculation    Start Time 1335  -MF 1004  -MF     Stop Time 1408  -MF 1030  -MF     Time Calculation (min) 33 min  -MF 26 min  -MF     PT Received On -- 06/30/24  -MF        Time Calculation- PT    Total Timed Code Minutes- PT 33 minute(s)  -MF 26 minute(s)  -MF        Timed Charges    06996 - Gait Training Minutes  15  -MF 26  -MF     70510 - PT Therapeutic Activity Minutes 18  -MF --        Total Minutes    Timed Charges Total Minutes 33  -MF 26  -MF      Total Minutes 33  -MF 26  -MF               User Key  (r) = Recorded By, (t) = Taken By, (c) = Cosigned By      Initials Name Provider Type     Mary Macedo PTA Physical Therapist Assistant                  Therapy Charges for Today       Code Description Service Date Service Provider Modifiers Qty    71312984037 HC GAIT TRAINING EA 15 MIN 6/29/2024 Mary Macedo, PTA GP 2    09044291844 HC GAIT TRAINING EA 15 MIN 6/29/2024 Mary Macedo, PTA GP 1    32922447071 HC GAIT TRAINING EA 15 MIN 6/30/2024 Mary Macedo, PTA GP 2    57840338629 HC GAIT TRAINING EA 15 MIN 6/30/2024 Mary Macedo, PTA GP 1    78017927944 HC PT THERAPEUTIC ACT EA 15 MIN 6/30/2024 Mary Macedo, PTA GP 1            PT G-Codes  Outcome Measure Options: AM-PAC 6 Clicks Basic Mobility (PT)  AM-PAC 6 Clicks Score (PT): 18    Mary Macedo PTA  6/30/2024

## 2024-06-30 NOTE — PLAN OF CARE
Goal Outcome Evaluation:  Plan of Care Reviewed With: patient        Progress: improving  Outcome Evaluation: VSS. A&O. Pt has had no c/o pain this shift. Pt continues to be a fall risk. Uses call light for assistance. Sinus rhythm 68-86 with BBB.

## 2024-06-30 NOTE — PLAN OF CARE
Goal Outcome Evaluation:  Plan of Care Reviewed With: patient      Pt is alert and oriented x 4. Pleasant and cooperative with cares. Pt has no c/o pain. SHANNON drain 10ml. Tele SR 65-75 Walked in the hallway on the way back from x-ray and tolerated well.

## 2024-06-30 NOTE — THERAPY TREATMENT NOTE
Acute Care - Physical Therapy Treatment Note  [facility]     Patient Name: Jeff Alatorre  : 1940  MRN: [id number]  Today's Date: 2024      Visit Dx:     ICD-10-CM ICD-9-CM   1. Impaired functional mobility and activity tolerance [Z74.09]  Z74.09 V49.89   2. Coronary artery disease of native artery of native heart with stable angina pectoris  I25.118 414.01     413.9     Patient Active Problem List   Diagnosis    Hx of adenomatous colonic polyps    Adenomatous polyps    Idiopathic gout of multiple sites    Primary hypertension    Type 2 diabetes mellitus without complication, without long-term current use of insulin    Hyperlipidemia LDL goal <100    Erectile dysfunction due to arterial insufficiency    Anemia    B12 deficiency    Stage 3a chronic kidney disease (CKD)    Seasonal allergic rhinitis    ANYI (obstructive sleep apnea)    Carotid artery disease    ROSALES (dyspnea on exertion)    Coronary artery disease of native artery of native heart with stable angina pectoris    Bilateral carotid artery stenosis    History of left-sided carotid endarterectomy    Stenosis of right subclavian artery    CAD (coronary artery disease)     Past Medical History:   Diagnosis Date    Arthritis     Chronic kidney disease     Coronary artery disease of native artery of native heart with stable angina pectoris 2024    Erectile dysfunction     Gout     History of diverticulitis     HTN (hypertension)     Hx of adenomatous colonic polyps 10/13/2020    Hypercholesteremia     Low testosterone     Polycythemia vera 10/13/2020    Squamous cell carcinoma of skin 10/2021    top of head    Type 2 diabetes mellitus without complication, without long-term current use of insulin 2022     Past Surgical History:   Procedure Laterality Date    CARDIAC CATHETERIZATION Left 2024    Procedure: Coronary angiography;  Surgeon: Zaid Contreras MD;  Location: [facility] INVASIVE LOCATION;  Service: Cardiology;   Laterality: Left;    CARDIAC CATHETERIZATION Left 05/20/2024    Procedure: Percutaneous Coronary Intervention;  Surgeon: Zaid Contreras MD;  Location:  [facility] CATH INVASIVE LOCATION;  Service: Cardiology;  Laterality: Left;    CAROTID ENDARTERECTOMY Left     CATARACT EXTRACTION, BILATERAL      COLON SURGERY      COLONOSCOPY      COLONOSCOPY N/A 07/27/2021    Procedure: COLONOSCOPY WITH ANESTHESIA;  Surgeon: Luciano Alatorre DO;  Location: [facility] ENDOSCOPY;  Service: Gastroenterology;  Laterality: N/A;  preop; hx of polyps  postop; diverticulosis   PCP Devon Moore     CORONARY ARTERY BYPASS GRAFT N/A 6/25/2024    Procedure: CORONARY ARTERY BYPASS GRAFTING x4, LEFT INTERNAL MAMMARY ARTERY GRAFT, RIGHT LEG ENDOSCOPIC VEIN HARVEST, TRANSESOPHAGEAL ECHOCARDIOGRAM, APPLICATION OF PREVENA;  Surgeon: Jose F Kim MD;  Location:  [facility] OR;  Service: Cardiothoracic;  Laterality: N/A;    PENILE PROSTHESIS IMPLANT N/A 03/14/2023    Procedure: 3-PIECE INFLATABLE PENILE PROSTHESIS PLACEMENT;  Surgeon: Garcia Santana MD;  Location:  [facility] OR;  Service: Urology;  Laterality: N/A;    VASECTOMY       PT Assessment (Last 12 Hours)       PT Evaluation and Treatment       Row Name 06/30/24 1004          Physical Therapy Time and Intention    Subjective Information no complaints  -     Document Type therapy note (daily note)  -     Mode of Treatment physical therapy  -       Row Name 06/30/24 1004          General Information    Existing Precautions/Restrictions fall;sternal  prevena  -       Row Name 06/30/24 1004          Pain    Pretreatment Pain Rating 0/10 - no pain  -     Posttreatment Pain Rating 3/10  -     Pain Location incisional  -     Pain Location - chest  -     Pain Intervention(s) Repositioned;Ambulation/increased activity  -       Row Name 06/30/24 1004          Bed Mobility    Comment, (Bed Mobility) chair  -       Row Name 06/30/24 1004          Sit-Stand Transfer    Sit-Stand  [place] (Transfers) verbal cues;contact guard  -MF       Row Name 06/30/24 1004          Stand-Sit Transfer    Stand-Sit [place] (Transfers) contact guard  -MF       Row Name 06/30/24 1004          Gait/Stairs (Locomotion)    [place] Level (Gait) verbal cues;contact guard  -MF     Distance in Feet (Gait) 400  1 standing rest  -     Deviations/Abnormal Patterns (Gait) nani decreased;stride length decreased  -MF     Bilateral Gait Deviations forward flexed posture  -     [place] Level (Stairs) verbal cues;contact guard;minimum assist (75% patient effort)  -MF     Handrail Location (Stairs) right side (ascending);left side (descending)  -     Number of Steps (Stairs) 3  -MF     Ascending Technique (Stairs) step-to-step  -MF     Descending Technique (Stairs) step-to-step  -MF     Stairs, Safety Issues loses balance backward  -MF       Row Name 06/30/24 1004          Motor Skills    Comments, Therapeutic Exercise cardiac warm ups x 10  -MF       Row Name             Wound 06/25/24 1415 midline sternal Incision    Wound - Properties Group Placement Date: 06/25/24  -SF Placement Time: 1415  -SF Present on Original Admission: N  -SF Orientation: midline  -SF Location: sternal  -SF Primary Wound Type: Incision  -SF    Retired Wound - Properties Group Placement Date: 06/25/24  -SF Placement Time: 1415  -SF Present on Original Admission: N  -SF Orientation: midline  -SF Location: sternal  -SF Primary Wound Type: Incision  -SF    Retired Wound - Properties Group Date first assessed: 06/25/24  -SF Time first assessed: 1415  -SF Present on Original Admission: N  -SF Location: sternal  -SF Primary Wound Type: Incision  -SF      Row Name             Wound 06/25/24 1354 Left lower leg Incision    Wound - Properties Group Placement Date: 06/25/24  -SF Placement Time: 1354  -SF Present on Original Admission: N  -SF Side: Left  -SF Orientation: lower  -SF Location: leg  -SF Primary Wound Type: Incision  -SF     Retired Wound - Properties Group Placement Date: 06/25/24  -SF Placement Time: 1354  -SF Present on Original Admission: N  -SF Side: Left  -SF Orientation: lower  -SF Location: leg  -SF Primary Wound Type: Incision  -SF    Retired Wound - Properties Group Date first assessed: 06/25/24  -SF Time first assessed: 1354  -SF Present on Original Admission: N  -SF Side: Left  -SF Location: leg  -SF Primary Wound Type: Incision  -SF      Row Name             Wound 06/25/24 1415 Right lower leg Incision    Wound - Properties Group Placement Date: 06/25/24  -SF Placement Time: 1415  -SF Side: Right  -SF Orientation: lower  -SF Location: leg  -SF Primary Wound Type: Incision  -SF    Retired Wound - Properties Group Placement Date: 06/25/24  -SF Placement Time: 1415  -SF Side: Right  -SF Orientation: lower  -SF Location: leg  -SF Primary Wound Type: Incision  -SF    Retired Wound - Properties Group Date first assessed: 06/25/24  -SF Time first assessed: 1415  -SF Side: Right  -SF Location: leg  -SF Primary Wound Type: Incision  -SF      Row Name             NPWT (Negative Pressure Wound Therapy) 06/25/24 1900 STERNUM    NPWT (Negative Pressure Wound Therapy) - Properties Group Placement Date: 06/25/24  -SF Placement Time: 1900  -SF Location: STERNUM  -SF Additional Comments: PREVENA  -SF    Retired NPWT (Negative Pressure Wound Therapy) - Properties Group Placement Date: 06/25/24  -SF Placement Time: 1900  -SF Location: STERNUM  -SF Additional Comments: PREVENA  -SF    Retired NPWT (Negative Pressure Wound Therapy) - Properties Group Placement Date: 06/25/24  -SF Placement Time: 1900  -SF Location: STERNUM  -SF Additional Comments: PREVENA  -SF      Row Name 06/30/24 1004          Plan of Care Review    Plan of Care Reviewed With patient  -MF     Progress improving  -MF     Outcome Evaluation Pt. agreeable to therapy and continues to make improvement. Pt. was CGA for transfers even though he usually requires 2 attempts to  fully stand. Pt. walked 400' with 1 standing rest. Practiced stair climbing-3 steps. Pt. did MIN assist and had LOB posteriorly. Will  practice them again prior to discharge. Will continue to work towards independence.  -       Row Name 06/30/24 1004          Vital Signs    Intra SpO2 (%) 93  -MF     O2 Delivery Intra Treatment room air  -       Row Name 06/30/24 1004          Positioning and Restraints    Pre-Treatment Position sitting in chair/recliner  -     Post Treatment Position chair  -MF     In Chair reclined;call light within reach;encouraged to call for assist;notified nsg  -               User Key  (r) = Recorded By, (t) = Taken By, (c) = Cosigned By      Initials Name Provider Type    Mary Gallagher PTA Physical Therapist Assistant    Bakari Chery, RN Registered Nurse                    Physical Therapy Education       Title: PT OT SLP Therapies (In Progress)       Topic: Physical Therapy (In Progress)       Point: Mobility training (In Progress)       Learning Progress Summary             Patient Acceptance, E,D, NR by SHANNON at 6/28/2024 0804    Comment: sit to stand    Acceptance, E,TB,D, VU,DU,NR by  at 6/26/2024 1047    Comment: education re: purpose of PT/importance of activity, safety/falls prevention, sternal precautions, improved tech w/ tfers, deep breathing and pacing activity   Significant Other Acceptance, E,TB,D, VU,DU,NR by  at 6/26/2024 1047    Comment: education re: purpose of PT/importance of activity, safety/falls prevention, sternal precautions, improved tech w/ tfers, deep breathing and pacing activity                         Point: Home exercise program (Done)       Learning Progress Summary             Patient Acceptance, E,D, DU by SHANNON at 6/27/2024 0825    Comment: purse lip breathing    Acceptance, E,TB,D, VU,DU,NR by  at 6/26/2024 1047    Comment: education re: purpose of PT/importance of activity, safety/falls prevention, sternal precautions, improved  tech w/ tfers, deep breathing and pacing activity   Significant Other Acceptance, E,TB,D, VU,DU,NR by  at 6/26/2024 1047    Comment: education re: purpose of PT/importance of activity, safety/falls prevention, sternal precautions, improved tech w/ tfers, deep breathing and pacing activity                         Point: Body mechanics (Done)       Learning Progress Summary             Patient Acceptance, E,TB,D, VU,DU,NR by  at 6/26/2024 1047    Comment: education re: purpose of PT/importance of activity, safety/falls prevention, sternal precautions, improved tech w/ tfers, deep breathing and pacing activity   Significant Other Acceptance, E,TB,D, VU,DU,NR by  at 6/26/2024 1047    Comment: education re: purpose of PT/importance of activity, safety/falls prevention, sternal precautions, improved tech w/ tfers, deep breathing and pacing activity                         Point: Precautions (Done)       Learning Progress Summary             Patient Acceptance, E,TB,D, VU,DU,NR by  at 6/26/2024 1047    Comment: education re: purpose of PT/importance of activity, safety/falls prevention, sternal precautions, improved tech w/ tfers, deep breathing and pacing activity   Significant Other Acceptance, E,TB,D, VU,DU,NR by  at 6/26/2024 1047    Comment: education re: purpose of PT/importance of activity, safety/falls prevention, sternal precautions, improved tech w/ tfers, deep breathing and pacing activity                                         User Key       Initials Effective Dates Name Provider Type Discipline     02/03/23 -  Mae Santiago PTA Physical Therapist Assistant PT     08/02/18 -  Indira Jones, PT Physical Therapist PT                  PT Recommendation and Plan     Plan of Care Reviewed With: patient  Progress: improving  Outcome Evaluation: Pt. agreeable to therapy and continues to make improvement. Pt. was CGA for transfers even though he usually requires 2 attempts to fully stand. Pt.  walked 400' with 1 standing rest. Practiced stair climbing-3 steps. Pt. did MIN assist and had LOB posteriorly. Will  practice them again prior to discharge. Will continue to work towards independence.   Outcome Measures       Row Name 06/30/24 1004 06/29/24 1106 06/28/24 0800       How much help from another person do you currently need...    Turning from your back to your side while in flat bed without using bedrails? 3  -MF 3  -MF 2  -SHANNON    Moving from lying on back to sitting on the side of a flat bed without bedrails? 3  -MF 3  -MF 2  -SHANNON    Moving to and from a bed to a chair (including a wheelchair)? 3  -MF 3  -MF 3  -SHANNON    Standing up from a chair using your arms (e.g., wheelchair, bedside chair)? 3  -MF 3  -MF 3  -SHANNON    Climbing 3-5 steps with a railing? 3  -MF 2  -MF 2  -SHANNON    To walk in hospital room? 3  -MF 3  -MF 3  -SHANNON    AM-PAC 6 Clicks Score (PT) 18  -MF 17  -MF 15  -SHANNON    Highest Level of Mobility Goal 6 --> Walk 10 steps or more  -MF 5 --> Static standing  -MF 4 --> Transfer to chair/commode  -SHANNON       Functional Assessment    Outcome Measure Options AM-PAC 6 Clicks Basic Mobility (PT)  -MF AM-PAC 6 Clicks Basic Mobility (PT)  -MF --              User Key  (r) = Recorded By, (t) = Taken By, (c) = Cosigned By      Initials Name Provider Type    Mae Taylor, NELSON Physical Therapist Assistant     Mary Macedo, NELSON Physical Therapist Assistant                     Time Calculation:    PT Charges       Row Name 06/30/24 1038             Time Calculation    Start Time 1004  -MF      Stop Time 1030  -MF      Time Calculation (min) 26 min  -MF      PT Received On 06/30/24  -MF         Time Calculation- PT    Total Timed Code Minutes- PT 26 minute(s)  -MF         Timed Charges    79911 - Gait Training Minutes  26  -MF         Total Minutes    Timed Charges Total Minutes 26  -MF       Total Minutes 26  -MF                User Key  (r) = Recorded By, (t) = Taken By, (c) = Cosigned By       Initials Name Provider Type     Mary Macedo PTA Physical Therapist Assistant                  Therapy Charges for Today       Code Description Service Date Service Provider Modifiers Qty    62032962418 HC GAIT TRAINING EA 15 MIN 6/29/2024 Mary Macedo, NELSON GP 2    29097099218 HC GAIT TRAINING EA 15 MIN 6/29/2024 Mary Macedo, NELSON GP 1    83680208149 HC GAIT TRAINING EA 15 MIN 6/30/2024 Mary Macedo, NELSON GP 2            PT G-Codes  Outcome Measure Options: AM-PAC 6 Clicks Basic Mobility (PT)  AM-PAC 6 Clicks Score (PT): 18    Mary Macedo PTA  6/30/2024

## 2024-07-01 ENCOUNTER — APPOINTMENT (OUTPATIENT)
Dept: GENERAL RADIOLOGY | Facility: HOSPITAL | Age: 84
DRG: 236 | End: 2024-07-01
Payer: MEDICARE

## 2024-07-01 VITALS
OXYGEN SATURATION: 97 % | DIASTOLIC BLOOD PRESSURE: 60 MMHG | BODY MASS INDEX: 28.91 KG/M2 | RESPIRATION RATE: 18 BRPM | SYSTOLIC BLOOD PRESSURE: 124 MMHG | HEIGHT: 69 IN | WEIGHT: 195.2 LBS | TEMPERATURE: 97.7 F | HEART RATE: 67 BPM

## 2024-07-01 PROBLEM — I48.0 PAF (PAROXYSMAL ATRIAL FIBRILLATION): Status: ACTIVE | Noted: 2024-07-01

## 2024-07-01 PROBLEM — D64.9 CHRONIC ANEMIA: Status: ACTIVE | Noted: 2024-07-01

## 2024-07-01 PROBLEM — I73.9 PAD (PERIPHERAL ARTERY DISEASE): Status: ACTIVE | Noted: 2024-07-01

## 2024-07-01 LAB
ANION GAP SERPL CALCULATED.3IONS-SCNC: 10 MMOL/L (ref 5–15)
BUN SERPL-MCNC: 32 MG/DL (ref 8–23)
BUN/CREAT SERPL: 22.2 (ref 7–25)
CALCIUM SPEC-SCNC: 9 MG/DL (ref 8.6–10.5)
CHLORIDE SERPL-SCNC: 102 MMOL/L (ref 98–107)
CO2 SERPL-SCNC: 26 MMOL/L (ref 22–29)
CREAT SERPL-MCNC: 1.44 MG/DL (ref 0.76–1.27)
DEPRECATED RDW RBC AUTO: 57.4 FL (ref 37–54)
EGFRCR SERPLBLD CKD-EPI 2021: 47.9 ML/MIN/1.73
ERYTHROCYTE [DISTWIDTH] IN BLOOD BY AUTOMATED COUNT: 16.3 % (ref 12.3–15.4)
GLUCOSE BLDC GLUCOMTR-MCNC: 113 MG/DL (ref 70–130)
GLUCOSE BLDC GLUCOMTR-MCNC: 137 MG/DL (ref 70–130)
GLUCOSE SERPL-MCNC: 116 MG/DL (ref 65–99)
HCT VFR BLD AUTO: 36.3 % (ref 37.5–51)
HGB BLD-MCNC: 11.8 G/DL (ref 13–17.7)
MCH RBC QN AUTO: 31.4 PG (ref 26.6–33)
MCHC RBC AUTO-ENTMCNC: 32.5 G/DL (ref 31.5–35.7)
MCV RBC AUTO: 96.5 FL (ref 79–97)
PLATELET # BLD AUTO: 178 10*3/MM3 (ref 140–450)
PMV BLD AUTO: 10.4 FL (ref 6–12)
POTASSIUM SERPL-SCNC: 3.7 MMOL/L (ref 3.5–5.2)
RBC # BLD AUTO: 3.76 10*6/MM3 (ref 4.14–5.8)
SODIUM SERPL-SCNC: 138 MMOL/L (ref 136–145)
WBC NRBC COR # BLD AUTO: 8.04 10*3/MM3 (ref 3.4–10.8)

## 2024-07-01 PROCEDURE — 93010 ELECTROCARDIOGRAM REPORT: CPT | Performed by: INTERNAL MEDICINE

## 2024-07-01 PROCEDURE — 82948 REAGENT STRIP/BLOOD GLUCOSE: CPT

## 2024-07-01 PROCEDURE — 97116 GAIT TRAINING THERAPY: CPT

## 2024-07-01 PROCEDURE — 25010000002 FUROSEMIDE PER 20 MG: Performed by: SURGERY

## 2024-07-01 PROCEDURE — 99024 POSTOP FOLLOW-UP VISIT: CPT | Performed by: THORACIC SURGERY (CARDIOTHORACIC VASCULAR SURGERY)

## 2024-07-01 PROCEDURE — 93005 ELECTROCARDIOGRAM TRACING: CPT | Performed by: SURGERY

## 2024-07-01 PROCEDURE — 80048 BASIC METABOLIC PNL TOTAL CA: CPT | Performed by: NURSE PRACTITIONER

## 2024-07-01 PROCEDURE — 85027 COMPLETE CBC AUTOMATED: CPT | Performed by: NURSE PRACTITIONER

## 2024-07-01 PROCEDURE — 25010000002 ENOXAPARIN PER 10 MG: Performed by: THORACIC SURGERY (CARDIOTHORACIC VASCULAR SURGERY)

## 2024-07-01 PROCEDURE — 71046 X-RAY EXAM CHEST 2 VIEWS: CPT

## 2024-07-01 RX ORDER — CLOPIDOGREL BISULFATE 75 MG/1
75 TABLET ORAL DAILY
Qty: 30 TABLET | Refills: 2 | Status: SHIPPED | OUTPATIENT
Start: 2024-07-01

## 2024-07-01 RX ORDER — ATORVASTATIN CALCIUM 20 MG/1
20 TABLET, FILM COATED ORAL NIGHTLY
Qty: 30 TABLET | Refills: 2 | Status: SHIPPED | OUTPATIENT
Start: 2024-07-01

## 2024-07-01 RX ORDER — PANTOPRAZOLE SODIUM 40 MG/1
40 TABLET, DELAYED RELEASE ORAL
Status: DISCONTINUED | OUTPATIENT
Start: 2024-07-01 | End: 2024-07-01 | Stop reason: HOSPADM

## 2024-07-01 RX ORDER — LOSARTAN POTASSIUM 25 MG/1
25 TABLET ORAL DAILY
Qty: 30 TABLET | Refills: 2 | Status: SHIPPED | OUTPATIENT
Start: 2024-07-01

## 2024-07-01 RX ORDER — OXYCODONE HYDROCHLORIDE AND ACETAMINOPHEN 5; 325 MG/1; MG/1
1 TABLET ORAL EVERY 6 HOURS PRN
Qty: 10 TABLET | Refills: 0 | Status: SHIPPED | OUTPATIENT
Start: 2024-07-01 | End: 2024-07-10

## 2024-07-01 RX ORDER — PANTOPRAZOLE SODIUM 40 MG/1
40 TABLET, DELAYED RELEASE ORAL
Qty: 30 TABLET | Refills: 0 | Status: SHIPPED | OUTPATIENT
Start: 2024-07-01

## 2024-07-01 RX ORDER — POTASSIUM CHLORIDE 750 MG/1
20 CAPSULE, EXTENDED RELEASE ORAL DAILY
Qty: 10 CAPSULE | Refills: 0 | Status: SHIPPED | OUTPATIENT
Start: 2024-07-01 | End: 2024-07-06

## 2024-07-01 RX ORDER — FUROSEMIDE 40 MG/1
40 TABLET ORAL DAILY
Qty: 5 TABLET | Refills: 0 | Status: SHIPPED | OUTPATIENT
Start: 2024-07-01 | End: 2024-07-10

## 2024-07-01 RX ORDER — AMIODARONE HYDROCHLORIDE 400 MG/1
400 TABLET ORAL DAILY
Qty: 30 TABLET | Refills: 0 | Status: SHIPPED | OUTPATIENT
Start: 2024-07-01

## 2024-07-01 RX ADMIN — ACETAMINOPHEN 1000 MG: 500 TABLET, FILM COATED ORAL at 14:21

## 2024-07-01 RX ADMIN — PREGABALIN 25 MG: 25 CAPSULE ORAL at 06:13

## 2024-07-01 RX ADMIN — POLYETHYLENE GLYCOL 3350 17 G: 17 POWDER, FOR SOLUTION ORAL at 08:35

## 2024-07-01 RX ADMIN — BISACODYL 10 MG: 5 TABLET, COATED ORAL at 08:36

## 2024-07-01 RX ADMIN — ACETAMINOPHEN 1000 MG: 500 TABLET, FILM COATED ORAL at 03:26

## 2024-07-01 RX ADMIN — METOPROLOL TARTRATE 25 MG: 50 TABLET, FILM COATED ORAL at 08:36

## 2024-07-01 RX ADMIN — ENOXAPARIN SODIUM 40 MG: 100 INJECTION SUBCUTANEOUS at 08:35

## 2024-07-01 RX ADMIN — FUROSEMIDE 20 MG: 10 INJECTION, SOLUTION INTRAMUSCULAR; INTRAVENOUS at 08:36

## 2024-07-01 RX ADMIN — LIDOCAINE 2 PATCH: 4 PATCH TOPICAL at 08:36

## 2024-07-01 RX ADMIN — POTASSIUM CHLORIDE 30 MEQ: 750 CAPSULE, EXTENDED RELEASE ORAL at 08:36

## 2024-07-01 RX ADMIN — AMIODARONE HYDROCHLORIDE 400 MG: 200 TABLET ORAL at 08:36

## 2024-07-01 RX ADMIN — ASPIRIN 81 MG: 81 TABLET, COATED ORAL at 08:36

## 2024-07-01 NOTE — CASE MANAGEMENT/SOCIAL WORK
Continued Stay Note   [place]     Patient Name: Jeff Alatorre  MRN: [id number]  Today's Date: 7/1/2024    Admit Date: 6/25/2024    Plan: Home   Discharge Plan       Row Name 07/01/24 1046       Plan    Plan Home    Final Discharge Disposition Code 01 - home or self-care    Final Note Anticipate discharge home soon. No dc planning needs identified.             TK Zamora

## 2024-07-01 NOTE — THERAPY DISCHARGE NOTE
Acute Care - Physical Therapy Discharge Summary  [facility]       Patient Name: Jeff Alatorre  : 1940  MRN: [id number]    Today's Date: 2024                 Admit Date: 2024      PT Recommendation and Plan    Visit Dx:    ICD-10-CM ICD-9-CM   1. S/P CABG (coronary artery bypass graft)  Z95.1 V45.81   2. Coronary artery disease of native artery of native heart with stable angina pectoris  I25.118 414.01     413.9   3. Impaired functional mobility and activity tolerance [Z74.09]  Z74.09 V49.89   4. Mixed hyperlipidemia  E78.2 272.2   5. Stage 3a chronic kidney disease (CKD)  N18.31 585.3        Outcome Measures       Row Name 24 1004 24 1106          How much help from another person do you currently need...    Turning from your back to your side while in flat bed without using bedrails? 3  -MF 3  -MF     Moving from lying on back to sitting on the side of a flat bed without bedrails? 3  -MF 3  -MF     Moving to and from a bed to a chair (including a wheelchair)? 3  -MF 3  -MF     Standing up from a chair using your arms (e.g., wheelchair, bedside chair)? 3  -MF 3  -MF     Climbing 3-5 steps with a railing? 3  -MF 2  -MF     To walk in hospital room? 3  -MF 3  -MF     AM-PAC 6 Clicks Score (PT) 18  -MF 17  -MF     Highest Level of Mobility Goal 6 --> Walk 10 steps or more  -MF 5 --> Static standing  -MF        Functional Assessment    Outcome Measure Options AM-PAC 6 Clicks Basic Mobility (PT)  -MF AM-PAC 6 Clicks Basic Mobility (PT)  -MF               User Key  (r) = Recorded By, (t) = Taken By, (c) = Cosigned By      Initials Name Provider Type    Mary Gallagher PTA Physical Therapist Assistant                         PT Rehab Goals       Row Name 24 1610             Bed Mobility Goal 1 (PT)    Activity/Assistive Device (Bed Mobility Goal 1, PT) bridging;rolling to left;rolling to right;scooting;sit to supine/supine to sit  -LY      [place] Level/Cues Needed (Bed  Mobility Goal 1, PT) contact guard required  -LY      Time Frame (Bed Mobility Goal 1, PT) long term goal (LTG);10 days  -LY      Progress/Outcomes (Bed Mobility Goal 1, PT) goal not met  -LY         Transfer Goal 1 (PT)    Activity/Assistive Device (Transfer Goal 1, PT) sit-to-stand/stand-to-sit;bed-to-chair/chair-to-bed  -LY      [place] Level/Cues Needed (Transfer Goal 1, PT) standby assist  -LY      Time Frame (Transfer Goal 1, PT) long term goal (LTG);10 days  -LY      Progress/Outcome (Transfer Goal 1, PT) goal not met  -LY         Gait Training Goal 1 (PT)    Activity/Assistive Device (Gait Training Goal 1, PT) gait (walking locomotion);increase energy conservation;increase endurance/gait distance;improve balance and speed;diminish gait deviation;decrease fall risk  -LY      [place] Level (Gait Training Goal 1, PT) standby assist  -LY      Distance (Gait Training Goal 1, PT) 300 ft+  -LY      Time Frame (Gait Training Goal 1, PT) long term goal (LTG);10 days  -LY      Progress/Outcome (Gait Training Goal 1, PT) goal not met  -LY         Stairs Goal 1 (PT)    Activity/Assistive Device (Stairs Goal 1, PT) ascending stairs;descending stairs;using handrail, left;step-to-step;decrease fall risk;improve balance and speed  -LY      [place] Level/Cues Needed (Stairs Goal 1, PT) contact guard required  -LY      Number of Stairs (Stairs Goal 1, PT) 3  -LY      Time Frame (Stairs Goal 1, PT) long term goal (LTG);10 days  -LY      Progress/Outcome (Stairs Goal 1, PT) goal not met  -LY         Patient Education Goal (PT)    Activity (Patient Education Goal, PT) sternal precautions, warm up/cool down ex, energy conservation techniques, breathing ex  -LY      [place]/Cues/Accuracy (Memory Goal 2, PT) demonstrates adequately;independent;verbalizes understanding  -LY      Time Frame (Patient Education Goal, PT) long term goal (LTG);10 days  -LY      Progress/Outcome (Patient Education Goal, PT) goal not  met  -LY                User Key  (r) = Recorded By, (t) = Taken By, (c) = Cosigned By      Initials Name Provider Type Discipline    Zonia Corona, NELSON Physical Therapist Assistant PT                        PT Discharge Summary  Anticipated Discharge Disposition (PT): home with assist  Reason for Discharge: Discharge from facility  Outcomes Achieved: Refer to plan of care for updates on goals achieved  Discharge Destination: Home with assist      Zonia Lopez PTA   7/1/2024

## 2024-07-01 NOTE — DISCHARGE SUMMARY
[facility] Cardiothoracic Surgery  DISCHARGE SUMMARY        Date of Admission: 6/25/2024  Date of Discharge:  7/1/2024  Primary Care Physician: Jimmy Curtis MD    Discharge Diagnoses:  Active Hospital Problems    Diagnosis     **Coronary artery disease of native artery of native heart with stable angina pectoris     Chronic anemia     PAD (peripheral artery disease)     PAF (paroxysmal atrial fibrillation)     Stenosis of right subclavian artery     Bilateral carotid artery stenosis     Carotid artery disease     ANYI (obstructive sleep apnea)     Stage 3a chronic kidney disease (CKD)     Primary hypertension     Type 2 diabetes mellitus without complication, without long-term current use of insulin        Procedures Performed: Coronary artery bypass grafting-4 vessel (left internal mammary artery/left anterior descending, reverse saphenous vein graft/obtuse marginal, reverse saphenous vein graft/diagonal artery, and reverse saphenous vein graft/posterior descending artery), Right leg endoscopic vein harvest, Application of Prevena Incision management system performed on 6/25/2024 by Dr. Kim.     HPI:  Mr. Jeff Alatorre is a 84 y.o. male who was found to have significant coronary artery disease (severe distal left main and three-vessel) after evaluation for chest pain and dyspnea both at rest and during physical exertion.  Most recently, he had an episode of dyspnea while ascending a mountain climb but denied associated chest pain at that time.  He has a fairly active lifestyle, walks about a mile daily and lifts up to 50 pounds at a time.  He also hikes frequently.  He does have chest tightness and jaw pain which last typically 4 to 5 minutes before subsiding.  He has taken sublingual nitroglycerin with relief.  Additional past medical history includes gout, former smoker, well controlled diabetes mellitus, stage IIIa chronic kidney disease, carotid artery stenosis with previous left  carotid endarterectomy and 2 knee replacements.  LV function is preserved.  Preoperative testing was obtained and he was deemed an acceptable risk candidate to proceed forward with coronary artery bypass grafting.    Hospital Course: Mr. Alatorre was admitted on the morning of surgery on June 25, 2024.  Please see a separate op note by Dr. Kim.  Following surgery, he was transferred to the intensive care unit in stable guarded condition.  He remained hemodynamically stable.  He was extubated overnight demonstrated neurologically intact exam.  He was kept in the ICU for close watch.  He did receive a blood transfusion for chronic anemia.  Pulmonary toilet and ambulation were encouraged.  On postop day 2, he met criteria to transfer to  for continued recovery.  Supplemental oxygen was weaned.  Mediastinal tubes were removed without remark.  He was given diuresis.  The remaining stay of his hospitalization was remarkable for treatment of paroxysmal atrial fibrillation and encouraging pulmonary toilet and ambulation.  On postop day 6, he meets criteria for discharge home. Pacing wires and Prevena were removed on post op day 6 without remark. He is agreeable to discharge home with family.  He is saturating appropriately on room air.  He has good pain control.  He is walking over 200 feet with physical therapy.  He has remained in normal sinus rhythm for greater than 24 hours prior to discharge home.    Condition on Discharge:  Neurologically intact and has good pain control.  He is eating well and has demonstrable good bowel function.  The sternum is stable without clicks and the saphenectomy incisions are healing nicely.  The heart is in normal sinus rhythm.  He has met all physical therapy criteria and verbalizes understanding of sternotomy precautions.   He verbalizes understanding of a separately handed out cardiac surgery handout.       Discharge Disposition:  Home or Self Care [1]    Discharge Medications:      Discharge Medications        New Medications        Instructions Start Date   amiodarone 400 MG tablet  Commonly known as: PACERONE   400 mg, Oral, Daily      clopidogrel 75 MG tablet  Commonly known as: PLAVIX   75 mg, Oral, Daily      furosemide 40 MG tablet  Commonly known as: Lasix   40 mg, Oral, Daily      metoprolol tartrate 25 MG tablet  Commonly known as: LOPRESSOR   25 mg, Oral, Every 12 Hours Scheduled      oxyCODONE-acetaminophen 5-325 MG per tablet  Commonly known as: Percocet   1 tablet, Oral, Every 6 Hours PRN      pantoprazole 40 MG EC tablet  Commonly known as: PROTONIX   40 mg, Oral, Every Early Morning      potassium chloride 10 MEQ CR capsule  Commonly known as: MICRO-K   20 mEq, Oral, Daily             Changes to Medications        Instructions Start Date   atorvastatin 20 MG tablet  Commonly known as: LIPITOR  What changed:   medication strength  how much to take   20 mg, Oral, Nightly      losartan 25 MG tablet  Commonly known as: COZAAR  What changed:   medication strength  how much to take   25 mg, Oral, Daily             Continue These Medications        Instructions Start Date   acetaminophen 500 MG tablet  Commonly known as: TYLENOL   500 mg, Oral, Daily PRN      allopurinol 300 MG tablet  Commonly known as: ZYLOPRIM   300 mg, Oral, Daily      aspirin 81 MG EC tablet   81 mg, Oral, Daily      Cyanocobalamin 1000 MCG/ML kit   1,000 mcg, Injection, Every 30 Days      ferrous sulfate 325 (65 FE) MG tablet   325 mg, Oral, Daily With Breakfast, Pt takes every other day      fluticasone 50 MCG/ACT nasal spray  Commonly known as: FLONASE   2 sprays, Nasal, Daily      metFORMIN 500 MG tablet  Commonly known as: GLUCOPHAGE   500 mg, Oral, 2 Times Daily With Meals      nitroglycerin 0.4 MG SL tablet  Commonly known as: NITROSTAT   0.4 mg, Sublingual, Every 5 Minutes PRN, Take no more than 3 doses in 15 minutes.             Stop These Medications      amLODIPine 5 MG tablet  Commonly known as:  NORVASC     isosorbide mononitrate 30 MG 24 hr tablet  Commonly known as: IMDUR     ranolazine 500 MG 12 hr tablet  Commonly known as: Ranexa              Discharge Diet: No concentrated sweets diet with an effort to maintain acceptable glycemic control.      Discharge Care Plan / Instructions: Please see the separately handed out cardiac surgery handout. Cardiac rehab deferred at this time due to sternotomy precautions-will reassess at formal office visit.     Activity at Discharge:   No heavy lifting greater than 5 pounds or a gallon of milk while maintaining sternal precautions.  Jeff Alatorre has been instructed on an exercise  regimen as detailed in a handed out cardiac surgery handout.    Tobacco: The patient does not use tobacco products and therefore does not need tobacco cessation education.    BMI: Body mass index is 29.24 kg/m².  Continue to follow with primary care to establish durable lifestyle changes accomplish weight except for age and height.    Follow-up Appointments: Jeff Alatorre  is requested to see Jimmy Curtis MD within 1-2 weeks from time of discharge, to see Lucy HURTADO in 1 week with [facility], to follow-up with Dr. Kim in 4 weeks,  and to follow-up with Dr. Contreras of the cardiology service in 6 weeks.

## 2024-07-01 NOTE — PROGRESS NOTES
"Patient name: Jeff Alatorre  Patient : 1940  VISIT # [id number]  MR #[id number]    Procedure:Procedure(s):  Coronary artery bypass grafting-4 vessel (left internal mammary artery/left anterior descending, reverse saphenous vein graft/obtuse marginal, reverse saphenous vein graft/diagonal artery, and reverse saphenous vein graft/posterior descending artery), right leg endoscopic vein harvest,  application of Prevena Incision management system     Procedure Date:2024  POD:6 Days Post-Op    Subjective   No chief complaint on file.  Sitting up in the chair.  Tolerating room air.  Pain is well-controlled.  Creatinine is stable, 1.44 today.  Weight is down 4 pounds over the last 24 hours and he is 3 pounds above his baseline weight.  400 feet with 1 standing rest with physical therapy yesterday. (+) BM.    Telemetry: Sinus bradycardia/sinus 56 to 70 bpm with BBB     Objective     Visit Vitals  /58 (BP Location: Right arm, Patient Position: Lying)   Pulse 68   Temp 97.5 °F (36.4 °C) (Oral)   Resp 18   Ht 174 cm (68.5\")   Wt 88.5 kg (195 lb 3.2 oz)   SpO2 95%   BMI 29.24 kg/m²   Baseline weight: 192 lbs     Intake/Output Summary (Last 24 hours) at 2024 0936  Last data filed at 2024 0900  Gross per 24 hour   Intake 1290 ml   Output 2095 ml   Net -805 ml       Lab:     CBC:  Results from last 7 days   Lab Units 24  0748 24  0804 24  0846   WBC 10*3/mm3 8.04 7.83 10.10   HEMATOCRIT % 36.3* 34.3* 34.7*   PLATELETS 10*3/mm3 178 159 132*          BMP:  Results from last 7 days   Lab Units 24  0748 24  0804 24  0846   SODIUM mmol/L 138 139 141   POTASSIUM mmol/L 3.7 3.6 3.7   CHLORIDE mmol/L 102 103 104   CO2 mmol/L 26.0 26.0 25.0   GLUCOSE mg/dL 116* 118* 139*   BUN mg/dL 32* 31* 33*   CREATININE mg/dL 1.44* 1.43* 1.41*          COAG:  Results from last 7 days   Lab Units 24   INR  1.25*   APTT seconds 37.2*       IMAGES:       Imaging Results (Last " Hours)       Procedure Component Value Units Date/Time    XR Chest PA & Lateral [[id number]] Resulted: 07/01/24 0934     Updated: 07/01/24 0935              Physical Exam:  General: Alert, oriented. No apparent distress.   Cardiovascular: Regular rate and rhythm without murmur, rubs, or gallops.    Pulmonary: Clear to auscultation bilaterally without wheezing, rubs, or rales.  Chest: Sternotomy incision clean, dry, and intact. Sternum stable. No clicks.   Abdomen: Soft, non distended, and non tender.  Extremities: Warm, moves all extremities. No edema. Saphenectomy site clean, dry, and intact.   Neurologic:  Grossly intact with no focal deficits.       Impression:  Coronary artery disease of native artery of native heart with stable angina pectoris  Type 2 Diabetes mellitus  Chronic anemia  Chronic kidney disease, Stage IIIa  Carotid artery stenosis post left carotid artery endarterectomy  Peripheral arterial disease  Hypertension    Plan:  Continue diuresis and potassium replacement  Routine postcardiac surgery care  Encourage pulmonary toilet and ambulation  Bowel regimen  Discharge home later today  Follow-up 1 week after discharge with BRYANT Flores   Follow-up 4-6 weeks after discharge with Dr. Julio Suarez with patient, family and nursing        BRYANT Yu  07/01/24  09:36 CDT

## 2024-07-01 NOTE — PLAN OF CARE
Goal Outcome Evaluation:  Plan of Care Reviewed With: patient        Progress: improving                 Outcome Evaluation: BP soft after last night, prevena in tact, right and left EVH c/d/i, ambulated in mendez this a.m., 3 lbs above base weight this morning, no c/o pain, SB-S 56-70 with a BBB on telemetry.

## 2024-07-01 NOTE — THERAPY TREATMENT NOTE
Acute Care - Physical Therapy Treatment Note  [facility]     Patient Name: Jeff Alatorre  : 1940  MRN: [id number]  Today's Date: 2024      Visit Dx:     ICD-10-CM ICD-9-CM   1. S/P CABG (coronary artery bypass graft)  Z95.1 V45.81   2. Coronary artery disease of native artery of native heart with stable angina pectoris  I25.118 414.01     413.9   3. Impaired functional mobility and activity tolerance [Z74.09]  Z74.09 V49.89   4. Mixed hyperlipidemia  E78.2 272.2   5. Stage 3a chronic kidney disease (CKD)  N18.31 585.3     Patient Active Problem List   Diagnosis    Hx of adenomatous colonic polyps    Adenomatous polyps    Idiopathic gout of multiple sites    Primary hypertension    Type 2 diabetes mellitus without complication, without long-term current use of insulin    Hyperlipidemia LDL goal <100    Erectile dysfunction due to arterial insufficiency    Anemia    B12 deficiency    Stage 3a chronic kidney disease (CKD)    Seasonal allergic rhinitis    ANYI (obstructive sleep apnea)    Carotid artery disease    ROSALES (dyspnea on exertion)    Coronary artery disease of native artery of native heart with stable angina pectoris    Bilateral carotid artery stenosis    History of left-sided carotid endarterectomy    Stenosis of right subclavian artery    CAD (coronary artery disease)    Chronic anemia    PAD (peripheral artery disease)    PAF (paroxysmal atrial fibrillation)     Past Medical History:   Diagnosis Date    Arthritis     Chronic kidney disease     Coronary artery disease of native artery of native heart with stable angina pectoris 2024    Erectile dysfunction     Gout     History of diverticulitis     HTN (hypertension)     Hx of adenomatous colonic polyps 10/13/2020    Hypercholesteremia     Low testosterone     PAF (paroxysmal atrial fibrillation) 2024    Polycythemia vera 10/13/2020    Squamous cell carcinoma of skin 10/2021    top of head    Type 2 diabetes mellitus without  complication, without long-term current use of insulin 12/01/2022     Past Surgical History:   Procedure Laterality Date    CARDIAC CATHETERIZATION Left 05/20/2024    Procedure: Coronary angiography;  Surgeon: Zaid Contreras MD;  Location: [facility] CATH INVASIVE LOCATION;  Service: Cardiology;  Laterality: Left;    CARDIAC CATHETERIZATION Left 05/20/2024    Procedure: Percutaneous Coronary Intervention;  Surgeon: Zaid Contreras MD;  Location: [facility] CATH INVASIVE LOCATION;  Service: Cardiology;  Laterality: Left;    CAROTID ENDARTERECTOMY Left     CATARACT EXTRACTION, BILATERAL      COLON SURGERY      COLONOSCOPY      COLONOSCOPY N/A 07/27/2021    Procedure: COLONOSCOPY WITH ANESTHESIA;  Surgeon: Luciano Alatorre DO;  Location: [facility] ENDOSCOPY;  Service: Gastroenterology;  Laterality: N/A;  preop; hx of polyps  postop; diverticulosis   PCP Devon Moore     CORONARY ARTERY BYPASS GRAFT N/A 6/25/2024    Procedure: CORONARY ARTERY BYPASS GRAFTING x4, LEFT INTERNAL MAMMARY ARTERY GRAFT, RIGHT LEG ENDOSCOPIC VEIN HARVEST, TRANSESOPHAGEAL ECHOCARDIOGRAM, APPLICATION OF PREVENA;  Surgeon: Jose F Kim MD;  Location: [facility] OR;  Service: Cardiothoracic;  Laterality: N/A;    PENILE PROSTHESIS IMPLANT N/A 03/14/2023    Procedure: 3-PIECE INFLATABLE PENILE PROSTHESIS PLACEMENT;  Surgeon: Garcia Santana MD;  Location:  [facility] OR;  Service: Urology;  Laterality: N/A;    VASECTOMY       PT Assessment (Last 12 Hours)       PT Evaluation and Treatment       Row Name 07/01/24 1125          Physical Therapy Time and Intention    Subjective Information complains of;no complaints  -     Document Type therapy note (daily note)  -     Mode of Treatment physical therapy  -     Comment reviewed home safety, bed mobilty, sternal precautions with pt and spouse.  -       Row Name 07/01/24 1125          General Information    Existing Precautions/Restrictions fall;sternal  prevena  -       Row Name 07/01/24 1122           Pain    Pretreatment Pain Rating 0/10 - no pain  -     Posttreatment Pain Rating 0/10 - no pain  -       Row Name 07/01/24 1125          Bed Mobility    Comment, (Bed Mobility) chair  -MF       Row Name 07/01/24 1125          Sit-Stand Transfer    Sit-Stand [place] (Transfers) verbal cues;contact guard  -       Row Name 07/01/24 1125          Stand-Sit Transfer    Stand-Sit [place] (Transfers) contact guard  -       Row Name 07/01/24 1125          Gait/Stairs (Locomotion)    [place] Level (Gait) verbal cues;contact guard  -     Distance in Feet (Gait) 425  1 standing rest  -     Deviations/Abnormal Patterns (Gait) nani decreased;stride length decreased  -     [place] Level (Stairs) verbal cues;contact guard;minimum assist (75% patient effort)  -     Handrail Location (Stairs) right side (ascending);left side (descending)  -     Number of Steps (Stairs) 4  -     Ascending Technique (Stairs) step-to-step  -MF     Descending Technique (Stairs) step-to-step  -MF     Stairs, Safety Issues loses balance backward  -       Row Name             Wound 06/25/24 1415 midline sternal Incision    Wound - Properties Group Placement Date: 06/25/24  -SF Placement Time: 1415  -SF Present on Original Admission: N  -SF Orientation: midline  -SF Location: sternal  -SF Primary Wound Type: Incision  -SF    Retired Wound - Properties Group Placement Date: 06/25/24  -SF Placement Time: 1415  -SF Present on Original Admission: N  -SF Orientation: midline  -SF Location: sternal  -SF Primary Wound Type: Incision  -SF    Retired Wound - Properties Group Date first assessed: 06/25/24  -SF Time first assessed: 1415  -SF Present on Original Admission: N  -SF Location: sternal  -SF Primary Wound Type: Incision  -SF      Row Name             Wound 06/25/24 1354 Left lower leg Incision    Wound - Properties Group Placement Date: 06/25/24  -SF Placement Time: 1354  -SF Present on Original Admission: N   -SF Side: Left  -SF Orientation: lower  -SF Location: leg  -SF Primary Wound Type: Incision  -SF    Retired Wound - Properties Group Placement Date: 06/25/24  -SF Placement Time: 1354  -SF Present on Original Admission: N  -SF Side: Left  -SF Orientation: lower  -SF Location: leg  -SF Primary Wound Type: Incision  -SF    Retired Wound - Properties Group Date first assessed: 06/25/24  -SF Time first assessed: 1354  -SF Present on Original Admission: N  -SF Side: Left  -SF Location: leg  -SF Primary Wound Type: Incision  -SF      Row Name             Wound 06/25/24 1415 Right lower leg Incision    Wound - Properties Group Placement Date: 06/25/24  -SF Placement Time: 1415  -SF Side: Right  -SF Orientation: lower  -SF Location: leg  -SF Primary Wound Type: Incision  -SF    Retired Wound - Properties Group Placement Date: 06/25/24  -SF Placement Time: 1415  -SF Side: Right  -SF Orientation: lower  -SF Location: leg  -SF Primary Wound Type: Incision  -SF    Retired Wound - Properties Group Date first assessed: 06/25/24  -SF Time first assessed: 1415  -SF Side: Right  -SF Location: leg  -SF Primary Wound Type: Incision  -SF      Row Name             NPWT (Negative Pressure Wound Therapy) 06/25/24 1900 STERNUM    NPWT (Negative Pressure Wound Therapy) - Properties Group Placement Date: 06/25/24  -SF Placement Time: 1900  -SF Location: STERNUM  -SF Additional Comments: PREVENA  -SF    Retired NPWT (Negative Pressure Wound Therapy) - Properties Group Placement Date: 06/25/24  -SF Placement Time: 1900 -SF Location: STERNUM  -SF Additional Comments: PREVENA  -SF    Retired NPWT (Negative Pressure Wound Therapy) - Properties Group Placement Date: 06/25/24  -SF Placement Time: 1900 -SF Location: STERNUM  -SF Additional Comments: PREVENA  -SF      Row Name 07/01/24 1125          Positioning and Restraints    Pre-Treatment Position sitting in chair/recliner  -MF     Post Treatment Position chair  -MF     In Chair reclined;call  light within reach;encouraged to call for assist;with family/caregiver;LUE elevated;RUE elevated  -               User Key  (r) = Recorded By, (t) = Taken By, (c) = Cosigned By      Initials Name Provider Type    Mary Gallagher PTA Physical Therapist Assistant    Bakari Chery, RN Registered Nurse                    Physical Therapy Education       Title: PT OT SLP Therapies (Resolved)       Topic: Physical Therapy (Resolved)       Point: Mobility training (Resolved)       Learning Progress Summary             Patient Acceptance, E,D, NR by SHANNON at 6/28/2024 0804    Comment: sit to stand    Acceptance, E,TB,D, VU,DU,NR by  at 6/26/2024 1047    Comment: education re: purpose of PT/importance of activity, safety/falls prevention, sternal precautions, improved tech w/ tfers, deep breathing and pacing activity   Significant Other Acceptance, E,TB,D, VU,DU,NR by  at 6/26/2024 1047    Comment: education re: purpose of PT/importance of activity, safety/falls prevention, sternal precautions, improved tech w/ tfers, deep breathing and pacing activity                         Point: Home exercise program (Resolved)       Learning Progress Summary             Patient Acceptance, E,D, DU by SHANNON at 6/27/2024 0825    Comment: purse lip breathing    Acceptance, E,TB,D, VU,DU,NR by  at 6/26/2024 1047    Comment: education re: purpose of PT/importance of activity, safety/falls prevention, sternal precautions, improved tech w/ tfers, deep breathing and pacing activity   Significant Other Acceptance, E,TB,D, VU,DU,NR by  at 6/26/2024 1047    Comment: education re: purpose of PT/importance of activity, safety/falls prevention, sternal precautions, improved tech w/ tfers, deep breathing and pacing activity                         Point: Body mechanics (Resolved)       Learning Progress Summary             Patient Acceptance, E,TB,D, VU,DU,NR by  at 6/26/2024 1047    Comment: education re: purpose of PT/importance  of activity, safety/falls prevention, sternal precautions, improved tech w/ tfers, deep breathing and pacing activity   Significant Other Acceptance, E,TB,D, VU,DU,NR by MAGALYS at 6/26/2024 1047    Comment: education re: purpose of PT/importance of activity, safety/falls prevention, sternal precautions, improved tech w/ tfers, deep breathing and pacing activity                         Point: Precautions (Resolved)       Learning Progress Summary             Patient Acceptance, E,TB,D, VU,DU,NR by  at 6/26/2024 1047    Comment: education re: purpose of PT/importance of activity, safety/falls prevention, sternal precautions, improved tech w/ tfers, deep breathing and pacing activity   Significant Other Acceptance, E,TB,D, VU,DU,NR by  at 6/26/2024 1047    Comment: education re: purpose of PT/importance of activity, safety/falls prevention, sternal precautions, improved tech w/ tfers, deep breathing and pacing activity                                         User Key       Initials Effective Dates Name Provider Type Discipline     02/03/23 -  Mae Santiago PTA Physical Therapist Assistant PT     08/02/18 -  Indira Jones, PT Physical Therapist PT                  PT Recommendation and Plan     Plan of Care Reviewed With: patient  Progress: improving  Outcome Evaluation: Pt. agreeable to therapy and continues to make improvement. Pt. was CGA for transfers even though he usually requires 2 attempts to fully stand. Pt. walked 400' with 1 standing rest. Practiced stair climbing-3 steps. Pt. did MIN assist and had LOB posteriorly. Will  practice them again prior to discharge. Will continue to work towards independence.   Outcome Measures       Row Name 07/01/24 1125 06/30/24 1004 06/29/24 1106       How much help from another person do you currently need...    Turning from your back to your side while in flat bed without using bedrails? 3  -MF 3  -MF 3  -MF    Moving from lying on back to sitting on the side of  a flat bed without bedrails? 3  -MF 3  -MF 3  -MF    Moving to and from a bed to a chair (including a wheelchair)? 3  -MF 3  -MF 3  -MF    Standing up from a chair using your arms (e.g., wheelchair, bedside chair)? 3  -MF 3  -MF 3  -MF    Climbing 3-5 steps with a railing? 3  -MF 3  -MF 2  -MF    To walk in hospital room? 3  -MF 3  -MF 3  -MF    AM-PAC 6 Clicks Score (PT) 18  -MF 18  -MF 17  -MF    Highest Level of Mobility Goal 6 --> Walk 10 steps or more  -MF 6 --> Walk 10 steps or more  -MF 5 --> Static standing  -MF       Functional Assessment    Outcome Measure Options AM-PAC 6 Clicks Basic Mobility (PT)  -MF AM-PAC 6 Clicks Basic Mobility (PT)  -MF AM-PAC 6 Clicks Basic Mobility (PT)  -MF              User Key  (r) = Recorded By, (t) = Taken By, (c) = Cosigned By      Initials Name Provider Type    Mary Gallagher PTA Physical Therapist Assistant                     Time Calculation:    PT Charges       Row Name 07/01/24 1650             Time Calculation    Start Time 1125  -MF      Stop Time 1148  -MF      Time Calculation (min) 23 min  -MF      PT Received On 07/01/24  -MF         Time Calculation- PT    Total Timed Code Minutes- PT 23 minute(s)  -MF         Timed Charges    90179 - Gait Training Minutes  23  -MF         Total Minutes    Timed Charges Total Minutes 23  -MF       Total Minutes 23  -MF                User Key  (r) = Recorded By, (t) = Taken By, (c) = Cosigned By      Initials Name Provider Type    Mary Gallagher PTA Physical Therapist Assistant                  Therapy Charges for Today       Code Description Service Date Service Provider Modifiers Qty    79545497177 HC GAIT TRAINING EA 15 MIN 6/30/2024 Mary Macedo, PTA GP 2    40449505409 HC GAIT TRAINING EA 15 MIN 6/30/2024 Mary Macedo, PTA GP 1    27217267738 HC PT THERAPEUTIC ACT EA 15 MIN 6/30/2024 Mary Macedo, NELSON GP 1    20892382316 HC GAIT TRAINING EA 15 MIN 7/1/2024 Mary Macedo, NELSON GP  2            PT G-Codes  Outcome Measure Options: AM-PAC 6 Clicks Basic Mobility (PT)  AM-PAC 6 Clicks Score (PT): 18    Mary Macedo, PTA  7/1/2024

## 2024-07-02 ENCOUNTER — TRANSITIONAL CARE MANAGEMENT TELEPHONE ENCOUNTER (OUTPATIENT)
Dept: CALL CENTER | Facility: HOSPITAL | Age: 84
End: 2024-07-02
Payer: MEDICARE

## 2024-07-02 ENCOUNTER — READMISSION MANAGEMENT (OUTPATIENT)
Dept: CALL CENTER | Facility: HOSPITAL | Age: 84
End: 2024-07-02
Payer: MEDICARE

## 2024-07-02 NOTE — OUTREACH NOTE
Prep Survey      Flowsheet Row Responses   [facility] patient discharged from? [place]   Is LACE score < 7 ? No   Eligibility [facility]   Date of Admission 06/25/24   Date of Discharge 07/01/24   Discharge Disposition Home or Self Care   Discharge diagnosis CORONARY ARTERY BYPASS GRAFTING x4, LEFT INTERNAL MAMMARY ARTERY GRAFT, RIGHT LEG ENDOSCOPIC VEIN HARVEST, TRANSESOPHAGEAL ECHOCARDIOGRAM, APPLICATION OF PREVENA   Does the patient have one of the following disease processes/diagnoses(primary or secondary)? Cardiothoracic surgery   Does the patient have Home health ordered? No   Is there a DME ordered? No   Prep survey completed? Yes            Emani OCAMPO - Registered Nurse

## 2024-07-02 NOTE — OUTREACH NOTE
Call Center TCM Note      Flowsheet Row Responses   [facility] patient discharged from? [place]   Does the patient have one of the following disease processes/diagnoses(primary or secondary)? Cardiothoracic surgery   TCM attempt successful? Yes   Call start time 1306   Call end time 1307   Discharge diagnosis CORONARY ARTERY BYPASS GRAFTING x4, LEFT INTERNAL MAMMARY ARTERY GRAFT, RIGHT LEG ENDOSCOPIC VEIN HARVEST, TRANSESOPHAGEAL ECHOCARDIOGRAM, APPLICATION OF PREVENA   Meds reviewed with patient/caregiver? Yes   Is the patient having any side effects they believe may be caused by any medication additions or changes? No   Does the patient have all medications related to this admission filled (includes all antibiotics, pain medications, cardiac medications, etc.) Yes   Is the patient taking all medications as directed (includes completed medication regime)? Yes   Comments 7/10/2024 11:15 AM   Does the patient have an appointment with their PCP within 7-14 days of discharge? Yes   Has home health visited the patient within 72 hours of discharge? N/A   Psychosocial issues? No   Did the patient receive a copy of their discharge instructions? Yes   Nursing interventions Reviewed instructions with patient   What is the patient's perception of their health status since discharge? Improving   Is the patient/caregiver able to teach back signs and symptoms of incisional infection? Increased redness, swelling or pain at the incisonal site, Increased drainage or bleeding, Pus or odor from incision, Incisional warmth, Fever   Is the patient/caregiver able to teach back steps to recovery at home? Set small, achievable goals for return to baseline health, Rest and rebuild strength, gradually increase activity, Eat a well-balance diet   TCM call completed? Yes   Call end time 1307            Loreto Banda RN    7/2/2024, 13:08 EDT

## 2024-07-03 LAB
QT INTERVAL: 462 MS
QTC INTERVAL: 476 MS

## 2024-07-10 ENCOUNTER — OFFICE VISIT (OUTPATIENT)
Dept: CARDIAC SURGERY | Facility: CLINIC | Age: 84
End: 2024-07-10
Payer: MEDICARE

## 2024-07-10 ENCOUNTER — OFFICE VISIT (OUTPATIENT)
Dept: FAMILY MEDICINE CLINIC | Facility: CLINIC | Age: 84
End: 2024-07-10
Payer: MEDICARE

## 2024-07-10 ENCOUNTER — LAB (OUTPATIENT)
Dept: LAB | Facility: HOSPITAL | Age: 84
End: 2024-07-10
Payer: MEDICARE

## 2024-07-10 VITALS
DIASTOLIC BLOOD PRESSURE: 62 MMHG | WEIGHT: 185 LBS | HEART RATE: 63 BPM | BODY MASS INDEX: 27.4 KG/M2 | HEIGHT: 69 IN | TEMPERATURE: 98.7 F | RESPIRATION RATE: 20 BRPM | OXYGEN SATURATION: 99 % | SYSTOLIC BLOOD PRESSURE: 112 MMHG

## 2024-07-10 VITALS
WEIGHT: 188.2 LBS | SYSTOLIC BLOOD PRESSURE: 115 MMHG | HEART RATE: 68 BPM | DIASTOLIC BLOOD PRESSURE: 62 MMHG | HEIGHT: 69 IN | BODY MASS INDEX: 27.88 KG/M2

## 2024-07-10 DIAGNOSIS — N18.31 STAGE 3A CHRONIC KIDNEY DISEASE (CKD): ICD-10-CM

## 2024-07-10 DIAGNOSIS — E11.9 TYPE 2 DIABETES MELLITUS WITHOUT COMPLICATION, WITHOUT LONG-TERM CURRENT USE OF INSULIN: ICD-10-CM

## 2024-07-10 DIAGNOSIS — Z53.21 PATIENT LEFT WITHOUT BEING SEEN: Primary | ICD-10-CM

## 2024-07-10 DIAGNOSIS — I48.0 PAF (PAROXYSMAL ATRIAL FIBRILLATION): ICD-10-CM

## 2024-07-10 DIAGNOSIS — I25.118 CORONARY ARTERY DISEASE OF NATIVE ARTERY OF NATIVE HEART WITH STABLE ANGINA PECTORIS: ICD-10-CM

## 2024-07-10 DIAGNOSIS — I25.118 CORONARY ARTERY DISEASE OF NATIVE ARTERY OF NATIVE HEART WITH STABLE ANGINA PECTORIS: Primary | ICD-10-CM

## 2024-07-10 LAB
ANION GAP SERPL CALCULATED.3IONS-SCNC: 13 MMOL/L (ref 5–15)
BUN SERPL-MCNC: 32 MG/DL (ref 8–23)
BUN/CREAT SERPL: 19.3 (ref 7–25)
CALCIUM SPEC-SCNC: 9.8 MG/DL (ref 8.6–10.5)
CHLORIDE SERPL-SCNC: 101 MMOL/L (ref 98–107)
CO2 SERPL-SCNC: 23 MMOL/L (ref 22–29)
CREAT SERPL-MCNC: 1.66 MG/DL (ref 0.76–1.27)
EGFRCR SERPLBLD CKD-EPI 2021: 40.4 ML/MIN/1.73
GLUCOSE SERPL-MCNC: 129 MG/DL (ref 65–99)
POTASSIUM SERPL-SCNC: 4.6 MMOL/L (ref 3.5–5.2)
SODIUM SERPL-SCNC: 137 MMOL/L (ref 136–145)

## 2024-07-10 PROCEDURE — 80048 BASIC METABOLIC PNL TOTAL CA: CPT

## 2024-07-10 PROCEDURE — 36415 COLL VENOUS BLD VENIPUNCTURE: CPT

## 2024-07-10 NOTE — PROGRESS NOTES
Subjective   Chief Complaint   Patient presents with    Post-op Follow-up     Patient had CABG x 4 on 6/25. Labs today.       Patient ID: Jeff Alatorre is a 84 y.o. male who is here for follow-up having had Coronary artery bypass grafting-4 vessel (left internal mammary artery/left anterior descending, reverse saphenous vein graft/obtuse marginal, reverse saphenous vein graft/diagonal artery, and reverse saphenous vein graft/posterior descending artery), Right leg endoscopic vein harvest, Application of Prevena Incision management system performed on 6/25/2024 by Dr. Kim.    History of Present Illness  Post operative recovery was uneventful without any major complications.  Returns today for 1 week follow-up.  Joined by his friend today.  Sleep habits are fair-he inquires about sleeping on his side. Pain control has been good overall. No fevers/sweats/chills.  Slight drainage from sternal incision seen on T-shirts in the morning.  Drainage has been decreasing over the last few days.  No drainage from saphenectomy incisions.  No sternal clicks. Appetite is fair and is improving. Ambulating about 10 minutes twice daily.  Was due to see PCP today but left without being seen.  He does have postnasal drip and worsening of sinus issues and allergies over the last few days.  Has not been taking allergy medication.  Weight is down about 7 pounds since discharge home and he is below baseline weight.  Blood sugars controlled.    Medication reconciliation performed.    The following portions of the patient's history were reviewed and updated as appropriate: allergies, current medications, past family history, past medical history, past social history, past surgical history and problem list.    Review of Systems   Constitutional:  Negative for chills, diaphoresis and fever.   HENT:  Positive for postnasal drip. Negative for trouble swallowing.    Respiratory:  Negative for shortness of breath.    Cardiovascular:  Positive  "for chest pain (Incisional). Negative for palpitations and leg swelling.       Objective   Visit Vitals  /62 (BP Location: Right arm, Patient Position: Sitting, Cuff Size: Adult)   Pulse 68   Ht 174 cm (68.5\")   Wt 85.4 kg (188 lb 3.2 oz)   BMI 28.20 kg/m²   Unable to obtain SpO2 reading in the office today, cold fingers    Physical Exam  Vitals reviewed.   Constitutional:       General: He is not in acute distress.     Appearance: Normal appearance. He is well-developed. He is not diaphoretic.   HENT:      Head: Normocephalic and atraumatic.   Eyes:      Pupils: Pupils are equal, round, and reactive to light.   Cardiovascular:      Rate and Rhythm: Normal rate and regular rhythm.      Heart sounds: Normal heart sounds. No murmur heard.     No friction rub.   Pulmonary:      Effort: Pulmonary effort is normal. No respiratory distress.      Breath sounds: Normal breath sounds. No wheezing or rales.   Abdominal:      General: There is no distension.      Palpations: Abdomen is soft.      Tenderness: There is no abdominal tenderness. There is no guarding.   Musculoskeletal:      Right lower leg: No edema.      Left lower leg: No edema.   Skin:     General: Skin is warm and dry.      Coloration: Skin is not pale.      Findings: No rash.      Comments: Sternotomy site clean, dry, and intact.  Steri-Strips intact.  Scab noted over superior aspect of sternal incision.  No active drainage but there are a few drops of serosanguineous drainage on his white T-shirt.  No evidence of infection. Sternum stable. No clicks.  Saphenectomy site clean, dry, and intact.    Neurological:      Mental Status: He is alert and oriented to person, place, and time.   Psychiatric:         Behavior: Behavior normal.       Basic Metabolic Panel    Sodium Sodium   Date Value Ref Range Status   07/10/2024 137 136 - 145 mmol/L Final      Potassium Potassium   Date Value Ref Range Status   07/10/2024 4.6 3.5 - 5.2 mmol/L Final      Chloride " "Chloride   Date Value Ref Range Status   07/10/2024 101 98 - 107 mmol/L Final      Bicarbonate No results found for: \"PLASMABICARB\"   BUN BUN   Date Value Ref Range Status   07/10/2024 32 (H) 8 - 23 mg/dL Final      Creatinine Creatinine   Date Value Ref Range Status   07/10/2024 1.66 (H) 0.76 - 1.27 mg/dL Final      Calcium Calcium   Date Value Ref Range Status   07/10/2024 9.8 8.6 - 10.5 mg/dL Final      Glucose      No components found for: \"GLUCOSE.*\"     Previous creatinine 1.44 on 7/1    Assessment & Plan         Diagnoses and all orders for this visit:    1. Coronary artery disease of native artery of native heart with stable angina pectoris (Primary)    2. Stage 3a chronic kidney disease (CKD)  -     Basic Metabolic Panel; Future    3. Type 2 diabetes mellitus without complication, without long-term current use of insulin    4. PAF (paroxysmal atrial fibrillation)         Overall, Jeff Alatorre is doing well.  Surgical incisions are clean and dry without evidence of infection.  No active sternal drainage seen today.  We discussed current sternotomy precautions and how these will not be advanced yet. Continue post op surgical wound care: shower daily with antibacterial soap and water, avoid use of lotions, creams, ointments, powders etc, allow steri strips to fall off on their own. Following post op cardiac surgery home instructions.  Discussed importance of continued good oral intake to aid in wound healing.    We discussed the importance of strict glycemic control in the post operative setting and this impacts wound healing, infection risk, and future cardiovascular disease. Continue to follow with primary care for management of diabetes mellitus.     Okay to take over-the-counter allergy medication.  He was advised to resume use of Flonase (home medication).  If sinus issues and allergies do not improve, recommend follow-up with PCP.    Repeat BMP next week.  He has completed prescription of Lasix.  He " is euvolemic upon exam.  Encouraged oral hydration.    Provided support and encouragement. All questions have been answered to the best of my ability. Will discuss starting cardiac rehabilitation at official 1 month post op visit. Patient has follow up with Dr. Kim in a few weeks. Patient has follow up with Cardiology in a few weeks. Patient has been instructed to contact our office with any questions or concerns should they arise prior to the next office visit.      Jeff GODDARD Landry  reports that he quit smoking about 29 years ago. His smoking use included cigarettes. He started smoking about 69 years ago. He has a 40 pack-year smoking history. He has been exposed to tobacco smoke. He has never used smokeless tobacco.       Advance Care Planning   ACP discussion was declined by the patient. Patient has an advance directive in EMR which is still valid.

## 2024-07-17 ENCOUNTER — LAB (OUTPATIENT)
Dept: LAB | Facility: HOSPITAL | Age: 84
End: 2024-07-17
Payer: MEDICARE

## 2024-07-17 DIAGNOSIS — N18.31 STAGE 3A CHRONIC KIDNEY DISEASE (CKD): ICD-10-CM

## 2024-07-17 LAB
ANION GAP SERPL CALCULATED.3IONS-SCNC: 11 MMOL/L (ref 5–15)
BUN SERPL-MCNC: 31 MG/DL (ref 8–23)
BUN/CREAT SERPL: 17.3 (ref 7–25)
CALCIUM SPEC-SCNC: 9.6 MG/DL (ref 8.6–10.5)
CHLORIDE SERPL-SCNC: 103 MMOL/L (ref 98–107)
CO2 SERPL-SCNC: 25 MMOL/L (ref 22–29)
CREAT SERPL-MCNC: 1.79 MG/DL (ref 0.76–1.27)
EGFRCR SERPLBLD CKD-EPI 2021: 36.9 ML/MIN/1.73
GLUCOSE SERPL-MCNC: 112 MG/DL (ref 65–99)
POTASSIUM SERPL-SCNC: 5.2 MMOL/L (ref 3.5–5.2)
SODIUM SERPL-SCNC: 139 MMOL/L (ref 136–145)

## 2024-07-17 PROCEDURE — 80048 BASIC METABOLIC PNL TOTAL CA: CPT

## 2024-07-17 PROCEDURE — 36415 COLL VENOUS BLD VENIPUNCTURE: CPT

## 2024-07-18 NOTE — PROGRESS NOTES
Please inform patient that I have reviewed labs.  Renal function is essentially stable.  Keep planned follow-up with Dr. Kim on 7/31 (he does not want labs repeated at that visit).

## 2024-07-23 ENCOUNTER — LAB (OUTPATIENT)
Dept: LAB | Facility: HOSPITAL | Age: 84
End: 2024-07-23
Payer: MEDICARE

## 2024-07-23 ENCOUNTER — OFFICE VISIT (OUTPATIENT)
Dept: ONCOLOGY | Facility: CLINIC | Age: 84
End: 2024-07-23
Payer: MEDICARE

## 2024-07-23 VITALS
TEMPERATURE: 97.6 F | BODY MASS INDEX: 27.56 KG/M2 | HEART RATE: 62 BPM | SYSTOLIC BLOOD PRESSURE: 128 MMHG | DIASTOLIC BLOOD PRESSURE: 82 MMHG | RESPIRATION RATE: 16 BRPM | HEIGHT: 69 IN | OXYGEN SATURATION: 98 % | WEIGHT: 186.1 LBS

## 2024-07-23 DIAGNOSIS — N18.31 ANEMIA DUE TO STAGE 3A CHRONIC KIDNEY DISEASE: Primary | ICD-10-CM

## 2024-07-23 DIAGNOSIS — N18.31 ANEMIA DUE TO STAGE 3A CHRONIC KIDNEY DISEASE: ICD-10-CM

## 2024-07-23 DIAGNOSIS — E53.8 B12 DEFICIENCY: ICD-10-CM

## 2024-07-23 DIAGNOSIS — E61.1 IRON DEFICIENCY: Primary | ICD-10-CM

## 2024-07-23 DIAGNOSIS — D63.1 ANEMIA DUE TO STAGE 3A CHRONIC KIDNEY DISEASE: ICD-10-CM

## 2024-07-23 DIAGNOSIS — D63.1 ANEMIA DUE TO STAGE 3A CHRONIC KIDNEY DISEASE: Primary | ICD-10-CM

## 2024-07-23 DIAGNOSIS — E61.1 IRON DEFICIENCY: ICD-10-CM

## 2024-07-23 LAB
ALBUMIN SERPL-MCNC: 4 G/DL (ref 3.5–5.2)
ALBUMIN/GLOB SERPL: 1.4 G/DL
ALP SERPL-CCNC: 166 U/L (ref 39–117)
ALT SERPL W P-5'-P-CCNC: 14 U/L (ref 1–41)
ANION GAP SERPL CALCULATED.3IONS-SCNC: 11 MMOL/L (ref 5–15)
AST SERPL-CCNC: 17 U/L (ref 1–40)
BASOPHILS # BLD AUTO: 0.04 10*3/MM3 (ref 0–0.2)
BASOPHILS NFR BLD AUTO: 0.5 % (ref 0–1.5)
BILIRUB SERPL-MCNC: 0.3 MG/DL (ref 0–1.2)
BUN SERPL-MCNC: 38 MG/DL (ref 8–23)
BUN/CREAT SERPL: 23.5 (ref 7–25)
CALCIUM SPEC-SCNC: 10 MG/DL (ref 8.6–10.5)
CHLORIDE SERPL-SCNC: 103 MMOL/L (ref 98–107)
CO2 SERPL-SCNC: 25 MMOL/L (ref 22–29)
CREAT SERPL-MCNC: 1.62 MG/DL (ref 0.76–1.27)
DEPRECATED RDW RBC AUTO: 62.7 FL (ref 37–54)
EGFRCR SERPLBLD CKD-EPI 2021: 41.6 ML/MIN/1.73
EOSINOPHIL # BLD AUTO: 0.46 10*3/MM3 (ref 0–0.4)
EOSINOPHIL NFR BLD AUTO: 5.8 % (ref 0.3–6.2)
ERYTHROCYTE [DISTWIDTH] IN BLOOD BY AUTOMATED COUNT: 17 % (ref 12.3–15.4)
FERRITIN SERPL-MCNC: 363.9 NG/ML (ref 30–400)
GLOBULIN UR ELPH-MCNC: 2.9 GM/DL
GLUCOSE SERPL-MCNC: 124 MG/DL (ref 65–99)
HCT VFR BLD AUTO: 38.2 % (ref 37.5–51)
HGB BLD-MCNC: 12.2 G/DL (ref 13–17.7)
HOLD SPECIMEN: NORMAL
IMM GRANULOCYTES # BLD AUTO: 0.05 10*3/MM3 (ref 0–0.05)
IMM GRANULOCYTES NFR BLD AUTO: 0.6 % (ref 0–0.5)
IRON 24H UR-MRATE: 54 MCG/DL (ref 59–158)
IRON SATN MFR SERPL: 16 % (ref 20–50)
LYMPHOCYTES # BLD AUTO: 1.14 10*3/MM3 (ref 0.7–3.1)
LYMPHOCYTES NFR BLD AUTO: 14.4 % (ref 19.6–45.3)
MCH RBC QN AUTO: 32.2 PG (ref 26.6–33)
MCHC RBC AUTO-ENTMCNC: 31.9 G/DL (ref 31.5–35.7)
MCV RBC AUTO: 100.8 FL (ref 79–97)
MONOCYTES # BLD AUTO: 0.56 10*3/MM3 (ref 0.1–0.9)
MONOCYTES NFR BLD AUTO: 7.1 % (ref 5–12)
NEUTROPHILS NFR BLD AUTO: 5.69 10*3/MM3 (ref 1.7–7)
NEUTROPHILS NFR BLD AUTO: 71.6 % (ref 42.7–76)
NRBC BLD AUTO-RTO: 0 /100 WBC (ref 0–0.2)
PLATELET # BLD AUTO: 264 10*3/MM3 (ref 140–450)
PMV BLD AUTO: 10.3 FL (ref 6–12)
POTASSIUM SERPL-SCNC: 5 MMOL/L (ref 3.5–5.2)
PROT SERPL-MCNC: 6.9 G/DL (ref 6–8.5)
RBC # BLD AUTO: 3.79 10*6/MM3 (ref 4.14–5.8)
SODIUM SERPL-SCNC: 139 MMOL/L (ref 136–145)
TIBC SERPL-MCNC: 340 MCG/DL (ref 298–536)
TRANSFERRIN SERPL-MCNC: 228 MG/DL (ref 200–360)
VIT B12 BLD-MCNC: 796 PG/ML (ref 211–946)
WBC NRBC COR # BLD AUTO: 7.94 10*3/MM3 (ref 3.4–10.8)

## 2024-07-23 PROCEDURE — 36415 COLL VENOUS BLD VENIPUNCTURE: CPT

## 2024-07-23 PROCEDURE — 85025 COMPLETE CBC W/AUTO DIFF WBC: CPT

## 2024-07-23 PROCEDURE — 3079F DIAST BP 80-89 MM HG: CPT | Performed by: NURSE PRACTITIONER

## 2024-07-23 PROCEDURE — 80053 COMPREHEN METABOLIC PANEL: CPT

## 2024-07-23 PROCEDURE — 84466 ASSAY OF TRANSFERRIN: CPT

## 2024-07-23 PROCEDURE — 3074F SYST BP LT 130 MM HG: CPT | Performed by: NURSE PRACTITIONER

## 2024-07-23 PROCEDURE — 82728 ASSAY OF FERRITIN: CPT

## 2024-07-23 PROCEDURE — 1126F AMNT PAIN NOTED NONE PRSNT: CPT | Performed by: NURSE PRACTITIONER

## 2024-07-23 PROCEDURE — 99214 OFFICE O/P EST MOD 30 MIN: CPT | Performed by: NURSE PRACTITIONER

## 2024-07-23 PROCEDURE — 82607 VITAMIN B-12: CPT

## 2024-07-23 PROCEDURE — 83540 ASSAY OF IRON: CPT

## 2024-07-23 NOTE — LETTER
July 24, 2024       No Recipients    Patient: Jeff Alatorre   YOB: 1940   Date of Visit: 7/23/2024     Dear Jimmy Curtis MD:       Thank you for referring Jeff Alatorre to me for evaluation. Below are the relevant portions of my assessment and plan of care.    If you have questions, please do not hesitate to call me. I look forward to following Jeff along with you.         Sincerely,        BRYANT Nix        CC:   No Recipients    Enriqueta Rao APRN  07/24/24 0916  Signed  [facility] HEMATOLOGY & ONCOLOGY [place]  [address]  [facility] [address] [phone]    Patient Name: Jeff Alatorre  Encounter Date: 07/23/2024   YOB: 1940  Patient Number: [id number]    Hematology / Oncology Progress Note    HPI / REASON FOR VISIT: Jeff Alatorre is a 84 y.o. male who is followed by this office for polycythemia which was diagnosed many years ago and believed to be related to testosterone injections.  Bone marrow biopsy in either 2012 or 2013 which showed 55-65% cellularity, prominent erythroid hyperplasia with no simultaneous increases that are dyspoietic myeloid and megakaryocytic series (there was an isolated erythroid hyperplasia).  The flow cytometry and cytogenetics were unrevealing.  This appeared to be more of a reactive erythrocytosis rather than a myeloproliferative process.     He lost follow up from April 26, 2022 and returned on April 26. 2023.   He states that he had operation on 3/14/23 and penile implant was placed.  He has recovered well from that surgery but has had some anemia recently.  Hgb on 03/07 was 12.4.  On 04/17/23, Hgb was 9.9.  Records indicate that patient did not loose any significant amount of blood during his surgery.  He was started on oral iron on April 17.  Stool was normal prior to iron but then turned dark which is an expected findings.  Colonoscopy was good 2021.   "He denies blood in stool or urine.      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       INTERVAL HISTORY     Pt presents to clinic today for continued follow up.  His last Retacrit was 5/10/23 and he has maintained Hgb > 11g since then.       Sammi 15 CABG x 4.  Hasn't started cardiac rehab yet.   Sees Dr. Kim July 31.  Pt states \"I'm doing ok\".    He has no acute complaints today.  He had labs drawn and results were reviewed with him in office.         LABS    Lab Results - Last 18 Months   Lab Units 07/23/24  0920 07/01/24  0748 06/30/24  0804 06/29/24  0846 06/28/24  0352 06/27/24  0520 06/26/24  1845 06/26/24  0232 05/10/23  1017 04/26/23  1053   HEMOGLOBIN g/dL 12.2* 11.8* 11.1* 11.4* 9.9* 9.4* 9.1* 9.1*   < > 8.1*   HEMATOCRIT % 38.2 36.3* 34.3* 34.7* 30.4* 28.6* 28.1* 27.1*   < > 26.8*   MCV fL 100.8* 96.5 95.5 97.5* 96.2 95.3 95.3 95.8   < > 108.1*   WBC 10*3/mm3 7.94 8.04 7.83 10.10 8.81 12.59* 10.72 12.76*   < > 6.98   RDW % 17.0* 16.3* 16.2* 16.6* 16.7* 17.2* 16.6* 15.7*   < > 15.9*   MPV fL 10.3 10.4 10.3 11.3 10.5 10.7 10.6 10.3   < > 9.8   PLATELETS 10*3/mm3 264 178 159 132* 101* 117* 100* 111*   < > 237   IMM GRAN % % 0.6*  --  0.5  --  0.6* 0.6* 0.4 0.4   < >  --    NEUTROS ABS 10*3/mm3 5.69  --  6.02  --  7.06* 10.00* 9.47* 11.38*   < > 5.10   LYMPHS ABS 10*3/mm3 1.14  --  0.87  --  0.83 1.14 0.55* 0.38*   < >  --    MONOS ABS 10*3/mm3 0.56  --  0.57  --  0.64 1.08* 0.62 0.84   < >  --    EOS ABS 10*3/mm3 0.46*  --  0.30  --  0.21 0.25 0.02 0.08   < > 0.07   BASOS ABS 10*3/mm3 0.04  --  0.03  --  0.02 0.04 0.02 0.03   < > 0.14   IMMATURE GRANS (ABS) 10*3/mm3 0.05  --  0.04  --  0.05 0.08* 0.04 0.05   < >  --    NRBC /100 WBC 0.0  --  0.0  --  0.0 0.0 0.0 0.0   < >  --    NEUTROPHIL % %  --   --   --   --   --   --   --   --   --  73.0   MONOCYTES % %  --   --   --   --   --   --   --   --   --  2.0*   BASOPHIL % %  --   --   --   --   --   --   --   --   --  2.0* "   ANISOCYTOSIS   --   --   --   --   --   --   --   --   --  Slight/1+    < > = values in this interval not displayed.       Lab Results - Last 18 Months   Lab Units 07/23/24  0920 07/17/24  1155 07/10/24  1312 07/01/24  0748 06/30/24  0804 06/29/24  0846 06/28/24  1447 06/28/24  0352 06/27/24  0950 06/27/24  0520 06/26/24  1312 06/26/24  0232 06/25/24  2347 06/25/24  1958 06/25/24  1343 06/24/24  0941 06/04/24  0926 05/20/24  0707 04/30/24  0953 01/30/24  0938   GLUCOSE mg/dL 124* 112* 129* 116* 118* 139*   < > 135*   < > 135*   < > 163* 124* 99  --  112*  --  116* 117* 98   SODIUM mmol/L 139 139 137 138 139 141   < > 139   < > 136   < > 142 142 144  --  136  --  140 141 141   SODIUM, ARTERIAL   --   --   --   --   --   --   --   --    < >  --    < >  --   --   --    < >  --   --   --   --   --    POTASSIUM mmol/L 5.0 5.2 4.6 3.7 3.6 3.7   < > 4.0   < > 4.2   < > 4.6 4.2 4.3  --  4.9  --  4.3 4.4 4.2   CO2 mmol/L 25.0 25.0 23.0 26.0 26.0 25.0   < > 22.0   < > 22.0   < > 23.0 25.0 25.0  --  24.0  --  27.0 24.0 26.0   CHLORIDE mmol/L 103 103 101 102 103 104   < > 107   < > 103   < > 109* 109* 112*  --  103  --  103 105 105   ANION GAP mmol/L 11.0 11.0 13.0 10.0 10.0 12.0   < > 10.0   < > 11.0   < > 10.0 8.0 7.0  --  9.0  --  10.0 12.0 10.0   CREATININE mg/dL 1.62* 1.79* 1.66* 1.44* 1.43* 1.41*   < > 1.44*   < > 1.51*   < > 1.60* 1.49* 1.36*  --  1.39*   < > 1.29* 1.26 1.47*   BUN mg/dL 38* 31* 32* 32* 31* 33*   < > 28*   < > 29*   < > 28* 28* 27*  --  26*  --  24* 24* 27*   BUN / CREAT RATIO  23.5 17.3 19.3 22.2 21.7 23.4   < > 19.4   < > 19.2   < > 17.5 18.8 19.9  --  18.7  --  18.6 19.0 18.4   CALCIUM mg/dL 10.0 9.6 9.8 9.0 8.7 9.0   < > 8.4*   < > 8.5*   < > 8.8 9.3 10.5  --  9.4  --  9.5 9.5 9.2   ALK PHOS U/L 166*  --   --   --   --   --   --  73  --   --   --   --   --   --   --  139*  --  143* 127* 134*   TOTAL PROTEIN g/dL 6.9  --   --   --   --   --   --  5.2*  --   --   --   --   --   --   --  6.7  --  7.1  6.9 6.9   ALT (SGPT) U/L 14  --   --   --   --   --   --  <5  --   --   --   --   --   --   --  11  --  17 11 12   AST (SGOT) U/L 17  --   --   --   --   --   --  15  --   --   --   --   --   --   --  12  --  17 14 15   BILIRUBIN mg/dL 0.3  --   --   --   --   --   --  0.6  --   --   --   --   --   --   --  0.3  --  0.6 0.4 0.4   ALBUMIN g/dL 4.0  --   --   --   --   --   --  3.2*  --  3.6  --  3.3* 3.4* 3.3*  --  4.2  --  4.6 4.5 4.3   GLOBULIN gm/dL 2.9  --   --   --   --   --   --  2.0  --   --   --   --   --   --   --  2.5  --  2.5 2.4 2.6    < > = values in this interval not displayed.       Lab Results - Last 18 Months   Lab Units 11/02/23  0945 04/26/23  1054 04/26/23  1053   M-SPIKE g/dL  --  Not Observed  --    KAPPA/LAMBDA RATIO, S   --  1.11  --    FREE LAMBDA LIGHT CHAINS mg/L  --  25.3  --    URIC ACID mg/dL 3.6*  --   --    IG KAPPA FREE LIGHT CHAIN mg/L  --  28.1*  --    REFERENCE LAB REPORT   --   --  See Attached Report       Lab Results - Last 18 Months   Lab Units 07/23/24  0920 04/30/24  0953 01/30/24  0938 11/02/23  0945 10/31/23  0918 06/28/23  0939 06/08/23  0855 05/24/23  0831 04/26/23  1053   IRON mcg/dL 54* 61 84  --  84 50*  --   --  91   TIBC mcg/dL 340 413 422  --  428 457  --   --  375   IRON SATURATION (TSAT) % 16* 15* 20  --  20 11*  --   --  24   FERRITIN ng/mL 363.90 86.15 93.33  --  93.89 73.80 77.46  --  244.90   TSH uIU/mL  --   --   --  1.700  --   --   --   --   --    FOLATE ng/mL  --   --   --  8.0  --   --   --  6.45  --          PAST MEDICAL HISTORY:  ALLERGIES:  No Known Allergies  CURRENT MEDICATIONS:  Outpatient Encounter Medications as of 7/23/2024   Medication Sig Dispense Refill   • acetaminophen (TYLENOL) 500 MG tablet Take 1 tablet by mouth Daily As Needed for Mild Pain.     • allopurinol (ZYLOPRIM) 300 MG tablet Take 1 tablet by mouth Daily.     • amiodarone (PACERONE) 400 MG tablet Take 1 tablet by mouth Daily. 30 tablet 0   • aspirin 81 MG EC tablet Take 1 tablet  by mouth Daily.     • atorvastatin (LIPITOR) 20 MG tablet Take 1 tablet by mouth Every Night. 30 tablet 2   • clopidogrel (PLAVIX) 75 MG tablet Take 1 tablet by mouth Daily. 30 tablet 2   • Cyanocobalamin 1000 MCG/ML kit Inject 1 mL as directed Every 30 (Thirty) Days.     • ferrous sulfate 325 (65 FE) MG tablet Take 1 tablet by mouth Daily With Breakfast. Pt takes every other day     • losartan (COZAAR) 25 MG tablet Take 1 tablet by mouth Daily. 30 tablet 2   • metFORMIN (GLUCOPHAGE) 500 MG tablet Take 1 tablet by mouth 2 (Two) Times a Day With Meals.     • metoprolol tartrate (LOPRESSOR) 25 MG tablet Take 1 tablet by mouth Every 12 (Twelve) Hours. 60 tablet 2   • nitroglycerin (NITROSTAT) 0.4 MG SL tablet Place 1 tablet under the tongue Every 5 (Five) Minutes As Needed for Chest Pain. Take no more than 3 doses in 15 minutes.     • pantoprazole (PROTONIX) 40 MG EC tablet Take 1 tablet by mouth Every Morning. 30 tablet 0   • [DISCONTINUED] fluticasone (FLONASE) 50 MCG/ACT nasal spray 2 sprays into the nostril(s) as directed by provider Daily. (Patient not taking: Reported on 7/23/2024) 16 g 2     Facility-Administered Encounter Medications as of 7/23/2024   Medication Dose Route Frequency Provider Last Rate Last Admin   • cyanocobalamin injection 1,000 mcg  1,000 mcg Intramuscular Once Enriqueta Rao APRN         ADULT ILLNESSES:  Patient Active Problem List   Diagnosis Code   • Hx of adenomatous colonic polyps Z86.010   • Adenomatous polyps D36.9   • Idiopathic gout of multiple sites M10.09   • Primary hypertension I10   • Type 2 diabetes mellitus without complication, without long-term current use of insulin E11.9   • Hyperlipidemia LDL goal <100 E78.5   • Erectile dysfunction due to arterial insufficiency N52.01   • Anemia D64.9   • B12 deficiency E53.8   • Stage 3a chronic kidney disease (CKD) N18.31   • Seasonal allergic rhinitis J30.2   • ANYI (obstructive sleep apnea) G47.33   • Carotid artery disease I77.9    • ROSALES (dyspnea on exertion) R06.09   • 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   • Bilateral carotid artery stenosis I65.23   • History of left-sided carotid endarterectomy Z98.890   • 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   • CAD (coronary artery disease) I25.10   • Chronic anemia D64.9   • PAD (peripheral artery disease) I73.9   • PAF (paroxysmal atrial fibrillation) I48.0     SURGERIES:  Past Surgical History:   Procedure Laterality Date   • CARDIAC CATHETERIZATION Left 05/20/2024    Procedure: Coronary angiography;  Surgeon: Zaid Contreras MD;  Location:  [facility] CATH INVASIVE [facility];  Service: Cardiology;  Laterality: Left;   • CARDIAC CATHETERIZATION Left 05/20/2024    Procedure: Percutaneous Coronary Intervention;  Surgeon: Zaid Contreras MD;  Location:  [facility] CATH INVASIVE [facility];  Service: Cardiology;  Laterality: Left;   • CAROTID ENDARTERECTOMY Left    • CATARACT EXTRACTION, BILATERAL     • COLON SURGERY     • COLONOSCOPY     • COLONOSCOPY N/A 07/27/2021    Procedure: COLONOSCOPY WITH ANESTHESIA;  Surgeon: Luciano Alatorre DO;  Location:  [facility] ENDOSCOPY;  Service: Gastroenterology;  Laterality: N/A;  preop; hx of polyps  postop; diverticulosis   PCP Devon Moore    • CORONARY ARTERY BYPASS GRAFT N/A 6/25/2024    Procedure: CORONARY ARTERY BYPASS GRAFTING x4, LEFT INTERNAL MAMMARY ARTERY GRAFT, RIGHT LEG ENDOSCOPIC VEIN HARVEST, TRANSESOPHAGEAL ECHOCARDIOGRAM, APPLICATION OF PREVENA;  Surgeon: Jose F Kim MD;  Location:  [facility] OR;  Service: Cardiothoracic;  Laterality: N/A;   • PENILE PROSTHESIS IMPLANT N/A 03/14/2023    Procedure: 3-PIECE INFLATABLE PENILE PROSTHESIS PLACEMENT;  Surgeon: Garcia Santana MD;  Location:  [facility] OR;  Service: Urology;  Laterality: N/A;   • VASECTOMY       HEALTH MAINTENANCE ITEMS:  Health Maintenance Due   Topic Date Due   • DIABETIC EYE EXAM  Never done   • TDAP/TD VACCINES (1 - Tdap) Never done    • ZOSTER VACCINE (3 of 3) 2023   • COVID-19 Vaccine ( season) 2024   • COLORECTAL CANCER SCREENING  2024       <no information>  Last Completed Colonoscopy       This patient has no relevant Health Maintenance data.          Immunization History   Administered Date(s) Administered   • Arexvy (RSV, Adults 60+ yrs) 2023   • COVID-19 (MODERNA) 1st,2nd,3rd Dose Monovalent 2021, 2021, 2022   • COVID-19 (PFIZER) BIVALENT 12+YRS 2022   • COVID-19 F23 (MODERNA) 12YRS+ (SPIKEVAX) 2024   • Flu Vaccine Split Quad 2015   • Fluad Quad 65+ 10/13/2020   • Fluzone (or Fluarix & Flulaval for VFC) >6mos 2015   • Fluzone High Dose =>65 Years (Vaxcare ONLY) 2019   • Fluzone High-Dose 65+yrs 10/15/2021, 10/17/2022, 2023   • Pneumococcal Conjugate 20-Valent (PCV20) 10/17/2022   • Shingrix 10/30/2023   • Zostavax 2016     Last Completed Mammogram       This patient has no relevant Health Maintenance data.              FAMILY HISTORY:  Family History   Problem Relation Age of Onset   • No Known Problems Mother    • Heart attack Father    • No Known Problems Maternal Grandmother    • No Known Problems Maternal Grandfather    • No Known Problems Paternal Grandmother    • No Known Problems Paternal Grandfather    • No Known Problems Son    • No Known Problems Daughter    • Parkinsonism Brother    • Colon cancer Neg Hx    • Colon polyps Neg Hx    • Esophageal cancer Neg Hx      SOCIAL HISTORY:  Social History     Socioeconomic History   • Marital status: Single   Tobacco Use   • Smoking status: Former     Current packs/day: 0.00     Average packs/day: 1 pack/day for 40.0 years (40.0 ttl pk-yrs)     Types: Cigarettes     Start date:      Quit date:      Years since quittin.5     Passive exposure: Past   • Smokeless tobacco: Never   • Tobacco comments:     N/A   Vaping Use   • Vaping status: Never Used   Substance and Sexual Activity   •  "Alcohol use: Not Currently     Comment: Quite 20+ yrs ago   • Drug use: Never   • Sexual activity: Yes     Partners: Female     Birth control/protection: Vasectomy       REVIEW OF SYSTEMS:  Review of Systems   Constitutional:  Positive for fatigue. Negative for activity change, appetite change, fever, unexpected weight gain and unexpected weight loss.   HENT:  Negative for dental problem, facial swelling, swollen glands and trouble swallowing.    Eyes:  Negative for double vision and discharge.   Respiratory:  Negative for cough, shortness of breath and wheezing.    Cardiovascular:  Negative for chest pain, palpitations and leg swelling.        S/p CABG x 4 on 06/15/24   Gastrointestinal:  Negative for abdominal pain, blood in stool, nausea and vomiting.   Endocrine: Negative.    Genitourinary:  Negative for dysuria and hematuria.        Had penile implant 03/14/2023   Musculoskeletal:  Positive for arthralgias. Negative for myalgias.   Skin:  Negative for rash, skin lesions and wound.   Allergic/Immunologic: Negative for immunocompromised state.   Neurological:  Negative for speech difficulty, light-headedness, headache, memory problem and confusion.   Hematological:  Negative for adenopathy.   Psychiatric/Behavioral:  Negative for self-injury, suicidal ideas and depressed mood. The patient is not nervous/anxious.        /82   Pulse 62   Temp 97.6 °F (36.4 °C)   Resp 16   Ht 174 cm (68.5\")   Wt 84.4 kg (186 lb 1.6 oz)   SpO2 98%   BMI 27.88 kg/m²  Body surface area is 1.99 meters squared.    Pain Score    07/23/24 0945   PainSc: 0-No pain       Physical Exam  Constitutional:       Appearance: Normal appearance.   HENT:      Head: Normocephalic and atraumatic.   Cardiovascular:      Rate and Rhythm: Normal rate and regular rhythm.   Pulmonary:      Effort: Pulmonary effort is normal.      Breath sounds: Normal breath sounds.   Abdominal:      General: Bowel sounds are normal.      Palpations: Abdomen is " soft.   Musculoskeletal:      Right lower leg: No edema.      Left lower leg: No edema.   Skin:     General: Skin is warm and dry.   Neurological:      Mental Status: He is alert and oriented to person, place, and time.   Psychiatric:         Attention and Perception: Attention normal.         Mood and Affect: Mood normal.         Judgment: Judgment normal.         Jeff Alatorre reports a pain score of 0.  Given his pain assessment as noted, treatment options were discussed and the following options were decided upon as a follow-up plan to address the patient's pain:  No intervention indicated .           ASSESSMENT / PLAN    ASSESSMENT:   1. Iron deficiency    2. B12 deficiency    3. Anemia due to stage 3a chronic kidney disease        Secondary Polycythemia is no longer a concern.       1.  Anemia in Stage 3a CKD  2.  Iron Deficiency   -Received Retacrit April 26 and May 10, 2023  Taking oral iron QOD  -Labs today:  Hgb 12.2, Hct 38.2, Iron 54, Ferritin 363, Sat 16%, TIBC 340    -Increase iron to daily for now.    -No Retacrit indicated as Hgb > 11       3.  B12 Deficiency   -Pt is getting injection monthly  per PCP   -B12 today 796    Pt recently had CABG.  Continue follow up with cardiology and cardiothoracic surgery      PLAN:  No Retacrit indicated today.    Pt has not required Retacrit since May 10, 2023.  Recheck in 6 weeks.   Labs only in 3 months for CBC, CMP, Iron Profile, Ferritin, B12  RTC in 6 months   Patient will have preoffice labs for CBC, CMP, Iron Profile , Ferritin   Pt will contact office if he has any problems or becomes symptomatic   Continue current medications, treatment plans and follow up with PCP and any other providers.  Care discussed with patient.  Understanding expressed.  Patient agreeable with plan.      Enriqueta Rao, BRYANT  07/23/2024

## 2024-07-23 NOTE — PROGRESS NOTES
"MG[facility] ONC [facility] GROUP HEMATOLOGY & ONCOLOGY [place]  [address]  [facility] [address] [phone]    Patient Name: Jeff Alatorre  Encounter Date: 07/23/2024   YOB: 1940  Patient Number: [id number]    Hematology / Oncology Progress Note    HPI / REASON FOR VISIT: Jeff Alatorre is a 84 y.o. male who is followed by this office for polycythemia which was diagnosed many years ago and believed to be related to testosterone injections.  Bone marrow biopsy in either 2012 or 2013 which showed 55-65% cellularity, prominent erythroid hyperplasia with no simultaneous increases that are dyspoietic myeloid and megakaryocytic series (there was an isolated erythroid hyperplasia).  The flow cytometry and cytogenetics were unrevealing.  This appeared to be more of a reactive erythrocytosis rather than a myeloproliferative process.     He lost follow up from April 26, 2022 and returned on April 26. 2023.   He states that he had operation on 3/14/23 and penile implant was placed.  He has recovered well from that surgery but has had some anemia recently.  Hgb on 03/07 was 12.4.  On 04/17/23, Hgb was 9.9.  Records indicate that patient did not loose any significant amount of blood during his surgery.  He was started on oral iron on April 17.  Stool was normal prior to iron but then turned dark which is an expected findings.  Colonoscopy was good 2021.  He denies blood in stool or urine.      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       INTERVAL HISTORY     Pt presents to clinic today for continued follow up.  His last Retacrit was 5/10/23 and he has maintained Hgb > 11g since then.       Sammi 15 CABG x 4.  Hasn't started cardiac rehab yet.   Sees Dr. Kim July 31.  Pt states \"I'm doing ok\".    He has no acute complaints today.  He had labs drawn and results were reviewed with him in office.         LABS    Lab Results - " Last 18 Months   Lab Units 07/23/24  0920 07/01/24  0748 06/30/24  0804 06/29/24  0846 06/28/24  0352 06/27/24  0520 06/26/24  1845 06/26/24  0232 05/10/23  1017 04/26/23  1053   HEMOGLOBIN g/dL 12.2* 11.8* 11.1* 11.4* 9.9* 9.4* 9.1* 9.1*   < > 8.1*   HEMATOCRIT % 38.2 36.3* 34.3* 34.7* 30.4* 28.6* 28.1* 27.1*   < > 26.8*   MCV fL 100.8* 96.5 95.5 97.5* 96.2 95.3 95.3 95.8   < > 108.1*   WBC 10*3/mm3 7.94 8.04 7.83 10.10 8.81 12.59* 10.72 12.76*   < > 6.98   RDW % 17.0* 16.3* 16.2* 16.6* 16.7* 17.2* 16.6* 15.7*   < > 15.9*   MPV fL 10.3 10.4 10.3 11.3 10.5 10.7 10.6 10.3   < > 9.8   PLATELETS 10*3/mm3 264 178 159 132* 101* 117* 100* 111*   < > 237   IMM GRAN % % 0.6*  --  0.5  --  0.6* 0.6* 0.4 0.4   < >  --    NEUTROS ABS 10*3/mm3 5.69  --  6.02  --  7.06* 10.00* 9.47* 11.38*   < > 5.10   LYMPHS ABS 10*3/mm3 1.14  --  0.87  --  0.83 1.14 0.55* 0.38*   < >  --    MONOS ABS 10*3/mm3 0.56  --  0.57  --  0.64 1.08* 0.62 0.84   < >  --    EOS ABS 10*3/mm3 0.46*  --  0.30  --  0.21 0.25 0.02 0.08   < > 0.07   BASOS ABS 10*3/mm3 0.04  --  0.03  --  0.02 0.04 0.02 0.03   < > 0.14   IMMATURE GRANS (ABS) 10*3/mm3 0.05  --  0.04  --  0.05 0.08* 0.04 0.05   < >  --    NRBC /100 WBC 0.0  --  0.0  --  0.0 0.0 0.0 0.0   < >  --    NEUTROPHIL % %  --   --   --   --   --   --   --   --   --  73.0   MONOCYTES % %  --   --   --   --   --   --   --   --   --  2.0*   BASOPHIL % %  --   --   --   --   --   --   --   --   --  2.0*   ANISOCYTOSIS   --   --   --   --   --   --   --   --   --  Slight/1+    < > = values in this interval not displayed.       Lab Results - Last 18 Months   Lab Units 07/23/24  0920 07/17/24  1155 07/10/24  1312 07/01/24  0748 06/30/24  0804 06/29/24  0846 06/28/24  1447 06/28/24  0352 06/27/24  0950 06/27/24  0520 06/26/24  1312 06/26/24  0232 06/25/24  2347 06/25/24  1958 06/25/24  1343 06/24/24  0941 06/04/24  0926 05/20/24  0707 04/30/24  0953 01/30/24  0938   GLUCOSE mg/dL 124* 112* 129* 116* 118* 139*   <  > 135*   < > 135*   < > 163* 124* 99  --  112*  --  116* 117* 98   SODIUM mmol/L 139 139 137 138 139 141   < > 139   < > 136   < > 142 142 144  --  136  --  140 141 141   SODIUM, ARTERIAL   --   --   --   --   --   --   --   --    < >  --    < >  --   --   --    < >  --   --   --   --   --    POTASSIUM mmol/L 5.0 5.2 4.6 3.7 3.6 3.7   < > 4.0   < > 4.2   < > 4.6 4.2 4.3  --  4.9  --  4.3 4.4 4.2   CO2 mmol/L 25.0 25.0 23.0 26.0 26.0 25.0   < > 22.0   < > 22.0   < > 23.0 25.0 25.0  --  24.0  --  27.0 24.0 26.0   CHLORIDE mmol/L 103 103 101 102 103 104   < > 107   < > 103   < > 109* 109* 112*  --  103  --  103 105 105   ANION GAP mmol/L 11.0 11.0 13.0 10.0 10.0 12.0   < > 10.0   < > 11.0   < > 10.0 8.0 7.0  --  9.0  --  10.0 12.0 10.0   CREATININE mg/dL 1.62* 1.79* 1.66* 1.44* 1.43* 1.41*   < > 1.44*   < > 1.51*   < > 1.60* 1.49* 1.36*  --  1.39*   < > 1.29* 1.26 1.47*   BUN mg/dL 38* 31* 32* 32* 31* 33*   < > 28*   < > 29*   < > 28* 28* 27*  --  26*  --  24* 24* 27*   BUN / CREAT RATIO  23.5 17.3 19.3 22.2 21.7 23.4   < > 19.4   < > 19.2   < > 17.5 18.8 19.9  --  18.7  --  18.6 19.0 18.4   CALCIUM mg/dL 10.0 9.6 9.8 9.0 8.7 9.0   < > 8.4*   < > 8.5*   < > 8.8 9.3 10.5  --  9.4  --  9.5 9.5 9.2   ALK PHOS U/L 166*  --   --   --   --   --   --  73  --   --   --   --   --   --   --  139*  --  143* 127* 134*   TOTAL PROTEIN g/dL 6.9  --   --   --   --   --   --  5.2*  --   --   --   --   --   --   --  6.7  --  7.1 6.9 6.9   ALT (SGPT) U/L 14  --   --   --   --   --   --  <5  --   --   --   --   --   --   --  11  --  17 11 12   AST (SGOT) U/L 17  --   --   --   --   --   --  15  --   --   --   --   --   --   --  12  --  17 14 15   BILIRUBIN mg/dL 0.3  --   --   --   --   --   --  0.6  --   --   --   --   --   --   --  0.3  --  0.6 0.4 0.4   ALBUMIN g/dL 4.0  --   --   --   --   --   --  3.2*  --  3.6  --  3.3* 3.4* 3.3*  --  4.2  --  4.6 4.5 4.3   GLOBULIN gm/dL 2.9  --   --   --   --   --   --  2.0  --   --   --   --    --   --   --  2.5  --  2.5 2.4 2.6    < > = values in this interval not displayed.       Lab Results - Last 18 Months   Lab Units 11/02/23  0945 04/26/23  1054 04/26/23  1053   M-SPIKE g/dL  --  Not Observed  --    KAPPA/LAMBDA RATIO, S   --  1.11  --    FREE LAMBDA LIGHT CHAINS mg/L  --  25.3  --    URIC ACID mg/dL 3.6*  --   --    IG KAPPA FREE LIGHT CHAIN mg/L  --  28.1*  --    REFERENCE LAB REPORT   --   --  See Attached Report       Lab Results - Last 18 Months   Lab Units 07/23/24  0920 04/30/24  0953 01/30/24  0938 11/02/23  0945 10/31/23  0918 06/28/23  0939 06/08/23  0855 05/24/23  0831 04/26/23  1053   IRON mcg/dL 54* 61 84  --  84 50*  --   --  91   TIBC mcg/dL 340 413 422  --  428 457  --   --  375   IRON SATURATION (TSAT) % 16* 15* 20  --  20 11*  --   --  24   FERRITIN ng/mL 363.90 86.15 93.33  --  93.89 73.80 77.46  --  244.90   TSH uIU/mL  --   --   --  1.700  --   --   --   --   --    FOLATE ng/mL  --   --   --  8.0  --   --   --  6.45  --          PAST MEDICAL HISTORY:  ALLERGIES:  No Known Allergies  CURRENT MEDICATIONS:  Outpatient Encounter Medications as of 7/23/2024   Medication Sig Dispense Refill    acetaminophen (TYLENOL) 500 MG tablet Take 1 tablet by mouth Daily As Needed for Mild Pain.      allopurinol (ZYLOPRIM) 300 MG tablet Take 1 tablet by mouth Daily.      amiodarone (PACERONE) 400 MG tablet Take 1 tablet by mouth Daily. 30 tablet 0    aspirin 81 MG EC tablet Take 1 tablet by mouth Daily.      atorvastatin (LIPITOR) 20 MG tablet Take 1 tablet by mouth Every Night. 30 tablet 2    clopidogrel (PLAVIX) 75 MG tablet Take 1 tablet by mouth Daily. 30 tablet 2    Cyanocobalamin 1000 MCG/ML kit Inject 1 mL as directed Every 30 (Thirty) Days.      ferrous sulfate 325 (65 FE) MG tablet Take 1 tablet by mouth Daily With Breakfast. Pt takes every other day      losartan (COZAAR) 25 MG tablet Take 1 tablet by mouth Daily. 30 tablet 2    metFORMIN (GLUCOPHAGE) 500 MG tablet Take 1 tablet by mouth  2 (Two) Times a Day With Meals.      metoprolol tartrate (LOPRESSOR) 25 MG tablet Take 1 tablet by mouth Every 12 (Twelve) Hours. 60 tablet 2    nitroglycerin (NITROSTAT) 0.4 MG SL tablet Place 1 tablet under the tongue Every 5 (Five) Minutes As Needed for Chest Pain. Take no more than 3 doses in 15 minutes.      pantoprazole (PROTONIX) 40 MG EC tablet Take 1 tablet by mouth Every Morning. 30 tablet 0    [DISCONTINUED] fluticasone (FLONASE) 50 MCG/ACT nasal spray 2 sprays into the nostril(s) as directed by provider Daily. (Patient not taking: Reported on 7/23/2024) 16 g 2     Facility-Administered Encounter Medications as of 7/23/2024   Medication Dose Route Frequency Provider Last Rate Last Admin    cyanocobalamin injection 1,000 mcg  1,000 mcg Intramuscular Once Enriqueta Rao APRN         ADULT ILLNESSES:  Patient Active Problem List   Diagnosis Code    Hx of adenomatous colonic polyps Z86.010    Adenomatous polyps D36.9    Idiopathic gout of multiple sites M10.09    Primary hypertension I10    Type 2 diabetes mellitus without complication, without long-term current use of insulin E11.9    Hyperlipidemia LDL goal <100 E78.5    Erectile dysfunction due to arterial insufficiency N52.01    Anemia D64.9    B12 deficiency E53.8    Stage 3a chronic kidney disease (CKD) N18.31    Seasonal allergic rhinitis J30.2    ANYI (obstructive sleep apnea) G47.33    Carotid artery disease I77.9    ROSALES (dyspnea on exertion) R06.09    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    Bilateral carotid artery stenosis I65.23    History of left-sided carotid endarterectomy Z98.890    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    CAD (coronary artery disease) I25.10    Chronic anemia D64.9    PAD (peripheral artery disease) I73.9    PAF (paroxysmal atrial fibrillation) I48.0     SURGERIES:  Past Surgical History:   Procedure Laterality Date    CARDIAC CATHETERIZATION Left 05/20/2024     Procedure: Coronary angiography;  Surgeon: Zaid Contreras MD;  Location:  [facility] CATH INVASIVE LOCATION;  Service: Cardiology;  Laterality: Left;    CARDIAC CATHETERIZATION Left 05/20/2024    Procedure: Percutaneous Coronary Intervention;  Surgeon: Zaid Contreras MD;  Location:  [facility] CATH INVASIVE LOCATION;  Service: Cardiology;  Laterality: Left;    CAROTID ENDARTERECTOMY Left     CATARACT EXTRACTION, BILATERAL      COLON SURGERY      COLONOSCOPY      COLONOSCOPY N/A 07/27/2021    Procedure: COLONOSCOPY WITH ANESTHESIA;  Surgeon: Luciano Alatorre DO;  Location: [facility] ENDOSCOPY;  Service: Gastroenterology;  Laterality: N/A;  preop; hx of polyps  postop; diverticulosis   PCP Devon Moore     CORONARY ARTERY BYPASS GRAFT N/A 6/25/2024    Procedure: CORONARY ARTERY BYPASS GRAFTING x4, LEFT INTERNAL MAMMARY ARTERY GRAFT, RIGHT LEG ENDOSCOPIC VEIN HARVEST, TRANSESOPHAGEAL ECHOCARDIOGRAM, APPLICATION OF PREVENA;  Surgeon: Jose F Kim MD;  Location: [facility] OR;  Service: Cardiothoracic;  Laterality: N/A;    PENILE PROSTHESIS IMPLANT N/A 03/14/2023    Procedure: 3-PIECE INFLATABLE PENILE PROSTHESIS PLACEMENT;  Surgeon: Garcia Santana MD;  Location:  [facility] OR;  Service: Urology;  Laterality: N/A;    VASECTOMY       HEALTH MAINTENANCE ITEMS:  Health Maintenance Due   Topic Date Due    DIABETIC EYE EXAM  Never done    TDAP/TD VACCINES (1 - Tdap) Never done    ZOSTER VACCINE (3 of 3) 12/25/2023    COVID-19 Vaccine (6 - 2023-24 season) 05/02/2024    COLORECTAL CANCER SCREENING  07/27/2024       <no information>  Last Completed Colonoscopy       This patient has no relevant Health Maintenance data.          Immunization History   Administered Date(s) Administered    Arexvy (RSV, Adults 60+ yrs) 11/29/2023    COVID-19 (MODERNA) 1st,2nd,3rd Dose Monovalent 02/24/2021, 03/24/2021, 01/05/2022    COVID-19 (PFIZER) BIVALENT 12+YRS 11/08/2022    COVID-19 F23 (MODERNA) 12YRS+ (SPIKEVAX) 01/02/2024    Flu Vaccine Split  Quad 2015    Fluad Quad 65+ 10/13/2020    Fluzone (or Fluarix & Flulaval for VFC) >6mos 2015    Fluzone High Dose =>65 Years (Vaxcare ONLY) 2019    Fluzone High-Dose 65+yrs 10/15/2021, 10/17/2022, 2023    Pneumococcal Conjugate 20-Valent (PCV20) 10/17/2022    Shingrix 10/30/2023    Zostavax 2016     Last Completed Mammogram       This patient has no relevant Health Maintenance data.              FAMILY HISTORY:  Family History   Problem Relation Age of Onset    No Known Problems Mother     Heart attack Father     No Known Problems Maternal Grandmother     No Known Problems Maternal Grandfather     No Known Problems Paternal Grandmother     No Known Problems Paternal Grandfather     No Known Problems Son     No Known Problems Daughter     Parkinsonism Brother     Colon cancer Neg Hx     Colon polyps Neg Hx     Esophageal cancer Neg Hx      SOCIAL HISTORY:  Social History     Socioeconomic History    Marital status: Single   Tobacco Use    Smoking status: Former     Current packs/day: 0.00     Average packs/day: 1 pack/day for 40.0 years (40.0 ttl pk-yrs)     Types: Cigarettes     Start date:      Quit date:      Years since quittin.5     Passive exposure: Past    Smokeless tobacco: Never    Tobacco comments:     N/A   Vaping Use    Vaping status: Never Used   Substance and Sexual Activity    Alcohol use: Not Currently     Comment: Quite 20+ yrs ago    Drug use: Never    Sexual activity: Yes     Partners: Female     Birth control/protection: Vasectomy       REVIEW OF SYSTEMS:  Review of Systems   Constitutional:  Positive for fatigue. Negative for activity change, appetite change, fever, unexpected weight gain and unexpected weight loss.   HENT:  Negative for dental problem, facial swelling, swollen glands and trouble swallowing.    Eyes:  Negative for double vision and discharge.   Respiratory:  Negative for cough, shortness of breath and wheezing.    Cardiovascular:   "Negative for chest pain, palpitations and leg swelling.        S/p CABG x 4 on 06/15/24   Gastrointestinal:  Negative for abdominal pain, blood in stool, nausea and vomiting.   Endocrine: Negative.    Genitourinary:  Negative for dysuria and hematuria.        Had penile implant 03/14/2023   Musculoskeletal:  Positive for arthralgias. Negative for myalgias.   Skin:  Negative for rash, skin lesions and wound.   Allergic/Immunologic: Negative for immunocompromised state.   Neurological:  Negative for speech difficulty, light-headedness, headache, memory problem and confusion.   Hematological:  Negative for adenopathy.   Psychiatric/Behavioral:  Negative for self-injury, suicidal ideas and depressed mood. The patient is not nervous/anxious.        /82   Pulse 62   Temp 97.6 °F (36.4 °C)   Resp 16   Ht 174 cm (68.5\")   Wt 84.4 kg (186 lb 1.6 oz)   SpO2 98%   BMI 27.88 kg/m²  Body surface area is 1.99 meters squared.    Pain Score    07/23/24 0945   PainSc: 0-No pain       Physical Exam  Constitutional:       Appearance: Normal appearance.   HENT:      Head: Normocephalic and atraumatic.   Cardiovascular:      Rate and Rhythm: Normal rate and regular rhythm.   Pulmonary:      Effort: Pulmonary effort is normal.      Breath sounds: Normal breath sounds.   Abdominal:      General: Bowel sounds are normal.      Palpations: Abdomen is soft.   Musculoskeletal:      Right lower leg: No edema.      Left lower leg: No edema.   Skin:     General: Skin is warm and dry.   Neurological:      Mental Status: He is alert and oriented to person, place, and time.   Psychiatric:         Attention and Perception: Attention normal.         Mood and Affect: Mood normal.         Judgment: Judgment normal.         Jeff GODDARD Landry reports a pain score of 0.  Given his pain assessment as noted, treatment options were discussed and the following options were decided upon as a follow-up plan to address the patient's pain:  No " intervention indicated .           ASSESSMENT / PLAN    ASSESSMENT:   1. Iron deficiency    2. B12 deficiency    3. Anemia due to stage 3a chronic kidney disease        Secondary Polycythemia is no longer a concern.       1.  Anemia in Stage 3a CKD  2.  Iron Deficiency   -Received Retacrit April 26 and May 10, 2023  Taking oral iron QOD  -Labs today:  Hgb 12.2, Hct 38.2, Iron 54, Ferritin 363, Sat 16%, TIBC 340    -Increase iron to daily for now.    -No Retacrit indicated as Hgb > 11       3.  B12 Deficiency   -Pt is getting injection monthly  per PCP   -B12 today 796    Pt recently had CABG.  Continue follow up with cardiology and cardiothoracic surgery      PLAN:  No Retacrit indicated today.    Pt has not required Retacrit since May 10, 2023.  Recheck in 6 weeks.   Labs only in 3 months for CBC, CMP, Iron Profile, Ferritin, B12  RTC in 6 months   Patient will have preoffice labs for CBC, CMP, Iron Profile , Ferritin   Pt will contact office if he has any problems or becomes symptomatic   Continue current medications, treatment plans and follow up with PCP and any other providers.  Care discussed with patient.  Understanding expressed.  Patient agreeable with plan.      Enriqueta Rao, APRN  07/23/2024

## 2024-07-30 ENCOUNTER — CLINICAL SUPPORT (OUTPATIENT)
Dept: FAMILY MEDICINE CLINIC | Facility: CLINIC | Age: 84
End: 2024-07-30
Payer: MEDICARE

## 2024-07-30 DIAGNOSIS — E53.8 B12 DEFICIENCY: Primary | ICD-10-CM

## 2024-07-30 PROCEDURE — 96372 THER/PROPH/DIAG INJ SC/IM: CPT | Performed by: NURSE PRACTITIONER

## 2024-07-30 RX ORDER — CYANOCOBALAMIN 1000 UG/ML
1000 INJECTION, SOLUTION INTRAMUSCULAR; SUBCUTANEOUS ONCE
Status: CANCELLED | OUTPATIENT
Start: 2024-07-30

## 2024-07-30 RX ORDER — CYANOCOBALAMIN 1000 UG/ML
1000 INJECTION, SOLUTION INTRAMUSCULAR; SUBCUTANEOUS ONCE
OUTPATIENT
Start: 2024-08-27

## 2024-07-30 RX ORDER — CYANOCOBALAMIN 1000 UG/ML
1000 INJECTION, SOLUTION INTRAMUSCULAR; SUBCUTANEOUS ONCE
Status: COMPLETED | OUTPATIENT
Start: 2024-07-30 | End: 2024-07-30

## 2024-07-30 RX ADMIN — CYANOCOBALAMIN 1000 MCG: 1000 INJECTION, SOLUTION INTRAMUSCULAR; SUBCUTANEOUS at 14:18

## 2024-07-31 ENCOUNTER — OFFICE VISIT (OUTPATIENT)
Dept: CARDIAC SURGERY | Facility: CLINIC | Age: 84
End: 2024-07-31
Payer: MEDICARE

## 2024-07-31 VITALS
DIASTOLIC BLOOD PRESSURE: 67 MMHG | OXYGEN SATURATION: 99 % | BODY MASS INDEX: 27.37 KG/M2 | HEART RATE: 67 BPM | WEIGHT: 184.8 LBS | SYSTOLIC BLOOD PRESSURE: 118 MMHG | HEIGHT: 69 IN

## 2024-07-31 DIAGNOSIS — Z95.1 S/P CABG (CORONARY ARTERY BYPASS GRAFT): Primary | ICD-10-CM

## 2024-07-31 PROCEDURE — 99024 POSTOP FOLLOW-UP VISIT: CPT | Performed by: THORACIC SURGERY (CARDIOTHORACIC VASCULAR SURGERY)

## 2024-07-31 PROCEDURE — 3074F SYST BP LT 130 MM HG: CPT | Performed by: THORACIC SURGERY (CARDIOTHORACIC VASCULAR SURGERY)

## 2024-07-31 PROCEDURE — 3078F DIAST BP <80 MM HG: CPT | Performed by: THORACIC SURGERY (CARDIOTHORACIC VASCULAR SURGERY)

## 2024-07-31 PROCEDURE — 1159F MED LIST DOCD IN RCRD: CPT | Performed by: THORACIC SURGERY (CARDIOTHORACIC VASCULAR SURGERY)

## 2024-07-31 PROCEDURE — 1160F RVW MEDS BY RX/DR IN RCRD: CPT | Performed by: THORACIC SURGERY (CARDIOTHORACIC VASCULAR SURGERY)

## 2024-07-31 NOTE — PROGRESS NOTES
Subjective   Patient ID: Jeff Alatorre is a 84 y.o. male who is here for follow-up having had Coronary artery bypass grafting-4 vessel (left internal mammary artery/left anterior descending, reverse saphenous vein graft/obtuse marginal, reverse saphenous vein graft/diagonal artery, and reverse saphenous vein graft/posterior descending artery), Right leg endoscopic vein harvest, Application of Prevena Incision management system performed on 6/25/2024 by Dr. Kim.         History of Present Illness  The patient underwent 4-vessel bypass grafting on 06/25/2023, had a fairly unremarkable postoperative course with heme criteria for discharge home on postop day 6. He did see Lucy Zurita and overall been doing quite well while noting clear drainage on his T-shirts from his sternal incision but was getting better as well as having postnasal drip and worsening sinus issues for the last few days. Unfortunately, he did go see his PCP, but left without being seen. Lucy's impression reported no active drainage, but a few drops of serosanguineous drainage on the white T-shirt that was dry. A scab was noted. Routine recovery was in place with a repeat BMP planned. Renal function was essentially stable with a creatinine of 1.62 noted, which is slightly elevated compared to 1 month ago at 1.44.    The patient reports persistent drainage from his sternal incision, which he attributes to showering. He is uncertain if the drainage is related to the dressing. He observes a small amount of blood, which he attributes to soaking the incision to remove his T-shirt. The drainage is intermittent and occurs only post-shower. He denies experiencing any clicking sounds. He reports no pain during coughing or sneezing. He expresses a desire to resume driving and participate in cardiac rehabilitation. He has been able to ambulate uphill, albeit with minor breathlessness.      The following portions of the patient's history were reviewed and  "updated as appropriate: allergies, current medications, past family history, past medical history, past social history, past surgical history and problem list.        Objective   Visit Vitals  /67 (BP Location: Right arm, Patient Position: Sitting, Cuff Size: Adult)   Pulse 67   Ht 174 cm (68.5\")   Wt 83.8 kg (184 lb 12.8 oz)   SpO2 99%   BMI 27.69 kg/m²       Physical Exam  Physical Exam  Patient is in no acute distress, appropriate, and alert.  Lungs are clear to auscultation bilaterally without wheezing, rubs, or rales. Chest sternum is stable, nontender.  No clicks.  Incision is well healed.    Heart has a regular rate and rhythm without murmurs, rubs, or gallops.  Abdomen is soft, nondistended, nontender.  Extremities show no clubbing, cyanosis, or edema. Saphenous incision is healing nicely.      No results found.  Results  Laboratory Studies  Creatinine of 1.62.    Assessment & Plan       Diagnoses and all orders for this visit:    1. S/P CABG (coronary artery bypass graft) (Primary)  -     Ambulatory Referral to Cardiac Rehab      Assessment & Plan  1. Multivessel coronary disease.  Upon inspection of the patient's sternotomy incision, two areas of scabs were identified. Dressings were applied and supportive and offered.   We discussed sternotomy precautions and how those will evolve over the next couple months.  He is now eligible for cardiac rehabilitation and orders have been placed.  An order placed at [facility].  The importance of a regular and progressive lifelong exercise program has been strongly advised.  We discussed the importance of ongoing risk factor modification.  We discussed the importance of a heart healthy diet and education has been provided today verbally and with a handout.  Strict adherence to his prescribed medical regimen is strongly encouraged as well as continued evaluation by primary care and cardiology providers.      Provided support and encouragement. All " questions have been answered to the best of my ability. Patient has follow up with Dr. Villalta in a few weeks. Patient has been instructed to contact our office with any questions or concerns at any time.         The patient is a non-smoker.     Many thanks for the opportunity to care for your patient.    I will continue to keep you apprised of provided care as it ensues.      Follow-up  A follow-up appointment is scheduled for 6 weeks from now.    Transcribed from ambient dictation for Jose F Kim MD by Jose F Kim MD.  07/31/24   14:31 CDT    Patient or patient representative verbalized consent for the use of Ambient Listening during the visit with  Jose F Kim MD for chart documentation. 8/18/2024  10:08 CDT

## 2024-08-12 ENCOUNTER — OFFICE VISIT (OUTPATIENT)
Dept: CARDIOLOGY | Facility: CLINIC | Age: 84
End: 2024-08-12
Payer: MEDICARE

## 2024-08-12 VITALS
HEART RATE: 52 BPM | DIASTOLIC BLOOD PRESSURE: 56 MMHG | OXYGEN SATURATION: 99 % | SYSTOLIC BLOOD PRESSURE: 108 MMHG | WEIGHT: 185.8 LBS | BODY MASS INDEX: 27.52 KG/M2 | HEIGHT: 69 IN

## 2024-08-12 DIAGNOSIS — I77.9 CAROTID ARTERY DISEASE, UNSPECIFIED LATERALITY, UNSPECIFIED TYPE: ICD-10-CM

## 2024-08-12 DIAGNOSIS — N18.31 STAGE 3A CHRONIC KIDNEY DISEASE (CKD): ICD-10-CM

## 2024-08-12 DIAGNOSIS — I25.10 CORONARY ARTERY DISEASE INVOLVING NATIVE CORONARY ARTERY OF NATIVE HEART WITHOUT ANGINA PECTORIS: Primary | ICD-10-CM

## 2024-08-12 DIAGNOSIS — E11.9 TYPE 2 DIABETES MELLITUS WITHOUT COMPLICATION, WITHOUT LONG-TERM CURRENT USE OF INSULIN: ICD-10-CM

## 2024-08-12 DIAGNOSIS — E78.2 MIXED HYPERLIPIDEMIA: ICD-10-CM

## 2024-08-12 DIAGNOSIS — I10 PRIMARY HYPERTENSION: ICD-10-CM

## 2024-08-12 DIAGNOSIS — I48.0 PAF (PAROXYSMAL ATRIAL FIBRILLATION): ICD-10-CM

## 2024-08-12 PROBLEM — E78.5 HYPERLIPIDEMIA LDL GOAL <100: Status: RESOLVED | Noted: 2022-12-01 | Resolved: 2024-08-12

## 2024-08-12 PROCEDURE — 1159F MED LIST DOCD IN RCRD: CPT | Performed by: NURSE PRACTITIONER

## 2024-08-12 PROCEDURE — 93000 ELECTROCARDIOGRAM COMPLETE: CPT | Performed by: NURSE PRACTITIONER

## 2024-08-12 PROCEDURE — 3078F DIAST BP <80 MM HG: CPT | Performed by: NURSE PRACTITIONER

## 2024-08-12 PROCEDURE — 3074F SYST BP LT 130 MM HG: CPT | Performed by: NURSE PRACTITIONER

## 2024-08-12 PROCEDURE — 1160F RVW MEDS BY RX/DR IN RCRD: CPT | Performed by: NURSE PRACTITIONER

## 2024-08-12 PROCEDURE — 99214 OFFICE O/P EST MOD 30 MIN: CPT | Performed by: NURSE PRACTITIONER

## 2024-08-12 RX ORDER — ATORVASTATIN CALCIUM 40 MG/1
40 TABLET, FILM COATED ORAL NIGHTLY
Qty: 90 TABLET | Refills: 3 | Status: SHIPPED | OUTPATIENT
Start: 2024-08-12

## 2024-08-12 RX ORDER — CLOPIDOGREL BISULFATE 75 MG/1
75 TABLET ORAL DAILY
Qty: 30 TABLET | Refills: 8 | Status: SHIPPED | OUTPATIENT
Start: 2024-08-12

## 2024-08-12 RX ORDER — METOPROLOL SUCCINATE 25 MG/1
25 TABLET, EXTENDED RELEASE ORAL DAILY
Qty: 30 TABLET | Refills: 11 | Status: SHIPPED | OUTPATIENT
Start: 2024-08-12

## 2024-08-12 NOTE — PROGRESS NOTES
Subjective:     Encounter Date: 8/12/2024      Patient ID: Jeff Alatorre is a 84 y.o. male.    Chief Complaint: follow up CAD s/p CABG    History of Present Illness    The patient presents to follow-up regarding his coronary artery disease.  He initially established care with Dr. Contreras back in 2022.  Though he did have risk factors his cardiac workup has been unremarkable and he was instructed to follow-up as needed.    He came back to see me in April 2024 and over the previous 6 to 8 months he was having progressively worsening chest pain and over the previous 1 to 2 years he was having progressively worsening shortness of breath with exertion.  He described his chest pain as a heartburn with radiation to the jaw.  It was exertional when walking, but would only occur after eating.  It would resolve with Pepto-Bismol.  He stated he could exert on an empty stomach with no symptoms.  Protonix prescribed by his PCP did not help.  He was not having any orthopnea or PND.  It was felt at that point time that his chest pain might be GI in nature but given concerns regarding the exertional dyspnea stress testing was ordered.    His tress test was abnormal.  He was started on Ranexa without any improvement in exertional dyspnea and therefore Dr. Contreras proceeded with cardiac catheterization which revealed multivessel coronary artery disease.  He ultimately underwent four-vessel CABG with CT surgery 6/25/2024.  Notes indicate he did well postoperatively and was discharged home on 7/1.  He did have some postoperative atrial fibrillation but was discharged home in normal sinus rhythm with a short course of amiodarone.  His LVEF remains preserved.    Today the patient reports he is doing well.  He is walking about a mile a day.  He will start cardiac rehab next week.  He states after surgery his heartburn sensation resolved and has not returned and his shortness of breath with exertion also resolved.  He states now the  only time he gets short of breath was if he is climbing hills and this is a substantial improvement.  He denies any chest pain, orthopnea, PND, palpitations, syncope or presyncope.  He reports he was asymptomatic to his atrial fibrillation after bypass surgery.  He does monitor his heart rate and blood pressure however, and notes both are well-controlled.  He does admit some fatigue that leads to napping in the afternoon.      The following portions of the patient's history were reviewed and updated as appropriate: allergies, current medications, past family history, past medical history, past social history, past surgical history and problem list.    Review of Systems   Constitutional: Positive for malaise/fatigue.   Cardiovascular:  Negative for chest pain, claudication, dyspnea on exertion, leg swelling, near-syncope, orthopnea, palpitations, paroxysmal nocturnal dyspnea and syncope.   Respiratory:  Negative for cough and shortness of breath.    Hematologic/Lymphatic: Does not bruise/bleed easily.   Musculoskeletal:  Negative for falls.   Gastrointestinal:  Negative for bloating.   Neurological:  Negative for dizziness, light-headedness and weakness.       No Known Allergies    Current Outpatient Medications:     acetaminophen (TYLENOL) 500 MG tablet, Take 1 tablet by mouth Daily As Needed for Mild Pain., Disp: , Rfl:     allopurinol (ZYLOPRIM) 300 MG tablet, Take 1 tablet by mouth Daily., Disp: , Rfl:     aspirin 81 MG EC tablet, Take 1 tablet by mouth Daily., Disp: , Rfl:     atorvastatin (LIPITOR) 40 MG tablet, Take 1 tablet by mouth Every Night., Disp: 90 tablet, Rfl: 3    clopidogrel (PLAVIX) 75 MG tablet, Take 1 tablet by mouth Daily., Disp: 30 tablet, Rfl: 8    Cyanocobalamin 1000 MCG/ML kit, Inject 1 mL as directed Every 30 (Thirty) Days., Disp: , Rfl:     ferrous sulfate 325 (65 FE) MG tablet, Take 1 tablet by mouth Daily With Breakfast. Pt takes every other day, Disp: , Rfl:     losartan (COZAAR) 25 MG  "tablet, Take 1 tablet by mouth Daily., Disp: 30 tablet, Rfl: 2    metFORMIN (GLUCOPHAGE) 500 MG tablet, TAKE ONE TABLET BY MOUTH TWICE A DAY WITH MEALS, Disp: 180 tablet, Rfl: 3    nitroglycerin (NITROSTAT) 0.4 MG SL tablet, Place 1 tablet under the tongue Every 5 (Five) Minutes As Needed for Chest Pain. Take no more than 3 doses in 15 minutes., Disp: , Rfl:     pantoprazole (PROTONIX) 40 MG EC tablet, Take 1 tablet by mouth Every Morning., Disp: 30 tablet, Rfl: 0    metoprolol succinate XL (TOPROL-XL) 25 MG 24 hr tablet, Take 1 tablet by mouth Daily., Disp: 30 tablet, Rfl: 11  No current facility-administered medications for this visit.    Facility-Administered Medications Ordered in Other Visits:     cyanocobalamin injection 1,000 mcg, 1,000 mcg, Intramuscular, Once, Enriqueta Rao, BRYANT         Objective:    /56   Pulse 52   Ht 174 cm (68.5\")   Wt 84.3 kg (185 lb 12.8 oz)   SpO2 99%   BMI 27.84 kg/m²        Vitals and nursing note reviewed.   Constitutional:       General: Not in acute distress.     Appearance: Well-developed and not in distress. Not diaphoretic.   Neck:      Vascular: No JVD.   Pulmonary:      Effort: Pulmonary effort is normal. No respiratory distress.      Breath sounds: Normal breath sounds.   Cardiovascular:      Normal rate. Regular rhythm.      Murmurs: There is no murmur.   Edema:     Peripheral edema absent.   Abdominal:      Tenderness: There is no abdominal tenderness.   Skin:     General: Skin is warm and dry.   Neurological:      Mental Status: Alert and oriented to person, place, and time.         Lab Review:   Lab Results   Component Value Date    GLUCOSE 124 (H) 07/23/2024    BUN 38 (H) 07/23/2024    CREATININE 1.62 (H) 07/23/2024    EGFRRESULT 55 (L) 11/02/2023    EGFR 41.6 (L) 07/23/2024    BCR 23.5 07/23/2024    K 5.0 07/23/2024    CO2 25.0 07/23/2024    CALCIUM 10.0 07/23/2024    PROTENTOTREF 5.0 (L) 04/26/2023    ALBUMIN 4.0 07/23/2024    BILITOT 0.3 07/23/2024    " AST 17 07/23/2024    ALT 14 07/23/2024        Lab Results   Component Value Date    CHLPL 106 11/02/2023    TRIG 124 11/02/2023    HDL 41 11/02/2023    LDL 43 11/02/2023        Lab Results   Component Value Date    HGBA1C 6.40 (H) 06/24/2024        Lab Results   Component Value Date    WBC 7.94 07/23/2024    HGB 12.2 (L) 07/23/2024    HCT 38.2 07/23/2024    .8 (H) 07/23/2024     07/23/2024              ECG 12 Lead    Date/Time: 8/12/2024 2:04 PM  Performed by: Maryjo Tolbert APRN    Authorized by: Maryjo Tolbert APRN  Comparison: compared with previous ECG from 7/1/2024  Similar to previous ECG  Rhythm: sinus bradycardia  BPM: 53  Conduction: right bundle branch block    Clinical impression: abnormal EKG          Results for orders placed during the hospital encounter of 06/25/24    Intra-Op TAVR Transesophageal Echo    Interpretation Summary    Unremarkable intraoperative exam.    LVEF appears normal on initial images, and remains preserved on concluding images.      Assessment:      Problem List Items Addressed This Visit          Cardiac and Vasculature    Primary hypertension    Relevant Medications    metoprolol succinate XL (TOPROL-XL) 25 MG 24 hr tablet    Carotid artery disease    CAD (coronary artery disease) - Primary    Relevant Medications    metoprolol succinate XL (TOPROL-XL) 25 MG 24 hr tablet    clopidogrel (PLAVIX) 75 MG tablet    PAF (paroxysmal atrial fibrillation)    Relevant Medications    metoprolol succinate XL (TOPROL-XL) 25 MG 24 hr tablet    clopidogrel (PLAVIX) 75 MG tablet    Mixed hyperlipidemia    Relevant Medications    atorvastatin (LIPITOR) 40 MG tablet       Endocrine and Metabolic    Type 2 diabetes mellitus without complication, without long-term current use of insulin       Other    Stage 3a chronic kidney disease (CKD)       Plan:     1.  CAD: Stable.  No angina after CABG on 6/25/2024.  Symptoms were somewhat atypical but ultimately abnormal stress testing led to  "discovery of multivessel disease and bypass grafting.  In retrospect, his symptoms were consistent with unstable angina as he was having \"heartburn\" at rest prior to surgery.     -Continue aspirin 81 mg daily without interruption  -Continue Plavix 75 mg daily-refills provided today.  Continue without interruption for minimum of 3 months postoperatively, ideally 1 year given his UA presentation leading up to surgery.  -Continue statin and beta-blocker with changes as outlined below  -As needed sublingual nitroglycerin-has not required  -Begin cardiac rehab as scheduled in 1 week    2.  Hyperlipidemia: LDL is well-controlled at 43 but given his coronary artery disease we will uptitrate his atorvastatin to 40 mg in accordance with guidelines.    3.  Hypertension: Well-controlled on Lopressor and losartan.  Given his fatigue, sinus bradycardia and low normal blood pressure, will replace Lopressor 25 mg twice daily with Toprol-XL 25 mg nightly.    4.  Diabetes mellitus type 2: Continue to follow close with PCP.  A1c is well-controlled at 6.4.    5.  History of carotid endarterectomy: Continue current medical therapy including aspirin, statin and other risk factor modification.    6.  Postoperative atrial fibrillation: He is maintaining normal sinus rhythm.  He just quit taking amiodarone 1 to 2 weeks ago.  We will plan to place him in Holter monitor for surveillance at follow-up, after he has been off of amiodarone for several months.  He will call sooner with any palpitations or any unusual fluctuations in his heart rate, which he monitors regularly.    Follow-up 6 months with Dr. Contreras, call sooner with symptoms or concerns  "

## 2024-08-12 NOTE — TELEPHONE ENCOUNTER
Rx Refill Note  Requested Prescriptions     Pending Prescriptions Disp Refills    metFORMIN (GLUCOPHAGE) 500 MG tablet [Pharmacy Med Name: METFORMIN HCL 500MG TABS] 180 tablet 3     Sig: TAKE ONE TABLET BY MOUTH TWICE A DAY WITH MEALS      Last office visit with prescribing clinician: 4/17/2023   Last telemedicine visit with prescribing clinician: Visit date not found   Next office visit with prescribing clinician: Visit date not found                         Would you like a call back once the refill request has been completed: [] Yes [] No    If the office needs to give you a call back, can they leave a voicemail: [] Yes [] No    Jenna Woods, PCT  08/12/24, 08:17 CDT

## 2024-09-03 ENCOUNTER — LAB (OUTPATIENT)
Dept: LAB | Facility: HOSPITAL | Age: 84
End: 2024-09-03
Payer: MEDICARE

## 2024-09-03 DIAGNOSIS — E61.1 IRON DEFICIENCY: ICD-10-CM

## 2024-09-03 LAB
ALBUMIN SERPL-MCNC: 4.3 G/DL (ref 3.5–5.2)
ALBUMIN/GLOB SERPL: 1.9 G/DL
ALP SERPL-CCNC: 130 U/L (ref 39–117)
ALT SERPL W P-5'-P-CCNC: 12 U/L (ref 1–41)
ANION GAP SERPL CALCULATED.3IONS-SCNC: 8 MMOL/L (ref 5–15)
AST SERPL-CCNC: 14 U/L (ref 1–40)
BASOPHILS # BLD AUTO: 0.03 10*3/MM3 (ref 0–0.2)
BASOPHILS NFR BLD AUTO: 0.4 % (ref 0–1.5)
BILIRUB SERPL-MCNC: 0.3 MG/DL (ref 0–1.2)
BUN SERPL-MCNC: 37 MG/DL (ref 8–23)
BUN/CREAT SERPL: 23.3 (ref 7–25)
CALCIUM SPEC-SCNC: 9.5 MG/DL (ref 8.6–10.5)
CHLORIDE SERPL-SCNC: 106 MMOL/L (ref 98–107)
CO2 SERPL-SCNC: 26 MMOL/L (ref 22–29)
CREAT SERPL-MCNC: 1.59 MG/DL (ref 0.76–1.27)
DEPRECATED RDW RBC AUTO: 61.1 FL (ref 37–54)
EGFRCR SERPLBLD CKD-EPI 2021: 42.5 ML/MIN/1.73
EOSINOPHIL # BLD AUTO: 0.15 10*3/MM3 (ref 0–0.4)
EOSINOPHIL NFR BLD AUTO: 2.2 % (ref 0.3–6.2)
ERYTHROCYTE [DISTWIDTH] IN BLOOD BY AUTOMATED COUNT: 15.9 % (ref 12.3–15.4)
FERRITIN SERPL-MCNC: 191.6 NG/ML (ref 30–400)
GLOBULIN UR ELPH-MCNC: 2.3 GM/DL
GLUCOSE SERPL-MCNC: 147 MG/DL (ref 65–99)
HCT VFR BLD AUTO: 38 % (ref 37.5–51)
HGB BLD-MCNC: 12.1 G/DL (ref 13–17.7)
HOLD SPECIMEN: NORMAL
IMM GRANULOCYTES # BLD AUTO: 0.02 10*3/MM3 (ref 0–0.05)
IMM GRANULOCYTES NFR BLD AUTO: 0.3 % (ref 0–0.5)
IRON 24H UR-MRATE: 69 MCG/DL (ref 59–158)
IRON SATN MFR SERPL: 20 % (ref 20–50)
LYMPHOCYTES # BLD AUTO: 1.02 10*3/MM3 (ref 0.7–3.1)
LYMPHOCYTES NFR BLD AUTO: 15 % (ref 19.6–45.3)
MCH RBC QN AUTO: 33.2 PG (ref 26.6–33)
MCHC RBC AUTO-ENTMCNC: 31.8 G/DL (ref 31.5–35.7)
MCV RBC AUTO: 104.1 FL (ref 79–97)
MONOCYTES # BLD AUTO: 0.51 10*3/MM3 (ref 0.1–0.9)
MONOCYTES NFR BLD AUTO: 7.5 % (ref 5–12)
NEUTROPHILS NFR BLD AUTO: 5.06 10*3/MM3 (ref 1.7–7)
NEUTROPHILS NFR BLD AUTO: 74.6 % (ref 42.7–76)
NRBC BLD AUTO-RTO: 0 /100 WBC (ref 0–0.2)
PLATELET # BLD AUTO: 176 10*3/MM3 (ref 140–450)
PMV BLD AUTO: 10.4 FL (ref 6–12)
POTASSIUM SERPL-SCNC: 4.4 MMOL/L (ref 3.5–5.2)
PROT SERPL-MCNC: 6.6 G/DL (ref 6–8.5)
RBC # BLD AUTO: 3.65 10*6/MM3 (ref 4.14–5.8)
SODIUM SERPL-SCNC: 140 MMOL/L (ref 136–145)
TIBC SERPL-MCNC: 349 MCG/DL (ref 298–536)
TRANSFERRIN SERPL-MCNC: 234 MG/DL (ref 200–360)
WBC NRBC COR # BLD AUTO: 6.79 10*3/MM3 (ref 3.4–10.8)

## 2024-09-03 PROCEDURE — 36415 COLL VENOUS BLD VENIPUNCTURE: CPT

## 2024-09-03 PROCEDURE — 84466 ASSAY OF TRANSFERRIN: CPT

## 2024-09-03 PROCEDURE — 85025 COMPLETE CBC W/AUTO DIFF WBC: CPT

## 2024-09-03 PROCEDURE — 83540 ASSAY OF IRON: CPT

## 2024-09-03 PROCEDURE — 80053 COMPREHEN METABOLIC PANEL: CPT

## 2024-09-03 PROCEDURE — 82728 ASSAY OF FERRITIN: CPT

## 2024-09-04 ENCOUNTER — TELEPHONE (OUTPATIENT)
Dept: ONCOLOGY | Facility: CLINIC | Age: 84
End: 2024-09-04

## 2024-09-04 NOTE — TELEPHONE ENCOUNTER
Caller: Jeff Alatorre    Relationship: Self    Best call back number: [phone]     What is the best time to reach you: ANYTIME    Who are you requesting to speak with (clinical staff, provider,  specific staff member): CLINICAL    What was the call regarding: PATIENT WENT TO GET HIS B12 INJECTION AT THE [facility] IN [place] AND THEY HAD NO ORDER FOR THIS. PLEASE UPDATE THE ORDER IN PATIENT'S CHART. HE GETS THIS INJECTION MONTHLY.     PLEASE CALL TO ADVISE.

## 2024-09-05 ENCOUNTER — CLINICAL SUPPORT (OUTPATIENT)
Dept: FAMILY MEDICINE CLINIC | Facility: CLINIC | Age: 84
End: 2024-09-05
Payer: MEDICARE

## 2024-09-05 DIAGNOSIS — E53.8 B12 DEFICIENCY: Primary | ICD-10-CM

## 2024-09-05 RX ORDER — CYANOCOBALAMIN 1000 UG/ML
1000 INJECTION, SOLUTION INTRAMUSCULAR; SUBCUTANEOUS ONCE
OUTPATIENT
Start: 2024-09-24

## 2024-09-05 RX ORDER — CYANOCOBALAMIN 1000 UG/ML
1000 INJECTION, SOLUTION INTRAMUSCULAR; SUBCUTANEOUS ONCE
Status: COMPLETED | OUTPATIENT
Start: 2024-09-05 | End: 2024-09-05

## 2024-09-05 RX ADMIN — CYANOCOBALAMIN 1000 MCG: 1000 INJECTION, SOLUTION INTRAMUSCULAR; SUBCUTANEOUS at 14:24

## 2024-09-05 NOTE — PROGRESS NOTES
Pt came in today for a B-12 shot. Shot was injected into his right deltoid and pt tolerated the shot well!

## 2024-09-09 ENCOUNTER — TELEPHONE (OUTPATIENT)
Dept: ONCOLOGY | Facility: CLINIC | Age: 84
End: 2024-09-09
Payer: MEDICARE

## 2024-09-09 NOTE — TELEPHONE ENCOUNTER
Caller: Jeff Alatorre    Relationship: Self    Best call back number: [phone]       Who are you requesting to speak with (clinical staff, provider,  specific staff member): CLINICAL    What was the call regarding: PATIENT STATES THEY DO NOT FEEL THAT B12 IS EFFECTIVE. THEY ARE STIIL TIRED CONSISTENTLY AND WOULD LIKE TO DISCUSS SCHEDULING WITH OFFICE

## 2024-09-11 ENCOUNTER — NURSE TRIAGE (OUTPATIENT)
Dept: CALL CENTER | Facility: HOSPITAL | Age: 84
End: 2024-09-11
Payer: MEDICARE

## 2024-09-11 ENCOUNTER — OFFICE VISIT (OUTPATIENT)
Dept: CARDIAC SURGERY | Facility: CLINIC | Age: 84
End: 2024-09-11
Payer: MEDICARE

## 2024-09-11 VITALS
SYSTOLIC BLOOD PRESSURE: 112 MMHG | HEART RATE: 65 BPM | OXYGEN SATURATION: 97 % | BODY MASS INDEX: 27.7 KG/M2 | WEIGHT: 187 LBS | DIASTOLIC BLOOD PRESSURE: 68 MMHG | HEIGHT: 69 IN

## 2024-09-11 DIAGNOSIS — Z95.1 S/P CABG (CORONARY ARTERY BYPASS GRAFT): Primary | ICD-10-CM

## 2024-09-11 PROCEDURE — 99024 POSTOP FOLLOW-UP VISIT: CPT | Performed by: THORACIC SURGERY (CARDIOTHORACIC VASCULAR SURGERY)

## 2024-09-11 PROCEDURE — 3074F SYST BP LT 130 MM HG: CPT | Performed by: THORACIC SURGERY (CARDIOTHORACIC VASCULAR SURGERY)

## 2024-09-11 PROCEDURE — 3078F DIAST BP <80 MM HG: CPT | Performed by: THORACIC SURGERY (CARDIOTHORACIC VASCULAR SURGERY)

## 2024-09-11 PROCEDURE — 1159F MED LIST DOCD IN RCRD: CPT | Performed by: THORACIC SURGERY (CARDIOTHORACIC VASCULAR SURGERY)

## 2024-09-11 PROCEDURE — 1160F RVW MEDS BY RX/DR IN RCRD: CPT | Performed by: THORACIC SURGERY (CARDIOTHORACIC VASCULAR SURGERY)

## 2024-09-11 NOTE — TELEPHONE ENCOUNTER
Pt wanted to cancel appt tomorrow at 1pm with Dr Curtis--will see if i can send them a message to cancel it but also requested that he call back in the morning to make sure they received the messge to cancel. Pt verbalized understanding.

## 2024-09-12 NOTE — TELEPHONE ENCOUNTER
Pt called back, he had wanted to discuss how fatigued he has been, but discussed with Dr martin at his appt yesterday and they told him that they administer an injection that can cause pts to be fatigued for about 3 months after surgery. He feels better about it now after having talked to Dr. Martin.

## 2024-09-23 DIAGNOSIS — E78.2 MIXED HYPERLIPIDEMIA: ICD-10-CM

## 2024-09-23 RX ORDER — METOPROLOL SUCCINATE 25 MG/1
25 TABLET, EXTENDED RELEASE ORAL DAILY
Qty: 90 TABLET | Refills: 2 | Status: SHIPPED | OUTPATIENT
Start: 2024-09-23

## 2024-09-23 RX ORDER — PANTOPRAZOLE SODIUM 40 MG/1
40 TABLET, DELAYED RELEASE ORAL
Qty: 90 TABLET | Refills: 2 | Status: SHIPPED | OUTPATIENT
Start: 2024-09-23

## 2024-09-23 RX ORDER — LOSARTAN POTASSIUM 25 MG/1
25 TABLET ORAL DAILY
Qty: 90 TABLET | Refills: 2 | Status: SHIPPED | OUTPATIENT
Start: 2024-09-23

## 2024-09-23 RX ORDER — ATORVASTATIN CALCIUM 40 MG/1
40 TABLET, FILM COATED ORAL NIGHTLY
Qty: 90 TABLET | Refills: 2 | Status: SHIPPED | OUTPATIENT
Start: 2024-09-23

## 2024-09-23 RX ORDER — CLOPIDOGREL BISULFATE 75 MG/1
75 TABLET ORAL DAILY
Qty: 90 TABLET | Refills: 2 | Status: SHIPPED | OUTPATIENT
Start: 2024-09-23

## 2024-09-23 RX ORDER — FERROUS SULFATE 325(65) MG
325 TABLET ORAL
Qty: 90 TABLET | Refills: 2 | Status: SHIPPED | OUTPATIENT
Start: 2024-09-23

## 2024-09-23 RX ORDER — ALLOPURINOL 300 MG/1
300 TABLET ORAL DAILY
Qty: 90 TABLET | Refills: 2 | Status: SHIPPED | OUTPATIENT
Start: 2024-09-23

## 2024-09-23 RX ORDER — ASPIRIN 81 MG/1
81 TABLET ORAL DAILY
Qty: 90 TABLET | Refills: 2 | Status: SHIPPED | OUTPATIENT
Start: 2024-09-23

## 2024-09-29 PROBLEM — Z95.1 S/P CABG (CORONARY ARTERY BYPASS GRAFT): Status: ACTIVE | Noted: 2024-09-29

## 2024-10-04 ENCOUNTER — OFFICE VISIT (OUTPATIENT)
Dept: FAMILY MEDICINE CLINIC | Facility: CLINIC | Age: 84
End: 2024-10-04
Payer: MEDICARE

## 2024-10-04 VITALS
OXYGEN SATURATION: 97 % | HEART RATE: 61 BPM | HEIGHT: 69 IN | BODY MASS INDEX: 27.25 KG/M2 | SYSTOLIC BLOOD PRESSURE: 124 MMHG | DIASTOLIC BLOOD PRESSURE: 71 MMHG | WEIGHT: 184 LBS

## 2024-10-04 DIAGNOSIS — E78.2 MIXED HYPERLIPIDEMIA: ICD-10-CM

## 2024-10-04 DIAGNOSIS — N18.31 STAGE 3A CHRONIC KIDNEY DISEASE (CKD): ICD-10-CM

## 2024-10-04 DIAGNOSIS — E53.8 B12 DEFICIENCY: ICD-10-CM

## 2024-10-04 DIAGNOSIS — I10 PRIMARY HYPERTENSION: ICD-10-CM

## 2024-10-04 DIAGNOSIS — E11.9 TYPE 2 DIABETES MELLITUS WITHOUT COMPLICATION, WITHOUT LONG-TERM CURRENT USE OF INSULIN: ICD-10-CM

## 2024-10-04 DIAGNOSIS — D64.9 ANEMIA, UNSPECIFIED TYPE: ICD-10-CM

## 2024-10-04 DIAGNOSIS — M10.09 IDIOPATHIC GOUT OF MULTIPLE SITES, UNSPECIFIED CHRONICITY: ICD-10-CM

## 2024-10-04 DIAGNOSIS — E78.5 HYPERLIPIDEMIA LDL GOAL <100: ICD-10-CM

## 2024-10-04 DIAGNOSIS — I25.118 CORONARY ARTERY DISEASE OF NATIVE ARTERY OF NATIVE HEART WITH STABLE ANGINA PECTORIS: Primary | ICD-10-CM

## 2024-10-04 DIAGNOSIS — N18.31 STAGE 3A CHRONIC KIDNEY DISEASE: ICD-10-CM

## 2024-10-04 PROCEDURE — 3078F DIAST BP <80 MM HG: CPT | Performed by: STUDENT IN AN ORGANIZED HEALTH CARE EDUCATION/TRAINING PROGRAM

## 2024-10-04 PROCEDURE — 96372 THER/PROPH/DIAG INJ SC/IM: CPT | Performed by: STUDENT IN AN ORGANIZED HEALTH CARE EDUCATION/TRAINING PROGRAM

## 2024-10-04 PROCEDURE — 3074F SYST BP LT 130 MM HG: CPT | Performed by: STUDENT IN AN ORGANIZED HEALTH CARE EDUCATION/TRAINING PROGRAM

## 2024-10-04 PROCEDURE — 99214 OFFICE O/P EST MOD 30 MIN: CPT | Performed by: STUDENT IN AN ORGANIZED HEALTH CARE EDUCATION/TRAINING PROGRAM

## 2024-10-04 PROCEDURE — 1126F AMNT PAIN NOTED NONE PRSNT: CPT | Performed by: STUDENT IN AN ORGANIZED HEALTH CARE EDUCATION/TRAINING PROGRAM

## 2024-10-04 RX ORDER — ALLOPURINOL 300 MG/1
300 TABLET ORAL DAILY
Qty: 90 TABLET | Refills: 2 | Status: SHIPPED | OUTPATIENT
Start: 2024-10-04

## 2024-10-04 RX ORDER — CYANOCOBALAMIN 1000 UG/ML
1000 INJECTION, SOLUTION INTRAMUSCULAR; SUBCUTANEOUS
Status: DISCONTINUED | OUTPATIENT
Start: 2024-10-04 | End: 2024-10-04 | Stop reason: HOSPADM

## 2024-10-04 RX ORDER — LOSARTAN POTASSIUM 25 MG/1
25 TABLET ORAL DAILY
Qty: 90 TABLET | Refills: 2 | Status: SHIPPED | OUTPATIENT
Start: 2024-10-04

## 2024-10-04 RX ADMIN — CYANOCOBALAMIN 1000 MCG: 1000 INJECTION, SOLUTION INTRAMUSCULAR; SUBCUTANEOUS at 13:49

## 2024-10-04 NOTE — PROGRESS NOTES
Chief Complaint  Coronary Artery Disease, Hypertension, and Hyperlipidemia    Subjective        Jeff Alatorre presents to [facility]    HPI    Patient here for follow-up.  Patient's main concern today is medicine reconciliation.  He underwent CABG 6/2024 and has since been doing well.  Currently [facility] through cardiac rehab at [facility].  Has follow-up with Dr. Kim.  On daily aspirin, Plavix (3 months to year per cardiology), metoprolol succinate 25 mg, Lipitor 40 mg.  No chest pains or any other symptoms aside from generalized fatigue which she says has been getting better.  Heart rate has been in the 60s.  Hypertension has been controlled with losartan 25 mg.  He receives B12 injections monthly.  Due for annual labs.  Past Medical History:   Diagnosis Date    Arthritis     Chronic kidney disease     Coronary artery disease of native artery of native heart with stable angina pectoris 05/23/2024    Erectile dysfunction     Gout     Heart valve disease     4 bypass surgery    History of diverticulitis     HTN (hypertension)     Hx of adenomatous colonic polyps 10/13/2020    Hypercholesteremia     Kidney stone     Low testosterone     PAF (paroxysmal atrial fibrillation) 07/01/2024    Polycythemia vera 10/13/2020    Squamous cell carcinoma of skin 10/2021    top of head    Type 2 diabetes mellitus without complication, without long-term current use of insulin 12/01/2022     Past Surgical History:   Procedure Laterality Date    CARDIAC CATHETERIZATION Left 05/20/2024    Procedure: Coronary angiography;  Surgeon: Zaid Contreras MD;  Location:  [facility] CATH INVASIVE LOCATION;  Service: Cardiology;  Laterality: Left;    CARDIAC CATHETERIZATION Left 05/20/2024    Procedure: Percutaneous Coronary Intervention;  Surgeon: Zaid Contreras MD;  Location:  [facility] CATH INVASIVE LOCATION;  Service: Cardiology;  Laterality: Left;    CAROTID ENDARTERECTOMY Left      "CATARACT EXTRACTION, BILATERAL      COLON SURGERY      COLONOSCOPY      COLONOSCOPY N/A 2021    Procedure: COLONOSCOPY WITH ANESTHESIA;  Surgeon: Luciano Alatorre DO;  Location:  [facility] ENDOSCOPY;  Service: Gastroenterology;  Laterality: N/A;  preop; hx of polyps  postop; diverticulosis   PCP Devon Moore     CORONARY ARTERY BYPASS GRAFT N/A 2024    Procedure: CORONARY ARTERY BYPASS GRAFTING x4, LEFT INTERNAL MAMMARY ARTERY GRAFT, RIGHT LEG ENDOSCOPIC VEIN HARVEST, TRANSESOPHAGEAL ECHOCARDIOGRAM, APPLICATION OF PREVENA;  Surgeon: Jose F Kim MD;  Location:  [facility] OR;  Service: Cardiothoracic;  Laterality: N/A;    PENILE PROSTHESIS IMPLANT N/A 2023    Procedure: 3-PIECE INFLATABLE PENILE PROSTHESIS PLACEMENT;  Surgeon: Garcia Santana MD;  Location:  [facility] OR;  Service: Urology;  Laterality: N/A;    VASECTOMY       Social History     Socioeconomic History    Marital status: Single   Tobacco Use    Smoking status: Former     Current packs/day: 0.00     Average packs/day: 1 pack/day for 40.0 years (40.0 ttl pk-yrs)     Types: Cigarettes     Start date:      Quit date:      Years since quittin.7     Passive exposure: Past    Smokeless tobacco: Never    Tobacco comments:     N/A   Vaping Use    Vaping status: Never Used   Substance and Sexual Activity    Alcohol use: Not Currently     Comment: Quite 20+ yrs ago    Drug use: Never    Sexual activity: Yes     Partners: Female     Birth control/protection: Vasectomy       Objective   Vital Signs:  /71   Pulse 61   Ht 174 cm (68.5\")   Wt 83.5 kg (184 lb)   SpO2 97%   BMI 27.57 kg/m²   Estimated body mass index is 27.57 kg/m² as calculated from the following:    Height as of this encounter: 174 cm (68.5\").    Weight as of this encounter: 83.5 kg (184 lb).              Physical Exam  Vitals reviewed.   Constitutional:       Appearance: Normal appearance.   HENT:      Head: Normocephalic.      Nose: Nose normal.      " Mouth/Throat:      Mouth: Mucous membranes are moist.   Eyes:      Extraocular Movements: Extraocular movements intact.   Cardiovascular:      Rate and Rhythm: Normal rate and regular rhythm.      Heart sounds: Normal heart sounds.   Pulmonary:      Effort: Pulmonary effort is normal.      Breath sounds: Normal breath sounds.   Musculoskeletal:         General: Normal range of motion.      Cervical back: Normal range of motion.   Skin:     General: Skin is warm and dry.   Neurological:      General: No focal deficit present.      Mental Status: He is alert and oriented to person, place, and time.   Psychiatric:         Mood and Affect: Mood normal.         Behavior: Behavior normal.        Result Review :                   Assessment and Plan   Diagnoses and all orders for this visit:    1. Coronary artery disease of native artery of native heart with stable angina pectoris (Primary)  -Doing well status post CABG.  Continue daily aspirin, Plavix, Lipitor, metoprolol.  Follow-up with cardiology as scheduled    2. Hyperlipidemia LDL goal <100    3. Type 2 diabetes mellitus without complication, without long-term current use of insulin  -Continue metformin 500 mg twice daily, check labs  -     Hemoglobin A1c; Future  -     TSH Rfx On Abnormal To Free T4; Future  -     Comprehensive metabolic panel; Future  -     Microalbumin / Creatinine Urine Ratio - Urine, Clean Catch; Future  -     metFORMIN (GLUCOPHAGE) 500 MG tablet; Take 1 tablet by mouth 2 (Two) Times a Day With Meals.  Dispense: 180 tablet; Refill: 3    4. Primary hypertension  -Continue losartan 25 mg    5. B12 deficiency  -B12 injection order changed to low every 30 day injection at the [facility] mL clinic  -     Vitamin B12; Future  -     cyanocobalamin injection 1,000 mcg    6. Idiopathic gout of multiple sites, unspecified chronicity  -     Uric acid; Future  -     allopurinol (ZYLOPRIM) 300 MG tablet; Take 1 tablet by mouth Daily.  Dispense: 90 tablet; Refill:  2    7. Mixed hyperlipidemia  -     Lipid panel; Future    8. Anemia, unspecified type  -Patient sees hematology  -     CBC w AUTO Differential; Future  -     Vitamin B12; Future  -     Folate; Future  -     Iron and TIBC; Future  -     Ferritin; Future    9. Stage 3a chronic kidney disease  -     TSH Rfx On Abnormal To Free T4; Future  -     Vitamin D 25 hydroxy; Future    10. Stage 3a chronic kidney disease (CKD)    Other orders  -     losartan (COZAAR) 25 MG tablet; Take 1 tablet by mouth Daily.  Dispense: 90 tablet; Refill: 2      Follow-up for Medicare annual wellness visit/lab discussion       EMR Dragon/Transcription disclaimer:   Much of this encounter note is an electronic transcription/translation of spoken language to printed text. The electronic translation of spoken language may permit erroneous, or at times, nonsensical words or phrases to be inadvertently transcribed; although attempts have made to review the note for such errors, some may still exist. Please excuse any unrecognized transcription errors and contact us if the air is unintelligible or needs documented correction. Also, portions of this note have been copied forward, however, changed to reflect the most current clinical status of this patient.  Follow Up   Return in about 2 months (around 12/4/2024).  Patient was given instructions and counseling regarding his condition or for health maintenance advice. Please see specific information pulled into the AVS if appropriate.

## 2024-10-07 ENCOUNTER — LAB (OUTPATIENT)
Dept: LAB | Facility: HOSPITAL | Age: 84
End: 2024-10-07
Payer: MEDICARE

## 2024-10-07 DIAGNOSIS — E53.8 B12 DEFICIENCY: ICD-10-CM

## 2024-10-07 DIAGNOSIS — M10.09 IDIOPATHIC GOUT OF MULTIPLE SITES, UNSPECIFIED CHRONICITY: ICD-10-CM

## 2024-10-07 DIAGNOSIS — E78.2 MIXED HYPERLIPIDEMIA: ICD-10-CM

## 2024-10-07 DIAGNOSIS — N18.31 STAGE 3A CHRONIC KIDNEY DISEASE: ICD-10-CM

## 2024-10-07 DIAGNOSIS — E11.9 TYPE 2 DIABETES MELLITUS WITHOUT COMPLICATION, WITHOUT LONG-TERM CURRENT USE OF INSULIN: ICD-10-CM

## 2024-10-07 DIAGNOSIS — D64.9 ANEMIA, UNSPECIFIED TYPE: ICD-10-CM

## 2024-10-07 LAB
ALBUMIN SERPL-MCNC: 4.2 G/DL (ref 3.5–5)
ALBUMIN/GLOB SERPL: 1.5 G/DL (ref 1.1–2.5)
ALP SERPL-CCNC: 139 U/L (ref 24–120)
ALT SERPL W P-5'-P-CCNC: 27 U/L (ref 0–50)
ANION GAP SERPL CALCULATED.3IONS-SCNC: 15 MMOL/L (ref 4–13)
AST SERPL-CCNC: 29 U/L (ref 7–45)
AUTO MIXED CELLS #: 0.6 10*3/MM3 (ref 0.1–2.6)
AUTO MIXED CELLS %: 8.3 % (ref 0.1–24)
BILIRUB SERPL-MCNC: 0.5 MG/DL (ref 0.1–1)
BUN SERPL-MCNC: 36 MG/DL (ref 5–21)
BUN/CREAT SERPL: 25.5
CALCIUM SPEC-SCNC: 9.4 MG/DL (ref 8.6–10.5)
CHLORIDE SERPL-SCNC: 103 MMOL/L (ref 98–110)
CHOLEST SERPL-MCNC: 118 MG/DL (ref 130–200)
CO2 SERPL-SCNC: 22 MMOL/L (ref 24–31)
CREAT SERPL-MCNC: 1.41 MG/DL (ref 0.5–1.4)
EGFRCR SERPLBLD CKD-EPI 2021: 49.1 ML/MIN/1.73
ERYTHROCYTE [DISTWIDTH] IN BLOOD BY AUTOMATED COUNT: 13.9 % (ref 12.3–15.4)
GLOBULIN UR ELPH-MCNC: 2.8 GM/DL
GLUCOSE SERPL-MCNC: 112 MG/DL (ref 70–100)
HBA1C MFR BLD: 5.9 % (ref 4.8–5.9)
HCT VFR BLD AUTO: 40.2 % (ref 37.5–51)
HDLC SERPL-MCNC: 34 MG/DL
HGB BLD-MCNC: 13.2 G/DL (ref 13–17.7)
LDLC SERPL CALC-MCNC: 60 MG/DL (ref 0–99)
LDLC/HDLC SERPL: 1.66 {RATIO}
LYMPHOCYTES # BLD AUTO: 1.5 10*3/MM3 (ref 0.7–3.1)
LYMPHOCYTES NFR BLD AUTO: 22.4 % (ref 19.6–45.3)
MCH RBC QN AUTO: 33.3 PG (ref 26.6–33)
MCHC RBC AUTO-ENTMCNC: 32.8 G/DL (ref 31.5–35.7)
MCV RBC AUTO: 101.5 FL (ref 79–97)
NEUTROPHILS NFR BLD AUTO: 4.7 10*3/MM3 (ref 1.7–7)
NEUTROPHILS NFR BLD AUTO: 69.3 % (ref 42.7–76)
PLATELET # BLD AUTO: 198 10*3/MM3 (ref 140–450)
PMV BLD AUTO: 9.5 FL (ref 6–12)
POTASSIUM SERPL-SCNC: 4.4 MMOL/L (ref 3.5–5.3)
PROT SERPL-MCNC: 7 G/DL (ref 6.3–8.7)
RBC # BLD AUTO: 3.96 10*6/MM3 (ref 4.14–5.8)
SODIUM SERPL-SCNC: 140 MMOL/L (ref 135–145)
TRIGL SERPL-MCNC: 138 MG/DL (ref 0–149)
VLDLC SERPL-MCNC: 24 MG/DL (ref 5–40)
WBC NRBC COR # BLD AUTO: 6.8 10*3/MM3 (ref 3.4–10.8)

## 2024-10-07 PROCEDURE — 84550 ASSAY OF BLOOD/URIC ACID: CPT

## 2024-10-07 PROCEDURE — 82570 ASSAY OF URINE CREATININE: CPT

## 2024-10-07 PROCEDURE — 83540 ASSAY OF IRON: CPT

## 2024-10-07 PROCEDURE — 82043 UR ALBUMIN QUANTITATIVE: CPT

## 2024-10-07 PROCEDURE — 85025 COMPLETE CBC W/AUTO DIFF WBC: CPT

## 2024-10-07 PROCEDURE — 80053 COMPREHEN METABOLIC PANEL: CPT

## 2024-10-07 PROCEDURE — 82306 VITAMIN D 25 HYDROXY: CPT

## 2024-10-07 PROCEDURE — 82746 ASSAY OF FOLIC ACID SERUM: CPT

## 2024-10-07 PROCEDURE — 84466 ASSAY OF TRANSFERRIN: CPT

## 2024-10-07 PROCEDURE — 82607 VITAMIN B-12: CPT

## 2024-10-07 PROCEDURE — 80061 LIPID PANEL: CPT

## 2024-10-07 PROCEDURE — 83036 HEMOGLOBIN GLYCOSYLATED A1C: CPT

## 2024-10-07 PROCEDURE — 84443 ASSAY THYROID STIM HORMONE: CPT

## 2024-10-07 PROCEDURE — 36415 COLL VENOUS BLD VENIPUNCTURE: CPT

## 2024-10-07 PROCEDURE — 82728 ASSAY OF FERRITIN: CPT

## 2024-10-08 ENCOUNTER — OFFICE VISIT (OUTPATIENT)
Dept: FAMILY MEDICINE CLINIC | Facility: CLINIC | Age: 84
End: 2024-10-08
Payer: MEDICARE

## 2024-10-08 VITALS
HEART RATE: 51 BPM | OXYGEN SATURATION: 98 % | DIASTOLIC BLOOD PRESSURE: 58 MMHG | HEIGHT: 69 IN | WEIGHT: 168 LBS | BODY MASS INDEX: 24.88 KG/M2 | RESPIRATION RATE: 20 BRPM | SYSTOLIC BLOOD PRESSURE: 114 MMHG | TEMPERATURE: 97.8 F

## 2024-10-08 DIAGNOSIS — E78.2 MIXED HYPERLIPIDEMIA: ICD-10-CM

## 2024-10-08 DIAGNOSIS — Z00.00 MEDICARE ANNUAL WELLNESS VISIT, SUBSEQUENT: Primary | ICD-10-CM

## 2024-10-08 DIAGNOSIS — E11.9 TYPE 2 DIABETES MELLITUS WITHOUT COMPLICATION, WITHOUT LONG-TERM CURRENT USE OF INSULIN: ICD-10-CM

## 2024-10-08 DIAGNOSIS — I10 PRIMARY HYPERTENSION: ICD-10-CM

## 2024-10-08 DIAGNOSIS — I25.10 CORONARY ARTERY DISEASE INVOLVING NATIVE CORONARY ARTERY OF NATIVE HEART WITHOUT ANGINA PECTORIS: ICD-10-CM

## 2024-10-08 LAB
25(OH)D3 SERPL-MCNC: 41.6 NG/ML (ref 30–100)
ALBUMIN UR-MCNC: <1.2 MG/DL
CREAT UR-MCNC: 85.4 MG/DL
FERRITIN SERPL-MCNC: 170 NG/ML (ref 30–400)
FOLATE SERPL-MCNC: 11.9 NG/ML (ref 4.78–24.2)
IRON 24H UR-MRATE: 48 MCG/DL (ref 59–158)
IRON SATN MFR SERPL: 13 % (ref 20–50)
MICROALBUMIN/CREAT UR: NORMAL MG/G{CREAT}
TIBC SERPL-MCNC: 358 MCG/DL (ref 298–536)
TRANSFERRIN SERPL-MCNC: 240 MG/DL (ref 200–360)
TSH SERPL DL<=0.05 MIU/L-ACNC: 3.01 UIU/ML (ref 0.27–4.2)
URATE SERPL-MCNC: 3.8 MG/DL (ref 3.4–7)
VIT B12 BLD-MCNC: >2000 PG/ML (ref 211–946)

## 2024-10-08 PROCEDURE — 96160 PT-FOCUSED HLTH RISK ASSMT: CPT | Performed by: STUDENT IN AN ORGANIZED HEALTH CARE EDUCATION/TRAINING PROGRAM

## 2024-10-08 PROCEDURE — 3074F SYST BP LT 130 MM HG: CPT | Performed by: STUDENT IN AN ORGANIZED HEALTH CARE EDUCATION/TRAINING PROGRAM

## 2024-10-08 PROCEDURE — 1126F AMNT PAIN NOTED NONE PRSNT: CPT | Performed by: STUDENT IN AN ORGANIZED HEALTH CARE EDUCATION/TRAINING PROGRAM

## 2024-10-08 PROCEDURE — G0439 PPPS, SUBSEQ VISIT: HCPCS | Performed by: STUDENT IN AN ORGANIZED HEALTH CARE EDUCATION/TRAINING PROGRAM

## 2024-10-08 PROCEDURE — 99214 OFFICE O/P EST MOD 30 MIN: CPT | Performed by: STUDENT IN AN ORGANIZED HEALTH CARE EDUCATION/TRAINING PROGRAM

## 2024-10-08 PROCEDURE — 3078F DIAST BP <80 MM HG: CPT | Performed by: STUDENT IN AN ORGANIZED HEALTH CARE EDUCATION/TRAINING PROGRAM

## 2024-10-08 PROCEDURE — 1170F FXNL STATUS ASSESSED: CPT | Performed by: STUDENT IN AN ORGANIZED HEALTH CARE EDUCATION/TRAINING PROGRAM

## 2024-10-08 RX ORDER — LOSARTAN POTASSIUM 25 MG/1
25 TABLET ORAL DAILY
Qty: 90 TABLET | Refills: 2 | Status: SHIPPED | OUTPATIENT
Start: 2024-10-08

## 2024-10-08 RX ORDER — NITROGLYCERIN 0.4 MG/1
0.4 TABLET SUBLINGUAL
Qty: 90 TABLET | Refills: 1 | Status: SHIPPED | OUTPATIENT
Start: 2024-10-08

## 2024-10-08 RX ORDER — LOSARTAN POTASSIUM 25 MG/1
25 TABLET ORAL DAILY
Qty: 30 TABLET | Refills: 2 | OUTPATIENT
Start: 2024-10-08

## 2024-10-08 RX ORDER — ATORVASTATIN CALCIUM 80 MG/1
80 TABLET, FILM COATED ORAL DAILY
Qty: 90 TABLET | Refills: 2 | Status: SHIPPED | OUTPATIENT
Start: 2024-10-08

## 2024-10-08 NOTE — PROGRESS NOTES
The ABCs of the Annual Wellness Visit  Initial Medicare Wellness Visit    Subjective     Jeff Alatorre is a 84 y.o. male who presents for an Initial Medicare Wellness Visit.    The following portions of the patient's history were reviewed and   updated as appropriate: allergies, current medications, past family history, past medical history, past social history, past surgical history, and problem list.     Compared to one year ago, the patient feels his physical   health is better.    Compared to one year ago, the patient feels his mental   health is better.    Recent Hospitalizations:  This patient has had a [facility] admission record on file within the last 365 days.    Current Medical Providers:  Patient Care Team:  Jimmy Curtis MD as PCP - General (Family Medicine)  Zaid Contreras MD as Consulting Physician (Cardiology)  Jose F Kim MD as Surgeon (Cardiothoracic Surgery)    Outpatient Medications Prior to Visit   Medication Sig Dispense Refill    acetaminophen (TYLENOL) 500 MG tablet Take 1 tablet by mouth Daily As Needed for Mild Pain.      allopurinol (ZYLOPRIM) 300 MG tablet Take 1 tablet by mouth Daily. 90 tablet 2    aspirin 81 MG EC tablet Take 1 tablet by mouth Daily. 90 tablet 2    clopidogrel (PLAVIX) 75 MG tablet Take 1 tablet by mouth Daily. 90 tablet 2    Cyanocobalamin 1000 MCG/ML kit Inject 1 mL as directed Every 30 (Thirty) Days.      ferrous sulfate 325 (65 FE) MG tablet Take 1 tablet by mouth Daily With Breakfast. Pt takes every other day 90 tablet 2    metFORMIN (GLUCOPHAGE) 500 MG tablet Take 1 tablet by mouth 2 (Two) Times a Day With Meals. 180 tablet 3    metoprolol succinate XL (TOPROL-XL) 25 MG 24 hr tablet Take 1 tablet by mouth Daily. 90 tablet 2    atorvastatin (LIPITOR) 40 MG tablet Take 1 tablet by mouth Every Night. 90 tablet 2    losartan (COZAAR) 25 MG tablet Take 1 tablet by mouth Daily. 90 tablet 2    nitroglycerin (NITROSTAT) 0.4 MG SL tablet Place 1 tablet  "under the tongue Every 5 (Five) Minutes As Needed for Chest Pain. Take no more than 3 doses in 15 minutes.       No facility-administered medications prior to visit.       No opioid medication identified on active medication list. I have reviewed chart for other potential  high risk medication/s and harmful drug interactions in the elderly.        Aspirin is on active medication list. .      Patient Active Problem List   Diagnosis    Hx of adenomatous colonic polyps    Adenomatous polyps    Idiopathic gout of multiple sites    Primary hypertension    Type 2 diabetes mellitus without complication, without long-term current use of insulin    Erectile dysfunction due to arterial insufficiency    Anemia    B12 deficiency    Stage 3a chronic kidney disease (CKD)    Seasonal allergic rhinitis    ANYI (obstructive sleep apnea)    Carotid artery disease    ROSALES (dyspnea on exertion)    Coronary artery disease of native artery of native heart with stable angina pectoris    Bilateral carotid artery stenosis    History of left-sided carotid endarterectomy    Stenosis of right subclavian artery    CAD (coronary artery disease)    Chronic anemia    PAD (peripheral artery disease)    PAF (paroxysmal atrial fibrillation)    Mixed hyperlipidemia    S/P CABG (coronary artery bypass graft)     Advance Care Planning  Advance Directive is on file.         Objective    Vitals:    10/08/24 0925   BP: 114/58   Pulse: 51   Resp: 20   Temp: 97.8 °F (36.6 °C)   TempSrc: Temporal   SpO2: 98%   Weight: 76.2 kg (168 lb)   Height: 174 cm (68.5\")     Estimated body mass index is 25.17 kg/m² as calculated from the following:    Height as of this encounter: 174 cm (68.5\").    Weight as of this encounter: 76.2 kg (168 lb).           Does the patient have evidence of cognitive impairment?   No    Lab Results   Component Value Date    TRIG 138 10/07/2024    HDL 34 (L) 10/07/2024    LDL 60 10/07/2024    VLDL 24 10/07/2024    HGBA1C 5.9 10/07/2024      "   HEALTH RISK ASSESSMENT    Smoking Status:  Social History     Tobacco Use   Smoking Status Former    Current packs/day: 0.00    Average packs/day: 1 pack/day for 40.0 years (40.0 ttl pk-yrs)    Types: Cigarettes    Start date:     Quit date:     Years since quittin.7    Passive exposure: Past   Smokeless Tobacco Never   Tobacco Comments    N/A     Alcohol Consumption:  Social History     Substance and Sexual Activity   Alcohol Use Not Currently    Comment: Quite 20+ yrs ago     Fall Risk Screen:    MARIELY Fall Risk Assessment was completed, and patient is at LOW risk for falls.Assessment completed on:10/8/2024    Depression Screen:       10/8/2024     9:28 AM   PHQ-2/PHQ-9 Depression Screening   Little Interest or Pleasure in Doing Things 0-->not at all   Feeling Down, Depressed or Hopeless 0-->not at all   PHQ-9: Brief Depression Severity Measure Score 0       Health Habits and Functional and Cognitive Screening:      10/8/2024     9:27 AM   Functional & Cognitive Status   Do you have difficulty preparing food and eating? No   Do you have difficulty bathing yourself, getting dressed or grooming yourself? No   Do you have difficulty using the toilet? No   Do you have difficulty moving around from place to place? No   Do you have trouble with steps or getting out of a bed or a chair? No   Current Diet Well Balanced Diet   Dental Exam Not up to date   Eye Exam Not up to date   Exercise (times per week) 7 times per week   Current Exercises Include Yard Work   Do you need help using the phone?  No   Are you deaf or do you have serious difficulty hearing?  No   Do you need help to go to places out of walking distance? No   Do you need help shopping? No   Do you need help preparing meals?  No   Do you need help with housework?  No   Do you need help with laundry? No   Do you need help taking your medications? No   Do you need help managing money? No   Do you ever drive or ride in a car without wearing a  seat belt? No   Have you felt unusual stress, anger or loneliness in the last month? No   Who do you live with? Child   If you need help, do you have trouble finding someone available to you? No   Have you been bothered in the last four weeks by sexual problems? No   Do you have difficulty concentrating, remembering or making decisions? No       Age-appropriate Screening Schedule:  Refer to the list below for future screening recommendations based on patient's age, sex and/or medical conditions. Orders for these recommended tests are listed in the plan section. The patient has been provided with a written plan.    Health Maintenance   Topic Date Due    DIABETIC EYE EXAM  Never done    ANNUAL WELLNESS VISIT  11/30/2024    ZOSTER VACCINE (3 of 3) 10/08/2024 (Originally 12/25/2023)    TDAP/TD VACCINES (1 - Tdap) 10/08/2025 (Originally 2/25/1959)    URINE MICROALBUMIN  11/02/2024    BMI FOLLOWUP  11/30/2024    HEMOGLOBIN A1C  04/07/2025    LIPID PANEL  10/07/2025    COLORECTAL CANCER SCREENING  07/27/2026    COVID-19 Vaccine  Completed    RSV Vaccine - Adults  Completed    INFLUENZA VACCINE  Completed    Pneumococcal Vaccine 65+  Completed          CMS Preventative Services Quick Reference  Risk Factors Identified During Encounter    Dental Screening Recommended  Vision Screening Recommended    The above risks/problems have been discussed with the patient.  Pertinent information has been shared with the patient in the After Visit Summary.  An After Visit Summary and PPPS were made available to the patient.  Diagnoses and all orders for this visit:    1. Medicare annual wellness visit, subsequent (Primary)    2. Coronary artery disease involving native coronary artery of native heart without angina pectoris  -     nitroglycerin (NITROSTAT) 0.4 MG SL tablet; Place 1 tablet under the tongue Every 5 (Five) Minutes As Needed for Chest Pain. Take no more than 3 doses in 15 minutes.  Dispense: 90 tablet; Refill: 1  -      atorvastatin (Lipitor) 80 MG tablet; Take 1 tablet by mouth Daily.  Dispense: 90 tablet; Refill: 2    3. Type 2 diabetes mellitus without complication, without long-term current use of insulin    4. Mixed hyperlipidemia  -     atorvastatin (Lipitor) 80 MG tablet; Take 1 tablet by mouth Daily.  Dispense: 90 tablet; Refill: 2    5. Primary hypertension  -     losartan (COZAAR) 25 MG tablet; Take 1 tablet by mouth Daily.  Dispense: 90 tablet; Refill: 2      Follow Up:  Next Medicare Wellness visit to be scheduled in 1 year.        Additional E&M Note during same encounter follows:  Patient has multiple medical problems which are significant and separately identifiable that require additional work above and beyond the Medicare Wellness Visit.      Chief Complaint  Medicare Wellness-subsequent    Subjective        Jeff Alatorre presents to [facility]    HPI    Patient doing well after CABG.  Had labs done, partially returned.  Blood counts have improved, hemoglobin 13.2, normalized.  A1c 5.9, improved from low sixes previously.  LDL 60.  Renal function has improved with GFR 49, up from mid 30s low 40s 3 months ago.  Other labs pending    Past Medical History:   Diagnosis Date    Arthritis     Chronic kidney disease     Coronary artery disease of native artery of native heart with stable angina pectoris 05/23/2024    Erectile dysfunction     Gout     Heart valve disease     4 bypass surgery    History of diverticulitis     HTN (hypertension)     Hx of adenomatous colonic polyps 10/13/2020    Hypercholesteremia     Kidney stone     Low testosterone     PAF (paroxysmal atrial fibrillation) 07/01/2024    Polycythemia vera 10/13/2020    Squamous cell carcinoma of skin 10/2021    top of head    Type 2 diabetes mellitus without complication, without long-term current use of insulin 12/01/2022     Past Surgical History:   Procedure Laterality Date    CARDIAC CATHETERIZATION Left 05/20/2024     Procedure: Coronary angiography;  Surgeon: Zaid Contreras MD;  Location:  [facility] CATH INVASIVE LOC[facility];  Service: Cardiology;  Laterality: Left;    CARDIAC CATHETERIZATION Left 2024    Procedure: Percutaneous Coronary Intervention;  Surgeon: Zaid Contreras MD;  Location:  [facility] CATH INVASIVE LOC[facility];  Service: Cardiology;  Laterality: Left;    CAROTID ENDARTERECTOMY Left     CATARACT EXTRACTION, BILATERAL      COLON SURGERY      COLONOSCOPY      COLONOSCOPY N/A 2021    Procedure: COLONOSCOPY WITH ANESTHESIA;  Surgeon: Luciano Alatorre DO;  Location: [facility] ENDOSCOPY;  Service: Gastroenterology;  Laterality: N/A;  preop; hx of polyps  postop; diverticulosis   PCP Devon Moore     CORONARY ARTERY BYPASS GRAFT N/A 2024    Procedure: CORONARY ARTERY BYPASS GRAFTING x4, LEFT INTERNAL MAMMARY ARTERY GRAFT, RIGHT LEG ENDOSCOPIC VEIN HARVEST, TRANSESOPHAGEAL ECHOCARDIOGRAM, APPLICATION OF PREVENA;  Surgeon: Jose F Kim MD;  Location:  [facility] OR;  Service: Cardiothoracic;  Laterality: N/A;    PENILE PROSTHESIS IMPLANT N/A 2023    Procedure: 3-PIECE INFLATABLE PENILE PROSTHESIS PLACEMENT;  Surgeon: Garcia Santana MD;  Location:  [facility] OR;  Service: Urology;  Laterality: N/A;    VASECTOMY       Social History     Socioeconomic History    Marital status: Single   Tobacco Use    Smoking status: Former     Current packs/day: 0.00     Average packs/day: 1 pack/day for 40.0 years (40.0 ttl pk-yrs)     Types: Cigarettes     Start date:      Quit date:      Years since quittin.7     Passive exposure: Past    Smokeless tobacco: Never    Tobacco comments:     N/A   Vaping Use    Vaping status: Never Used   Substance and Sexual Activity    Alcohol use: Not Currently     Comment: Quite 20+ yrs ago    Drug use: Never    Sexual activity: Yes     Partners: Female     Birth control/protection: Vasectomy       Objective   Vital Signs:  /58   Pulse 51   Temp 97.8 °F (36.6 °C)  "(Temporal)   Resp 20   Ht 174 cm (68.5\")   Wt 76.2 kg (168 lb)   SpO2 98%   BMI 25.17 kg/m²   Estimated body mass index is 25.17 kg/m² as calculated from the following:    Height as of this encounter: 174 cm (68.5\").    Weight as of this encounter: 76.2 kg (168 lb).               Physical Exam  Vitals reviewed.   Constitutional:       Appearance: Normal appearance.   HENT:      Head: Normocephalic.      Nose: Nose normal.      Mouth/Throat:      Mouth: Mucous membranes are moist.   Eyes:      Extraocular Movements: Extraocular movements intact.   Cardiovascular:      Rate and Rhythm: Normal rate and regular rhythm.      Heart sounds: Normal heart sounds.   Pulmonary:      Effort: Pulmonary effort is normal.      Breath sounds: Normal breath sounds.   Musculoskeletal:         General: Normal range of motion.      Cervical back: Normal range of motion.   Skin:     General: Skin is warm and dry.   Neurological:      General: No focal deficit present.      Mental Status: He is alert and oriented to person, place, and time.   Psychiatric:         Mood and Affect: Mood normal.         Behavior: Behavior normal.        Result Review :                   Assessment and Plan   Diagnoses and all orders for this visit:    1. Medicare annual wellness visit, subsequent (Primary)  -Recommended updated vision and dental exam.    2. Coronary artery disease involving native coronary artery of native heart without angina pectoris  -Continue daily aspirin, statin, Plavix, beta-blocker.  Follow-up with cardiology as scheduled.  Recommendations appreciated  -     nitroglycerin (NITROSTAT) 0.4 MG SL tablet; Place 1 tablet under the tongue Every 5 (Five) Minutes As Needed for Chest Pain. Take no more than 3 doses in 15 minutes.  Dispense: 90 tablet; Refill: 1  -     atorvastatin (Lipitor) 80 MG tablet; Take 1 tablet by mouth Daily.  Dispense: 90 tablet; Refill: 2    3. Type 2 diabetes mellitus without complication, without long-term " current use of insulin  -improved, likely with lifestyle changes since CABG.  Continue metformin 500 mg twice daily    4. Mixed hyperlipidemia  -Discussed LDL goals and coronary artery disease, will increase Lipitor to 80 mg  -     atorvastatin (Lipitor) 80 MG tablet; Take 1 tablet by mouth Daily.  Dispense: 90 tablet; Refill: 2    5. Primary hypertension  -At goal  -     losartan (COZAAR) 25 MG tablet; Take 1 tablet by mouth Daily.  Dispense: 90 tablet; Refill: 2         Follow Up   Return in about 8 months (around 6/8/2025).  Patient was given instructions and counseling regarding his condition or for health maintenance advice. Please see specific information pulled into the AVS if appropriate.

## 2024-10-22 ENCOUNTER — LAB (OUTPATIENT)
Dept: LAB | Facility: HOSPITAL | Age: 84
End: 2024-10-22
Payer: MEDICARE

## 2024-10-22 DIAGNOSIS — E61.1 IRON DEFICIENCY: ICD-10-CM

## 2024-10-22 LAB
ALBUMIN SERPL-MCNC: 4.1 G/DL (ref 3.5–5.2)
ALBUMIN/GLOB SERPL: 1.5 G/DL
ALP SERPL-CCNC: 163 U/L (ref 39–117)
ALT SERPL W P-5'-P-CCNC: 29 U/L (ref 1–41)
ANION GAP SERPL CALCULATED.3IONS-SCNC: 12 MMOL/L (ref 5–15)
AST SERPL-CCNC: 24 U/L (ref 1–40)
BASOPHILS # BLD AUTO: 0.03 10*3/MM3 (ref 0–0.2)
BASOPHILS NFR BLD AUTO: 0.5 % (ref 0–1.5)
BILIRUB SERPL-MCNC: 0.4 MG/DL (ref 0–1.2)
BUN SERPL-MCNC: 26 MG/DL (ref 8–23)
BUN/CREAT SERPL: 19.7 (ref 7–25)
CALCIUM SPEC-SCNC: 9.9 MG/DL (ref 8.6–10.5)
CHLORIDE SERPL-SCNC: 105 MMOL/L (ref 98–107)
CO2 SERPL-SCNC: 25 MMOL/L (ref 22–29)
CREAT SERPL-MCNC: 1.32 MG/DL (ref 0.76–1.27)
DEPRECATED RDW RBC AUTO: 51.3 FL (ref 37–54)
EGFRCR SERPLBLD CKD-EPI 2021: 53.2 ML/MIN/1.73
EOSINOPHIL # BLD AUTO: 0.13 10*3/MM3 (ref 0–0.4)
EOSINOPHIL NFR BLD AUTO: 2.2 % (ref 0.3–6.2)
ERYTHROCYTE [DISTWIDTH] IN BLOOD BY AUTOMATED COUNT: 13.6 % (ref 12.3–15.4)
FERRITIN SERPL-MCNC: 128.6 NG/ML (ref 30–400)
GLOBULIN UR ELPH-MCNC: 2.7 GM/DL
GLUCOSE SERPL-MCNC: 100 MG/DL (ref 65–99)
HCT VFR BLD AUTO: 40 % (ref 37.5–51)
HGB BLD-MCNC: 13.2 G/DL (ref 13–17.7)
IMM GRANULOCYTES # BLD AUTO: 0.02 10*3/MM3 (ref 0–0.05)
IMM GRANULOCYTES NFR BLD AUTO: 0.3 % (ref 0–0.5)
IRON 24H UR-MRATE: 86 MCG/DL (ref 59–158)
IRON SATN MFR SERPL: 24 % (ref 20–50)
LYMPHOCYTES # BLD AUTO: 1.16 10*3/MM3 (ref 0.7–3.1)
LYMPHOCYTES NFR BLD AUTO: 19.3 % (ref 19.6–45.3)
MCH RBC QN AUTO: 34.1 PG (ref 26.6–33)
MCHC RBC AUTO-ENTMCNC: 33 G/DL (ref 31.5–35.7)
MCV RBC AUTO: 103.4 FL (ref 79–97)
MONOCYTES # BLD AUTO: 0.42 10*3/MM3 (ref 0.1–0.9)
MONOCYTES NFR BLD AUTO: 7 % (ref 5–12)
NEUTROPHILS NFR BLD AUTO: 4.25 10*3/MM3 (ref 1.7–7)
NEUTROPHILS NFR BLD AUTO: 70.7 % (ref 42.7–76)
NRBC BLD AUTO-RTO: 0 /100 WBC (ref 0–0.2)
PLATELET # BLD AUTO: 183 10*3/MM3 (ref 140–450)
PMV BLD AUTO: 10.6 FL (ref 6–12)
POTASSIUM SERPL-SCNC: 4.5 MMOL/L (ref 3.5–5.2)
PROT SERPL-MCNC: 6.8 G/DL (ref 6–8.5)
RBC # BLD AUTO: 3.87 10*6/MM3 (ref 4.14–5.8)
SODIUM SERPL-SCNC: 142 MMOL/L (ref 136–145)
TIBC SERPL-MCNC: 362 MCG/DL (ref 298–536)
TRANSFERRIN SERPL-MCNC: 243 MG/DL (ref 200–360)
VIT B12 BLD-MCNC: 1025 PG/ML (ref 211–946)
WBC NRBC COR # BLD AUTO: 6.01 10*3/MM3 (ref 3.4–10.8)

## 2024-10-22 PROCEDURE — 82728 ASSAY OF FERRITIN: CPT

## 2024-10-22 PROCEDURE — 82607 VITAMIN B-12: CPT

## 2024-10-22 PROCEDURE — 84466 ASSAY OF TRANSFERRIN: CPT

## 2024-10-22 PROCEDURE — 36415 COLL VENOUS BLD VENIPUNCTURE: CPT

## 2024-10-22 PROCEDURE — 83540 ASSAY OF IRON: CPT

## 2024-10-22 PROCEDURE — 85025 COMPLETE CBC W/AUTO DIFF WBC: CPT

## 2024-10-22 PROCEDURE — 80053 COMPREHEN METABOLIC PANEL: CPT

## 2024-11-05 ENCOUNTER — CLINICAL SUPPORT (OUTPATIENT)
Dept: FAMILY MEDICINE CLINIC | Facility: CLINIC | Age: 84
End: 2024-11-05
Payer: MEDICARE

## 2024-11-05 ENCOUNTER — TELEPHONE (OUTPATIENT)
Dept: ONCOLOGY | Facility: CLINIC | Age: 84
End: 2024-11-05
Payer: MEDICARE

## 2024-11-05 DIAGNOSIS — E53.8 B12 DEFICIENCY: Primary | ICD-10-CM

## 2024-11-05 PROCEDURE — 96372 THER/PROPH/DIAG INJ SC/IM: CPT | Performed by: NURSE PRACTITIONER

## 2024-11-05 RX ORDER — CYANOCOBALAMIN 1000 UG/ML
1000 INJECTION, SOLUTION INTRAMUSCULAR; SUBCUTANEOUS ONCE
Status: COMPLETED | OUTPATIENT
Start: 2024-11-05 | End: 2024-11-05

## 2024-11-05 RX ORDER — CYANOCOBALAMIN 1000 UG/ML
1000 INJECTION, SOLUTION INTRAMUSCULAR; SUBCUTANEOUS ONCE
OUTPATIENT
Start: 2024-11-28

## 2024-11-05 RX ADMIN — CYANOCOBALAMIN 1000 MCG: 1000 INJECTION, SOLUTION INTRAMUSCULAR; SUBCUTANEOUS at 09:51

## 2024-11-05 NOTE — TELEPHONE ENCOUNTER
Caller: Jeff Alatorre    Relationship: Self    Best call back number: [phone]    What is the best time to reach you: ANY    Who are you requesting to speak with (clinical staff, provider,  specific staff member): CLINICAL     What was the call regarding: JEFF IS CALLING WANTING TO DISCUSS DOING A PILL INSTEAD OF THE B 12 INJECTION OR IF HE DOESN'T NEED TO TAKE THE B 12 THAT'S OK TO   HE [place] AND WOULD LIKE TO DISCUSS WITH ALONZO      PLEASE ADVISE

## 2024-11-06 RX ORDER — UREA 10 %
500 LOTION (ML) TOPICAL DAILY
Qty: 90 TABLET | Refills: 1 | Status: SHIPPED | OUTPATIENT
Start: 2024-11-06

## 2024-11-19 ENCOUNTER — OFFICE VISIT (OUTPATIENT)
Dept: FAMILY MEDICINE CLINIC | Facility: CLINIC | Age: 84
End: 2024-11-19
Payer: MEDICARE

## 2024-11-19 VITALS
HEIGHT: 69 IN | WEIGHT: 187 LBS | TEMPERATURE: 97 F | HEART RATE: 63 BPM | DIASTOLIC BLOOD PRESSURE: 48 MMHG | OXYGEN SATURATION: 96 % | BODY MASS INDEX: 27.7 KG/M2 | RESPIRATION RATE: 12 BRPM | SYSTOLIC BLOOD PRESSURE: 100 MMHG

## 2024-11-19 DIAGNOSIS — R19.8 ALTERNATING CONSTIPATION AND DIARRHEA: Primary | ICD-10-CM

## 2024-11-19 DIAGNOSIS — D64.89 OTHER SPECIFIED ANEMIAS: ICD-10-CM

## 2024-11-19 DIAGNOSIS — I10 PRIMARY HYPERTENSION: ICD-10-CM

## 2024-11-19 PROCEDURE — 1126F AMNT PAIN NOTED NONE PRSNT: CPT | Performed by: STUDENT IN AN ORGANIZED HEALTH CARE EDUCATION/TRAINING PROGRAM

## 2024-11-19 PROCEDURE — G2211 COMPLEX E/M VISIT ADD ON: HCPCS | Performed by: STUDENT IN AN ORGANIZED HEALTH CARE EDUCATION/TRAINING PROGRAM

## 2024-11-19 PROCEDURE — 3078F DIAST BP <80 MM HG: CPT | Performed by: STUDENT IN AN ORGANIZED HEALTH CARE EDUCATION/TRAINING PROGRAM

## 2024-11-19 PROCEDURE — 99214 OFFICE O/P EST MOD 30 MIN: CPT | Performed by: STUDENT IN AN ORGANIZED HEALTH CARE EDUCATION/TRAINING PROGRAM

## 2024-11-19 PROCEDURE — 3074F SYST BP LT 130 MM HG: CPT | Performed by: STUDENT IN AN ORGANIZED HEALTH CARE EDUCATION/TRAINING PROGRAM

## 2024-11-19 NOTE — PROGRESS NOTES
Chief Complaint  bowel issues (Constipation, diarrhea, uncontrollable at times/)    Subjective        Jeff Alatorre presents to [facility]    HPI    GI symptoms  -Patient states for the last 4 weeks he has been having alternating diarrhea and constipation episodes, relatively unpredictable.  He has also had lower blood pressures and orthostatic symptoms since his heart surgery.  No fever, blood in stool.      Past Medical History:   Diagnosis Date    Arthritis     Chronic kidney disease     Coronary artery disease of native artery of native heart with stable angina pectoris 05/23/2024    Erectile dysfunction     Gout     Heart valve disease     4 bypass surgery    History of diverticulitis     HTN (hypertension)     Hx of adenomatous colonic polyps 10/13/2020    Hypercholesteremia     Kidney stone     Low testosterone     PAF (paroxysmal atrial fibrillation) 07/01/2024    Polycythemia vera 10/13/2020    Squamous cell carcinoma of skin 10/2021    top of head    Type 2 diabetes mellitus without complication, without long-term current use of insulin 12/01/2022     Past Surgical History:   Procedure Laterality Date    CARDIAC CATHETERIZATION Left 05/20/2024    Procedure: Coronary angiography;  Surgeon: Zaid Contreras MD;  Location: [facility] CATH INVASIVE LOCATION;  Service: Cardiology;  Laterality: Left;    CARDIAC CATHETERIZATION Left 05/20/2024    Procedure: Percutaneous Coronary Intervention;  Surgeon: Zaid Contreras MD;  Location: [facility] CATH INVASIVE LOCATION;  Service: Cardiology;  Laterality: Left;    CAROTID ENDARTERECTOMY Left     CATARACT EXTRACTION, BILATERAL      COLON SURGERY      COLONOSCOPY      COLONOSCOPY N/A 07/27/2021    Procedure: COLONOSCOPY WITH ANESTHESIA;  Surgeon: Luciano Alatorre DO;  Location: [facility] ENDOSCOPY;  Service: Gastroenterology;  Laterality: N/A;  preop; hx of polyps  postop; diverticulosis   PCP Devon Moore     CORONARY ARTERY BYPASS  "GRAFT N/A 2024    Procedure: CORONARY ARTERY BYPASS GRAFTING x4, LEFT INTERNAL MAMMARY ARTERY GRAFT, RIGHT LEG ENDOSCOPIC VEIN HARVEST, TRANSESOPHAGEAL ECHOCARDIOGRAM, APPLICATION OF PREVENA;  Surgeon: Jose F Kim MD;  Location:  [facility] OR;  Service: Cardiothoracic;  Laterality: N/A;    PENILE PROSTHESIS IMPLANT N/A 2023    Procedure: 3-PIECE INFLATABLE PENILE PROSTHESIS PLACEMENT;  Surgeon: Garcia Santana MD;  Location:  [facility] OR;  Service: Urology;  Laterality: N/A;    VASECTOMY       Social History     Socioeconomic History    Marital status: Single   Tobacco Use    Smoking status: Former     Current packs/day: 0.00     Average packs/day: 1 pack/day for 40.0 years (40.0 ttl pk-yrs)     Types: Cigarettes     Start date:      Quit date:      Years since quittin.9     Passive exposure: Past    Smokeless tobacco: Never    Tobacco comments:     N/A   Vaping Use    Vaping status: Never Used   Substance and Sexual Activity    Alcohol use: Not Currently     Comment: Quite 20+ yrs ago    Drug use: Never    Sexual activity: Yes     Partners: Female     Birth control/protection: Vasectomy       Objective   Vital Signs:  /48   Pulse 63   Temp 97 °F (36.1 °C) (Temporal)   Resp 12   Ht 174 cm (68.5\")   Wt 84.8 kg (187 lb)   SpO2 96%   BMI 28.02 kg/m²   Estimated body mass index is 28.02 kg/m² as calculated from the following:    Height as of this encounter: 174 cm (68.5\").    Weight as of this encounter: 84.8 kg (187 lb).              Physical Exam  Vitals reviewed.   Constitutional:       Appearance: Normal appearance.   HENT:      Head: Normocephalic.      Nose: Nose normal.      Mouth/Throat:      Mouth: Mucous membranes are moist.   Eyes:      Extraocular Movements: Extraocular movements intact.   Cardiovascular:      Rate and Rhythm: Normal rate and regular rhythm.      Heart sounds: Normal heart sounds.   Pulmonary:      Effort: Pulmonary effort is normal.      Breath " sounds: Normal breath sounds.   Musculoskeletal:         General: Normal range of motion.      Cervical back: Normal range of motion.   Skin:     General: Skin is warm and dry.   Neurological:      General: No focal deficit present.      Mental Status: He is alert and oriented to person, place, and time.   Psychiatric:         Mood and Affect: Mood normal.         Behavior: Behavior normal.        Result Review :                   Assessment and Plan   Diagnoses and all orders for this visit:    1. Alternating constipation and diarrhea (Primary)  -Rule out infection, otherwise resembles mixed IBS symptoms.  For now recommend increase hydration, particularly for blood pressure, as well as start daily fiber supplement.  -     Gastrointestinal Panel, PCR - Stool, Per Rectum; Future    2. Primary hypertension  -DC losartan, will monitor.    3. Other specified anemias  -     Gastrointestinal Panel, PCR - Stool, Per Rectum; Future       EMR Dragon/Transcription disclaimer:   Much of this encounter note is an electronic transcription/translation of spoken language to printed text. The electronic translation of spoken language may permit erroneous, or at times, nonsensical words or phrases to be inadvertently transcribed; although attempts have made to review the note for such errors, some may still exist. Please excuse any unrecognized transcription errors and contact us if the air is unintelligible or needs documented correction. Also, portions of this note have been copied forward, however, changed to reflect the most current clinical status of this patient.  Follow Up   Return in about 6 weeks (around 12/31/2024).  Patient was given instructions and counseling regarding his condition or for health maintenance advice. Please see specific information pulled into the AVS if appropriate.

## 2024-11-20 ENCOUNTER — LAB (OUTPATIENT)
Dept: LAB | Facility: HOSPITAL | Age: 84
End: 2024-11-20
Payer: MEDICARE

## 2024-11-20 DIAGNOSIS — D64.89 OTHER SPECIFIED ANEMIAS: ICD-10-CM

## 2024-11-20 DIAGNOSIS — R19.8 ALTERNATING CONSTIPATION AND DIARRHEA: ICD-10-CM

## 2024-11-20 LAB

## 2024-11-20 PROCEDURE — 87507 IADNA-DNA/RNA PROBE TQ 12-25: CPT

## 2024-12-04 ENCOUNTER — TELEPHONE (OUTPATIENT)
Dept: FAMILY MEDICINE CLINIC | Facility: CLINIC | Age: 84
End: 2024-12-04
Payer: MEDICARE

## 2024-12-04 NOTE — TELEPHONE ENCOUNTER
----- Message from Zonia Malcolm sent at 12/4/2024 10:34 AM CST -----  GI panel is negatie; symptoms are likely related to IBS. Please see how he is doing since starting Fiber supplement Dr. Curtis recommended.

## 2024-12-31 ENCOUNTER — OFFICE VISIT (OUTPATIENT)
Dept: FAMILY MEDICINE CLINIC | Facility: CLINIC | Age: 84
End: 2024-12-31
Payer: MEDICARE

## 2024-12-31 VITALS
DIASTOLIC BLOOD PRESSURE: 64 MMHG | HEART RATE: 64 BPM | BODY MASS INDEX: 27.7 KG/M2 | SYSTOLIC BLOOD PRESSURE: 108 MMHG | OXYGEN SATURATION: 98 % | HEIGHT: 69 IN | TEMPERATURE: 98.1 F | WEIGHT: 187 LBS | RESPIRATION RATE: 12 BRPM

## 2024-12-31 DIAGNOSIS — K58.0 IRRITABLE BOWEL SYNDROME WITH DIARRHEA: Primary | ICD-10-CM

## 2024-12-31 DIAGNOSIS — I10 PRIMARY HYPERTENSION: ICD-10-CM

## 2024-12-31 DIAGNOSIS — L81.9 ATYPICAL PIGMENTED SKIN LESION: ICD-10-CM

## 2024-12-31 PROCEDURE — 1126F AMNT PAIN NOTED NONE PRSNT: CPT | Performed by: STUDENT IN AN ORGANIZED HEALTH CARE EDUCATION/TRAINING PROGRAM

## 2024-12-31 PROCEDURE — 99214 OFFICE O/P EST MOD 30 MIN: CPT | Performed by: STUDENT IN AN ORGANIZED HEALTH CARE EDUCATION/TRAINING PROGRAM

## 2024-12-31 PROCEDURE — 3078F DIAST BP <80 MM HG: CPT | Performed by: STUDENT IN AN ORGANIZED HEALTH CARE EDUCATION/TRAINING PROGRAM

## 2024-12-31 PROCEDURE — 3074F SYST BP LT 130 MM HG: CPT | Performed by: STUDENT IN AN ORGANIZED HEALTH CARE EDUCATION/TRAINING PROGRAM

## 2024-12-31 PROCEDURE — G2211 COMPLEX E/M VISIT ADD ON: HCPCS | Performed by: STUDENT IN AN ORGANIZED HEALTH CARE EDUCATION/TRAINING PROGRAM

## 2024-12-31 RX ORDER — LOPERAMIDE HYDROCHLORIDE 2 MG/1
2 TABLET ORAL 4 TIMES DAILY PRN
Qty: 120 TABLET | Refills: 2 | Status: SHIPPED | OUTPATIENT
Start: 2024-12-31

## 2024-12-31 NOTE — PROGRESS NOTES
Chief Complaint  blood pressure    Subjective        Jeff Alatorre presents to [facility]    HPI    History of Present Illness  The patient is an 84-year-old male who presents for evaluation of diarrhea, actinic keratosis, and blood pressure management.    He reports a decrease in his blood pressure readings, which are not consistently low but show a general downward trend. He expresses concern about the potential for his blood pressure to rise again. He also notes a persistent feeling of fatigue, although it is less severe than before. He continues to engage in physical activity, including cycling for 30 minutes on Mondays, Wednesdays, and Fridays, covering a distance of 5 miles each session.    He has been experiencing intermittent diarrhea, discussed at previous visit, which he describes as improved but not yet resolved with fiber supplementation. His bowel movements are inconsistent, with some mornings passing a normal stool, while other days he experiences diarrhea in the afternoon. He does not experience any associated pain. He admits to consuming a significant amount of candy and coffee, approximately 6 to 8 ounces per day. He also reports that dairy products tend to cause him discomfort. He has been managing this with fiber supplements, taking 1 to 2 pills as needed. He underwent a colonoscopy approximately 4 years ago, which did not reveal any abnormalities.    He has a history of actinic keratosis on his forehead, which was previously treated with cryotherapy. He was informed that the condition might recur, and indeed, he has noticed a new lesion on his scalp. He has been picking at this lesion, which has remained unchanged in size but has become irritating due to frequent scratching.    FAMILY HISTORY  The patient has a family history of heart disease.    Past Medical History:   Diagnosis Date    Arthritis     Chronic kidney disease     Coronary artery disease of  native artery of native heart with stable angina pectoris 05/23/2024    Erectile dysfunction     Gout     Heart valve disease     4 bypass surgery    History of diverticulitis     HTN (hypertension)     Hx of adenomatous colonic polyps 10/13/2020    Hypercholesteremia     Kidney stone     Low testosterone     PAF (paroxysmal atrial fibrillation) 07/01/2024    Polycythemia vera 10/13/2020    Squamous cell carcinoma of skin 10/2021    top of head    Type 2 diabetes mellitus without complication, without long-term current use of insulin 12/01/2022     Past Surgical History:   Procedure Laterality Date    CARDIAC CATHETERIZATION Left 05/20/2024    Procedure: Coronary angiography;  Surgeon: Zaid Contreras MD;  Location:  [facility] CATH INVASIVE LOCATION;  Service: Cardiology;  Laterality: Left;    CARDIAC CATHETERIZATION Left 05/20/2024    Procedure: Percutaneous Coronary Intervention;  Surgeon: Zaid Contreras MD;  Location:  [facility] CATH INVASIVE LOCATION;  Service: Cardiology;  Laterality: Left;    CAROTID ENDARTERECTOMY Left     CATARACT EXTRACTION, BILATERAL      COLON SURGERY      COLONOSCOPY      COLONOSCOPY N/A 07/27/2021    Procedure: COLONOSCOPY WITH ANESTHESIA;  Surgeon: Luciano Alatorre DO;  Location: [facility] ENDOSCOPY;  Service: Gastroenterology;  Laterality: N/A;  preop; hx of polyps  postop; diverticulosis   PCP Devon Moore     CORONARY ARTERY BYPASS GRAFT N/A 06/25/2024    Procedure: CORONARY ARTERY BYPASS GRAFTING x4, LEFT INTERNAL MAMMARY ARTERY GRAFT, RIGHT LEG ENDOSCOPIC VEIN HARVEST, TRANSESOPHAGEAL ECHOCARDIOGRAM, APPLICATION OF PREVENA;  Surgeon: Jose F Kim MD;  Location:  [facility] OR;  Service: Cardiothoracic;  Laterality: N/A;    PENILE PROSTHESIS IMPLANT N/A 03/14/2023    Procedure: 3-PIECE INFLATABLE PENILE PROSTHESIS PLACEMENT;  Surgeon: Garcia Santana MD;  Location:  [facility] OR;  Service: Urology;  Laterality: N/A;    VASECTOMY       Social History     Socioeconomic History    Marital  "status: Single   Tobacco Use    Smoking status: Former     Current packs/day: 0.00     Average packs/day: 1 pack/day for 40.0 years (40.0 ttl pk-yrs)     Types: Cigarettes     Start date:      Quit date:      Years since quittin.0     Passive exposure: Past    Smokeless tobacco: Never    Tobacco comments:     N/A   Vaping Use    Vaping status: Never Used   Substance and Sexual Activity    Alcohol use: Not Currently     Comment: Quite 20+ yrs ago    Drug use: Never    Sexual activity: Yes     Partners: Female     Birth control/protection: Vasectomy       Objective   Vital Signs:  /64   Pulse 64   Temp 98.1 °F (36.7 °C) (Temporal)   Resp 12   Ht 174 cm (68.5\")   Wt 84.8 kg (187 lb)   SpO2 98%   BMI 28.02 kg/m²   Estimated body mass index is 28.02 kg/m² as calculated from the following:    Height as of this encounter: 174 cm (68.5\").    Weight as of this encounter: 84.8 kg (187 lb).       BMI is >= 25 and <30. (Overweight) The following options were offered after discussion;: exercise counseling/recommendations and nutrition counseling/recommendations      Physical Exam  Vitals reviewed.   Constitutional:       Appearance: Normal appearance.   HENT:      Head: Normocephalic.        Comments: ~0.5 cm fairly well circumscribed raised lesion on R forehead. Appears excoriated, pearly borders.     Nose: Nose normal.      Mouth/Throat:      Mouth: Mucous membranes are moist.   Eyes:      Extraocular Movements: Extraocular movements intact.   Cardiovascular:      Rate and Rhythm: Normal rate and regular rhythm.      Heart sounds: Normal heart sounds.   Pulmonary:      Effort: Pulmonary effort is normal.      Breath sounds: Normal breath sounds.   Musculoskeletal:         General: Normal range of motion.      Cervical back: Normal range of motion.   Skin:     General: Skin is warm and dry.   Neurological:      General: No focal deficit present.      Mental Status: He is alert and oriented to person, " place, and time.   Psychiatric:         Mood and Affect: Mood normal.         Behavior: Behavior normal.          Result Review :        Results               Assessment and Plan   Diagnoses and all orders for this visit:    1. Irritable bowel syndrome with diarrhea (Primary)  -Recommend treating diarrheal component with prn imodium 30 -45 min prior to 1-2 meals per day. Continue w/ fiber supplementation.  -     loperamide (Imodium A-D) 2 MG tablet; Take 1 tablet by mouth 4 (Four) Times a Day As Needed for Diarrhea.  Dispense: 120 tablet; Refill: 2    2. Atypical pigmented skin lesion  -I have concern for BCC, recommend he have biopsy performed by dermatologist given location.  -     Ambulatory Referral to Dermatology    3. Primary hypertension  -Controlled.  Continue w/ toprol-xl 25 mg for CAD           EMR Dragon/Transcription disclaimer:   Much of this encounter note is an electronic transcription/translation of spoken language to printed text. The electronic translation of spoken language may permit erroneous, or at times, nonsensical words or phrases to be inadvertently transcribed; although attempts have made to review the note for such errors, some may still exist. Please excuse any unrecognized transcription errors and contact us if the air is unintelligible or needs documented correction. Also, portions of this note have been copied forward, however, changed to reflect the most current clinical status of this patient.  Follow Up   No follow-ups on file.    Patient or patient representative verbalized consent for the use of Ambient Listening during the visit with  Jimmy Curtis MD for chart documentation. 1/6/2025  15:16 CST    Patient was given instructions and counseling regarding his condition or for health maintenance advice. Please see specific information pulled into the AVS if appropriate.

## 2025-01-08 ENCOUNTER — OFFICE VISIT (OUTPATIENT)
Dept: ONCOLOGY | Facility: CLINIC | Age: 85
End: 2025-01-08
Payer: MEDICARE

## 2025-01-08 ENCOUNTER — LAB (OUTPATIENT)
Dept: LAB | Facility: HOSPITAL | Age: 85
End: 2025-01-08
Payer: MEDICARE

## 2025-01-08 VITALS
DIASTOLIC BLOOD PRESSURE: 82 MMHG | SYSTOLIC BLOOD PRESSURE: 122 MMHG | HEART RATE: 72 BPM | RESPIRATION RATE: 16 BRPM | TEMPERATURE: 97.2 F | WEIGHT: 184.8 LBS | HEIGHT: 69 IN | BODY MASS INDEX: 27.37 KG/M2 | OXYGEN SATURATION: 97 %

## 2025-01-08 DIAGNOSIS — D64.9 ANEMIA, UNSPECIFIED TYPE: ICD-10-CM

## 2025-01-08 DIAGNOSIS — E53.8 B12 DEFICIENCY: ICD-10-CM

## 2025-01-08 DIAGNOSIS — E61.1 IRON DEFICIENCY: Primary | ICD-10-CM

## 2025-01-08 DIAGNOSIS — D63.1 ANEMIA DUE TO STAGE 3A CHRONIC KIDNEY DISEASE: ICD-10-CM

## 2025-01-08 DIAGNOSIS — N18.31 ANEMIA DUE TO STAGE 3A CHRONIC KIDNEY DISEASE: ICD-10-CM

## 2025-01-08 DIAGNOSIS — E61.1 IRON DEFICIENCY: ICD-10-CM

## 2025-01-08 DIAGNOSIS — N18.2 STAGE 2 CHRONIC KIDNEY DISEASE: Primary | ICD-10-CM

## 2025-01-08 LAB
ALBUMIN SERPL-MCNC: 4.4 G/DL (ref 3.5–5.2)
ALBUMIN/GLOB SERPL: 1.7 G/DL
ALP SERPL-CCNC: 149 U/L (ref 39–117)
ALT SERPL W P-5'-P-CCNC: 15 U/L (ref 1–41)
ANION GAP SERPL CALCULATED.3IONS-SCNC: 13 MMOL/L (ref 5–15)
AST SERPL-CCNC: 18 U/L (ref 1–40)
BASOPHILS # BLD AUTO: 0.06 10*3/MM3 (ref 0–0.2)
BASOPHILS NFR BLD AUTO: 0.9 % (ref 0–1.5)
BILIRUB SERPL-MCNC: 0.6 MG/DL (ref 0–1.2)
BUN SERPL-MCNC: 23 MG/DL (ref 8–23)
BUN/CREAT SERPL: 20 (ref 7–25)
CALCIUM SPEC-SCNC: 9.9 MG/DL (ref 8.6–10.5)
CHLORIDE SERPL-SCNC: 102 MMOL/L (ref 98–107)
CO2 SERPL-SCNC: 25 MMOL/L (ref 22–29)
CREAT SERPL-MCNC: 1.15 MG/DL (ref 0.76–1.27)
DEPRECATED RDW RBC AUTO: 50.5 FL (ref 37–54)
EGFRCR SERPLBLD CKD-EPI 2021: 62.8 ML/MIN/1.73
EOSINOPHIL # BLD AUTO: 0.15 10*3/MM3 (ref 0–0.4)
EOSINOPHIL NFR BLD AUTO: 2.2 % (ref 0.3–6.2)
ERYTHROCYTE [DISTWIDTH] IN BLOOD BY AUTOMATED COUNT: 13.8 % (ref 12.3–15.4)
FERRITIN SERPL-MCNC: 160.6 NG/ML (ref 30–400)
GLOBULIN UR ELPH-MCNC: 2.6 GM/DL
GLUCOSE SERPL-MCNC: 142 MG/DL (ref 65–99)
HCT VFR BLD AUTO: 41.5 % (ref 37.5–51)
HGB BLD-MCNC: 13.4 G/DL (ref 13–17.7)
HOLD SPECIMEN: NORMAL
IMM GRANULOCYTES # BLD AUTO: 0.02 10*3/MM3 (ref 0–0.05)
IMM GRANULOCYTES NFR BLD AUTO: 0.3 % (ref 0–0.5)
IRON 24H UR-MRATE: 87 MCG/DL (ref 59–158)
IRON SATN MFR SERPL: 24 % (ref 20–50)
LYMPHOCYTES # BLD AUTO: 1.35 10*3/MM3 (ref 0.7–3.1)
LYMPHOCYTES NFR BLD AUTO: 19.6 % (ref 19.6–45.3)
MCH RBC QN AUTO: 32.8 PG (ref 26.6–33)
MCHC RBC AUTO-ENTMCNC: 32.3 G/DL (ref 31.5–35.7)
MCV RBC AUTO: 101.5 FL (ref 79–97)
MONOCYTES # BLD AUTO: 0.47 10*3/MM3 (ref 0.1–0.9)
MONOCYTES NFR BLD AUTO: 6.8 % (ref 5–12)
NEUTROPHILS NFR BLD AUTO: 4.84 10*3/MM3 (ref 1.7–7)
NEUTROPHILS NFR BLD AUTO: 70.2 % (ref 42.7–76)
NRBC BLD AUTO-RTO: 0 /100 WBC (ref 0–0.2)
PLATELET # BLD AUTO: 189 10*3/MM3 (ref 140–450)
PMV BLD AUTO: 10.9 FL (ref 6–12)
POTASSIUM SERPL-SCNC: 4.7 MMOL/L (ref 3.5–5.2)
PROT SERPL-MCNC: 7 G/DL (ref 6–8.5)
RBC # BLD AUTO: 4.09 10*6/MM3 (ref 4.14–5.8)
SODIUM SERPL-SCNC: 140 MMOL/L (ref 136–145)
TIBC SERPL-MCNC: 367 MCG/DL (ref 298–536)
TRANSFERRIN SERPL-MCNC: 246 MG/DL (ref 200–360)
WBC NRBC COR # BLD AUTO: 6.89 10*3/MM3 (ref 3.4–10.8)

## 2025-01-08 PROCEDURE — 84466 ASSAY OF TRANSFERRIN: CPT

## 2025-01-08 PROCEDURE — 82607 VITAMIN B-12: CPT

## 2025-01-08 PROCEDURE — 99214 OFFICE O/P EST MOD 30 MIN: CPT | Performed by: NURSE PRACTITIONER

## 2025-01-08 PROCEDURE — 1126F AMNT PAIN NOTED NONE PRSNT: CPT | Performed by: NURSE PRACTITIONER

## 2025-01-08 PROCEDURE — 85025 COMPLETE CBC W/AUTO DIFF WBC: CPT

## 2025-01-08 PROCEDURE — 36415 COLL VENOUS BLD VENIPUNCTURE: CPT

## 2025-01-08 PROCEDURE — 82728 ASSAY OF FERRITIN: CPT

## 2025-01-08 PROCEDURE — 83540 ASSAY OF IRON: CPT

## 2025-01-08 PROCEDURE — 3079F DIAST BP 80-89 MM HG: CPT | Performed by: NURSE PRACTITIONER

## 2025-01-08 PROCEDURE — 80053 COMPREHEN METABOLIC PANEL: CPT

## 2025-01-08 PROCEDURE — 3074F SYST BP LT 130 MM HG: CPT | Performed by: NURSE PRACTITIONER

## 2025-01-08 NOTE — PROGRESS NOTES
[facility] H[facility]ATOLOGY & ONCOL[facility]Y [address]  [facility] [address] [phone]    Patient Name: Jeff Alatorre  Encounter Date: 01/08/2025   YOB: 1940  Patient Number: [id number]    Hematology / Oncology Progress Note    HPI / REASON FOR VISIT: Jeff Alatorre is a 84 y.o. male who is followed by this office for polycythemia which was diagnosed many years ago and believed to be related to testosterone injections.  Bone marrow biopsy in either 2012 or 2013 which showed 55-65% cellularity, prominent erythroid hyperplasia with no simultaneous increases that are dyspoietic myeloid and megakaryocytic series (there was an isolated erythroid hyperplasia).  The flow cytometry and cytogenetics were unrevealing.  This appeared to be more of a reactive erythrocytosis rather than a myeloproliferative process.     He lost follow up from April 26, 2022 and returned on April 26. 2023.   He states that he had operation on 3/14/23 and penile implant was placed.  He has recovered well from that surgery but has had some anemia recently.  Hgb on 03/07 was 12.4.  On 04/17/23, Hgb was 9.9.  Records indicate that patient did not loose any significant amount of blood during his surgery.  He was started on oral iron on April 17.  Stool was normal prior to iron but then turned dark which is an expected findings.  Colonoscopy was good 2021.  He denies blood in stool or urine.      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       June 2024 CABG x 4.         INTERVAL HISTORY      History of Present Illness  The patient presents for evaluation of anemia, chronic kidney disease, and basal cell carcinoma.    He has been managing his anemia with a weekly regimen of iron supplements.    He underwent quadruple bypass surgery on 06/27/2024 and subsequently participated in cardiac rehabilitation, which he has since  completed.    He has a history of basal cell carcinoma, which was previously excised. He reports a recurrence of the condition but has not yet scheduled an appointment with a dermatologist.    He is currently on a regimen of vitamin B12 tablets, having transitioned from injections due to administrative complications at the clinic.             LABS    Lab Results - Last 18 Months   Lab Units 01/08/25  1314 10/22/24  0918 10/07/24  1547 09/03/24  1006 07/23/24  0920 07/01/24  0748 06/30/24  0804 06/29/24  0846 06/28/24  0352   HEMOGLOBIN g/dL 13.4 13.2 13.2 12.1* 12.2* 11.8* 11.1*   < > 9.9*   HEMATOCRIT % 41.5 40.0 40.2 38.0 38.2 36.3* 34.3*   < > 30.4*   MCV fL 101.5* 103.4* 101.5* 104.1* 100.8* 96.5 95.5   < > 96.2   WBC 10*3/mm3 6.89 6.01 6.80 6.79 7.94 8.04 7.83   < > 8.81   RDW % 13.8 13.6 13.9 15.9* 17.0* 16.3* 16.2*   < > 16.7*   MPV fL 10.9 10.6 9.5 10.4 10.3 10.4 10.3   < > 10.5   PLATELETS 10*3/mm3 189 183 198 176 264 178 159   < > 101*   IMM GRAN % % 0.3 0.3  --  0.3 0.6*  --  0.5  --  0.6*   NEUTROS ABS 10*3/mm3 4.84 4.25 4.70 5.06 5.69  --  6.02  --  7.06*   LYMPHS ABS 10*3/mm3 1.35 1.16 1.50 1.02 1.14  --  0.87  --  0.83   MONOS ABS 10*3/mm3 0.47 0.42  --  0.51 0.56  --  0.57  --  0.64   EOS ABS 10*3/mm3 0.15 0.13  --  0.15 0.46*  --  0.30  --  0.21   BASOS ABS 10*3/mm3 0.06 0.03  --  0.03 0.04  --  0.03  --  0.02   IMMATURE GRANS (ABS) 10*3/mm3 0.02 0.02  --  0.02 0.05  --  0.04  --  0.05   NRBC /100 WBC 0.0 0.0  --  0.0 0.0  --  0.0  --  0.0    < > = values in this interval not displayed.       Lab Results - Last 18 Months   Lab Units 01/08/25  1314 10/22/24  0918 10/07/24  1547 09/03/24  1006 07/23/24  0920 07/17/24  1155 06/28/24  1447 06/28/24  0352   GLUCOSE mg/dL 142* 100* 112* 147* 124* 112*   < > 135*   SODIUM mmol/L 140 142 140 140 139 139   < > 139   POTASSIUM mmol/L 4.7 4.5 4.4 4.4 5.0 5.2   < > 4.0   CO2 mmol/L 25.0 25.0 22.0* 26.0 25.0 25.0   < > 22.0   CHLORIDE mmol/L 102 105 103 106 103  103   < > 107   ANION GAP mmol/L 13.0 12.0 15.0* 8.0 11.0 11.0   < > 10.0   CREATININE mg/dL 1.15 1.32* 1.41* 1.59* 1.62* 1.79*   < > 1.44*   BUN mg/dL 23 26* 36* 37* 38* 31*   < > 28*   BUN / CREAT RATIO  20.0 19.7 25.5* 23.3 23.5 17.3   < > 19.4   CALCIUM mg/dL 9.9 9.9 9.4 9.5 10.0 9.6   < > 8.4*   ALK PHOS U/L 149* 163* 139* 130* 166*  --   --  73   TOTAL PROTEIN g/dL 7.0 6.8 7.0 6.6 6.9  --   --  5.2*   ALT (SGPT) U/L 15 29 27 12 14  --   --  <5   AST (SGOT) U/L 18 24 29 14 17  --   --  15   BILIRUBIN mg/dL 0.6 0.4 0.5 0.3 0.3  --   --  0.6   ALBUMIN g/dL 4.4 4.1 4.2 4.3 4.0  --   --  3.2*   GLOBULIN gm/dL 2.6 2.7 2.8 2.3 2.9  --   --  2.0    < > = values in this interval not displayed.       Lab Results - Last 18 Months   Lab Units 10/07/24  1547 11/02/23  0945   URIC ACID mg/dL 3.8 3.6*       Lab Results - Last 18 Months   Lab Units 01/08/25  1314 10/22/24  0918 10/07/24  1547 09/03/24  1006 07/23/24  0920 04/30/24  0953 01/30/24  0938 11/02/23  0945   IRON mcg/dL 87 86 48* 69 54* 61   < >  --    TIBC mcg/dL 367 362 358 349 340 413   < >  --    IRON SATURATION (TSAT) % 24 24 13* 20 16* 15*   < >  --    FERRITIN ng/mL 160.60 128.60 170.00 191.60 363.90 86.15   < >  --    TSH uIU/mL  --   --  3.010  --   --   --   --  1.700   FOLATE ng/mL  --   --  11.90  --   --   --   --  8.0    < > = values in this interval not displayed.         PAST MEDICAL HISTORY:  ALLERGIES:  No Known Allergies  CURRENT MEDICATIONS:  Outpatient Encounter Medications as of 1/8/2025   Medication Sig Dispense Refill    acetaminophen (TYLENOL) 500 MG tablet Take 1 tablet by mouth Daily As Needed for Mild Pain.      allopurinol (ZYLOPRIM) 300 MG tablet Take 1 tablet by mouth Daily. 90 tablet 2    aspirin 81 MG EC tablet Take 1 tablet by mouth Daily. 90 tablet 2    atorvastatin (Lipitor) 80 MG tablet Take 1 tablet by mouth Daily. 90 tablet 2    Calcium Polycarbophil (FIBER-CAPS PO) Take 500 mg by mouth 2 (Two) Times a Day.      clopidogrel  (PLAVIX) 75 MG tablet Take 1 tablet by mouth Daily. 90 tablet 2    ferrous sulfate 325 (65 FE) MG tablet Take 1 tablet by mouth Daily With Breakfast. Pt takes every other day 90 tablet 2    loperamide (Imodium A-D) 2 MG tablet Take 1 tablet by mouth 4 (Four) Times a Day As Needed for Diarrhea. 120 tablet 2    metFORMIN (GLUCOPHAGE) 500 MG tablet Take 1 tablet by mouth 2 (Two) Times a Day With Meals. 180 tablet 3    metoprolol succinate XL (TOPROL-XL) 25 MG 24 hr tablet Take 1 tablet by mouth Daily. 90 tablet 2    nitroglycerin (NITROSTAT) 0.4 MG SL tablet Place 1 tablet under the tongue Every 5 (Five) Minutes As Needed for Chest Pain. Take no more than 3 doses in 15 minutes. 90 tablet 1    vitamin B-12 (CYANOCOBALAMIN) 500 MCG tablet Take 1 tablet by mouth Daily. 90 tablet 1     No facility-administered encounter medications on file as of 1/8/2025.     ADULT ILLNESSES:  Patient Active Problem List   Diagnosis Code    Hx of adenomatous colonic polyps Z86.0101    Adenomatous polyps D36.9    Idiopathic gout of multiple sites M10.09    Primary hypertension I10    Type 2 diabetes mellitus without complication, without long-term current use of insulin E11.9    Erectile dysfunction due to arterial insufficiency N52.01    Anemia D64.9    B12 deficiency E53.8    Stage 3a chronic kidney disease (CKD) N18.31    Seasonal allergic rhinitis J30.2    ANYI (obstructive sleep apnea) G47.33    Carotid artery disease I77.9    ROSALES (dyspnea on exertion) R06.09    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    Bilateral carotid artery stenosis I65.23    History of left-sided carotid endarterectomy Z98.890    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    CAD (coronary artery disease) I25.10    Chronic anemia D64.9    PAD (peripheral artery disease) I73.9    PAF (paroxysmal atrial fibrillation) I48.0    Mixed hyperlipidemia E78.2    S/P CABG (coronary artery bypass graft) Z95.1      SURGERIES:  Past Surgical History:   Procedure Laterality Date    CARDIAC CATHETERIZATION Left 05/20/2024    Procedure: Coronary angiography;  Surgeon: Zaid Contreras MD;  Location: [facility] CATH INVASIVE LOCATION;  Service: Cardiology;  Laterality: Left;    CARDIAC CATHETERIZATION Left 05/20/2024    Procedure: Percutaneous Coronary Intervention;  Surgeon: Zaid Contreras MD;  Location:  [facility] CATH INVASIVE LOCATION;  Service: Cardiology;  Laterality: Left;    CAROTID ENDARTERECTOMY Left     CATARACT EXTRACTION, BILATERAL      COLON SURGERY      COLONOSCOPY      COLONOSCOPY N/A 07/27/2021    Procedure: COLONOSCOPY WITH ANESTHESIA;  Surgeon: Lucaino Alatorre DO;  Location: [facility] ENDOSCOPY;  Service: Gastroenterology;  Laterality: N/A;  preop; hx of polyps  postop; diverticulosis   PCP Devon Moore     CORONARY ARTERY BYPASS GRAFT N/A 06/25/2024    Procedure: CORONARY ARTERY BYPASS GRAFTING x4, LEFT INTERNAL MAMMARY ARTERY GRAFT, RIGHT LEG ENDOSCOPIC VEIN HARVEST, TRANSESOPHAGEAL ECHOCARDIOGRAM, APPLICATION OF PREVENA;  Surgeon: Jose F Kim MD;  Location:  [facility] OR;  Service: Cardiothoracic;  Laterality: N/A;    PENILE PROSTHESIS IMPLANT N/A 03/14/2023    Procedure: 3-PIECE INFLATABLE PENILE PROSTHESIS PLACEMENT;  Surgeon: Garcia Santana MD;  Location:  [facility] OR;  Service: Urology;  Laterality: N/A;    VASECTOMY       HEALTH MAINTENANCE ITEMS:  Health Maintenance Due   Topic Date Due    DIABETIC FOOT EXAM  Never done    DIABETIC EYE EXAM  Never done    ZOSTER VACCINE (3 of 3) 12/25/2023    HEMOGLOBIN A1C  04/07/2025       <no information>  Last Completed Colonoscopy            COLORECTAL CANCER SCREENING (COLONOSCOPY - Every 5 Years) Next due on 7/27/2026 07/27/2021  COLONOSCOPY    07/27/2021  Surgical Procedure: COLONOSCOPY    10/23/2020  Cologuard - Stool, Per Rectum    10/16/2020  Cologuard - ,    06/26/2018  COLONOSCOPY (Done)    Only the first 5 history entries have been loaded, but  more history exists.                  Immunization History   Administered Date(s) Administered    Arexvy (RSV, Adults 60+ yrs) 2023    COVID-19 (MODERNA) 12YRS+ (SPIKEVAX) 2024    COVID-19 (MODERNA) 1st,2nd,3rd Dose Monovalent 2021, 2021, 2022    COVID-19 (PFIZER) BIVALENT 12+YRS 2022    COVID-19 (UNSPECIFIED) 10/01/2024    Flu Vaccine Split Quad 2015    Fluad Quad 65+ 10/13/2020    Fluzone (or Fluarix & Flulaval for VFC) >6mos 2015    Fluzone High-Dose 65+YRS 2019    Fluzone High-Dose 65+yrs 10/15/2021, 10/17/2022, 2023, 10/01/2024    Pneumococcal Conjugate 20-Valent (PCV20) 10/17/2022    Shingrix 10/30/2023    Zostavax 2016        SOCIAL HISTORY:  Social History     Socioeconomic History    Marital status: Single   Tobacco Use    Smoking status: Former     Current packs/day: 0.00     Average packs/day: 1 pack/day for 40.0 years (40.0 ttl pk-yrs)     Types: Cigarettes     Start date:      Quit date:      Years since quittin.0     Passive exposure: Past    Smokeless tobacco: Never    Tobacco comments:     N/A   Vaping Use    Vaping status: Never Used   Substance and Sexual Activity    Alcohol use: Not Currently     Comment: Quite 20+ yrs ago    Drug use: Never    Sexual activity: Yes     Partners: Female     Birth control/protection: Vasectomy       REVIEW OF SYSTEMS:  Review of Systems   Constitutional:  Positive for fatigue. Negative for activity change, appetite change, fever, unexpected weight gain and unexpected weight loss.   HENT:  Negative for dental problem, facial swelling, swollen glands and trouble swallowing.    Eyes:  Negative for double vision and discharge.   Respiratory:  Negative for cough, shortness of breath and wheezing.    Cardiovascular:  Negative for chest pain, palpitations and leg swelling.        S/p CABG x 4 on 06/15/24   Gastrointestinal:  Negative for abdominal pain, blood in stool, nausea and vomiting.  "  Endocrine: Negative.    Genitourinary:  Negative for dysuria and hematuria.        Had penile implant 03/14/2023   Musculoskeletal:  Positive for arthralgias. Negative for myalgias.   Skin:  Negative for rash, skin lesions and wound.   Allergic/Immunologic: Negative for immunocompromised state.   Neurological:  Negative for speech difficulty, light-headedness, headache, memory problem and confusion.   Hematological:  Negative for adenopathy.   Psychiatric/Behavioral:  Negative for self-injury, suicidal ideas and depressed mood. The patient is not nervous/anxious.        /82   Pulse 72   Temp 97.2 °F (36.2 °C)   Resp 16   Ht 174 cm (68.5\")   Wt 83.8 kg (184 lb 12.8 oz)   SpO2 97%   BMI 27.69 kg/m²  Body surface area is 1.99 meters squared.    Pain Score    01/08/25 1328   PainSc: 0-No pain         Physical Exam  Constitutional:       Appearance: Normal appearance.   HENT:      Head: Normocephalic and atraumatic.   Cardiovascular:      Rate and Rhythm: Normal rate and regular rhythm.   Pulmonary:      Effort: Pulmonary effort is normal.      Breath sounds: Normal breath sounds.   Abdominal:      General: Bowel sounds are normal.      Palpations: Abdomen is soft.   Musculoskeletal:      Right lower leg: No edema.      Left lower leg: No edema.   Skin:     General: Skin is warm and dry.   Neurological:      Mental Status: He is alert and oriented to person, place, and time.   Psychiatric:         Attention and Perception: Attention normal.         Mood and Affect: Mood normal.         Judgment: Judgment normal.         Jeff Alatorre reports a pain score of 0.  Given his pain assessment as noted, treatment options were discussed and the following options were decided upon as a follow-up plan to address the patient's pain:  No intervention indicated .       ASSESSMENT:   1. Stage 2 chronic kidney disease    2. B12 deficiency    3. Iron deficiency    4. Anemia, unspecified type         Secondary " Polycythemia is no longer a concern.   Assessment & Plan  1. Anemia - Hemoglobin levels have consistently remained above 11 since May 2023, indicating effective management.  - Continue current regimen of iron supplementation once a week.  - Hemoglobin level today is 13.4, so no Retacrit is needed at this time.  -Labs only in 3 months for CBC, CMP, Iron Profile, Ferritin  -Continue Retacrit for Hgb < 11 or Hct < 33 and GFR < 60     2. Chronic kidney disease - Stable with no new symptoms reported.  - Continue current management plan, including regular monitoring of kidney function.     3. Vitamin B12 deficiency - Currently taking a B12 pill instead of receiving B12 shots due to logistical issues.  - Continue with the B12 pill as it is effectively managing the deficiency.    Follow-up  - The patient will follow up in 6 months.              Pt recently had CABG.  Continue follow up with cardiology and cardiothoracic surgery     Continue current medications, treatment plans and follow up with PCP and any other providers.  Care discussed with patient.  Understanding expressed.  Patient agreeable with plan.      Enriqueta Rao, APRN  01/08/2025

## 2025-01-09 ENCOUNTER — TELEPHONE (OUTPATIENT)
Dept: ONCOLOGY | Facility: CLINIC | Age: 85
End: 2025-01-09

## 2025-01-09 LAB — VIT B12 BLD-MCNC: 1544 PG/ML (ref 211–946)

## 2025-01-09 NOTE — TELEPHONE ENCOUNTER
Caller: Jeff Alatorre    Relationship: Self    Best call back number: [phone]    What is the best time to reach you: ANYTIME    Who are you requesting to speak with (clinical staff, provider,  specific staff member): CLINICAL    What was the call regarding: PT RETURNING A MISSED CALL, POSSIBLY REGARDING LAB RESULTS

## 2025-01-17 NOTE — PROGRESS NOTES
Subjective    Mr. Alatorre is 84 y.o. male    Chief Complaint: Kidney stone    History of Present Illness    84-year-old male established patient follow-up for kidney stones, enlarged prostate, history of hematuria.  KUB x-ray done today reviewed by me and discussed with the patient shows stable bilateral nonobstructing kidney stones, largest on the right measures up to 4.7 mm slightly larger from last year.  Cystoscopy for microhematuria 2023 revealed enlarged prostate.   He had placement of Coloplast Titan touch three-piece splittable penile implant 3/14/2022.     I independently visualized and reviewed the patient's prior imaging studies today in clinic and discussed the imaging findings with the patient.    The following portions of the patient's history were reviewed and updated as appropriate: allergies, current medications, past family history, past medical history, past social history, past surgical history and problem list.    Review of Systems      Current Outpatient Medications:     acetaminophen (TYLENOL) 500 MG tablet, Take 1 tablet by mouth Daily As Needed for Mild Pain., Disp: , Rfl:     allopurinol (ZYLOPRIM) 300 MG tablet, Take 1 tablet by mouth Daily., Disp: 90 tablet, Rfl: 2    aspirin 81 MG EC tablet, Take 1 tablet by mouth Daily., Disp: 90 tablet, Rfl: 2    atorvastatin (Lipitor) 80 MG tablet, Take 1 tablet by mouth Daily., Disp: 90 tablet, Rfl: 2    Calcium Polycarbophil (FIBER-CAPS PO), Take 500 mg by mouth 2 (Two) Times a Day., Disp: , Rfl:     clopidogrel (PLAVIX) 75 MG tablet, Take 1 tablet by mouth Daily., Disp: 90 tablet, Rfl: 2    ferrous sulfate 325 (65 FE) MG tablet, Take 1 tablet by mouth Daily With Breakfast. Pt takes every other day, Disp: 90 tablet, Rfl: 2    loperamide (Imodium A-D) 2 MG tablet, Take 1 tablet by mouth 4 (Four) Times a Day As Needed for Diarrhea., Disp: 120 tablet, Rfl: 2    metFORMIN (GLUCOPHAGE) 500 MG tablet, Take 1 tablet by mouth 2 (Two) Times a Day With Meals.,  Disp: 180 tablet, Rfl: 3    metoprolol succinate XL (TOPROL-XL) 25 MG 24 hr tablet, Take 1 tablet by mouth Daily., Disp: 90 tablet, Rfl: 2    nitroglycerin (NITROSTAT) 0.4 MG SL tablet, Place 1 tablet under the tongue Every 5 (Five) Minutes As Needed for Chest Pain. Take no more than 3 doses in 15 minutes., Disp: 90 tablet, Rfl: 1    vitamin B-12 (CYANOCOBALAMIN) 500 MCG tablet, Take 1 tablet by mouth Daily., Disp: 90 tablet, Rfl: 1    Past Medical History:   Diagnosis Date    Arthritis     Chronic kidney disease     Coronary artery disease of native artery of native heart with stable angina pectoris 05/23/2024    Erectile dysfunction     Gout     Heart valve disease     4 bypass surgery    History of diverticulitis     HTN (hypertension)     Hx of adenomatous colonic polyps 10/13/2020    Hypercholesteremia     Kidney stone     Low testosterone     PAF (paroxysmal atrial fibrillation) 07/01/2024    Polycythemia vera 10/13/2020    Squamous cell carcinoma of skin 10/2021    top of head    Type 2 diabetes mellitus without complication, without long-term current use of insulin 12/01/2022       Past Surgical History:   Procedure Laterality Date    CARDIAC CATHETERIZATION Left 05/20/2024    Procedure: Coronary angiography;  Surgeon: Zaid Contreras MD;  Location: [facility] CATH INVASIVE LOCATION;  Service: Cardiology;  Laterality: Left;    CARDIAC CATHETERIZATION Left 05/20/2024    Procedure: Percutaneous Coronary Intervention;  Surgeon: Zaid Contreras MD;  Location: [facility] CATH INVASIVE LOCATION;  Service: Cardiology;  Laterality: Left;    CAROTID ENDARTERECTOMY Left     CATARACT EXTRACTION, BILATERAL      COLON SURGERY      COLONOSCOPY      COLONOSCOPY N/A 07/27/2021    Procedure: COLONOSCOPY WITH ANESTHESIA;  Surgeon: Luciano Alatorre DO;  Location: [facility] ENDOSCOPY;  Service: Gastroenterology;  Laterality: N/A;  preop; hx of polyps  postop; diverticulosis   PCP Devon Moore     CORONARY ARTERY BYPASS GRAFT N/A  "2024    Procedure: CORONARY ARTERY BYPASS GRAFTING x4, LEFT INTERNAL MAMMARY ARTERY GRAFT, RIGHT LEG ENDOSCOPIC VEIN HARVEST, TRANSESOPHAGEAL ECHOCARDIOGRAM, APPLICATION OF PREVENA;  Surgeon: Jose F Kim MD;  Location:  [facility] OR;  Service: Cardiothoracic;  Laterality: N/A;    PENILE PROSTHESIS IMPLANT N/A 2023    Procedure: 3-PIECE INFLATABLE PENILE PROSTHESIS PLACEMENT;  Surgeon: Garcia Santana MD;  Location:  [facility] OR;  Service: Urology;  Laterality: N/A;    VASECTOMY         Social History     Socioeconomic History    Marital status: Single   Tobacco Use    Smoking status: Former     Current packs/day: 0.00     Average packs/day: 1 pack/day for 40.0 years (40.0 ttl pk-yrs)     Types: Cigarettes     Start date:      Quit date:      Years since quittin.0     Passive exposure: Past    Smokeless tobacco: Never    Tobacco comments:     N/A   Vaping Use    Vaping status: Never Used   Substance and Sexual Activity    Alcohol use: Not Currently     Comment: Quite 20+ yrs ago    Drug use: Never    Sexual activity: Yes     Partners: Female     Birth control/protection: Vasectomy       Family History   Problem Relation Age of Onset    No Known Problems Mother     Heart attack Father          in  70 yrs old    No Known Problems Maternal Grandmother     No Known Problems Maternal Grandfather     No Known Problems Paternal Grandmother     No Known Problems Paternal Grandfather     No Known Problems Son     No Known Problems Daughter     Parkinsonism Brother     Colon cancer Neg Hx     Colon polyps Neg Hx     Esophageal cancer Neg Hx        Objective    Temp 97.5 °F (36.4 °C)   Ht 177.8 cm (70\")   Wt 84.7 kg (186 lb 12.8 oz)   BMI 26.80 kg/m²     Physical Exam        Results for orders placed or performed in visit on 25   POC Urinalysis Dipstick, Multipro    Collection Time: 25 10:31 AM    Specimen: Urine   Result Value Ref Range    Color Yellow Yellow, Straw, Dark " Yellow, Pearl    Clarity, UA Clear Clear    Glucose, UA Negative Negative mg/dL    Bilirubin Negative Negative    Ketones, UA Negative Negative    Specific Gravity  1.025 1.005 - 1.030    Blood, UA Negative Negative    pH, Urine 5.5 5.0 - 8.0    Protein, POC 30 mg/dL (A) Negative mg/dL    Urobilinogen, UA Normal Normal, 0.2 E.U./dL    Nitrite, UA Negative Negative    Leukocytes Negative Negative     Assessment and Plan    Diagnoses and all orders for this visit:    1. Kidney stone (Primary)  -     POC Urinalysis Dipstick, Multipro  -     XR Abdomen KUB  -     XR abdomen kub; Future      Small asymptomatic bilateral kidney stones.  He will follow-up with me in 1 year with pre-clinic KUB      This document has been signed by SUNSHINE Santana MD on January 27, 2025 12:09 CST

## 2025-01-23 ENCOUNTER — TELEPHONE (OUTPATIENT)
Dept: CARDIOLOGY | Facility: CLINIC | Age: 85
End: 2025-01-23
Payer: MEDICARE

## 2025-01-23 NOTE — TELEPHONE ENCOUNTER
Caller: Jeff Alatorre     Relationship: PATIENT     Best call back number: [phone]     What is your medical concern? HAD AN EPISODE OF AFIB AND CHEST PAIN  FOR ABOUT 15 MINUTES . THE PATIENT LAID DOWN AND ALL WAS BETTER AFTER 15 MINUTES . PATIENT'S HEART RATE  AND BLOOD PRESSURE /75.    How long has this issue been going on? JUST ONE TIME ON 1/23/24    Is your provider already aware of this issue? NO    Have you been treated for this issue? NO

## 2025-01-24 ENCOUNTER — TELEPHONE (OUTPATIENT)
Dept: UROLOGY | Facility: CLINIC | Age: 85
End: 2025-01-24
Payer: MEDICARE

## 2025-01-24 NOTE — TELEPHONE ENCOUNTER
I contacted the patient back regarding the below.  He stated this has only happened once since he last saw our office.  He reported he was resting when this occurred.  He has been monitoring his HR and BP and both have been normal other than during the reported episode.  I informed him to continue to monitor and let us know anything changes.

## 2025-01-24 NOTE — TELEPHONE ENCOUNTER
Attempted to contact the patient, however he did not answer.  Left a message for him to call us back.

## 2025-01-27 ENCOUNTER — OFFICE VISIT (OUTPATIENT)
Dept: UROLOGY | Facility: CLINIC | Age: 85
End: 2025-01-27
Payer: MEDICARE

## 2025-01-27 ENCOUNTER — HOSPITAL ENCOUNTER (OUTPATIENT)
Dept: GENERAL RADIOLOGY | Facility: HOSPITAL | Age: 85
Discharge: HOME | End: 2025-01-27
Admitting: UROLOGY
Payer: MEDICARE

## 2025-01-27 VITALS — TEMPERATURE: 97.5 F | WEIGHT: 186.8 LBS | BODY MASS INDEX: 26.74 KG/M2 | HEIGHT: 70 IN

## 2025-01-27 DIAGNOSIS — N20.0 KIDNEY STONE: Primary | ICD-10-CM

## 2025-01-27 LAB
BILIRUB BLD-MCNC: NEGATIVE MG/DL
CLARITY, POC: CLEAR
COLOR UR: YELLOW
GLUCOSE UR STRIP-MCNC: NEGATIVE MG/DL
KETONES UR QL: NEGATIVE
LEUKOCYTE EST, POC: NEGATIVE
NITRITE UR-MCNC: NEGATIVE MG/ML
PH UR: 5.5 [PH] (ref 5–8)
PROT UR STRIP-MCNC: ABNORMAL MG/DL
RBC # UR STRIP: NEGATIVE /UL
SP GR UR: 1.02 (ref 1–1.03)
UROBILINOGEN UR QL: NORMAL

## 2025-01-27 PROCEDURE — 1160F RVW MEDS BY RX/DR IN RCRD: CPT | Performed by: UROLOGY

## 2025-01-27 PROCEDURE — 99213 OFFICE O/P EST LOW 20 MIN: CPT | Performed by: UROLOGY

## 2025-01-27 PROCEDURE — 81001 URINALYSIS AUTO W/SCOPE: CPT | Performed by: UROLOGY

## 2025-01-27 PROCEDURE — 1159F MED LIST DOCD IN RCRD: CPT | Performed by: UROLOGY

## 2025-01-27 PROCEDURE — 74018 RADEX ABDOMEN 1 VIEW: CPT

## 2025-03-06 ENCOUNTER — OFFICE VISIT (OUTPATIENT)
Dept: CARDIOLOGY | Facility: CLINIC | Age: 85
End: 2025-03-06
Payer: MEDICARE

## 2025-03-06 VITALS
DIASTOLIC BLOOD PRESSURE: 58 MMHG | OXYGEN SATURATION: 98 % | HEART RATE: 70 BPM | BODY MASS INDEX: 26.77 KG/M2 | HEIGHT: 70 IN | SYSTOLIC BLOOD PRESSURE: 136 MMHG | WEIGHT: 187 LBS

## 2025-03-06 DIAGNOSIS — I48.0 PAF (PAROXYSMAL ATRIAL FIBRILLATION): ICD-10-CM

## 2025-03-06 DIAGNOSIS — E78.2 MIXED HYPERLIPIDEMIA: ICD-10-CM

## 2025-03-06 DIAGNOSIS — I10 PRIMARY HYPERTENSION: ICD-10-CM

## 2025-03-06 DIAGNOSIS — Z95.1 S/P CABG (CORONARY ARTERY BYPASS GRAFT): Primary | ICD-10-CM

## 2025-03-06 DIAGNOSIS — I25.810 CORONARY ARTERY DISEASE INVOLVING CORONARY BYPASS GRAFT OF NATIVE HEART WITHOUT ANGINA PECTORIS: ICD-10-CM

## 2025-03-06 PROBLEM — I25.10 CAD (CORONARY ARTERY DISEASE): Status: RESOLVED | Noted: 2024-06-25 | Resolved: 2025-03-06

## 2025-03-06 NOTE — PROGRESS NOTES
Subjective:     Encounter Date:03/06/2025      Patient ID: Jeff Alatorre is a 85 y.o. male.    Chief Complaint: Follow-up CAD  History of Present Illness  85-year-old male returns today for routine follow-up of the above.  He was last seen here by Maryjo Tolbert in August 2024, after having undergone four-vessel CABG on 6/25/2024.  He was already back to walking a mile a day and was about to start cardiac rehab as of that visit.  He was reporting significant improvement in his breathing compared to preoperative, and also noted that the burning sensation in his chest that he had previously attributed to heartburn had resolved.  He did have some brief postoperative atrial fibrillation but was in sinus rhythm as of the follow-up visit, and had been taken off amiodarone just a few weeks prior to that by CT surgery.  Plan was to have him wear a Holter monitor after having been off amiodarone for a few months, and he was advised to call sooner if he has any palpitations or any other new or unusual symptoms.  His Lopressor was switched at that visit to Toprol-XL to see if it would help with any fatigue; otherwise no changes were made he returns now today for routine follow-up.      The patient is an 85-year-old male who presents for a follow-up visit.  He reports a general improvement in his health status following his surgery last year. He has successfully completed a cardiac rehabilitation program and maintains an active lifestyle, including daily walks of approximately one mile. He has not been on Eliquis in the past.    He experienced an episode last week which his iWatch alerted him was atrial fibrillation, characterized by mild discomfort in the upper abdomen and a pulse rate of 120, which escalated to 149. This episode lasted for a few hours before resolving spontaneously. He did not experience any associated chest symptoms such as palpitations, tachycardia, or uneasiness. His iPhone alerted him to the presence  of atrial fibrillation, but his version of [employer] does not have capability to record ECG tracings.        The following portions of the patient's history were reviewed and updated as appropriate: allergies, current medications, past family history, past medical history, past social history, past surgical history, and problem list.    Review of Systems   Constitutional: Negative for malaise/fatigue.   Cardiovascular:  Negative for chest pain, claudication, dyspnea on exertion, leg swelling, near-syncope, orthopnea, palpitations, paroxysmal nocturnal dyspnea and syncope.   Respiratory:  Negative for shortness of breath.    Hematologic/Lymphatic: Does not bruise/bleed easily.           Current Outpatient Medications:     acetaminophen (TYLENOL) 500 MG tablet, Take 1 tablet by mouth Daily As Needed for Mild Pain., Disp: , Rfl:     allopurinol (ZYLOPRIM) 300 MG tablet, Take 1 tablet by mouth Daily., Disp: 90 tablet, Rfl: 2    aspirin 81 MG EC tablet, Take 1 tablet by mouth Daily., Disp: 90 tablet, Rfl: 2    atorvastatin (Lipitor) 80 MG tablet, Take 1 tablet by mouth Daily., Disp: 90 tablet, Rfl: 2    Calcium Polycarbophil (FIBER-CAPS PO), Take 500 mg by mouth 2 (Two) Times a Day., Disp: , Rfl:     clopidogrel (PLAVIX) 75 MG tablet, Take 1 tablet by mouth Daily., Disp: 90 tablet, Rfl: 2    ferrous sulfate 325 (65 FE) MG tablet, Take 1 tablet by mouth Daily With Breakfast. Pt takes every other day, Disp: 90 tablet, Rfl: 2    loperamide (Imodium A-D) 2 MG tablet, Take 1 tablet by mouth 4 (Four) Times a Day As Needed for Diarrhea., Disp: 120 tablet, Rfl: 2    metFORMIN (GLUCOPHAGE) 500 MG tablet, Take 1 tablet by mouth 2 (Two) Times a Day With Meals., Disp: 180 tablet, Rfl: 3    metoprolol succinate XL (TOPROL-XL) 25 MG 24 hr tablet, Take 1 tablet by mouth Daily., Disp: 90 tablet, Rfl: 2    nitroglycerin (NITROSTAT) 0.4 MG SL tablet, Place 1 tablet under the tongue Every 5 (Five) Minutes As Needed for Chest Pain. Take no  more than 3 doses in 15 minutes., Disp: 90 tablet, Rfl: 1    vitamin B-12 (CYANOCOBALAMIN) 500 MCG tablet, Take 1 tablet by mouth Daily., Disp: 90 tablet, Rfl: 1    apixaban (ELIQUIS) 5 MG tablet tablet, Take 1 tablet by mouth 2 (Two) Times a Day., Disp: 60 tablet, Rfl: 11       Objective:      Vitals:    03/06/25 0929   BP: 136/58   Pulse: 70   SpO2: 98%     Vitals and nursing note reviewed.   Constitutional:       General: Not in acute distress.     Appearance: Not in distress.   Neck:      Vascular: No JVD or JVR. JVD normal.   Pulmonary:      Effort: Pulmonary effort is normal.      Breath sounds: Normal breath sounds.   Cardiovascular:      Normal rate. Regular rhythm.      Murmurs: There is no murmur.      No gallop.  No rub.   Pulses:     Intact distal pulses.   Edema:     Peripheral edema absent.   Skin:     General: Skin is warm and dry.   Neurological:      Mental Status: Alert, oriented to person, place, and time and oriented to person, place and time.       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    LVEF appears normal on initial images, and remains preserved on concluding images.        Assessment/Plan:     Problem List Items Addressed This Visit          Cardiac and Vasculature    Primary hypertension    Coronary artery disease involving coronary bypass graft of native heart without angina pectoris    Overview     4v CABG June '24 (Julio):  Symptoms were somewhat atypical but  "ultimately abnormal stress testing led to discovery of multivessel disease and bypass grafting.  In retrospect, his symptoms were consistent with unstable angina as he was having \"heartburn\" at rest prior to surgery.          PAF (paroxysmal atrial fibrillation)    Overview     Post-op AF (June '24)              Relevant Orders    Holter Monitor - 72 Hour Up To 15 Days    Mixed hyperlipidemia    S/P CABG (coronary artery bypass graft) - Primary         Recommendations/plans:    Assessment & Plan  1.  CAD: Stable.  No angina after CABG on 6/25/2024.  Symptoms were somewhat atypical but ultimately abnormal stress testing led to discovery of multivessel disease and bypass grafting.  In retrospect, his symptoms were consistent with unstable angina as he was having \"heartburn\" at rest prior to surgery.      -Continue aspirin 81 mg daily without interruption  -Continue Plavix 75 mg daily; will continue this for now pending communication I will have by Dr. Kim regarding whether he needs either or both of these antiplatelet medicines while on Eliquis (otherwise, would prefer he remain on dual antiplatelet therapy until 1 year from his presentation requiring CABG, which would be June 2025, since initial presentation was that of unstable angina)  -Continue statin and beta-blocker with changes as outlined below  -As needed sublingual nitroglycerin-has not required     2.  Hyperlipidemia: Continue high intensity statin (atorvastatin 40) to maintain goal LDL less than 70, preferably less than 55    3.  Hypertension: Remains well-controlled on Lopressor and losartan.      5.  Paroxysmal atrial fibrillation: Did have postoperative evidence of atrial fibrillation, but was taken off anticoagulant and antiarrhythmic therapy by the time of his initial follow-up visit here last year.  Now, he reports [employer] alerts regarding possible atrial fibrillation, though he was asymptomatic with these.    His iPhone indicated 3% A. fib between " January 31 and March 2, but no rhythm strip was available for confirmation.  -Will place patient in a Zio patch today for 14 days to determine whether any atrial fibrillation is occurring or not  -Resume Eliquis for now at 5 mg p.o. twice daily; if atrial fibrillation is in fact confirmed, he states he would prefer to undergo Watchman device.  This would be indicated given his otherwise concomitant need for antiplatelet therapy and increased risk of bleeding when on both.      Follow-up to be determined after reviewing results of Zio patch being placed today      Zaid Contreras MD  03/06/2025  10:19 CST    Transcribed from ambient dictation for Zaid Contreras MD by Zaid Contreras MD.  03/06/25   07:46 CST    Patient or patient representative verbalized consent to the visit recording.

## 2025-03-28 ENCOUNTER — TELEPHONE (OUTPATIENT)
Dept: CARDIOLOGY | Facility: CLINIC | Age: 85
End: 2025-03-28
Payer: MEDICARE

## 2025-04-21 ENCOUNTER — OFFICE VISIT (OUTPATIENT)
Dept: CARDIOLOGY | Facility: CLINIC | Age: 85
End: 2025-04-21
Payer: MEDICARE

## 2025-04-21 VITALS
HEART RATE: 58 BPM | WEIGHT: 186 LBS | OXYGEN SATURATION: 100 % | BODY MASS INDEX: 26.63 KG/M2 | DIASTOLIC BLOOD PRESSURE: 62 MMHG | SYSTOLIC BLOOD PRESSURE: 122 MMHG | HEIGHT: 70 IN

## 2025-04-21 DIAGNOSIS — I48.0 PAF (PAROXYSMAL ATRIAL FIBRILLATION): Primary | ICD-10-CM

## 2025-04-21 DIAGNOSIS — I25.810 CORONARY ARTERY DISEASE INVOLVING CORONARY BYPASS GRAFT OF NATIVE HEART WITHOUT ANGINA PECTORIS: ICD-10-CM

## 2025-04-21 DIAGNOSIS — G47.33 OSA (OBSTRUCTIVE SLEEP APNEA): ICD-10-CM

## 2025-04-21 DIAGNOSIS — M19.90 ARTHRITIS: ICD-10-CM

## 2025-04-21 DIAGNOSIS — Z95.1 S/P CABG (CORONARY ARTERY BYPASS GRAFT): ICD-10-CM

## 2025-04-21 NOTE — PROGRESS NOTES
Subjective:     Encounter Date:04/21/2025      Patient ID: Jeff Alatorre is a 85 y.o. male.    Chief Complaint: Discuss Watchman  History of Present Illness  The patient is an 85-year-old male who presents for a follow-up visit. He was last seen on 03/05/2025 for coronary disease, status post CABG in 06/2024. He was noted to have some postoperative atrial fibrillation, so he was advised to wear another ZIO patch. The patch showed that he was still having bouts of atrial fibrillation with a burden of 3 percent in 03/2025. Therefore, it was recommended that he resume Eliquis, but he is also aware of the Watchman device as an alternative. He has asked to come in to discuss this further.    He reports experiencing dizziness and occasional wooziness, which he attributes to the resumption of Eliquis. He has not experienced any increased bleeding or blood loss since starting the medication. He expresses a desire to discontinue Eliquis due to its cost and his belief that he would benefit from reducing his overall medication intake. He is currently only on Eliquis as a blood thinner, having discontinued aspirin and Plavix.    He has a history of sleep apnea, diagnosed concurrently with atrial fibrillation in 2019, for which he was prescribed CPAP therapy. However, he discontinued the use of the CPAP machine six months ago after four years of use due to discomfort and frequent awakenings.    He is currently taking allopurinol for gout and Tylenol for arthritis, having previously used Aleve and ibuprofen, which he found more effective but had to discontinue due to their interaction with Eliquis.  This is his primary reason for seeking an alternative to Eliquis for CVA prophylaxis.    PAST SURGICAL HISTORY: CABG in 06/2024      The following portions of the patient's history were reviewed and updated as appropriate: allergies, current medications, past family history, past medical history, past social history, past  surgical history, and problem list.    Review of Systems   Constitutional: Negative for malaise/fatigue.   Cardiovascular:  Negative for chest pain, claudication, dyspnea on exertion, leg swelling, near-syncope, orthopnea, palpitations, paroxysmal nocturnal dyspnea and syncope.   Respiratory:  Negative for shortness of breath.    Hematologic/Lymphatic: Does not bruise/bleed easily.   Musculoskeletal:  Positive for joint pain.           Current Outpatient Medications:     acetaminophen (TYLENOL) 500 MG tablet, Take 1 tablet by mouth Daily As Needed for Mild Pain., Disp: , Rfl:     allopurinol (ZYLOPRIM) 300 MG tablet, Take 1 tablet by mouth Daily., Disp: 90 tablet, Rfl: 2    apixaban (ELIQUIS) 5 MG tablet tablet, Take 1 tablet by mouth 2 (Two) Times a Day., Disp: 180 tablet, Rfl: 11    atorvastatin (Lipitor) 80 MG tablet, Take 1 tablet by mouth Daily., Disp: 90 tablet, Rfl: 2    Calcium Polycarbophil (FIBER-CAPS PO), Take 500 mg by mouth 2 (Two) Times a Day., Disp: , Rfl:     ferrous sulfate 325 (65 FE) MG tablet, Take 1 tablet by mouth Daily With Breakfast. Pt takes every other day, Disp: 90 tablet, Rfl: 2    loperamide (Imodium A-D) 2 MG tablet, Take 1 tablet by mouth 4 (Four) Times a Day As Needed for Diarrhea., Disp: 120 tablet, Rfl: 2    metFORMIN (GLUCOPHAGE) 500 MG tablet, Take 1 tablet by mouth 2 (Two) Times a Day With Meals., Disp: 180 tablet, Rfl: 3    metoprolol succinate XL (TOPROL-XL) 25 MG 24 hr tablet, Take 1 tablet by mouth Daily., Disp: 90 tablet, Rfl: 2    nitroglycerin (NITROSTAT) 0.4 MG SL tablet, Place 1 tablet under the tongue Every 5 (Five) Minutes As Needed for Chest Pain. Take no more than 3 doses in 15 minutes., Disp: 90 tablet, Rfl: 1    vitamin B-12 (CYANOCOBALAMIN) 500 MCG tablet, Take 1 tablet by mouth Daily., Disp: 90 tablet, Rfl: 1       Objective:      Vitals:    04/21/25 1456   BP: 122/62   Pulse: 58   SpO2: 100%     Vitals and nursing note reviewed.   Constitutional:       General:  "Not in acute distress.     Appearance: Not in distress.   Neck:      Vascular: No JVD or JVR. JVD normal.   Pulmonary:      Effort: Pulmonary effort is normal.      Breath sounds: Normal breath sounds.   Cardiovascular:      Normal rate. Regular rhythm.      Murmurs: There is no murmur.      No gallop.  No rub.   Pulses:     Intact distal pulses.   Edema:     Peripheral edema absent.   Skin:     General: Skin is warm and dry.   Neurological:      Mental Status: Alert, oriented to person, place, and time and oriented to person, place and time.       Physical Exam      Lab Review:         ECG 12 Lead    Date/Time: 4/21/2025 5:04 PM  Performed by: Zaid Contreras MD    Authorized by: Zaid Contreras MD  Comparison: compared with previous ECG from 3/6/2025  Similar to previous ECG  Rhythm: sinus rhythm  Rate: normal  BPM: 63  Conduction: right bundle branch block and left anterior fascicular block  Other findings: low voltage    Clinical impression: abnormal EKG        Results  Diagnostic Testing   - ZIO patch: 03/2025, Showed bouts of atrial fibrillation with a burden of 3%.      Results for orders placed during the hospital encounter of 06/25/24    Intra-Op TAVR Transesophageal Echo    Interpretation Summary    Unremarkable intraoperative exam.    LVEF appears normal on initial images, and remains preserved on concluding images.        Assessment/Plan:     Problem List Items Addressed This Visit          Cardiac and Vasculature    Coronary artery disease involving coronary bypass graft of native heart without angina pectoris    Overview   4v CABG June '24 (Julio):  Symptoms were somewhat atypical but ultimately abnormal stress testing led to discovery of multivessel disease and bypass grafting.  In retrospect, his symptoms were consistent with unstable angina as he was having \"heartburn\" at rest prior to surgery.          PAF (paroxysmal atrial fibrillation) - Primary    Overview   Post-op AF (June '24)              " Relevant Orders    ECG 12 Lead    S/P CABG (coronary artery bypass graft)       Sleep    ANYI (obstructive sleep apnea)     Other Visit Diagnoses         Arthritis                  Recommendations/plans:    Assessment & Plan  1. Paroxysmal Atrial Fibrillation: ZIO patch confirmed 3% burden.  - Continue Eliquis until the procedure is scheduled  - Discussed Watchman procedure as an alternative to indefinite anticoagulation -  Patient has history of paroxysmal atrial fibrillation with a [id number], which warrants CVA prophylaxis.  This patient is currently being treated with eliquis, but seeks an alternative to indefinite anticoagulation because of the need for NSAID therapy for arthritic pain relief that cannot be taken while on Eliquis.  Also, he is having side effects of dizziness since taking Eliquis.  As such, I do think this patient meets indications for left atrial appendage occlusion with Watchman device.  We spent the bulk of today's visit discussing the indications, preoperative evaluation, procedural conduct, and postprocedural follow-up care and medication management, emphasizing the need for 6 months of  DAPT from day of implant through 6 months, as well as follow-up MICAELA 6 weeks after implant.  - Of note, I did review his operative report from CABG in 2024; left atrial appendage was not excluded    Patient understands all this and would like to proceed with implant attempt.  - Referral to Dr. De Leon for evaluation and approval of Watchman procedure  - If approved, schedule Watchman implant    2. Sleep Apnea.  - Discussed the importance of treating sleep apnea to reduce atrial fibrillation and prevent pulmonary hypertension  - Provided information on CPAP therapy and Inspire procedure as alternatives  - Referral to sleep specialist for reevaluation and treatment options    3. Arthritis.  - Currently taking Tylenol for arthritis pain without relief  - Discussed potential resumption of NSAIDs like Aleve and  ibuprofen post-Watchman procedure for better arthritis management    4.  CAD, status post CABG: Stable.  No angina.  Since currently anticoagulated, does not need antiplatelet therapy.  However, if Watchman successfully implanted, antiplatelet therapy for post watchman treatment would cover him for CAD as well.  Continue high intensity statin.    Follow-up to be determined, after watchman implant      Zaid Contreras MD  04/21/2025  23:39 CDT    Transcribed from ambient dictation for Zaid Contreras MD by Zaid Contreras MD.  04/21/25   15:47 CDT    Patient or patient representative verbalized consent to the visit recording.

## 2025-05-05 ENCOUNTER — OFFICE VISIT (OUTPATIENT)
Dept: CARDIOLOGY | Facility: CLINIC | Age: 85
End: 2025-05-05
Payer: MEDICARE

## 2025-05-05 VITALS
DIASTOLIC BLOOD PRESSURE: 60 MMHG | BODY MASS INDEX: 26.2 KG/M2 | HEART RATE: 57 BPM | SYSTOLIC BLOOD PRESSURE: 110 MMHG | HEIGHT: 70 IN | OXYGEN SATURATION: 98 % | WEIGHT: 183 LBS

## 2025-05-05 DIAGNOSIS — I48.0 PAF (PAROXYSMAL ATRIAL FIBRILLATION): Primary | ICD-10-CM

## 2025-05-05 DIAGNOSIS — I10 PRIMARY HYPERTENSION: ICD-10-CM

## 2025-05-05 DIAGNOSIS — I25.810 CORONARY ARTERY DISEASE INVOLVING CORONARY BYPASS GRAFT OF NATIVE HEART WITHOUT ANGINA PECTORIS: ICD-10-CM

## 2025-05-05 DIAGNOSIS — M19.90 ARTHRITIS: ICD-10-CM

## 2025-05-05 DIAGNOSIS — I73.9 PAD (PERIPHERAL ARTERY DISEASE): ICD-10-CM

## 2025-05-05 PROCEDURE — 3078F DIAST BP <80 MM HG: CPT | Performed by: INTERNAL MEDICINE

## 2025-05-05 PROCEDURE — 99214 OFFICE O/P EST MOD 30 MIN: CPT | Performed by: INTERNAL MEDICINE

## 2025-05-05 PROCEDURE — 1160F RVW MEDS BY RX/DR IN RCRD: CPT | Performed by: INTERNAL MEDICINE

## 2025-05-05 PROCEDURE — 1159F MED LIST DOCD IN RCRD: CPT | Performed by: INTERNAL MEDICINE

## 2025-05-05 PROCEDURE — 3074F SYST BP LT 130 MM HG: CPT | Performed by: INTERNAL MEDICINE

## 2025-05-05 NOTE — LETTER
May 5, 2025     Jimmy Curtis MD  [address] [facility] [address]    Patient: Jeff Alatorre   YOB: 1940   Date of Visit: 5/5/2025       Dear Jimmy Curtis MD    Jeff Alatorre was in my office today. Below is a copy of my note.    If you have questions, please do not hesitate to call me. I look forward to following Jeff along with you.         Sincerely,        Lloyd De Leon MD        CC: Zaid Contreras MD      Reason for Visit: Left atrial appendage closure shared decision making.     HPI:  Jeff Alatorre is a 85 y.o. male is here today for left atrial appendage closure shared decision making.  He follows in cardiology clinic with Dr. Contreras.  Significant cardiac history includes coronary artery disease, carotid artery disease, paroxysmal atrial fibrillation, hypertension, hyperlipidemia, diabetes, and CKD.  He has had difficulty controlling his gout and arthritis with Tylenol.  He has been unable to take Aleve, ibuprofen, or other nonsteroidal anti-inflammatory drugs due to being on chronic anticoagulation is therefore being evaluated for alternative means of thromboembolic prophylaxis with left atrial appendage occlusion with a Watchman device.  The arthritis in his hands seems to bother him the most.        Patient Active Problem List   Diagnosis   • Hx of adenomatous colonic polyps   • Adenomatous polyps   • Idiopathic gout of multiple sites   • Primary hypertension   • Type 2 diabetes mellitus without complication, without long-term current use of insulin   • Erectile dysfunction due to arterial insufficiency   • Anemia   • B12 deficiency   • Stage 3a chronic kidney disease (CKD)   • Seasonal allergic rhinitis   • ANYI (obstructive sleep apnea)   • Carotid artery disease   • ROSALES (dyspnea on exertion)   • Coronary artery disease involving coronary bypass graft of native heart without angina pectoris   • Bilateral carotid artery stenosis   • History of left-sided carotid  endarterectomy   • Stenosis of right subclavian artery   • Chronic anemia   • PAD (peripheral artery disease)   • PAF (paroxysmal atrial fibrillation)   • Mixed hyperlipidemia   • S/P CABG (coronary artery bypass graft)   • Arthritis       Social History     Tobacco Use   • Smoking status: Former     Current packs/day: 0.00     Average packs/day: 1 pack/day for 40.0 years (40.0 ttl pk-yrs)     Types: Cigarettes     Start date:      Quit date:      Years since quittin.3     Passive exposure: Past   • Smokeless tobacco: Never   • Tobacco comments:     N/A   Vaping Use   • Vaping status: Never Used   Substance Use Topics   • Alcohol use: Not Currently     Comment: Quite 20+ yrs ago   • Drug use: Never       Family History   Problem Relation Age of Onset   • No Known Problems Mother    • Heart attack Father          in  70 yrs old   • No Known Problems Maternal Grandmother    • No Known Problems Maternal Grandfather    • No Known Problems Paternal Grandmother    • No Known Problems Paternal Grandfather    • No Known Problems Son    • No Known Problems Daughter    • Parkinsonism Brother    • Colon cancer Neg Hx    • Colon polyps Neg Hx    • Esophageal cancer Neg Hx        The following portions of the patient's history were reviewed and updated as appropriate: allergies, current medications, past family history, past medical history, past social history, past surgical history, and problem list.      Current Outpatient Medications:   •  acetaminophen (TYLENOL) 500 MG tablet, Take 1 tablet by mouth Daily As Needed for Mild Pain., Disp: , Rfl:   •  allopurinol (ZYLOPRIM) 300 MG tablet, Take 1 tablet by mouth Daily., Disp: 90 tablet, Rfl: 2  •  apixaban (ELIQUIS) 5 MG tablet tablet, Take 1 tablet by mouth 2 (Two) Times a Day., Disp: 180 tablet, Rfl: 11  •  atorvastatin (Lipitor) 80 MG tablet, Take 1 tablet by mouth Daily., Disp: 90 tablet, Rfl: 2  •  Calcium Polycarbophil (FIBER-CAPS PO), Take 500 mg by  "mouth 2 (Two) Times a Day., Disp: , Rfl:   •  ferrous sulfate 325 (65 FE) MG tablet, Take 1 tablet by mouth Daily With Breakfast. Pt takes every other day, Disp: 90 tablet, Rfl: 2  •  loperamide (Imodium A-D) 2 MG tablet, Take 1 tablet by mouth 4 (Four) Times a Day As Needed for Diarrhea., Disp: 120 tablet, Rfl: 2  •  metFORMIN (GLUCOPHAGE) 500 MG tablet, Take 1 tablet by mouth 2 (Two) Times a Day With Meals., Disp: 180 tablet, Rfl: 3  •  metoprolol succinate XL (TOPROL-XL) 25 MG 24 hr tablet, Take 1 tablet by mouth Daily., Disp: 90 tablet, Rfl: 2  •  nitroglycerin (NITROSTAT) 0.4 MG SL tablet, Place 1 tablet under the tongue Every 5 (Five) Minutes As Needed for Chest Pain. Take no more than 3 doses in 15 minutes., Disp: 90 tablet, Rfl: 1  •  vitamin B-12 (CYANOCOBALAMIN) 500 MCG tablet, Take 1 tablet by mouth Daily., Disp: 90 tablet, Rfl: 1    Review of Systems   Constitutional: Negative for chills and fever.   Cardiovascular:  Negative for chest pain and paroxysmal nocturnal dyspnea.   Respiratory:  Negative for cough and shortness of breath.    Hematologic/Lymphatic: Bruises/bleeds easily.   Skin:  Negative for rash.   Musculoskeletal:  Positive for arthritis.   Gastrointestinal:  Negative for abdominal pain and heartburn.   Neurological:  Negative for dizziness and numbness.       Objective  /60 (BP Location: Left arm, Patient Position: Sitting, Cuff Size: Adult)   Pulse 57   Ht 177.8 cm (70\")   Wt 83 kg (183 lb)   SpO2 98%   BMI 26.26 kg/m²   Constitutional:       Appearance: Well-developed.   HENT:      Head: Normocephalic and atraumatic.   Pulmonary:      Effort: Pulmonary effort is normal.      Breath sounds: Normal breath sounds.   Cardiovascular:      Normal rate. Regular rhythm.   Edema:     Peripheral edema absent.   Skin:     General: Skin is warm and dry.   Neurological:      Mental Status: Alert and oriented to person, place, and time.       Procedures      ICD-10-CM ICD-9-CM   1. PAF " (paroxysmal atrial fibrillation)  I48.0 427.31   2. PAD (peripheral artery disease)  I73.9 443.9   3. Coronary artery disease involving coronary bypass graft of native heart without angina pectoris  I25.810 414.05   4. Primary hypertension  I10 401.9   5. Arthritis  M19.90 716.90         Assessment/Plan:  Based on the history, it has been determined that the patient is a poor candidate for long-term oral anticoagulation.  The patient may, however, be tolerant to short-term anticoagulation as necessary.    Specifically regarding anticoagulation they have demonstrated: Inability to take NSAIDs and uncontrolled arthritis.    We have discussed that you need stroke and bleeding risk on and off anticoagulation.  The individual [id number] stroke risk store is 5 (age greater than 75, hypertension, vascular disease, diabetes), indicating an adjusted stroke rate of 6.7%/year.    Based on both stroke and bleeding risk, shared decision has been made to pursue closure of the left atrial appendage is a safe, effective alternative to long-term oral anticoagulation therapy for stroke prophylaxis.  This will reduce his long-term risk of incidence of bleeding.  Information regarding left atrial appendage closure and shared decision making were provided to patient.

## 2025-05-05 NOTE — PROGRESS NOTES
Reason for Visit: Left atrial appendage closure shared decision making.     HPI:  Jeff Alatorre is a 85 y.o. male is here today for left atrial appendage closure shared decision making.  He follows in cardiology clinic with Dr. Contreras.  Significant cardiac history includes coronary artery disease, carotid artery disease, paroxysmal atrial fibrillation, hypertension, hyperlipidemia, diabetes, and CKD.  He has had difficulty controlling his gout and arthritis with Tylenol.  He has been unable to take Aleve, ibuprofen, or other nonsteroidal anti-inflammatory drugs due to being on chronic anticoagulation is therefore being evaluated for alternative means of thromboembolic prophylaxis with left atrial appendage occlusion with a Watchman device.  The arthritis in his hands seems to bother him the most.        Patient Active Problem List   Diagnosis    Hx of adenomatous colonic polyps    Adenomatous polyps    Idiopathic gout of multiple sites    Primary hypertension    Type 2 diabetes mellitus without complication, without long-term current use of insulin    Erectile dysfunction due to arterial insufficiency    Anemia    B12 deficiency    Stage 3a chronic kidney disease (CKD)    Seasonal allergic rhinitis    ANYI (obstructive sleep apnea)    Carotid artery disease    ROSALES (dyspnea on exertion)    Coronary artery disease involving coronary bypass graft of native heart without angina pectoris    Bilateral carotid artery stenosis    History of left-sided carotid endarterectomy    Stenosis of right subclavian artery    Chronic anemia    PAD (peripheral artery disease)    PAF (paroxysmal atrial fibrillation)    Mixed hyperlipidemia    S/P CABG (coronary artery bypass graft)    Arthritis       Social History     Tobacco Use    Smoking status: Former     Current packs/day: 0.00     Average packs/day: 1 pack/day for 40.0 years (40.0 ttl pk-yrs)     Types: Cigarettes     Start date: 1955     Quit date: 1995     Years since  quittin.3     Passive exposure: Past    Smokeless tobacco: Never    Tobacco comments:     N/A   Vaping Use    Vaping status: Never Used   Substance Use Topics    Alcohol use: Not Currently     Comment: Quite 20+ yrs ago    Drug use: Never       Family History   Problem Relation Age of Onset    No Known Problems Mother     Heart attack Father          in  70 yrs old    No Known Problems Maternal Grandmother     No Known Problems Maternal Grandfather     No Known Problems Paternal Grandmother     No Known Problems Paternal Grandfather     No Known Problems Son     No Known Problems Daughter     Parkinsonism Brother     Colon cancer Neg Hx     Colon polyps Neg Hx     Esophageal cancer Neg Hx        The following portions of the patient's history were reviewed and updated as appropriate: allergies, current medications, past family history, past medical history, past social history, past surgical history, and problem list.      Current Outpatient Medications:     acetaminophen (TYLENOL) 500 MG tablet, Take 1 tablet by mouth Daily As Needed for Mild Pain., Disp: , Rfl:     allopurinol (ZYLOPRIM) 300 MG tablet, Take 1 tablet by mouth Daily., Disp: 90 tablet, Rfl: 2    apixaban (ELIQUIS) 5 MG tablet tablet, Take 1 tablet by mouth 2 (Two) Times a Day., Disp: 180 tablet, Rfl: 11    atorvastatin (Lipitor) 80 MG tablet, Take 1 tablet by mouth Daily., Disp: 90 tablet, Rfl: 2    Calcium Polycarbophil (FIBER-CAPS PO), Take 500 mg by mouth 2 (Two) Times a Day., Disp: , Rfl:     ferrous sulfate 325 (65 FE) MG tablet, Take 1 tablet by mouth Daily With Breakfast. Pt takes every other day, Disp: 90 tablet, Rfl: 2    loperamide (Imodium A-D) 2 MG tablet, Take 1 tablet by mouth 4 (Four) Times a Day As Needed for Diarrhea., Disp: 120 tablet, Rfl: 2    metFORMIN (GLUCOPHAGE) 500 MG tablet, Take 1 tablet by mouth 2 (Two) Times a Day With Meals., Disp: 180 tablet, Rfl: 3    metoprolol succinate XL (TOPROL-XL) 25 MG 24 hr tablet,  "Take 1 tablet by mouth Daily., Disp: 90 tablet, Rfl: 2    nitroglycerin (NITROSTAT) 0.4 MG SL tablet, Place 1 tablet under the tongue Every 5 (Five) Minutes As Needed for Chest Pain. Take no more than 3 doses in 15 minutes., Disp: 90 tablet, Rfl: 1    vitamin B-12 (CYANOCOBALAMIN) 500 MCG tablet, Take 1 tablet by mouth Daily., Disp: 90 tablet, Rfl: 1    Review of Systems   Constitutional: Negative for chills and fever.   Cardiovascular:  Negative for chest pain and paroxysmal nocturnal dyspnea.   Respiratory:  Negative for cough and shortness of breath.    Hematologic/Lymphatic: Bruises/bleeds easily.   Skin:  Negative for rash.   Musculoskeletal:  Positive for arthritis.   Gastrointestinal:  Negative for abdominal pain and heartburn.   Neurological:  Negative for dizziness and numbness.       Objective   /60 (BP Location: Left arm, Patient Position: Sitting, Cuff Size: Adult)   Pulse 57   Ht 177.8 cm (70\")   Wt 83 kg (183 lb)   SpO2 98%   BMI 26.26 kg/m²   Constitutional:       Appearance: Well-developed.   HENT:      Head: Normocephalic and atraumatic.   Pulmonary:      Effort: Pulmonary effort is normal.      Breath sounds: Normal breath sounds.   Cardiovascular:      Normal rate. Regular rhythm.   Edema:     Peripheral edema absent.   Skin:     General: Skin is warm and dry.   Neurological:      Mental Status: Alert and oriented to person, place, and time.       Procedures      ICD-10-CM ICD-9-CM   1. PAF (paroxysmal atrial fibrillation)  I48.0 427.31   2. PAD (peripheral artery disease)  I73.9 443.9   3. Coronary artery disease involving coronary bypass graft of native heart without angina pectoris  I25.810 414.05   4. Primary hypertension  I10 401.9   5. Arthritis  M19.90 716.90         Assessment/Plan:  Based on the history, it has been determined that the patient is a poor candidate for long-term oral anticoagulation.  The patient may, however, be tolerant to short-term anticoagulation as " necessary.    Specifically regarding anticoagulation they have demonstrated: Inability to take NSAIDs and uncontrolled arthritis.    We have discussed that you need stroke and bleeding risk on and off anticoagulation.  The individual [id number] stroke risk store is 5 (age greater than 75, hypertension, vascular disease, diabetes), indicating an adjusted stroke rate of 6.7%/year.    Based on both stroke and bleeding risk, shared decision has been made to pursue closure of the left atrial appendage is a safe, effective alternative to long-term oral anticoagulation therapy for stroke prophylaxis.  This will reduce his long-term risk of incidence of bleeding.  Information regarding left atrial appendage closure and shared decision making were provided to patient.

## 2025-05-20 RX ORDER — ATORVASTATIN CALCIUM 10 MG/1
10 TABLET, FILM COATED ORAL DAILY
COMMUNITY

## 2025-05-20 RX ORDER — CETIRIZINE HYDROCHLORIDE 10 MG/1
10 TABLET ORAL DAILY
COMMUNITY

## 2025-05-20 RX ORDER — LOPERAMIDE HYDROCHLORIDE 2 MG/1
2 TABLET ORAL 4 TIMES DAILY PRN
COMMUNITY

## 2025-05-21 LAB
ANION GAP SERPL CALCULATED.3IONS-SCNC: 12 MMOL/L (ref 8–16)
BASOPHILS # BLD: 0 K/UL (ref 0–0.2)
BASOPHILS NFR BLD: 0.5 % (ref 0–1)
BUN SERPL-MCNC: 23 MG/DL (ref 8–23)
CALCIUM SERPL-MCNC: 9.8 MG/DL (ref 8.8–10.2)
CHLORIDE SERPL-SCNC: 108 MMOL/L (ref 98–107)
CO2 SERPL-SCNC: 23 MMOL/L (ref 22–29)
CREAT SERPL-MCNC: 1.2 MG/DL (ref 0.7–1.2)
EOSINOPHIL # BLD: 0.2 K/UL (ref 0–0.6)
EOSINOPHIL NFR BLD: 2.5 % (ref 0–5)
ERYTHROCYTE [DISTWIDTH] IN BLOOD BY AUTOMATED COUNT: 14.3 % (ref 11.5–14.5)
GLUCOSE SERPL-MCNC: 94 MG/DL (ref 70–99)
HCT VFR BLD AUTO: 40 % (ref 42–52)
HGB BLD-MCNC: 12.9 G/DL (ref 14–18)
IMM GRANULOCYTES # BLD: 0 K/UL
LYMPHOCYTES # BLD: 1.6 K/UL (ref 1.1–4.5)
LYMPHOCYTES NFR BLD: 20.9 % (ref 20–40)
MCH RBC QN AUTO: 34 PG (ref 27–31)
MCHC RBC AUTO-ENTMCNC: 32.3 G/DL (ref 33–37)
MCV RBC AUTO: 105.5 FL (ref 80–94)
MONOCYTES # BLD: 0.6 K/UL (ref 0–0.9)
MONOCYTES NFR BLD: 8.2 % (ref 0–10)
NEUTROPHILS # BLD: 5.1 K/UL (ref 1.5–7.5)
NEUTS SEG NFR BLD: 67.4 % (ref 50–65)
PLATELET # BLD AUTO: 170 K/UL (ref 130–400)
PMV BLD AUTO: 11.6 FL (ref 9.4–12.4)
POTASSIUM SERPL-SCNC: 4.3 MMOL/L (ref 3.5–5.1)
RBC # BLD AUTO: 3.79 M/UL (ref 4.7–6.1)
SODIUM SERPL-SCNC: 143 MMOL/L (ref 136–145)
WBC # BLD AUTO: 7.6 K/UL (ref 4.8–10.8)

## 2025-05-30 ENCOUNTER — HOSPITAL ENCOUNTER (OUTPATIENT)
Age: 85
Setting detail: OUTPATIENT SURGERY
Discharge: HOME | End: 2025-05-30
Attending: SURGERY | Admitting: SURGERY

## 2025-05-30 ENCOUNTER — HOSPITAL ENCOUNTER (OUTPATIENT)
Age: 85
Setting detail: SPECIMEN
Discharge: HOME | End: 2025-05-30

## 2025-05-30 VITALS
HEART RATE: 61 BPM | BODY MASS INDEX: 26.48 KG/M2 | WEIGHT: 185 LBS | RESPIRATION RATE: 18 BRPM | DIASTOLIC BLOOD PRESSURE: 65 MMHG | TEMPERATURE: 97.8 F | OXYGEN SATURATION: 95 % | HEIGHT: 70 IN | SYSTOLIC BLOOD PRESSURE: 125 MMHG

## 2025-05-30 DIAGNOSIS — Z01.818 PRE-OP TESTING: Primary | ICD-10-CM

## 2025-05-30 PROBLEM — C44.329 SQUAMOUS CELL CARCINOMA OF SKIN OF OTHER PARTS OF FACE: Status: ACTIVE | Noted: 2025-05-30

## 2025-05-30 PROCEDURE — 12052 INTMD RPR FACE/MM 2.6-5.0 CM: CPT

## 2025-05-30 PROCEDURE — 11642 EXC F/E/E/N/L MAL+MRG 1.1-2: CPT

## 2025-05-30 RX ORDER — GINSENG 100 MG
CAPSULE ORAL PRN
Status: DISCONTINUED | OUTPATIENT
Start: 2025-05-30 | End: 2025-05-30 | Stop reason: ALTCHOICE

## 2025-05-30 RX ORDER — LIDOCAINE HYDROCHLORIDE AND EPINEPHRINE 10; 10 MG/ML; UG/ML
INJECTION, SOLUTION INFILTRATION; PERINEURAL PRN
Status: DISCONTINUED | OUTPATIENT
Start: 2025-05-30 | End: 2025-05-30 | Stop reason: ALTCHOICE

## 2025-05-30 ASSESSMENT — PAIN - FUNCTIONAL ASSESSMENT
PAIN_FUNCTIONAL_ASSESSMENT: NONE - DENIES PAIN
PAIN_FUNCTIONAL_ASSESSMENT: NONE - DENIES PAIN
PAIN_FUNCTIONAL_ASSESSMENT: 0-10

## 2025-05-30 NOTE — INTERVAL H&P NOTE
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  He and I have reviewed this plan as well is the concepts of clear margins and frozen sections.  We have also reviewed his anesthesia choice.  He has no anxiety regarding this procedure and has inquired about the possibility of doing the procedure under local anesthesia alone.  I believe this is entirely safe and appropriate.  We will proceed with unchanged surgical plan but will convert to a local only as opposed to a MAC.    Plan: The risks, benefits, expected outcome, and alternative to the recommended procedure have been discussed with the patient. Patient understands and wants to proceed with the procedure.     Electronically signed by Pako Thomason MD on 5/30/2025 at 8:55 AM

## 2025-05-30 NOTE — OP NOTE
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  Excision of right forehead squamous cell carcinoma, 1.2 cm  2.  Intermediate repair, right forehead, 3.2 cm    Surgeon(s):  Pako Thomason MD Verbist, Daniel E, MD    Assistant:   * No surgical staff found *    Anesthesia: Local    Estimated Blood Loss (mL): Minimal    Complications: None    Specimens:   ID Type Source Tests Collected by Time Destination   A : Right Forehead Lesion; Short stitch Superior and Long stitch Lateral Tissue Forehead SURGICAL PATHOLOGY Pako Thomason MD 5/30/2025 0919        Findings:  Infection Present At Time Of Surgery (PATOS) (choose all levels that have infection present):  No infection present  Other Findings:     This procedure was not performed to treat primary cutaneous melanoma through wide local excision    Detailed Description of Procedure:   I excised the lesion in lenticular fashion oriented transversely and parallel to existing forehead rhytids.  I captured adjacent grossly normal margins along with the excision.  I carried the incision full-thickness through the dermis and elevated the specimen in the subcutaneous fat plane.  The specimen was oriented with a short stitch in the superior margin and a long stitch in the lateral margin.  This was sent to pathology for frozen sections.    The pathologist confirmed the diagnosis of squamous cell carcinoma as well as the presence of clear margins.  I performed a layered repair of the defect beginning with interrupted 5-0 Vicryl in the deep dermis and concluding with a simple running 5-0 nylon in the skin.  The site was dressed with antibiotic ointment, sterile gauze, and silk tape.    Disposition:  Home and

## 2025-05-30 NOTE — DISCHARGE INSTRUCTIONS
General Postoperative Instructions  Pako Thomason MD      ENCOURAGED ACTIVITY  Please do not lift greater than 10 pounds for the next 72 hours.  Please keep your head elevated above heart level for the next 72 hours.    DRESSINGS AND FOLLOW-UP  Please keep your dressings in place for 24 hours.  Please keep your dressings clean and dry at all times.  After removing your dressings, you do not need to keep your incision covered.  It is ok to resume normal showering tomorrow.  Do not submerge your incisions below water for 2 weeks.  You will have scheduled follow-up in 12 days on Wednesday, June 11, 2025 @ 12:00 noon.    MEDICATIONS  Please take over-the-counter pain medications as needed for pain.  It is okay to resume your blood thinning medication this evening.    Questions or Concerns    If you have additional questions or concerns, please contact [facility] at [phone] during or after office hours. After hours, you will have the option to press #1 to speak to the answering service.  They will be able to put you in touch with Dr. Thomason if necessary.     In the case of an emergency, please call 911.    Signs and Symptoms of Infection and/or Complication:    Rapid or asymmetric swelling  Swelling that worsens after the first 48 hours  Redness and warmth developing on previously normal-appearing skin  Drainage other than scant blood or blood-tinged clear fluid  Fever or chills  Nausea and vomiting or diarrhea   Separation of the incision  Bleeding that does not stop with pressure    Signs and Symptoms of a Potential Emergency:    If you experience any of the following during your postoperative recovery, this may represent an emergency.  Please call 911 and seek immediate medical attention:    Loss of consciousness or “blacking out”  Worsening shortness of breath or inability to catch your breath  No onset chest, shoulder, or neck pain  Leg pain or

## 2025-06-06 ENCOUNTER — OFFICE VISIT (OUTPATIENT)
Dept: FAMILY MEDICINE CLINIC | Facility: CLINIC | Age: 85
End: 2025-06-06
Payer: MEDICARE

## 2025-06-06 VITALS
DIASTOLIC BLOOD PRESSURE: 70 MMHG | WEIGHT: 187 LBS | HEIGHT: 70 IN | RESPIRATION RATE: 12 BRPM | TEMPERATURE: 97.2 F | SYSTOLIC BLOOD PRESSURE: 110 MMHG | OXYGEN SATURATION: 99 % | HEART RATE: 54 BPM | BODY MASS INDEX: 26.77 KG/M2

## 2025-06-06 DIAGNOSIS — Z00.00 MEDICARE ANNUAL WELLNESS VISIT, SUBSEQUENT: Primary | ICD-10-CM

## 2025-06-06 NOTE — PROGRESS NOTES
The ABCs of the Annual Wellness Visit  Subsequent Medicare Wellness Visit    Subjective    Jeff Alatorre is a 85 y.o. male who presents for a Subsequent Medicare Wellness Visit.    The following portions of the patient's history were reviewed and   updated as appropriate: hx reviewed.    Compared to one year ago, the patient feels his physical   health is the same.    Compared to one year ago, the patient feels his mental   health is the same.    Recent Hospitalizations:  This patient has had a [facility] admission record on file within the last 365 days.    Current Medical Providers:  Patient Care Team:  Jimmy Curtis MD as PCP - General (Family Medicine)  Zaid Contreras MD as Consulting Physician (Cardiology)  Jose F Kim MD as Surgeon (Cardiothoracic Surgery)    Outpatient Medications Prior to Visit   Medication Sig Dispense Refill    acetaminophen (TYLENOL) 500 MG tablet Take 1 tablet by mouth Daily As Needed for Mild Pain.      allopurinol (ZYLOPRIM) 300 MG tablet Take 1 tablet by mouth Daily. 90 tablet 2    apixaban (ELIQUIS) 5 MG tablet tablet Take 1 tablet by mouth 2 (Two) Times a Day. 180 tablet 11    atorvastatin (Lipitor) 80 MG tablet Take 1 tablet by mouth Daily. 90 tablet 2    Calcium Polycarbophil (FIBER-CAPS PO) Take 500 mg by mouth 2 (Two) Times a Day.      ferrous sulfate 325 (65 FE) MG tablet Take 1 tablet by mouth Daily With Breakfast. Pt takes every other day 90 tablet 2    loperamide (Imodium A-D) 2 MG tablet Take 1 tablet by mouth 4 (Four) Times a Day As Needed for Diarrhea. 120 tablet 2    metFORMIN (GLUCOPHAGE) 500 MG tablet Take 1 tablet by mouth 2 (Two) Times a Day With Meals. 180 tablet 3    metoprolol succinate XL (TOPROL-XL) 25 MG 24 hr tablet Take 1 tablet by mouth Daily. 90 tablet 2    nitroglycerin (NITROSTAT) 0.4 MG SL tablet Place 1 tablet under the tongue Every 5 (Five) Minutes As Needed for Chest Pain. Take no more than 3 doses in 15 minutes. 90 tablet 1     "vitamin B-12 (CYANOCOBALAMIN) 500 MCG tablet Take 1 tablet by mouth Daily. 90 tablet 1     No facility-administered medications prior to visit.       No opioid medication identified on active medication list. I have reviewed chart for other potential  high risk medication/s and harmful drug interactions in the elderly.        Aspirin is not on active medication list.  No indication for aspirin.    Patient Active Problem List   Diagnosis    Hx of adenomatous colonic polyps    Adenomatous polyps    Idiopathic gout of multiple sites    Primary hypertension    Type 2 diabetes mellitus without complication, without long-term current use of insulin    Erectile dysfunction due to arterial insufficiency    Anemia    B12 deficiency    Stage 3a chronic kidney disease (CKD)    Seasonal allergic rhinitis    ANYI (obstructive sleep apnea)    Carotid artery disease    ROSALES (dyspnea on exertion)    Coronary artery disease involving coronary bypass graft of native heart without angina pectoris    Bilateral carotid artery stenosis    History of left-sided carotid endarterectomy    Stenosis of right subclavian artery    Chronic anemia    PAD (peripheral artery disease)    PAF (paroxysmal atrial fibrillation)    Mixed hyperlipidemia    S/P CABG (coronary artery bypass graft)    Arthritis     Advance Care Planning  Advance Directive is on file.  ACP discussion was held with the patient during this visit. On file at [facility]     Objective    Vitals:    06/06/25 1511   Resp: 14   Temp: 97.4 °F (36.3 °C)   TempSrc: Temporal   Weight: 84.8 kg (187 lb)   Height: 177.8 cm (70\")     Estimated body mass index is 26.83 kg/m² as calculated from the following:    Height as of this encounter: 177.8 cm (70\").    Weight as of this encounter: 84.8 kg (187 lb).           Does the patient have evidence of cognitive impairment? No          HEALTH RISK ASSESSMENT    Smoking Status:  Social History     Tobacco Use   Smoking Status Former    Current " packs/day: 0.00    Average packs/day: 1 pack/day for 40.0 years (40.0 ttl pk-yrs)    Types: Cigarettes    Start date:     Quit date:     Years since quittin.4    Passive exposure: Past   Smokeless Tobacco Never   Tobacco Comments    N/A     Alcohol Consumption:  Social History     Substance and Sexual Activity   Alcohol Use Not Currently    Comment: Quite 20+ yrs ago     Fall Risk Screen:    MARIELY Fall Risk Assessment has not been completed.    Depression Screenin/8/2025     1:29 PM   PHQ-2/PHQ-9 Depression Screening   Little interest or pleasure in doing things Not at all   Feeling down, depressed, or hopeless Not at all       Health Habits and Functional and Cognitive Screening:      10/8/2024     9:27 AM   Functional & Cognitive Status   Do you have difficulty preparing food and eating? No   Do you have difficulty bathing yourself, getting dressed or grooming yourself? No   Do you have difficulty using the toilet? No   Do you have difficulty moving around from place to place? No   Do you have trouble with steps or getting out of a bed or a chair? No   Current Diet Well Balanced Diet   Dental Exam Not up to date   Eye Exam Not up to date   Exercise (times per week) 7 times per week   Current Exercises Include Yard Work   Do you need help using the phone?  No   Are you deaf or do you have serious difficulty hearing?  No   Do you need help to go to places out of walking distance? No   Do you need help shopping? No   Do you need help preparing meals?  No   Do you need help with housework?  No   Do you need help with laundry? No   Do you need help taking your medications? No   Do you need help managing money? No   Do you ever drive or ride in a car without wearing a seat belt? No   Have you felt unusual stress, anger or loneliness in the last month? No   Who do you live with? Child   If you need help, do you have trouble finding someone available to you? No   Have you been bothered in the last  four weeks by sexual problems? No   Do you have difficulty concentrating, remembering or making decisions? No       Age-appropriate Screening Schedule:  Refer to the list below for future screening recommendations based on patient's age, sex and/or medical conditions. Orders for these recommended tests are listed in the plan section. The patient has been provided with a written plan.    Health Maintenance   Topic Date Due    DIABETIC FOOT EXAM  Never done    DIABETIC EYE EXAM  Never done    ZOSTER VACCINE (3 of 3) 12/25/2023    COVID-19 Vaccine (7 - 2024-25 season) 04/01/2025    HEMOGLOBIN A1C  04/07/2025    TDAP/TD VACCINES (1 - Tdap) 10/08/2025 (Originally 2/25/1959)    INFLUENZA VACCINE  07/01/2025    LIPID PANEL  10/07/2025    ANNUAL WELLNESS VISIT  10/08/2025    URINE MICROALBUMIN-CREATININE RATIO (uACR)  10/08/2025    COLORECTAL CANCER SCREENING  07/27/2026    RSV Vaccine - Adults  Completed    Pneumococcal Vaccine 50+  Completed                CMS Preventative Services Quick Reference  Risk Factors Identified During Encounter  None Identified  The above risks/problems have been discussed with the patient.  Pertinent information has been shared with the patient in the After Visit Summary.  An After Visit Summary and PPPS were made available to the patient.    Follow Up:   Next Medicare Wellness visit to be scheduled in 1 year.    2

## 2025-06-09 ENCOUNTER — PREP FOR SURGERY (OUTPATIENT)
Dept: OTHER | Facility: HOSPITAL | Age: 85
End: 2025-06-09
Payer: MEDICARE

## 2025-06-09 ENCOUNTER — TELEPHONE (OUTPATIENT)
Dept: CARDIOLOGY | Facility: CLINIC | Age: 85
End: 2025-06-09
Payer: MEDICARE

## 2025-06-09 DIAGNOSIS — I48.0 PAF (PAROXYSMAL ATRIAL FIBRILLATION): Primary | ICD-10-CM

## 2025-06-09 RX ORDER — SODIUM CHLORIDE 0.9 % (FLUSH) 0.9 %
10 SYRINGE (ML) INJECTION AS NEEDED
OUTPATIENT
Start: 2025-06-09

## 2025-06-09 RX ORDER — SODIUM CHLORIDE, SODIUM LACTATE, POTASSIUM CHLORIDE, CALCIUM CHLORIDE 600; 310; 30; 20 MG/100ML; MG/100ML; MG/100ML; MG/100ML
1 INJECTION, SOLUTION INTRAVENOUS CONTINUOUS
OUTPATIENT
Start: 2025-06-09 | End: 2025-06-15

## 2025-06-09 RX ORDER — ASPIRIN 81 MG/1
324 TABLET, CHEWABLE ORAL ONCE
OUTPATIENT
Start: 2025-06-09 | End: 2025-06-09

## 2025-06-09 RX ORDER — SODIUM CHLORIDE 9 MG/ML
40 INJECTION, SOLUTION INTRAVENOUS AS NEEDED
OUTPATIENT
Start: 2025-06-09

## 2025-06-09 RX ORDER — SODIUM CHLORIDE 0.9 % (FLUSH) 0.9 %
10 SYRINGE (ML) INJECTION EVERY 12 HOURS SCHEDULED
OUTPATIENT
Start: 2025-06-09

## 2025-06-24 ENCOUNTER — APPOINTMENT (OUTPATIENT)
Dept: CARDIOLOGY | Facility: HOSPITAL | Age: 85
End: 2025-06-24
Payer: MEDICARE

## 2025-06-24 ENCOUNTER — ANESTHESIA EVENT (OUTPATIENT)
Dept: CARDIOLOGY | Facility: HOSPITAL | Age: 85
End: 2025-06-24
Payer: MEDICARE

## 2025-06-24 ENCOUNTER — ANESTHESIA (OUTPATIENT)
Dept: CARDIOLOGY | Facility: HOSPITAL | Age: 85
End: 2025-06-24
Payer: MEDICARE

## 2025-06-24 ENCOUNTER — HOSPITAL ENCOUNTER (INPATIENT)
Facility: HOSPITAL | Age: 85
LOS: 1 days | Discharge: HOME | End: 2025-06-25
Attending: INTERNAL MEDICINE | Admitting: INTERNAL MEDICINE
Payer: MEDICARE

## 2025-06-24 DIAGNOSIS — E61.1 IRON DEFICIENCY: Primary | ICD-10-CM

## 2025-06-24 DIAGNOSIS — I48.0 PAF (PAROXYSMAL ATRIAL FIBRILLATION): ICD-10-CM

## 2025-06-24 DIAGNOSIS — E61.1 IRON DEFICIENCY: ICD-10-CM

## 2025-06-24 LAB
ABO GROUP BLD: NORMAL
ALBUMIN SERPL-MCNC: 3.7 G/DL (ref 3.5–5.2)
ALBUMIN SERPL-MCNC: 4.2 G/DL (ref 3.5–5.2)
ALBUMIN/GLOB SERPL: 1.6 G/DL
ALBUMIN/GLOB SERPL: 1.7 G/DL
ALP SERPL-CCNC: 132 U/L (ref 39–117)
ALP SERPL-CCNC: 156 U/L (ref 39–117)
ALT SERPL W P-5'-P-CCNC: 22 U/L (ref 1–41)
ALT SERPL W P-5'-P-CCNC: 28 U/L (ref 1–41)
ANION GAP SERPL CALCULATED.3IONS-SCNC: 11 MMOL/L (ref 5–15)
ANION GAP SERPL CALCULATED.3IONS-SCNC: 13 MMOL/L (ref 5–15)
AST SERPL-CCNC: 22 U/L (ref 1–40)
AST SERPL-CCNC: 29 U/L (ref 1–40)
BASOPHILS # BLD AUTO: 0.03 10*3/MM3 (ref 0–0.2)
BASOPHILS # BLD AUTO: 0.04 10*3/MM3 (ref 0–0.2)
BASOPHILS NFR BLD AUTO: 0.5 % (ref 0–1.5)
BASOPHILS NFR BLD AUTO: 0.6 % (ref 0–1.5)
BILIRUB SERPL-MCNC: 0.3 MG/DL (ref 0–1.2)
BILIRUB SERPL-MCNC: 0.5 MG/DL (ref 0–1.2)
BLD GP AB SCN SERPL QL: NEGATIVE
BUN SERPL-MCNC: 28 MG/DL (ref 8–23)
BUN SERPL-MCNC: 29.6 MG/DL (ref 8–23)
BUN/CREAT SERPL: 24.3 (ref 7–25)
BUN/CREAT SERPL: 24.6 (ref 7–25)
CALCIUM SPEC-SCNC: 8.8 MG/DL (ref 8.6–10.5)
CALCIUM SPEC-SCNC: 9.6 MG/DL (ref 8.6–10.5)
CHLORIDE SERPL-SCNC: 106 MMOL/L (ref 98–107)
CHLORIDE SERPL-SCNC: 109 MMOL/L (ref 98–107)
CO2 SERPL-SCNC: 23 MMOL/L (ref 22–29)
CO2 SERPL-SCNC: 24 MMOL/L (ref 22–29)
CREAT SERPL-MCNC: 1.14 MG/DL (ref 0.76–1.27)
CREAT SERPL-MCNC: 1.22 MG/DL (ref 0.76–1.27)
DEPRECATED RDW RBC AUTO: 54.8 FL (ref 37–54)
DEPRECATED RDW RBC AUTO: 55.6 FL (ref 37–54)
EGFRCR SERPLBLD CKD-EPI 2021: 58.1 ML/MIN/1.73
EGFRCR SERPLBLD CKD-EPI 2021: 63 ML/MIN/1.73
EOSINOPHIL # BLD AUTO: 0.13 10*3/MM3 (ref 0–0.4)
EOSINOPHIL # BLD AUTO: 0.16 10*3/MM3 (ref 0–0.4)
EOSINOPHIL NFR BLD AUTO: 2.3 % (ref 0.3–6.2)
EOSINOPHIL NFR BLD AUTO: 2.3 % (ref 0.3–6.2)
ERYTHROCYTE [DISTWIDTH] IN BLOOD BY AUTOMATED COUNT: 14.5 % (ref 12.3–15.4)
ERYTHROCYTE [DISTWIDTH] IN BLOOD BY AUTOMATED COUNT: 14.5 % (ref 12.3–15.4)
FERRITIN SERPL-MCNC: 96.9 NG/ML (ref 30–400)
GLOBULIN UR ELPH-MCNC: 2.2 GM/DL
GLOBULIN UR ELPH-MCNC: 2.7 GM/DL
GLUCOSE SERPL-MCNC: 118 MG/DL (ref 65–99)
GLUCOSE SERPL-MCNC: 120 MG/DL (ref 65–99)
HCT VFR BLD AUTO: 36.2 % (ref 37.5–51)
HCT VFR BLD AUTO: 41.2 % (ref 37.5–51)
HGB BLD-MCNC: 11.7 G/DL (ref 13–17.7)
HGB BLD-MCNC: 13.2 G/DL (ref 13–17.7)
IMM GRANULOCYTES # BLD AUTO: 0.01 10*3/MM3 (ref 0–0.05)
IMM GRANULOCYTES # BLD AUTO: 0.03 10*3/MM3 (ref 0–0.05)
IMM GRANULOCYTES NFR BLD AUTO: 0.2 % (ref 0–0.5)
IMM GRANULOCYTES NFR BLD AUTO: 0.4 % (ref 0–0.5)
INR PPP: 1.04 (ref 0.91–1.09)
IRON 24H UR-MRATE: 52 MCG/DL (ref 59–158)
IRON SATN MFR SERPL: 17 % (ref 20–50)
LYMPHOCYTES # BLD AUTO: 1.18 10*3/MM3 (ref 0.7–3.1)
LYMPHOCYTES # BLD AUTO: 1.25 10*3/MM3 (ref 0.7–3.1)
LYMPHOCYTES NFR BLD AUTO: 18.4 % (ref 19.6–45.3)
LYMPHOCYTES NFR BLD AUTO: 21.3 % (ref 19.6–45.3)
MCH RBC QN AUTO: 33.5 PG (ref 26.6–33)
MCH RBC QN AUTO: 33.5 PG (ref 26.6–33)
MCHC RBC AUTO-ENTMCNC: 32 G/DL (ref 31.5–35.7)
MCHC RBC AUTO-ENTMCNC: 32.3 G/DL (ref 31.5–35.7)
MCV RBC AUTO: 103.7 FL (ref 79–97)
MCV RBC AUTO: 104.6 FL (ref 79–97)
MONOCYTES # BLD AUTO: 0.53 10*3/MM3 (ref 0.1–0.9)
MONOCYTES # BLD AUTO: 0.59 10*3/MM3 (ref 0.1–0.9)
MONOCYTES NFR BLD AUTO: 8.7 % (ref 5–12)
MONOCYTES NFR BLD AUTO: 9.6 % (ref 5–12)
NEUTROPHILS NFR BLD AUTO: 3.66 10*3/MM3 (ref 1.7–7)
NEUTROPHILS NFR BLD AUTO: 4.74 10*3/MM3 (ref 1.7–7)
NEUTROPHILS NFR BLD AUTO: 66.1 % (ref 42.7–76)
NEUTROPHILS NFR BLD AUTO: 69.6 % (ref 42.7–76)
NRBC BLD AUTO-RTO: 0 /100 WBC (ref 0–0.2)
NRBC BLD AUTO-RTO: 0 /100 WBC (ref 0–0.2)
PLATELET # BLD AUTO: 132 10*3/MM3 (ref 140–450)
PLATELET # BLD AUTO: 169 10*3/MM3 (ref 140–450)
PMV BLD AUTO: 10.9 FL (ref 6–12)
PMV BLD AUTO: 11.6 FL (ref 6–12)
POTASSIUM SERPL-SCNC: 4.5 MMOL/L (ref 3.5–5.2)
POTASSIUM SERPL-SCNC: 4.8 MMOL/L (ref 3.5–5.2)
PROT SERPL-MCNC: 5.9 G/DL (ref 6–8.5)
PROT SERPL-MCNC: 6.9 G/DL (ref 6–8.5)
PROTHROMBIN TIME: 14.1 SECONDS (ref 11.8–14.8)
RBC # BLD AUTO: 3.49 10*6/MM3 (ref 4.14–5.8)
RBC # BLD AUTO: 3.94 10*6/MM3 (ref 4.14–5.8)
RH BLD: POSITIVE
SODIUM SERPL-SCNC: 143 MMOL/L (ref 136–145)
SODIUM SERPL-SCNC: 143 MMOL/L (ref 136–145)
T&S EXPIRATION DATE: NORMAL
TIBC SERPL-MCNC: 307 MCG/DL (ref 298–536)
TRANSFERRIN SERPL-MCNC: 206 MG/DL (ref 200–360)
WBC NRBC COR # BLD AUTO: 5.54 10*3/MM3 (ref 3.4–10.8)
WBC NRBC COR # BLD AUTO: 6.81 10*3/MM3 (ref 3.4–10.8)

## 2025-06-24 PROCEDURE — 25010000002 SUGAMMADEX 200 MG/2ML SOLUTION: Performed by: NURSE ANESTHETIST, CERTIFIED REGISTERED

## 2025-06-24 PROCEDURE — 80053 COMPREHEN METABOLIC PANEL: CPT | Performed by: INTERNAL MEDICINE

## 2025-06-24 PROCEDURE — 83540 ASSAY OF IRON: CPT | Performed by: NURSE PRACTITIONER

## 2025-06-24 PROCEDURE — 93355 ECHO TRANSESOPHAGEAL (TEE): CPT

## 2025-06-24 PROCEDURE — 82728 ASSAY OF FERRITIN: CPT | Performed by: NURSE PRACTITIONER

## 2025-06-24 PROCEDURE — 25810000003 SODIUM CHLORIDE 0.9 % SOLUTION: Performed by: INTERNAL MEDICINE

## 2025-06-24 PROCEDURE — 84466 ASSAY OF TRANSFERRIN: CPT | Performed by: NURSE PRACTITIONER

## 2025-06-24 PROCEDURE — 25010000002 HEPARIN (PORCINE) PER 1000 UNITS: Performed by: NURSE ANESTHETIST, CERTIFIED REGISTERED

## 2025-06-24 PROCEDURE — 33340 PERQ CLSR TCAT L ATR APNDGE: CPT | Performed by: INTERNAL MEDICINE

## 2025-06-24 PROCEDURE — 25010000002 LIDOCAINE PF 2% 2 % SOLUTION: Performed by: NURSE ANESTHETIST, CERTIFIED REGISTERED

## 2025-06-24 PROCEDURE — 25010000002 CEFAZOLIN PER 500 MG: Performed by: NURSE ANESTHETIST, CERTIFIED REGISTERED

## 2025-06-24 PROCEDURE — 25510000001 IOPAMIDOL PER 1 ML: Performed by: INTERNAL MEDICINE

## 2025-06-24 PROCEDURE — 86850 RBC ANTIBODY SCREEN: CPT | Performed by: INTERNAL MEDICINE

## 2025-06-24 PROCEDURE — C1889 IMPLANT/INSERT DEVICE, NOC: HCPCS | Performed by: INTERNAL MEDICINE

## 2025-06-24 PROCEDURE — 85025 COMPLETE CBC W/AUTO DIFF WBC: CPT | Performed by: INTERNAL MEDICINE

## 2025-06-24 PROCEDURE — 25810000003 LACTATED RINGERS PER 1000 ML: Performed by: NURSE ANESTHETIST, CERTIFIED REGISTERED

## 2025-06-24 PROCEDURE — C1894 INTRO/SHEATH, NON-LASER: HCPCS | Performed by: INTERNAL MEDICINE

## 2025-06-24 PROCEDURE — 25010000002 ONDANSETRON PER 1 MG: Performed by: NURSE ANESTHETIST, CERTIFIED REGISTERED

## 2025-06-24 PROCEDURE — 86900 BLOOD TYPING SEROLOGIC ABO: CPT | Performed by: INTERNAL MEDICINE

## 2025-06-24 PROCEDURE — 02L73DK OCCLUSION OF LEFT ATRIAL APPENDAGE WITH INTRALUMINAL DEVICE, PERCUTANEOUS APPROACH: ICD-10-PCS | Performed by: INTERNAL MEDICINE

## 2025-06-24 PROCEDURE — 86901 BLOOD TYPING SEROLOGIC RH(D): CPT | Performed by: INTERNAL MEDICINE

## 2025-06-24 PROCEDURE — 85610 PROTHROMBIN TIME: CPT | Performed by: INTERNAL MEDICINE

## 2025-06-24 PROCEDURE — 25010000002 DEXAMETHASONE PER 1 MG: Performed by: NURSE ANESTHETIST, CERTIFIED REGISTERED

## 2025-06-24 PROCEDURE — 25010000002 PROPOFOL 10 MG/ML EMULSION: Performed by: NURSE ANESTHETIST, CERTIFIED REGISTERED

## 2025-06-24 PROCEDURE — 85347 COAGULATION TIME ACTIVATED: CPT

## 2025-06-24 PROCEDURE — 25010000002 HEPARIN (PORCINE) 2000-0.9 UNIT/L-% SOLUTION: Performed by: INTERNAL MEDICINE

## 2025-06-24 DEVICE — LEFT ATRIAL APPENDAGE CLOSURE DEVICE WITH DELIVERY SYSTEM
Type: IMPLANTABLE DEVICE | Site: HEART | Status: FUNCTIONAL
Brand: WATCHMAN FLX™ PRO

## 2025-06-24 DEVICE — CP SYS WATCHMAN TRUSTEER ACCESS: Type: IMPLANTABLE DEVICE | Status: FUNCTIONAL

## 2025-06-24 DEVICE — CP WATCHMAN FLX PRO PROC: Type: IMPLANTABLE DEVICE | Status: FUNCTIONAL

## 2025-06-24 RX ORDER — METOPROLOL SUCCINATE 25 MG/1
25 TABLET, EXTENDED RELEASE ORAL DAILY
Status: DISCONTINUED | OUTPATIENT
Start: 2025-06-25 | End: 2025-06-25 | Stop reason: HOSPADM

## 2025-06-24 RX ORDER — EPHEDRINE SULFATE 50 MG/ML
INJECTION, SOLUTION INTRAVENOUS AS NEEDED
Status: DISCONTINUED | OUTPATIENT
Start: 2025-06-24 | End: 2025-06-24 | Stop reason: SURG

## 2025-06-24 RX ORDER — ROCURONIUM BROMIDE 10 MG/ML
INJECTION, SOLUTION INTRAVENOUS AS NEEDED
Status: DISCONTINUED | OUTPATIENT
Start: 2025-06-24 | End: 2025-06-24 | Stop reason: SURG

## 2025-06-24 RX ORDER — HEPARIN SODIUM 1000 [USP'U]/ML
INJECTION, SOLUTION INTRAVENOUS; SUBCUTANEOUS AS NEEDED
Status: DISCONTINUED | OUTPATIENT
Start: 2025-06-24 | End: 2025-06-24 | Stop reason: SURG

## 2025-06-24 RX ORDER — CLOPIDOGREL BISULFATE 75 MG/1
75 TABLET ORAL DAILY
Status: DISCONTINUED | OUTPATIENT
Start: 2025-06-25 | End: 2025-06-25 | Stop reason: HOSPADM

## 2025-06-24 RX ORDER — ONDANSETRON 2 MG/ML
INJECTION INTRAMUSCULAR; INTRAVENOUS AS NEEDED
Status: DISCONTINUED | OUTPATIENT
Start: 2025-06-24 | End: 2025-06-24 | Stop reason: SURG

## 2025-06-24 RX ORDER — SODIUM CHLORIDE, SODIUM LACTATE, POTASSIUM CHLORIDE, CALCIUM CHLORIDE 600; 310; 30; 20 MG/100ML; MG/100ML; MG/100ML; MG/100ML
INJECTION, SOLUTION INTRAVENOUS CONTINUOUS PRN
Status: DISCONTINUED | OUTPATIENT
Start: 2025-06-24 | End: 2025-06-24 | Stop reason: SURG

## 2025-06-24 RX ORDER — SODIUM CHLORIDE 0.9 % (FLUSH) 0.9 %
10 SYRINGE (ML) INJECTION AS NEEDED
Status: DISCONTINUED | OUTPATIENT
Start: 2025-06-24 | End: 2025-06-25 | Stop reason: HOSPADM

## 2025-06-24 RX ORDER — ACETAMINOPHEN 325 MG/1
650 TABLET ORAL EVERY 4 HOURS PRN
Status: DISCONTINUED | OUTPATIENT
Start: 2025-06-24 | End: 2025-06-25 | Stop reason: HOSPADM

## 2025-06-24 RX ORDER — IOPAMIDOL 755 MG/ML
INJECTION, SOLUTION INTRAVASCULAR
Status: DISCONTINUED | OUTPATIENT
Start: 2025-06-24 | End: 2025-06-24 | Stop reason: HOSPADM

## 2025-06-24 RX ORDER — ALLOPURINOL 300 MG/1
300 TABLET ORAL DAILY
Status: DISCONTINUED | OUTPATIENT
Start: 2025-06-25 | End: 2025-06-25 | Stop reason: HOSPADM

## 2025-06-24 RX ORDER — ASPIRIN 81 MG/1
81 TABLET ORAL DAILY
Status: DISCONTINUED | OUTPATIENT
Start: 2025-06-25 | End: 2025-06-25 | Stop reason: HOSPADM

## 2025-06-24 RX ORDER — CEFAZOLIN SODIUM 1 G/3ML
INJECTION, POWDER, FOR SOLUTION INTRAMUSCULAR; INTRAVENOUS AS NEEDED
Status: DISCONTINUED | OUTPATIENT
Start: 2025-06-24 | End: 2025-06-24 | Stop reason: SURG

## 2025-06-24 RX ORDER — SODIUM CHLORIDE, SODIUM LACTATE, POTASSIUM CHLORIDE, CALCIUM CHLORIDE 600; 310; 30; 20 MG/100ML; MG/100ML; MG/100ML; MG/100ML
1 INJECTION, SOLUTION INTRAVENOUS CONTINUOUS
Status: DISCONTINUED | OUTPATIENT
Start: 2025-06-24 | End: 2025-06-25 | Stop reason: HOSPADM

## 2025-06-24 RX ORDER — ASPIRIN 81 MG/1
TABLET, CHEWABLE ORAL
Status: COMPLETED
Start: 2025-06-24 | End: 2025-06-24

## 2025-06-24 RX ORDER — HEPARIN SODIUM 200 [USP'U]/100ML
INJECTION, SOLUTION INTRAVENOUS
Status: DISCONTINUED | OUTPATIENT
Start: 2025-06-24 | End: 2025-06-24 | Stop reason: HOSPADM

## 2025-06-24 RX ORDER — ACETAMINOPHEN 500 MG
500 TABLET ORAL DAILY PRN
Status: DISCONTINUED | OUTPATIENT
Start: 2025-06-24 | End: 2025-06-24

## 2025-06-24 RX ORDER — LIDOCAINE HYDROCHLORIDE 20 MG/ML
INJECTION, SOLUTION EPIDURAL; INFILTRATION; INTRACAUDAL; PERINEURAL AS NEEDED
Status: DISCONTINUED | OUTPATIENT
Start: 2025-06-24 | End: 2025-06-24 | Stop reason: SURG

## 2025-06-24 RX ORDER — ATORVASTATIN CALCIUM 40 MG/1
80 TABLET, FILM COATED ORAL DAILY
Status: DISCONTINUED | OUTPATIENT
Start: 2025-06-25 | End: 2025-06-25 | Stop reason: HOSPADM

## 2025-06-24 RX ORDER — DEXAMETHASONE SODIUM PHOSPHATE 4 MG/ML
INJECTION, SOLUTION INTRA-ARTICULAR; INTRALESIONAL; INTRAMUSCULAR; INTRAVENOUS; SOFT TISSUE AS NEEDED
Status: DISCONTINUED | OUTPATIENT
Start: 2025-06-24 | End: 2025-06-24 | Stop reason: SURG

## 2025-06-24 RX ORDER — SODIUM CHLORIDE 9 MG/ML
40 INJECTION, SOLUTION INTRAVENOUS AS NEEDED
Status: DISCONTINUED | OUTPATIENT
Start: 2025-06-24 | End: 2025-06-25 | Stop reason: HOSPADM

## 2025-06-24 RX ORDER — FERROUS SULFATE 325(65) MG
325 TABLET ORAL EVERY OTHER DAY
COMMUNITY

## 2025-06-24 RX ORDER — SODIUM CHLORIDE 9 MG/ML
100 INJECTION, SOLUTION INTRAVENOUS CONTINUOUS
Status: DISPENSED | OUTPATIENT
Start: 2025-06-24 | End: 2025-06-24

## 2025-06-24 RX ORDER — PROPOFOL 10 MG/ML
VIAL (ML) INTRAVENOUS AS NEEDED
Status: DISCONTINUED | OUTPATIENT
Start: 2025-06-24 | End: 2025-06-24 | Stop reason: SURG

## 2025-06-24 RX ORDER — NITROGLYCERIN 0.4 MG/1
0.4 TABLET SUBLINGUAL
Status: DISCONTINUED | OUTPATIENT
Start: 2025-06-24 | End: 2025-06-25 | Stop reason: HOSPADM

## 2025-06-24 RX ORDER — ASPIRIN 81 MG/1
324 TABLET, CHEWABLE ORAL ONCE
Status: COMPLETED | OUTPATIENT
Start: 2025-06-24 | End: 2025-06-24

## 2025-06-24 RX ORDER — SODIUM CHLORIDE 0.9 % (FLUSH) 0.9 %
10 SYRINGE (ML) INJECTION EVERY 12 HOURS SCHEDULED
Status: DISCONTINUED | OUTPATIENT
Start: 2025-06-24 | End: 2025-06-25 | Stop reason: HOSPADM

## 2025-06-24 RX ADMIN — SODIUM CHLORIDE 100 ML/HR: 9 INJECTION, SOLUTION INTRAVENOUS at 15:21

## 2025-06-24 RX ADMIN — EPHEDRINE SULFATE 10 MG: 50 INJECTION INTRAVENOUS at 12:47

## 2025-06-24 RX ADMIN — CEFAZOLIN 2 G: 1 INJECTION, POWDER, FOR SOLUTION INTRAMUSCULAR; INTRAVENOUS at 12:18

## 2025-06-24 RX ADMIN — Medication 10 ML: at 20:03

## 2025-06-24 RX ADMIN — HEPARIN SODIUM 4000 UNITS: 1000 INJECTION, SOLUTION INTRAVENOUS; SUBCUTANEOUS at 12:32

## 2025-06-24 RX ADMIN — HEPARIN SODIUM 4000 UNITS: 1000 INJECTION, SOLUTION INTRAVENOUS; SUBCUTANEOUS at 12:39

## 2025-06-24 RX ADMIN — ROCURONIUM BROMIDE 50 MG: 10 INJECTION, SOLUTION INTRAVENOUS at 12:07

## 2025-06-24 RX ADMIN — SODIUM CHLORIDE, POTASSIUM CHLORIDE, SODIUM LACTATE AND CALCIUM CHLORIDE: 600; 310; 30; 20 INJECTION, SOLUTION INTRAVENOUS at 12:02

## 2025-06-24 RX ADMIN — ASPIRIN 324 MG: 81 TABLET, CHEWABLE ORAL at 10:35

## 2025-06-24 RX ADMIN — ONDANSETRON 4 MG: 2 INJECTION INTRAMUSCULAR; INTRAVENOUS at 13:02

## 2025-06-24 RX ADMIN — SUGAMMADEX 200 MG: 100 INJECTION, SOLUTION INTRAVENOUS at 13:02

## 2025-06-24 RX ADMIN — PROPOFOL 150 MG: 10 INJECTION, EMULSION INTRAVENOUS at 12:07

## 2025-06-24 RX ADMIN — DEXAMETHASONE SODIUM PHOSPHATE 4 MG: 4 INJECTION INTRA-ARTICULAR; INTRALESIONAL; INTRAMUSCULAR; INTRAVENOUS; SOFT TISSUE at 12:34

## 2025-06-24 RX ADMIN — LIDOCAINE HYDROCHLORIDE 40 MG: 20 INJECTION, SOLUTION EPIDURAL; INFILTRATION; INTRACAUDAL; PERINEURAL at 12:07

## 2025-06-24 NOTE — ANESTHESIA POSTPROCEDURE EVALUATION
Patient: Jeff Alatorre    Procedure Summary       Date: 06/24/25 Room / Location: [facility] CATH LAB  /  [facility] CATH INVASIVE LOCATION    Anesthesia Start: 1200 Anesthesia Stop: 1314    Procedure: Atrial Appendage Occlusion (Right) Diagnosis: PAF (paroxysmal atrial fibrillation)    Providers: Zaid Contreras MD Provider: Chintan Shaw CRNA    Anesthesia Type: general ASA Status: 3            Anesthesia Type: general    Vitals  Vitals Value Taken Time   /65 06/24/25 13:16   Temp     Pulse 64 06/24/25 13:17   Resp     SpO2 100 % 06/24/25 13:17   Vitals shown include unfiled device data.        Post Anesthesia Care and Evaluation    Patient location during evaluation: PHASE II  Patient participation: complete - patient participated  Level of consciousness: responsive to light touch  Pain management: adequate    Airway patency: patent  Anesthetic complications: No anesthetic complications  PONV Status: none  Cardiovascular status: acceptable  Respiratory status: acceptable  Hydration status: acceptable

## 2025-06-24 NOTE — H&P
[facility] - CARDIOLOGY  HISTORY AND PHYSICAL    Date of Admission: 6/24/2025  Primary Care Physician: Jimmy Curtis MD    Subjective     Chief Complaint: Watchman    History of Present Illness    85-year-old male presents today for planned attempt at left atrial pended occlusion.  He has known coronary artery disease, and paroxysmal atrial fibrillation that was first noted after having undergone CABG in June 2024 (left atrial pended was not ligated at that time, according to operative note).  Subsequent surveillance demonstrated ongoing paroxysmal atrial fibrillation, so she was started on anticoagulation.  However, he requested this as an alternative to continuation of that, with primary indication being COU, need for antiplatelet therapy given coronary disease, side effects he described from Eliquis, and need to avoid daily NSAID therapy while on Eliquis with subsequent worsening of arthritic pain has resulted in inability to take his medications.    We last discussed this in office on 4/21/2025, and he had a shared decision making visit with Dr. De Leon on 5/5/2025.    Review of Systems   Otherwise complete ROS reviewed and negative except as mentioned in the HPI.    Past Medical History:   Past Medical History:   Diagnosis Date    Arthritis     Chronic kidney disease     Coronary artery disease of native artery of native heart with stable angina pectoris 05/23/2024    Erectile dysfunction     Gout     Heart valve disease     4 bypass surgery    History of diverticulitis     HTN (hypertension)     Hx of adenomatous colonic polyps 10/13/2020    Hypercholesteremia     Kidney stone     Low testosterone     PAF (paroxysmal atrial fibrillation) 07/01/2024    Polycythemia vera 10/13/2020    Squamous cell carcinoma of skin 10/2021    top of head  also place removed by Dr. Vale  may 2025    Type 2 diabetes mellitus without complication, without long-term current use of insulin 12/01/2022       Past Surgical History:  Past  Surgical History:   Procedure Laterality Date    CARDIAC CATHETERIZATION Left 05/20/2024    Procedure: Coronary angiography;  Surgeon: Zaid Contreras MD;  Location: [facility] CATH INVASIVE LOCATION;  Service: Cardiology;  Laterality: Left;    CARDIAC CATHETERIZATION Left 05/20/2024    Procedure: Percutaneous Coronary Intervention;  Surgeon: Zaid Contreras MD;  Location: [facility] CATH INVASIVE LOCATION;  Service: Cardiology;  Laterality: Left;    CAROTID ENDARTERECTOMY Left     CATARACT EXTRACTION, BILATERAL      COLON SURGERY      COLONOSCOPY      COLONOSCOPY N/A 07/27/2021    Procedure: COLONOSCOPY WITH ANESTHESIA;  Surgeon: Luciano Alatorre DO;  Location: [facility] ENDOSCOPY;  Service: Gastroenterology;  Laterality: N/A;  preop; hx of polyps  postop; diverticulosis   PCP Devon Moore     CORONARY ARTERY BYPASS GRAFT N/A 06/25/2024    Procedure: CORONARY ARTERY BYPASS GRAFTING x4, LEFT INTERNAL MAMMARY ARTERY GRAFT, RIGHT LEG ENDOSCOPIC VEIN HARVEST, TRANSESOPHAGEAL ECHOCARDIOGRAM, APPLICATION OF PREVENA;  Surgeon: Jose F Kim MD;  Location:  [facility] OR;  Service: Cardiothoracic;  Laterality: N/A;    PENILE PROSTHESIS IMPLANT N/A 03/14/2023    Procedure: 3-PIECE INFLATABLE PENILE PROSTHESIS PLACEMENT;  Surgeon: Garcia Santana MD;  Location: [facility] OR;  Service: Urology;  Laterality: N/A;    VASECTOMY         Family History: family history includes Heart attack in his father; No Known Problems in his daughter, maternal grandfather, maternal grandmother, mother, paternal grandfather, paternal grandmother, and son; Parkinsonism in his brother.    Social History:  reports that he quit smoking about 30 years ago. His smoking use included cigarettes. He started smoking about 70 years ago. He has a 40 pack-year smoking history. He has been exposed to tobacco smoke. He has never used smokeless tobacco. He reports that he does not currently use alcohol. He reports that he does not use drugs.    Medications:  Prior  "to Admission medications    Medication Sig Start Date End Date Taking? Authorizing Provider   acetaminophen (TYLENOL) 500 MG tablet Take 1 tablet by mouth Daily As Needed for Mild Pain.   Yes Blair Meng MD   allopurinol (ZYLOPRIM) 300 MG tablet Take 1 tablet by mouth Daily. 10/4/24  Yes Jimmy Curtis MD   apixaban (ELIQUIS) 5 MG tablet tablet Take 1 tablet by mouth 2 (Two) Times a Day. 3/27/25  Yes Zaid Contreras MD   atorvastatin (Lipitor) 80 MG tablet Take 1 tablet by mouth Daily. 10/8/24  Yes Jimmy Curtis MD   Calcium Polycarbophil (FIBER-CAPS PO) Take 500 mg by mouth 2 (Two) Times a Day.   Yes Blair Meng MD   ferrous sulfate 325 (65 FE) MG tablet Take 1 tablet by mouth Daily With Breakfast. Pt takes every other day 9/23/24  Yes Jimmy Curtis MD   loperamide (Imodium A-D) 2 MG tablet Take 1 tablet by mouth 4 (Four) Times a Day As Needed for Diarrhea. 12/31/24  Yes Jimmy Curtis MD   metFORMIN (GLUCOPHAGE) 500 MG tablet Take 1 tablet by mouth 2 (Two) Times a Day With Meals. 10/4/24  Yes Jimmy Curtis MD   metoprolol succinate XL (TOPROL-XL) 25 MG 24 hr tablet Take 1 tablet by mouth Daily. 9/23/24  Yes Jimmy Curtis MD   vitamin B-12 (CYANOCOBALAMIN) 500 MCG tablet Take 1 tablet by mouth Daily. 11/6/24  Yes Enriqueta Rao APRN   nitroglycerin (NITROSTAT) 0.4 MG SL tablet Place 1 tablet under the tongue Every 5 (Five) Minutes As Needed for Chest Pain. Take no more than 3 doses in 15 minutes. 10/8/24   Jimmy Curtis MD     Allergies:  No Known Allergies    Objective     Vital Signs: /70   Pulse 58   Resp 16   Ht 177.8 cm (70\")   Wt 82.1 kg (181 lb)   SpO2 97%   BMI 25.97 kg/m²     Vitals and nursing note reviewed.   Constitutional:       General: Not in acute distress.     Appearance: Not in distress.   Neck:      Vascular: No JVD or JVR. JVD normal.   Pulmonary:      Effort: Pulmonary effort is normal.      Breath sounds: Normal breath sounds.   Cardiovascular:      Normal " rate. Regular rhythm.      Murmurs: There is no murmur.      No gallop.  No rub.   Pulses:     Intact distal pulses.   Edema:     Peripheral edema absent.   Skin:     General: Skin is warm and dry.   Neurological:      Mental Status: Alert, oriented to person, place, and time and oriented to person, place and time.         Results Reviewed:  I have reviewed all laboratory data, with pertinent findings: Lab work from 5/21/2025 shows creatinine 1.2 with GFR 59.        Assessment / Plan        Active Hospital Problems    Diagnosis     **PAF (paroxysmal atrial fibrillation)     S/P CABG (coronary artery bypass graft)     Coronary artery disease involving coronary bypass graft of native heart without angina pectoris     Stage 3a chronic kidney disease (CKD)      I discussed left atrial appendage occlusion with Watchman device, the procedure, risks (including bleeding, infection, vascular damage [including minor oozing, bruising, bleeding, and up to and including the need for vascular surgery], pericardial effusion requiring pericardiocentesis contrast reaction, renal failure, respiratory failure, heart attack, stroke, arrhythmia and even death), benefits, and alternatives and the patient has voiced understanding and is willing to proceed.        Code Status: full     I discussed the patients findings and my recommendations with: patient and friend    Estimated length of stay: 1 night    Zaid Contreras MD   06/24/25   12:04 CDT

## 2025-06-24 NOTE — ANESTHESIA PROCEDURE NOTES
Airway  Reason: elective    Date/Time: 6/24/2025 12:08 PM  Airway not difficult    General Information and Staff    Patient location during procedure: OR  CRNA/CAA: Chintan Shaw CRNA    Indications and Patient Condition  Indications for airway management: airway protection    Preoxygenated: yes    Mask difficulty assessment: 1 - vent by mask    Final Airway Details    Final airway type: endotracheal airway      Successful airway: ETT  Cuffed: yes   Successful intubation technique: direct laryngoscopy  Adjuncts used in placement: intubating stylet  Endotracheal tube insertion site: oral  Blade: Bocanegra  Blade size: 2  ETT size (mm): 7.5  Cormack-Lehane Classification: grade I - full view of glottis  Placement verified by: chest auscultation and capnometry   Measured from: lips  ETT/EBT  to lips (cm): 22  Number of attempts at approach: 1  Assessment: lips, teeth, and gum same as pre-op and atraumatic intubation

## 2025-06-24 NOTE — PLAN OF CARE
Goal Outcome Evaluation:              Outcome Evaluation: Patient is alert and oriented x 4. Following watchman procedure. Safety maintained.

## 2025-06-24 NOTE — ANESTHESIA PREPROCEDURE EVALUATION
Anesthesia Evaluation     Patient summary reviewed   no history of anesthetic complications:   NPO Solid Status: > 8 hours  NPO Liquid Status: > 8 hours           Airway   Mallampati: II  TM distance: >3 FB  Neck ROM: full  Dental      Pulmonary    (+) ,sleep apnea  (-) COPD, asthma, not a smoker  Cardiovascular   Exercise tolerance: good (4-7 METS)    (+) hypertension, CAD, CABG (x4), dysrhythmias Atrial Fib, PVD (right subclavian stenosis), hyperlipidemia  (-) pacemaker, past MI, CHF, cardiac stents, carotid artery disease      Neuro/Psych  (-) seizures, TIA, CVA  GI/Hepatic/Renal/Endo    (+) renal disease- CRI, diabetes mellitus  (-) GERD, liver disease    Musculoskeletal     Abdominal    Substance History      OB/GYN          Other                      Anesthesia Plan    ASA 3     general     (BP measurements on left arm as has right subclavian stenosis)  intravenous induction     Anesthetic plan, risks, benefits, and alternatives have been provided, discussed and informed consent has been obtained with: patient.    Use of blood products discussed with  Consented to blood products.        CODE STATUS:

## 2025-06-24 NOTE — PROCEDURES
"Please see procedural report under \"results\" tab.    Successful implant of a 20 mm Watchman FLX pro plus device; no complications noted.    Electronically signed by Zaid Contreras MD, 06/24/25, 1:12 PM CDT.    "

## 2025-06-24 NOTE — Clinical Note
A 6 fr sheath was successfully inserted with ultrasound guidance into the right femoral vein. Sheath insertion not delayed.

## 2025-06-25 ENCOUNTER — READMISSION MANAGEMENT (OUTPATIENT)
Dept: CALL CENTER | Facility: HOSPITAL | Age: 85
End: 2025-06-25
Payer: MEDICARE

## 2025-06-25 VITALS
WEIGHT: 181 LBS | RESPIRATION RATE: 18 BRPM | TEMPERATURE: 97.9 F | OXYGEN SATURATION: 97 % | HEIGHT: 70 IN | BODY MASS INDEX: 25.91 KG/M2 | HEART RATE: 59 BPM | SYSTOLIC BLOOD PRESSURE: 135 MMHG | DIASTOLIC BLOOD PRESSURE: 50 MMHG

## 2025-06-25 LAB — ACT BLD: 251 SECONDS (ref 82–152)

## 2025-06-25 PROCEDURE — 99238 HOSP IP/OBS DSCHRG MGMT 30/<: CPT | Performed by: INTERNAL MEDICINE

## 2025-06-25 RX ORDER — LIDOCAINE HYDROCHLORIDE AND EPINEPHRINE 5; 5 MG/ML; UG/ML
1 INJECTION, SOLUTION INFILTRATION; PERINEURAL ONCE
Status: DISCONTINUED | OUTPATIENT
Start: 2025-06-25 | End: 2025-06-25

## 2025-06-25 RX ORDER — ASPIRIN 81 MG/1
81 TABLET ORAL DAILY
Qty: 30 TABLET | Refills: 11 | Status: SHIPPED | OUTPATIENT
Start: 2025-06-25

## 2025-06-25 RX ORDER — CLOPIDOGREL BISULFATE 75 MG/1
75 TABLET ORAL DAILY
Qty: 30 TABLET | Refills: 6 | Status: SHIPPED | OUTPATIENT
Start: 2025-06-25 | End: 2025-07-02

## 2025-06-25 RX ADMIN — CLOPIDOGREL BISULFATE 75 MG: 75 TABLET ORAL at 09:04

## 2025-06-25 RX ADMIN — Medication 10 ML: at 09:40

## 2025-06-25 RX ADMIN — METOPROLOL SUCCINATE 25 MG: 25 TABLET, EXTENDED RELEASE ORAL at 09:03

## 2025-06-25 RX ADMIN — ATORVASTATIN CALCIUM 80 MG: 40 TABLET, FILM COATED ORAL at 09:03

## 2025-06-25 RX ADMIN — ASPIRIN 81 MG: 81 TABLET, COATED ORAL at 09:04

## 2025-06-25 RX ADMIN — ALLOPURINOL 300 MG: 300 TABLET ORAL at 09:03

## 2025-06-25 NOTE — PAYOR COMM NOTE
"6/25/25. [facility] [phone]  [phone]    INPATIENT ADMISSION ON 6/24/25 INPATIENT ONLY ADMISSION AND INPATIENT PROCEDURE ONLY.    [facility] [id number]    FAXING REPORT AND CLINICAL.                Jeffery Mcfarland (85 y.o. Male)       Date of Birth   1940    Social Security Number       Address   [address] [place] [address]    Home Phone   [phone]    MRN   [id number]       [facility]   [facility]    Marital Status   Single                            Admission Date   6/24/2025    Admission Type   Elective    Admitting Provider   Zaid Contreras MD    Attending Provider   Zaid Contreras MD    Department, Room/Bed   [facility] 4B, 437/1       Discharge Date       Discharge Disposition       Discharge Destination                                 Attending Provider: Zaid Contreras MD    Allergies: No Known Allergies    Isolation: None   Infection: None   Code Status: CPR    Ht: 177.8 cm (70\")   Wt: 82.1 kg (181 lb)    Admission Cmt: None   Principal Problem: PAF (paroxysmal atrial fibrillation) [I48.0]                   Active Insurance as of 6/24/2025       Primary Coverage       Payor Plan Insurance Group Employer/Plan Group    HUMANA MEDICARE REPLACEMENT HUMANA MEDICARE ADVANTAGE PPO [id number]       Payor Plan Address Payor Plan Phone Number Payor Plan Fax Number Effective Dates    [address] [address] [phone]  1/1/2025 - None Entered    [facility] [address]         Subscriber Name Subscriber Birth Date Member ID       JEFFERY MCFARLAND 1940 [id number]                     Emergency Contacts        (Rel.) Home Phone Work Phone Mobile Phone    LandryIain huitron (Son) -- -- [phone]    Neisha Lazo (Friend) -- -- [phone]               [facility] Encounter Date/Time: 6/24/2025 0927   Hospital Account: [id number]    MRN: [id number]   Patient:  Jeffery Mcfarland   Contact Serial #: [id number]   SSN:        "   ENCOUNTER             Patient Class: Inpatient   Unit: [address] Service: Cardiology     Bed: 437/1   Admitting Provider: Zaid Contreras MD   Referring Physician: Zaid Contreras   Attending Provider: Zaid Contreras MD   Adm Diagnosis: PAF (paroxysmal atrial f*               PATIENT             Name: Jeffery Mcfarland : 1940 (85 yrs)   Address: [address] [place] [address] Sex: Male   City: [place] [address]   County: [place]   Marital Status: SINGLE Ethnicity: NOT        Race: WHITE   Primary Care Provider: Jimmy Curtis MD Patients Phone: Home Phone: [phone]     Mobile Phone: [phone]     EMERGENCY CONTACT   Contact Name Legal Guardian? Relationship to Patient Home Phone Work Phone Mobile Phone   1. Iain Mcfarland  2. Neisha Lazo      Son  Friend           [phone]   GUARANTOR             Guarantor: Jeffery Mcfarland     : 1940   Address: [address] [place] [address] Sex: [place] [address]     Relation to Patient: Self       Home Phone: [phone]   Guarantor ID: [id number]       Work Phone:     GUARANTOR EMPLOYER   Employer:           Status: RETIRED   COVERAGE          PRIMARY INSURANCE   Payor: HUMANA MEDICARE REPLACEMENT Plan: HUMANA MEDICARE ADVANTAGE PPO   Group Number: [id number] Insurance Type: INDEMNITY   Subscriber Name: JEFFERY MCFARLAND Subscriber : 1940   Subscriber ID: [id number] Coverage Address: [address] [address]   Pat. Rel. to Subscriber: Self Coverage Phone: [phone]   SECONDARY INSURANCE   Payor: N/A Plan: N/A   Group Number:   Insurance Type:     Subscriber Name:   Subscriber :     Subscriber ID:   Coverage Address:     Pat. Rel. to Subscriber:   Coverage Phone:        Contact Serial # [id number])         2025    Chart ID ([id number])         [facility] CATH LAB  [address] [place] [id number] [phone]          [facility] CATH LAB  [address] [place] [address]  [place]  [facility] [address]  [phone]           Patient Information    Patient Name  Jeff Alatorre MRN  [id number] Legal Sex  Male  (Age)  1940 (85 y.o.)       EP/CRM Study    Patient Name: Jeff Alatorre   Patient MRN: [id number]   Patient : 1940 (85 y.o.)   Gender Identity: Male    Accession Number: [id number]   Date of Study: 25   Ordering Provider: Zaid Contreras MD    Clinical Indications: PAF (paroxysmal atrial fibrillation) [I48.0 (ICD-10-CM)]       Performing Physician  Performing Staff   Primary: Zaid Contreras MD    Assisting: Chintan Moser MD     Documenter: Rob Shaw Jr.    Scrub Person: Cora Carbajal    Invasive Nurse: Tio Levy RN             Anesthesiology Staff    CRNA: Chintan Shaw CRNA           Procedures    Atrial Appendage Occlusion        Admission Information    Admission Date/Time Discharge Date/Time Room/Bed   25  0927  437/1       Patient Hx Of Height, Weight, and Vitals    Height Weight BSA (Calculated - sq m) BMI (Calculated) Retired BMI (kg/m2) Pulse BP                Indications    PAF (paroxysmal atrial fibrillation) [I48.0 (ICD-10-CM)]               Procedure Narrative    Date of Procedure: 25     Procedures Performed:  1. Ultrasound guided femoral venous access  2. Transseptal puncture (interatrial septal puncture)  3. Left atrial catheterization  4. Left atrial appendage angiography  5. Percutaneous closure of the left atrial appendage (Watchman device)     Access:   - 6 Peruvian right femoral vein (ultrasound guided), then up-sized to 14 Peruvian (for delivery catheter) - closed with suture    Procedural Details:  After written and informed consent was obtained, the patient was brought to the pre-op area and evaluated by anesthesia.  Patient was administered 324mg ASA before the case, and received IV antibiotics just prior to case initiation. The patient was then brought to the cath lab in a fasting  "state. The patient was then placed under general anesthesia by the anesthesia staff.  The patient was then sterilely prepped and draped in the usual fashion.  At this point, the MICAELA probe was advanced per the cardiology team.  Initial images were obtained to evaluate the left atrial appendage and overall cardiac anatomy (see separate MICAELA report for full details).  Following this, ultrasound guidance was used to puncture the right femoral vein. A 6 Macedonian sheath was then placed in the femoral vein.  Half bolus of heparin was administered.  Following this, a guidewire was advanced through the sheath into the upper extremity venous system.  With the multipurpose catheter advanced over this wire into the left subclavian venous position, the initial wire was removed and a 0.035 inch precurved pigtail Versacross wire was advanced through the multipurpose catheter left in position.  The multipurpose and short 6 Macedonian sheath were then removed over this wire, and the Watchman access sheath and Versacross \"connect\" transseptal dilator sheath were advanced together over this wire.  The wire was then retracted within the sheath, the entire system withdrawn until it was in the right atrium and in good contact in the midportion of the interatrial septum, with visible \"tenting\" in both the 45 and 110 degree views.  With the versa cross wire advanced 1 mm out of its dilator, RF energy was applied, transseptal puncture was made.  The wire was advanced out into the left atrium, then the Watchman access sheath and Versacross  connect dilator easily advanced across the septum into the atrium as well.  The remainder of the heparin bolus was administered.  The wire and Versacross dilator were removed, leaving the Watchman access sheath in left atrial position.  Hemodynamic measurement of left atrial pressure was then obtained.  A 6 Macedonian angled pigtail catheter was then advanced through this sheath, and, using MICAELA and fluoroscopy, the " pigtail catheter was then advanced into the left atrial appendage.  Once in a stable position, left atrial appendage angiography was then performed to further define the appendage anatomy.  A 20 mm Watchman FLX device was then chosen based on angiographic and MICAELA measurements. The device was prepped per protocol.  The pigtail catheter was removed and the Watchman access sheath left in the appendage.  Under wet to wet connection, the device catheter was then advanced through the Watchman access sheath.   Once in place, the Watchman FLX device was then deployed.  Once deployed, the device was evaluated by fluoroscopy as well as MICAELA. The device met all of the PASS criteria and all were in agreement that the device was stable to release.  Final angiography of the device revealed no leak around the device.  The device was then released.  The Watchman access sheath was then retracted back across the septum in the right atrium.  A pursestring suture was then placed around the venous sheath and the sheath was removed.    At this point the MICAELA probe was removed.  The patient was then transported to the [facility] in stable position. The patient tolerated the procedures well and there were no immediate complications.     Results/Findings:     Ultrasound guided venous access: Femoral vein identified as a compressible structure with femoral artery anterolateral to it.  Pulsatile flow was confirmed in the artery using color Doppler, and the vein was fully compressible with no thrombus seen.  Needle seen directly entering the right femoral vein.     Left Atrial Hemodynamics:     LA pressure:  11  mmHg      Left Atrial Appendage Angiography:    small sized left atrial appendage with posterior lobe that then wraps anteriorly.     Total Contrast (mL): 40 mL  X-ray Exposure:  75 mGy   Fluoro time (min): 7.7  minutes    Estimated Blood Loss: <30 mL  Specimens: None  Complications: None     Impression:  1. Successful placement of 20mm  Watchman FLX left atrial appendage occluder.     Plan:  1. Patient to recovery at this time.  2. Transition to  later today for overnight care.  3. Bedrest x 3 hours then suture removal, then remain flat for 1 additional hour.  If no significant bleeding after that, can raise head of bed to 30 degrees for 1 more hour, and if still no significant bleeding, then sit up or get out of bed with assistance.   4. Transition to DAPT tomorrow    5. Repeat MICAEAL  ~45 days to reassess   6. Will need SBE prophylaxis x6 months           Radiation Dose Tracking    Panel Cumulative Air Kerma (mGy) Fluoro Time (min) DAP (mGy-cm2) Physician   Panel 1 75.000 7.7 04165.000 Zaid Contreras MD       Medications  As of 06/24/25 1313  (Filter):  CV CONTRAST GROUPER Medications Shown  None                                   Implants    Implant      Dev Cls Ming Watchman/Flx/Pro 20mm - Sn/A - [id number] - Implanted    Inventory item: DEV CLS MING WATCHMAN/FLX/PRO 20MM Model/Cat number: [id number]   Serial number: N/A : [employer]   Lot number: [id number]       As of 6/24/2025    Status: Implanted             ISCV PACS Images     Show images for EP/CRM Study              Signed    Electronically signed by Zaid Contreras MD on 6/24/25 at 1320 CDT                Zaid Contreras MD   Physician  Cardiology     H&P     Signed     Date of Service: 06/24/25 1200  Creation Time: 06/24/25 1200     Signed       Expand All Collapse All         P - CARDIOLOGY  HISTORY AND PHYSICAL     Date of Admission: 6/24/2025  Primary Care Physician: Jimmy Curtis MD     Subjective      Chief Complaint: Watchman     History of Present Illness     85-year-old male presents today for planned attempt at left atrial pended occlusion.  He has known coronary artery disease, and paroxysmal atrial fibrillation that was first noted after having undergone CABG in June 2024 (left atrial pended was not ligated at that time, according to operative  note).  Subsequent surveillance demonstrated ongoing paroxysmal atrial fibrillation, so she was started on anticoagulation.  However, he requested this as an alternative to continuation of that, with primary indication being COU, need for antiplatelet therapy given coronary disease, side effects he described from Eliquis, and need to avoid daily NSAID therapy while on Eliquis with subsequent worsening of arthritic pain has resulted in inability to take his medications.     We last discussed this in office on 4/21/2025, and he had a shared decision making visit with Dr. De Leon on 5/5/2025.     Review of Systems   Otherwise complete ROS reviewed and negative except as mentioned in the HPI.     Past Medical History:   Medical History[]Expand by Default        Past Medical History:   Diagnosis Date    Arthritis      Chronic kidney disease      Coronary artery disease of native artery of native heart with stable angina pectoris 05/23/2024    Erectile dysfunction      Gout      Heart valve disease       4 bypass surgery    History of diverticulitis      HTN (hypertension)      Hx of adenomatous colonic polyps 10/13/2020    Hypercholesteremia      Kidney stone      Low testosterone      PAF (paroxysmal atrial fibrillation) 07/01/2024    Polycythemia vera 10/13/2020    Squamous cell carcinoma of skin 10/2021     top of head  also place removed by Dr. Vale  may 2025    Type 2 diabetes mellitus without complication, without long-term current use of insulin 12/01/2022            Past Surgical History:  Surgical History         Past Surgical History:   Procedure Laterality Date    CARDIAC CATHETERIZATION Left 05/20/2024     Procedure: Coronary angiography;  Surgeon: Zaid Contreras MD;  Location:  [facility] CATH INVASIVE LOC[facility];  Service: Cardiology;  Laterality: Left;    CARDIAC CATHETERIZATION Left 05/20/2024     Procedure: Percutaneous Coronary Intervention;  Surgeon: Zaid Contreras MD;  Location:  [facility] CATH INVASIVE  LOCATION;  Service: Cardiology;  Laterality: Left;    CAROTID ENDARTERECTOMY Left      CATARACT EXTRACTION, BILATERAL        COLON SURGERY        COLONOSCOPY        COLONOSCOPY N/A 07/27/2021     Procedure: COLONOSCOPY WITH ANESTHESIA;  Surgeon: Luciano Alatorre DO;  Location:  [facility] ENDOSCOPY;  Service: Gastroenterology;  Laterality: N/A;  preop; hx of polyps  postop; diverticulosis   PCP Devon Moore     CORONARY ARTERY BYPASS GRAFT N/A 06/25/2024     Procedure: CORONARY ARTERY BYPASS GRAFTING x4, LEFT INTERNAL MAMMARY ARTERY GRAFT, RIGHT LEG ENDOSCOPIC VEIN HARVEST, TRANSESOPHAGEAL ECHOCARDIOGRAM, APPLICATION OF PREVENA;  Surgeon: Jose F Kim MD;  Location:  [facility] OR;  Service: Cardiothoracic;  Laterality: N/A;    PENILE PROSTHESIS IMPLANT N/A 03/14/2023     Procedure: 3-PIECE INFLATABLE PENILE PROSTHESIS PLACEMENT;  Surgeon: Garcia Santana MD;  Location:  [facility] OR;  Service: Urology;  Laterality: N/A;    VASECTOMY                Family History: family history includes Heart attack in his father; No Known Problems in his daughter, maternal grandfather, maternal grandmother, mother, paternal grandfather, paternal grandmother, and son; Parkinsonism in his brother.     Social History:  reports that he quit smoking about 30 years ago. His smoking use included cigarettes. He started smoking about 70 years ago. He has a 40 pack-year smoking history. He has been exposed to tobacco smoke. He has never used smokeless tobacco. He reports that he does not currently use alcohol. He reports that he does not use drugs.     Medications:          Prior to Admission medications    Medication Sig Start Date End Date Taking? Authorizing Provider   acetaminophen (TYLENOL) 500 MG tablet Take 1 tablet by mouth Daily As Needed for Mild Pain.     Yes Provider, MD Blair   allopurinol (ZYLOPRIM) 300 MG tablet Take 1 tablet by mouth Daily. 10/4/24   Yes Jimmy Curtis MD   apixaban (ELIQUIS) 5 MG tablet tablet Take 1  "tablet by mouth 2 (Two) Times a Day. 3/27/25   Yes Zaid Contreras MD   atorvastatin (Lipitor) 80 MG tablet Take 1 tablet by mouth Daily. 10/8/24   Yes Jimmy Curtis MD   Calcium Polycarbophil (FIBER-CAPS PO) Take 500 mg by mouth 2 (Two) Times a Day.     Yes Blair Meng MD   ferrous sulfate 325 (65 FE) MG tablet Take 1 tablet by mouth Daily With Breakfast. Pt takes every other day 9/23/24   Yes Jimmy Curtis MD   loperamide (Imodium A-D) 2 MG tablet Take 1 tablet by mouth 4 (Four) Times a Day As Needed for Diarrhea. 12/31/24   Yes Jimmy Curtis MD   metFORMIN (GLUCOPHAGE) 500 MG tablet Take 1 tablet by mouth 2 (Two) Times a Day With Meals. 10/4/24   Yes Jimmy Curtis MD   metoprolol succinate XL (TOPROL-XL) 25 MG 24 hr tablet Take 1 tablet by mouth Daily. 9/23/24   Yes Jimmy Curtis MD   vitamin B-12 (CYANOCOBALAMIN) 500 MCG tablet Take 1 tablet by mouth Daily. 11/6/24   Yes Enriqueta Rao APRN   nitroglycerin (NITROSTAT) 0.4 MG SL tablet Place 1 tablet under the tongue Every 5 (Five) Minutes As Needed for Chest Pain. Take no more than 3 doses in 15 minutes. 10/8/24     Jimmy Curtis MD      Allergies:  Allergies   No Known Allergies        Objective      Vital Signs: /70   Pulse 58   Resp 16   Ht 177.8 cm (70\")   Wt 82.1 kg (181 lb)   SpO2 97%   BMI 25.97 kg/m²      Vitals and nursing note reviewed.   Constitutional:       General: Not in acute distress.     Appearance: Not in distress.   Neck:      Vascular: No JVD or JVR. JVD normal.   Pulmonary:      Effort: Pulmonary effort is normal.      Breath sounds: Normal breath sounds.   Cardiovascular:      Normal rate. Regular rhythm.      Murmurs: There is no murmur.      No gallop.  No rub.   Pulses:     Intact distal pulses.   Edema:     Peripheral edema absent.   Skin:     General: Skin is warm and dry.   Neurological:      Mental Status: Alert, oriented to person, place, and time and oriented to person, place and time.            Results " Reviewed:  I have reviewed all laboratory data, with pertinent findings: Lab work from 5/21/2025 shows creatinine 1.2 with GFR 59.           Assessment / Plan              Active Hospital Problems     Diagnosis      **PAF (paroxysmal atrial fibrillation)      S/P CABG (coronary artery bypass graft)      Coronary artery disease involving coronary bypass graft of native heart without angina pectoris      Stage 3a chronic kidney disease (CKD)        I discussed left atrial appendage occlusion with Watchman device, the procedure, risks (including bleeding, infection, vascular damage [including minor oozing, bruising, bleeding, and up to and including the need for vascular surgery], pericardial effusion requiring pericardiocentesis contrast reaction, renal failure, respiratory failure, heart attack, stroke, arrhythmia and even death), benefits, and alternatives and the patient has voiced understanding and is willing to proceed.           Code Status: full     I discussed the patients findings and my recommendations with: patient and friend     Estimated length of stay: 1 night     Zaid Contreras MD   06/24/25   12:04 CDT                       Current Facility-Administered Medications   Medication Dose Route Frequency Provider Last Rate Last Admin    acetaminophen (TYLENOL) tablet 650 mg  650 mg Oral Q4H PRN Zaid Contreras MD        allopurinol (ZYLOPRIM) tablet 300 mg  300 mg Oral Daily Zaid Contreras MD        aspirin EC tablet 81 mg  81 mg Oral Daily Zaid Contreras MD        atorvastatin (LIPITOR) tablet 80 mg  80 mg Oral Daily Zaid Contreras MD        clopidogrel (PLAVIX) tablet 75 mg  75 mg Oral Daily Zaid Contreras MD        lactated ringers infusion  1 mL/kg/hr Intravenous Continuous Zaid Contreras MD        metoprolol succinate XL (TOPROL-XL) 24 hr tablet 25 mg  25 mg Oral Daily Zaid Contreras MD        nitroglycerin (NITROSTAT) SL tablet 0.4 mg  0.4 mg Sublingual Q5 Min PRN Zaid Contreras MD         sodium chloride 0.9 % flush 10 mL  10 mL Intravenous Q12H Zaid Contreras MD   10 mL at 06/24/25 2003    sodium chloride 0.9 % flush 10 mL  10 mL Intravenous PRN Zaid Contreras MD        sodium chloride 0.9 % infusion 40 mL  40 mL Intravenous PRN Zaid Contreras MD

## 2025-06-25 NOTE — DISCHARGE SUMMARY
"[facility] HEART GROUP DISCHARGE    Date of Discharge:  6/25/2025    Discharge Diagnosis:     - Paroxysmal atrial fibrillation, [id number] 5; poor candidate for long-term anticoagulation given his need for NSAID treatment for arthritic pain, now status post Watchman device implantation per Dr. Contreras 6/20/2025    Presenting Problem/History of Present Illness  PAF (paroxysmal atrial fibrillation) [I48.0]    Per HPI by Dr. Contreras yesterday: \" 85-year-old male presents today for planned attempt at left atrial pended occlusion.  He has known coronary artery disease, and paroxysmal atrial fibrillation that was first noted after having undergone CABG in June 2024 (left atrial pended was not ligated at that time, according to operative note).  Subsequent surveillance demonstrated ongoing paroxysmal atrial fibrillation, so she was started on anticoagulation.  However, he requested this as an alternative to continuation of that, with primary indication being COU, need for antiplatelet therapy given coronary disease, side effects he described from Eliquis, and need to avoid daily NSAID therapy while on Eliquis with subsequent worsening of arthritic pain has resulted in inability to take his medications.     We last discussed this in office on 4/21/2025, and he had a shared decision making visit with Dr. De Leon on 5/5/2025.\"     Hospital Course  Patient is a 85 y.o. male presented yesterday and underwent successful Watchman device implantation per Dr. Contreras without complication.  Today he states he feels well.  He denies any chest pain, shortness of breath or palpitations.  He is maintaining sinus rhythm on telemetry.  He did have oxygen desaturation during sleep-87%.  He states he does have untreated sleep apnea due to inability to tolerate CPAP and also nasal cannula.  He is oxygenating well on room air during waking hours.  He is felt to be stable for discharge on the medical therapy listed below.  He will remain " off of Eliquis.  He will continue aspirin 81 mg daily indefinitely without interruption.  He will continue Plavix 75 mg daily without interruption for 6 months.  He will need SBE prophylaxis for 6 months.  He will have a MICAELA with Dr. Contreras in approximately 6 weeks.  He will follow-up with Dr. Contreras or myself in clinic in approximately 6 months.    Procedures Performed  Procedure(s):  Atrial Appendage Occlusion  06/24 1311 Note By: Zaid Contreras MD      Impression:  1. Successful placement of 20mm Watchman FLX left atrial appendage occluder.     Plan:  1. Patient to recovery at this time.  2. Transition to 4B later today for overnight care.  3. Bedrest x 3 hours then suture removal, then remain flat for 1 additional hour. If no significant bleeding after that, can raise head of bed to 30 degrees for 1 more hour, and if still no significant bleeding, then sit up or get out of bed with assistance.   4. Transition to DAPT tomorrow   5. Repeat MICAELA ~45 days to reassess   6. Will need SBE prophylaxis x6 months     Condition on Discharge:  Stable     Physical Exam at Discharge  General: alert and oriented  Card: RRR ; NSR 50s-90s on tele  Resp: CTA  Extrem: no edema, right groin access site soft, no hematoma, redness or warmth. Sutures previously removed by pt's nurse. Scant/small drop of blood over access site.    Vital Signs  Temp:  [97.8 °F (36.6 °C)-97.9 °F (36.6 °C)] 97.9 °F (36.6 °C)  Heart Rate:  [54-71] 59  Resp:  [16-21] 18  BP: (110-152)/(40-70) 135/50        Discharge Disposition  Home or Self Care    Discharge Medications     Discharge Medications        New Medications        Instructions Start Date   aspirin 81 MG EC tablet   81 mg, Oral, Daily      clopidogrel 75 MG tablet  Commonly known as: PLAVIX   75 mg, Oral, Daily             Continue These Medications        Instructions Start Date   acetaminophen 500 MG tablet  Commonly known as: TYLENOL   500 mg, Oral, Daily PRN      allopurinol 300 MG  tablet  Commonly known as: ZYLOPRIM   300 mg, Oral, Daily      atorvastatin 80 MG tablet  Commonly known as: Lipitor   80 mg, Oral, Daily      ferrous sulfate 325 (65 FE) MG tablet   325 mg, Oral, Every Other Day      FIBER-CAPS PO   500 mg, Oral, 2 Times Daily      loperamide 2 MG tablet  Commonly known as: Imodium A-D   2 mg, Oral, 4 Times Daily PRN      metFORMIN 500 MG tablet  Commonly known as: GLUCOPHAGE   500 mg, Oral, 2 Times Daily With Meals      metoprolol succinate XL 25 MG 24 hr tablet  Commonly known as: TOPROL-XL   25 mg, Oral, Daily      nitroglycerin 0.4 MG SL tablet  Commonly known as: NITROSTAT   0.4 mg, Sublingual, Every 5 Minutes PRN, Take no more than 3 doses in 15 minutes.      vitamin B-12 500 MCG tablet  Commonly known as: CYANOCOBALAMIN   500 mcg, Oral, Daily               Discharge Diet: heart healthy, diabetic     Activity at Discharge:   No heavy lifting for 5-7 days greater than 10 pounds.  No heavy or strenuous pushing or pulling.  No tub baths, hot tubs, swimming pools, or submerging groin underwater for 1 week.  Wash groin site daily with antibacterial soap and water. Rinse and keep clean and dry.  No lotions, powders, or topical ointments to site. Keep open to air and dry.  Call our office with any concerns or if you notice redness, swelling, drainage, warmth, or pain at the site.     Follow-up Appointments  Future Appointments   Date Time Provider Department Center   7/2/2025  9:35 AM  [facility]  [facility]   7/2/2025 10:00 AM Enriqueta Rao APRN [facility] PAD   10/10/2025 11:00 AM Jimmy Curtis MD [facility] PAD     PCP 1-2 weeks  Dr. Contreras 6 months        Electronically signed by BRYANT De León, 06/25/25, 8:31 AM CDT.     Time:45 minutes

## 2025-06-25 NOTE — PLAN OF CARE
Goal Outcome Evaluation:      VSS, O2 sat dropped to 87% with sleep, 2L/NC applied, right groin soft c/d/I, ppp, no complaints voiced, SB-S 52-91 on tele, continue to monitor.

## 2025-06-26 ENCOUNTER — TRANSITIONAL CARE MANAGEMENT TELEPHONE ENCOUNTER (OUTPATIENT)
Dept: CALL CENTER | Facility: HOSPITAL | Age: 85
End: 2025-06-26
Payer: MEDICARE

## 2025-06-26 NOTE — OUTREACH NOTE
Call Center TCM Note      Flowsheet Row Responses   [facility] patient discharged from? [place]   Does the patient have one of the following disease processes/diagnoses(primary or secondary)? Other   TCM attempt successful? Yes   Call start time 1500   Call end time 1505   Discharge diagnosis Paroxysmal atrial fibrillation,   Meds reviewed with patient/caregiver? Yes   Is the patient having any side effects they believe may be caused by any medication additions or changes? No   Does the patient have all medications ordered at discharge? Yes   Is the patient taking all medications as directed (includes completed medication regime)? Yes   Comments Pt had appt on 7/2 with PCP but has appt with oncology and wanted to changed to another date.  Appt made for 7/3 @ 3:15 with Jimmy Curtis MD   Does the patient have an appointment with their PCP within 7-14 days of discharge? Other   Nursing Interventions Assisted patient with making appointment per protocol   Psychosocial issues? No   Did the patient receive a copy of their discharge instructions? Yes   Nursing interventions Reviewed instructions with patient   What is the patient's perception of their health status since discharge? Improving   Is the patient/caregiver able to teach back signs and symptoms related to disease process for when to call PCP? Yes   Is the patient/caregiver able to teach back signs and symptoms related to disease process for when to call 911? Yes   Is the patient/caregiver able to teach back the hierarchy of who to call/visit for symptoms/problems? PCP, Specialist, Home health nurse, Urgent Care, ED, 911 Yes   Additional teach back comments States he has some slight drainage but nothing to worry about.  If there is any increase or other symptoms he will notify    TCM call completed? Yes   Wrap up additional comments Rescheduled appt with PCP with no other needs at this time.   Call end time 1505            Katina Callejas  Nurse    6/26/2025, 15:07 CDT

## 2025-06-26 NOTE — OUTREACH NOTE
Prep Survey      Flowsheet Row Responses   [facility] patient discharged from? [place]   Is LACE score < 7 ? No   Eligibility [facility]   Date of Admission 06/24/25   Date of Discharge 06/25/25   Discharge Disposition Home or Self Care   Discharge diagnosis Paroxysmal atrial fibrillation,   Does the patient have one of the following disease processes/diagnoses(primary or secondary)? Other   Does the patient have Home health ordered? No   Is there a DME ordered? No   Prep survey completed? Yes            RAQUEL SWIFT - Registered Nurse

## 2025-06-26 NOTE — PAYOR COMM NOTE
"REF: [id number]    [facility]  FAX  [phone]       Jeffery Mcfarland (85 y.o. Male)       Date of Birth   1940    Social Security Number       Address   [address] [place] [address]    Home Phone   [phone]    MRN   [id number]       Gnosticist   Islam    Marital Status   Single                            Admission Date   6/24/2025    Admission Type   Elective    Admitting Provider   Zaid Contreras MD    Attending Provider       Department, Room/Bed   [facility] 4B, 437/1       Discharge Date   6/25/2025    Discharge Disposition   Home or Self Care    Discharge Destination                                 Attending Provider: (none)   Allergies: No Known Allergies    Isolation: None   Infection: None   Code Status: Prior    Ht: 177.8 cm (70\")   Wt: 82.1 kg (181 lb)    Admission Cmt: None   Principal Problem: PAF (paroxysmal atrial fibrillation) [I48.0]                   Active Insurance as of 6/24/2025       Primary Coverage       Payor Plan Insurance Group Employer/Plan Group    HU[employer] MEDICARE REPLACEMENT HU[employer] MEDICARE ADVANTAGE PPO [id number]       Payor Plan Address Payor Plan Phone Number Payor Plan Fax Number Effective Dates    [address] [address] [phone]  1/1/2025 - None Entered    [facility] [id number]         Subscriber Name Subscriber Birth Date Member ID       JEFFERY MCFARLAND 1940 [id number]                     Emergency Contacts        (Rel.) Home Phone Work Phone Mobile Phone    Iain Mcfarland (Son) -- -- [phone]    Neisha Lazo (Friend) -- -- [phone]                 Discharge Summary        Maryjo Tolbert, APRN at 06/25/25 0831       Attestation signed by Zaid Contreras MD at 06/25/25 1811      I have reviewed this documentation and agree.    I did examine the patient.  His groin was soft but was continually oozing small amounts of blood from just under the skin surface.  There is no hematoma no severe tenderness to " "palpation.  I did inject the subcutaneous track with 1% lidocaine with epinephrine, with resolution of bloody ooze.  Gauze and Tegaderm were applied over this.    Otherwise, he is stable and medically ready for discharge.  He has been provided prescriptions for clopidogrel (to be taken for 6 months) and aspirin (81 mg daily, indefinitely).  We will see him back in approximately 6 weeks for a transesophageal echocardiogram to assess for device healing.    Electronically signed by Zaid Contreras MD, 06/25/25, 6:11 PM CDT.                      [facility] HEART GROUP DISCHARGE    Date of Discharge:  6/25/2025    Discharge Diagnosis:     - Paroxysmal atrial fibrillation, [id number] 5; poor candidate for long-term anticoagulation given his need for NSAID treatment for arthritic pain, now status post Watchman device implantation per Dr. Contreras 6/20/2025    Presenting Problem/History of Present Illness  PAF (paroxysmal atrial fibrillation) [I48.0]    Per HPI by Dr. Contreras yesterday: \" 85-year-old male presents today for planned attempt at left atrial pended occlusion.  He has known coronary artery disease, and paroxysmal atrial fibrillation that was first noted after having undergone CABG in June 2024 (left atrial pended was not ligated at that time, according to operative note).  Subsequent surveillance demonstrated ongoing paroxysmal atrial fibrillation, so she was started on anticoagulation.  However, he requested this as an alternative to continuation of that, with primary indication being COU, need for antiplatelet therapy given coronary disease, side effects he described from Eliquis, and need to avoid daily NSAID therapy while on Eliquis with subsequent worsening of arthritic pain has resulted in inability to take his medications.     We last discussed this in office on 4/21/2025, and he had a shared decision making visit with Dr. De Leon on 5/5/2025.\"     Hospital Course  Patient is a 85 y.o. male presented " yesterday and underwent successful Watchman device implantation per Dr. Contreras without complication.  Today he states he feels well.  He denies any chest pain, shortness of breath or palpitations.  He is maintaining sinus rhythm on telemetry.  He did have oxygen desaturation during sleep-87%.  He states he does have untreated sleep apnea due to inability to tolerate CPAP and also nasal cannula.  He is oxygenating well on room air during waking hours.  He is felt to be stable for discharge on the medical therapy listed below.  He will remain off of Eliquis.  He will continue aspirin 81 mg daily indefinitely without interruption.  He will continue Plavix 75 mg daily without interruption for 6 months.  He will need SBE prophylaxis for 6 months.  He will have a MICAELA with Dr. Contreras in approximately 6 weeks.  He will follow-up with Dr. Contreras or myself in clinic in approximately 6 months.    Procedures Performed  Procedure(s):  Atrial Appendage Occlusion  06/24 1311 Note By: Zaid Contreras MD      Impression:  1. Successful placement of 20mm Watchman FLX left atrial appendage occluder.     Plan:  1. Patient to recovery at this time.  2. Transition to 4B later today for overnight care.  3. Bedrest x 3 hours then suture removal, then remain flat for 1 additional hour. If no significant bleeding after that, can raise head of bed to 30 degrees for 1 more hour, and if still no significant bleeding, then sit up or get out of bed with assistance.   4. Transition to DAPT tomorrow   5. Repeat MICAELA ~45 days to reassess   6. Will need SBE prophylaxis x6 months     Condition on Discharge:  Stable     Physical Exam at Discharge  General: alert and oriented  Card: RRR ; NSR 50s-90s on tele  Resp: CTA  Extrem: no edema, right groin access site soft, no hematoma, redness or warmth. Sutures previously removed by pt's nurse. Scant/small drop of blood over access site.    Vital Signs  Temp:  [97.8 °F (36.6 °C)-97.9 °F (36.6 °C)] 97.9 °F (36.6  °C)  Heart Rate:  [54-71] 59  Resp:  [16-21] 18  BP: (110-152)/(40-70) 135/50        Discharge Disposition  Home or Self Care    Discharge Medications     Discharge Medications        New Medications        Instructions Start Date   aspirin 81 MG EC tablet   81 mg, Oral, Daily      clopidogrel 75 MG tablet  Commonly known as: PLAVIX   75 mg, Oral, Daily             Continue These Medications        Instructions Start Date   acetaminophen 500 MG tablet  Commonly known as: TYLENOL   500 mg, Oral, Daily PRN      allopurinol 300 MG tablet  Commonly known as: ZYLOPRIM   300 mg, Oral, Daily      atorvastatin 80 MG tablet  Commonly known as: Lipitor   80 mg, Oral, Daily      ferrous sulfate 325 (65 FE) MG tablet   325 mg, Oral, Every Other Day      FIBER-CAPS PO   500 mg, Oral, 2 Times Daily      loperamide 2 MG tablet  Commonly known as: Imodium A-D   2 mg, Oral, 4 Times Daily PRN      metFORMIN 500 MG tablet  Commonly known as: GLUCOPHAGE   500 mg, Oral, 2 Times Daily With Meals      metoprolol succinate XL 25 MG 24 hr tablet  Commonly known as: TOPROL-XL   25 mg, Oral, Daily      nitroglycerin 0.4 MG SL tablet  Commonly known as: NITROSTAT   0.4 mg, Sublingual, Every 5 Minutes PRN, Take no more than 3 doses in 15 minutes.      vitamin B-12 500 MCG tablet  Commonly known as: CYANOCOBALAMIN   500 mcg, Oral, Daily               Discharge Diet: heart healthy, diabetic     Activity at Discharge:   No heavy lifting for 5-7 days greater than 10 pounds.  No heavy or strenuous pushing or pulling.  No tub baths, hot tubs, swimming pools, or submerging groin underwater for 1 week.  Wash groin site daily with antibacterial soap and water. Rinse and keep clean and dry.  No lotions, powders, or topical ointments to site. Keep open to air and dry.  Call our office with any concerns or if you notice redness, swelling, drainage, warmth, or pain at the site.     Follow-up Appointments  Future Appointments   Date Time Provider Department  Center   7/2/2025  9:35 AM  [facility]  [facility]   7/2/2025 10:00 AM Enriqueta Rao APRN [facility] PAD   10/10/2025 11:00 AM Jimmy Curtis MD [facility]     PCP 1-2 weeks  Dr. Kate 6 months        Electronically signed by BRYANT De León, 06/25/25, 8:31 AM CDT.     Time:45 minutes                Electronically signed by Zaid Kate MD at 06/25/25 1811       Discharge Order (From admission, onward)       Start     Ordered    06/25/25 0827  Discharge patient  Once        Comments: Keep pt and call if unable to get right groin access site oozing completely stopped   Expected Discharge Date: 06/25/25   Discharge Disposition: Home or Self Care   Physician of Record for Attribution - Please select from Treatment Team: ZAID KATE [[id number]]   Review needed by CMO to determine Physician of Record: No      Question Answer Comment   Physician of Record for Attribution - Please select from Treatment Team ZAID KATE    Review needed by CMO to determine Physician of Record No        06/25/25 9256

## 2025-06-26 NOTE — OUTREACH NOTE
Call Center TCM Note      Flowsheet Row Responses   [facility] patient discharged from? [place]   Does the patient have one of the following disease processes/diagnoses(primary or secondary)? Other   TCM attempt successful? No   Unsuccessful attempts Attempt 1            Katina WILKES - Licensed Nurse    6/26/2025, 11:49 CDT

## 2025-07-02 ENCOUNTER — OFFICE VISIT (OUTPATIENT)
Dept: ONCOLOGY | Facility: CLINIC | Age: 85
End: 2025-07-02
Payer: MEDICARE

## 2025-07-02 ENCOUNTER — LAB (OUTPATIENT)
Dept: LAB | Facility: HOSPITAL | Age: 85
End: 2025-07-02
Payer: MEDICARE

## 2025-07-02 VITALS
SYSTOLIC BLOOD PRESSURE: 122 MMHG | OXYGEN SATURATION: 96 % | HEART RATE: 59 BPM | HEIGHT: 70 IN | RESPIRATION RATE: 16 BRPM | WEIGHT: 178 LBS | BODY MASS INDEX: 25.48 KG/M2 | TEMPERATURE: 97 F | DIASTOLIC BLOOD PRESSURE: 72 MMHG

## 2025-07-02 DIAGNOSIS — E61.1 IRON DEFICIENCY: ICD-10-CM

## 2025-07-02 DIAGNOSIS — N18.31 ANEMIA DUE TO STAGE 3A CHRONIC KIDNEY DISEASE: Primary | ICD-10-CM

## 2025-07-02 DIAGNOSIS — E53.8 B12 DEFICIENCY: ICD-10-CM

## 2025-07-02 DIAGNOSIS — N18.2 STAGE 2 CHRONIC KIDNEY DISEASE: ICD-10-CM

## 2025-07-02 DIAGNOSIS — D63.1 ANEMIA DUE TO STAGE 3A CHRONIC KIDNEY DISEASE: Primary | ICD-10-CM

## 2025-07-02 LAB
ALBUMIN SERPL-MCNC: 4.3 G/DL (ref 3.5–5.2)
ALBUMIN/GLOB SERPL: 1.7 G/DL
ALP SERPL-CCNC: 161 U/L (ref 39–117)
ALT SERPL W P-5'-P-CCNC: 11 U/L (ref 1–41)
ANION GAP SERPL CALCULATED.3IONS-SCNC: 11 MMOL/L (ref 5–15)
AST SERPL-CCNC: 15 U/L (ref 1–40)
BASOPHILS # BLD AUTO: 0.06 10*3/MM3 (ref 0–0.2)
BASOPHILS NFR BLD AUTO: 0.8 % (ref 0–1.5)
BILIRUB SERPL-MCNC: 0.9 MG/DL (ref 0–1.2)
BUN SERPL-MCNC: 20.1 MG/DL (ref 8–23)
BUN/CREAT SERPL: 15.5 (ref 7–25)
CALCIUM SPEC-SCNC: 9.6 MG/DL (ref 8.6–10.5)
CHLORIDE SERPL-SCNC: 105 MMOL/L (ref 98–107)
CO2 SERPL-SCNC: 22 MMOL/L (ref 22–29)
CREAT SERPL-MCNC: 1.3 MG/DL (ref 0.76–1.27)
DEPRECATED RDW RBC AUTO: 53.2 FL (ref 37–54)
EGFRCR SERPLBLD CKD-EPI 2021: 53.8 ML/MIN/1.73
EOSINOPHIL # BLD AUTO: 0.25 10*3/MM3 (ref 0–0.4)
EOSINOPHIL NFR BLD AUTO: 3.3 % (ref 0.3–6.2)
ERYTHROCYTE [DISTWIDTH] IN BLOOD BY AUTOMATED COUNT: 14.1 % (ref 12.3–15.4)
FERRITIN SERPL-MCNC: 143.3 NG/ML (ref 30–400)
GLOBULIN UR ELPH-MCNC: 2.5 GM/DL
GLUCOSE SERPL-MCNC: 96 MG/DL (ref 65–99)
HCT VFR BLD AUTO: 40.3 % (ref 37.5–51)
HGB BLD-MCNC: 12.8 G/DL (ref 13–17.7)
IMM GRANULOCYTES # BLD AUTO: 0.02 10*3/MM3 (ref 0–0.05)
IMM GRANULOCYTES NFR BLD AUTO: 0.3 % (ref 0–0.5)
IRON 24H UR-MRATE: 72 MCG/DL (ref 59–158)
IRON SATN MFR SERPL: 19 % (ref 20–50)
LYMPHOCYTES # BLD AUTO: 1.61 10*3/MM3 (ref 0.7–3.1)
LYMPHOCYTES NFR BLD AUTO: 21 % (ref 19.6–45.3)
MCH RBC QN AUTO: 32.4 PG (ref 26.6–33)
MCHC RBC AUTO-ENTMCNC: 31.8 G/DL (ref 31.5–35.7)
MCV RBC AUTO: 102 FL (ref 79–97)
MONOCYTES # BLD AUTO: 0.57 10*3/MM3 (ref 0.1–0.9)
MONOCYTES NFR BLD AUTO: 7.4 % (ref 5–12)
NEUTROPHILS NFR BLD AUTO: 5.17 10*3/MM3 (ref 1.7–7)
NEUTROPHILS NFR BLD AUTO: 67.2 % (ref 42.7–76)
NRBC BLD AUTO-RTO: 0 /100 WBC (ref 0–0.2)
PLATELET # BLD AUTO: 173 10*3/MM3 (ref 140–450)
PMV BLD AUTO: 10.8 FL (ref 6–12)
POTASSIUM SERPL-SCNC: 4.1 MMOL/L (ref 3.5–5.2)
PROT SERPL-MCNC: 6.8 G/DL (ref 6–8.5)
RBC # BLD AUTO: 3.95 10*6/MM3 (ref 4.14–5.8)
SODIUM SERPL-SCNC: 138 MMOL/L (ref 136–145)
TIBC SERPL-MCNC: 381 MCG/DL (ref 298–536)
TRANSFERRIN SERPL-MCNC: 256 MG/DL (ref 200–360)
VIT B12 BLD-MCNC: 1978 PG/ML (ref 211–946)
WBC NRBC COR # BLD AUTO: 7.68 10*3/MM3 (ref 3.4–10.8)

## 2025-07-02 PROCEDURE — 84466 ASSAY OF TRANSFERRIN: CPT

## 2025-07-02 PROCEDURE — 83540 ASSAY OF IRON: CPT

## 2025-07-02 PROCEDURE — 82728 ASSAY OF FERRITIN: CPT

## 2025-07-02 PROCEDURE — 36415 COLL VENOUS BLD VENIPUNCTURE: CPT

## 2025-07-02 PROCEDURE — 82607 VITAMIN B-12: CPT

## 2025-07-02 PROCEDURE — 85025 COMPLETE CBC W/AUTO DIFF WBC: CPT

## 2025-07-02 PROCEDURE — 80053 COMPREHEN METABOLIC PANEL: CPT

## 2025-07-02 NOTE — PROGRESS NOTES
[facility]EMATOLOGY & ONCOLOGY [place]  [address]  [place] [address] [phone]    Patient Name: Jeff Alatorre  Encounter Date: 07/02/2025   YOB: 1940  Patient Number: [id number]    Hematology / Oncology Progress Note    HPI / REASON FOR VISIT: Jeff Alatorre is a 85 y.o. male who is followed by this office for polycythemia which was diagnosed many years ago and believed to be related to testosterone injections.  Bone marrow biopsy in either 2012 or 2013 which showed 55-65% cellularity, prominent erythroid hyperplasia with no simultaneous increases that are dyspoietic myeloid and megakaryocytic series (there was an isolated erythroid hyperplasia).  The flow cytometry and cytogenetics were unrevealing.  This appeared to be more of a reactive erythrocytosis rather than a myeloproliferative process.     He lost follow up from April 26, 2022 and returned on April 26. 2023.   He states that he had operation on 3/14/23 and penile implant was placed.  He has recovered well from that surgery but has had some anemia recently.  Hgb on 03/07 was 12.4.  On 04/17/23, Hgb was 9.9.  Records indicate that patient did not loose any significant amount of blood during his surgery.  He was started on oral iron on April 17.  Stool was normal prior to iron but then turned dark which is an expected findings.  Colonoscopy was good 2021.  He denies blood in stool or urine.      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       June 2024 CABG x 4.         INTERVAL HISTORY     Pt presents to clinic today for continued follow up.  He reports over all doing well.  He states he had watchman placed last week.  He had labs drawn and results were reviewed with him and his wife in the office.     LABS    Lab Results - Last 18 Months   Lab Units 07/02/25  0947 06/24/25  1233 06/24/25  1002 05/21/25  1224 01/08/25  1314  10/22/24  0918 10/07/24  1547 09/03/24  1006   HEMOGLOBIN g/dL 12.8* 11.7* 13.2 12.9* 13.4 13.2   < > 12.1*   HEMATOCRIT % 40.3 36.2* 41.2 40* 41.5 40.0   < > 38.0   MCV fL 102.0* 103.7* 104.6* 105.5* 101.5* 103.4*   < > 104.1*   WBC 10*3/mm3 7.68 5.54 6.81 7.6 6.89 6.01   < > 6.79   RDW % 14.1 14.5 14.5 14.3 13.8 13.6   < > 15.9*   MPV fL 10.8 10.9 11.6 11.6 10.9 10.6   < > 10.4   PLATELETS 10*3/mm3 173 132* 169 170 189 183   < > 176   IMM GRAN % % 0.3 0.2 0.4  --  0.3 0.3  --  0.3   NEUTROS ABS 10*3/mm3 5.17 3.66 4.74 5.1 4.84 4.25   < > 5.06   LYMPHS ABS 10*3/mm3 1.61 1.18 1.25 1.6 1.35 1.16   < > 1.02   MONOS ABS 10*3/mm3 0.57 0.53 0.59 0.6 0.47 0.42  --  0.51   EOS ABS 10*3/mm3 0.25 0.13 0.16 0.2 0.15 0.13  --  0.15   BASOS ABS 10*3/mm3 0.06 0.03 0.04 0 0.06 0.03  --  0.03   IMMATURE GRANS (ABS) 10*3/mm3 0.02 0.01 0.03 0 0.02 0.02  --  0.02   NRBC /100 WBC 0.0 0.0 0.0  --  0.0 0.0  --  0.0    < > = values in this interval not displayed.       Lab Results - Last 18 Months   Lab Units 07/02/25  0947 06/24/25  1233 06/24/25  1002 05/21/25  1224 01/08/25  1314 10/22/24  0918 10/07/24  1547   GLUCOSE mg/dL 96 118* 120* 94 142* 100* 112*   SODIUM mmol/L 138 143 143 143 140 142 140   POTASSIUM mmol/L 4.1 4.5 4.8 4.3 4.7 4.5 4.4   TOTAL CO2 mmol/L  --   --   --  23  --   --   --    CO2 mmol/L 22.0 23.0 24.0  --  25.0 25.0 22.0*   CHLORIDE mmol/L 105 109* 106 108* 102 105 103   ANION GAP mmol/L 11.0 11.0 13.0 12 13.0 12.0 15.0*   CREATININE mg/dL 1.30* 1.14 1.22 1.2 1.15 1.32* 1.41*   BUN mg/dL 20.1 28.0* 29.6* 23 23 26* 36*   BUN / CREAT RATIO  15.5 24.6 24.3  --  20.0 19.7 25.5*   CALCIUM mg/dL 9.6 8.8 9.6 9.8 9.9 9.9 9.4   ALK PHOS U/L 161* 132* 156*  --  149* 163* 139*   TOTAL PROTEIN g/dL 6.8 5.9* 6.9  --  7.0 6.8 7.0   ALT (SGPT) U/L 11 22 28  --  15 29 27   AST (SGOT) U/L 15 22 29  --  18 24 29   BILIRUBIN mg/dL 0.9 0.3 0.5  --  0.6 0.4 0.5   ALBUMIN g/dL 4.3 3.7 4.2  --  4.4 4.1 4.2   GLOBULIN gm/dL 2.5 2.2 2.7   --  2.6 2.7 2.8       Lab Results - Last 18 Months   Lab Units 10/07/24  1547   URIC ACID mg/dL 3.8       Lab Results - Last 18 Months   Lab Units 07/02/25  0947 06/24/25  1233 01/08/25  1314 10/22/24  0918 10/07/24  1547 09/03/24  1006   IRON mcg/dL 72 52* 87 86 48* 69   TIBC mcg/dL 381 307 367 362 358 349   IRON SATURATION (TSAT) % 19* 17* 24 24 13* 20   FERRITIN ng/mL 143.30 96.90 160.60 128.60 170.00 191.60   TSH uIU/mL  --   --   --   --  3.010  --    FOLATE ng/mL  --   --   --   --  11.90  --          PAST MEDICAL HISTORY:  ALLERGIES:  No Known Allergies  CURRENT MEDICATIONS:  Outpatient Encounter Medications as of 7/2/2025   Medication Sig Dispense Refill    acetaminophen (TYLENOL) 500 MG tablet Take 1 tablet by mouth Daily As Needed for Mild Pain.      allopurinol (ZYLOPRIM) 300 MG tablet Take 1 tablet by mouth Daily. 90 tablet 2    aspirin 81 MG EC tablet Take 1 tablet by mouth Daily. 30 tablet 11    atorvastatin (Lipitor) 80 MG tablet Take 1 tablet by mouth Daily. 90 tablet 2    Calcium Polycarbophil (FIBER-CAPS PO) Take 500 mg by mouth 2 (Two) Times a Day.      ferrous sulfate 325 (65 FE) MG tablet Take 1 tablet by mouth Every Other Day.      loperamide (Imodium A-D) 2 MG tablet Take 1 tablet by mouth 4 (Four) Times a Day As Needed for Diarrhea. 120 tablet 2    metFORMIN (GLUCOPHAGE) 500 MG tablet Take 1 tablet by mouth 2 (Two) Times a Day With Meals. 180 tablet 3    metoprolol succinate XL (TOPROL-XL) 25 MG 24 hr tablet Take 1 tablet by mouth Daily. 90 tablet 2    nitroglycerin (NITROSTAT) 0.4 MG SL tablet Place 1 tablet under the tongue Every 5 (Five) Minutes As Needed for Chest Pain. Take no more than 3 doses in 15 minutes. 90 tablet 1    vitamin B-12 (CYANOCOBALAMIN) 500 MCG tablet Take 1 tablet by mouth Daily. 90 tablet 1    [DISCONTINUED] clopidogrel (PLAVIX) 75 MG tablet Take 1 tablet by mouth Daily. 30 tablet 6     No facility-administered encounter medications on file as of 7/2/2025.     ADULT  ILLNESSES:  Patient Active Problem List   Diagnosis Code    Hx of adenomatous colonic polyps Z86.0101    Adenomatous polyps D36.9    Idiopathic gout of multiple sites M10.09    Primary hypertension I10    Type 2 diabetes mellitus without complication, without long-term current use of insulin E11.9    Erectile dysfunction due to arterial insufficiency N52.01    Anemia D64.9    B12 deficiency E53.8    Stage 3a chronic kidney disease (CKD) N18.31    Seasonal allergic rhinitis J30.2    ANYI (obstructive sleep apnea) G47.33    Carotid artery disease I77.9    ROSALES (dyspnea on exertion) R06.09    Coronary artery disease involving coronary bypass graft of native heart without angina pectoris I25.810    Bilateral carotid artery stenosis I65.23    History of left-sided carotid endarterectomy Z98.890    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    Chronic anemia D64.9    PAD (peripheral artery disease) I73.9    PAF (paroxysmal atrial fibrillation) I48.0    Mixed hyperlipidemia E78.2    S/P CABG (coronary artery bypass graft) Z95.1    Arthritis M19.90     SURGERIES:  Past Surgical History:   Procedure Laterality Date    ATRIAL APPENDAGE EXCLUSION LEFT WITH TRANSESOPHAGEAL ECHOCARDIOGRAM Right 6/24/2025    Procedure: Atrial Appendage Occlusion;  Surgeon: Zaid Contreras MD;  Location:  [facility];  Service: Cardiology;  Laterality: Right;    CARDIAC CATHETERIZATION Left 05/20/2024    Procedure: Coronary angiography;  Surgeon: Zaid Contreras MD;  Location:  [facility];  Service: Cardiology;  Laterality: Left;    CARDIAC CATHETERIZATION Left 05/20/2024    Procedure: Percutaneous Coronary Intervention;  Surgeon: Zaid Contreras MD;  Location:  [facility];  Service: Cardiology;  Laterality: Left;    CAROTID ENDARTERECTOMY Left     CATARACT EXTRACTION, BILATERAL      COLON SURGERY      COLONOSCOPY      COLONOSCOPY N/A 07/27/2021    Procedure: COLONOSCOPY WITH ANESTHESIA;   Surgeon: Luciano Alatorre DO;  Location:  [facility] ENDOS[facility];  Service: Gastroenterology;  Laterality: N/A;  preop; hx of polyps  postop; diverticulosis   PCP Devon Moore     CORONARY ARTERY BYPASS GRAFT N/A 06/25/2024    Procedure: CORONARY ARTERY BYPASS GRAFTING x4, LEFT INTERNAL MAMMARY ARTERY GRAFT, RIGHT LEG ENDOSCOPIC VEIN HARVEST, TRANSESOPHAGEAL ECHOCARDIOGRAM, APPLICATION OF PREVENA;  Surgeon: Jose F Kmi MD;  Location:  [facility] OR;  Service: Cardiothoracic;  Laterality: N/A;    PENILE PROSTHESIS IMPLANT N/A 03/14/2023    Procedure: 3-PIECE INFLATABLE PENILE PROSTHESIS PLACEMENT;  Surgeon: Garcia Santana MD;  Location:  [facility] OR;  Service: Urology;  Laterality: N/A;    VASECTOMY       HEALTH MAINTENANCE ITEMS:  Health Maintenance Due   Topic Date Due    DIABETIC FOOT EXAM  Never done    DIABETIC EYE EXAM  Never done    HEMOGLOBIN A1C  04/07/2025    INFLUENZA VACCINE  07/01/2025       <no information>  Last Completed Colonoscopy            Upcoming       COLORECTAL CANCER SCREENING (COLONOSCOPY - Every 5 Years) Next due on 7/27/2026 07/27/2021  Surgical Procedure: COLONOSCOPY    07/27/2021  COLONOSCOPY    10/23/2020  Cologuard - Stool, Per Rectum    10/16/2020  Cologuard - ,    06/26/2018  COLONOSCOPY (Done)     Only the first 5 history entries have been loaded, but more history exists.                        Immunization History   Administered Date(s) Administered    Arexvy (RSV, Adults 60+ yrs) 11/29/2023    COVID-19 (MODERNA) 12YRS+ (SPIKEVAX) 01/02/2024    COVID-19 (MODERNA) 1st,2nd,3rd Dose Monovalent 02/24/2021, 03/24/2021, 01/05/2022    COVID-19 (PFIZER) BIVALENT 12+YRS 11/08/2022    COVID-19 (UNSPECIFIED) 10/01/2024    Flu Vaccine Split Quad 12/14/2015    Fluad Quad 65+ 10/13/2020    Fluzone (or Fluarix & Flulaval for VFC) >6mos 12/14/2015    Fluzone High-Dose 65+YRS 11/04/2019    Fluzone High-Dose 65+yrs 10/15/2021, 10/17/2022, 08/21/2023, 10/01/2024    Pneumococcal Conjugate  20-Valent (PCV20) 10/17/2022    Shingrix 10/30/2023    Zostavax 2016        SOCIAL HISTORY:  Social History     Socioeconomic History    Marital status: Single   Tobacco Use    Smoking status: Former     Current packs/day: 0.00     Average packs/day: 1 pack/day for 40.0 years (40.0 ttl pk-yrs)     Types: Cigarettes     Start date:      Quit date:      Years since quittin.5     Passive exposure: Past    Smokeless tobacco: Never    Tobacco comments:     N/A   Vaping Use    Vaping status: Never Used   Substance and Sexual Activity    Alcohol use: Not Currently     Comment: Quite 20+ yrs ago    Drug use: Never    Sexual activity: Yes     Partners: Female     Birth control/protection: Vasectomy       REVIEW OF SYSTEMS:  Review of Systems   Constitutional:  Positive for fatigue. Negative for activity change, appetite change, fever, unexpected weight gain and unexpected weight loss.   HENT:  Negative for dental problem, facial swelling, swollen glands and trouble swallowing.    Eyes:  Negative for double vision and discharge.   Respiratory:  Negative for cough, shortness of breath and wheezing.    Cardiovascular:  Negative for chest pain, palpitations and leg swelling.        S/p CABG x 4 on 06/15/24   Gastrointestinal:  Negative for abdominal pain, blood in stool, nausea and vomiting.   Endocrine: Negative.    Genitourinary:  Negative for dysuria and hematuria.        Had penile implant 2023   Musculoskeletal:  Positive for arthralgias. Negative for myalgias.   Skin:  Negative for rash, skin lesions and wound.   Allergic/Immunologic: Negative for immunocompromised state.   Neurological:  Negative for speech difficulty, light-headedness, headache, memory problem and confusion.   Hematological:  Negative for adenopathy.   Psychiatric/Behavioral:  Negative for self-injury, suicidal ideas and depressed mood. The patient is not nervous/anxious.        /72   Pulse 59   Temp 97 °F (36.1 °C)    "Resp 16   Ht 177.8 cm (70\")   Wt 80.7 kg (178 lb)   SpO2 96%   BMI 25.54 kg/m²  Body surface area is 1.99 meters squared.    Pain Score    07/02/25 0952   PainSc: 0-No pain           Physical Exam  Constitutional:       Appearance: Normal appearance.   HENT:      Head: Normocephalic and atraumatic.   Cardiovascular:      Rate and Rhythm: Normal rate and regular rhythm.   Pulmonary:      Effort: Pulmonary effort is normal.      Breath sounds: Normal breath sounds.   Abdominal:      General: Bowel sounds are normal.      Palpations: Abdomen is soft.   Musculoskeletal:      Right lower leg: No edema.      Left lower leg: No edema.   Skin:     General: Skin is warm and dry.   Neurological:      Mental Status: He is alert and oriented to person, place, and time.   Psychiatric:         Attention and Perception: Attention normal.         Mood and Affect: Mood normal.         Judgment: Judgment normal.         Jeff Alatorre reports a pain score of 0.  Given his pain assessment as noted, treatment options were discussed and the following options were decided upon as a follow-up plan to address the patient's pain: No intervention indicated.       ASSESSMENT:   No diagnosis found.       1. Anemia in CKD Stage IIIa  2.  Iron Deficiency   -Labs:  BUN 20, Creatinine 1.3, GFR 53.8, Hgb 12.8, Hct 40.3   -Iron 72, Ferritin 143, Sat 19%, TIBC 381  -Pt is taking oral iron daily. Continue current dose   -Stable for observation  -If GFR < 60, AND Ferritin >/= 100 and /or Sat >/= 20 AND Hgb < 10 or Hct < 30, can start ARACELI therapy with Retacrit 40,000 units  -If started, continue Retacrit biweekly for Hgb < 11 or Hct < 33        3. Vitamin B12 deficiency  -B12: 1978  -Continue daily oral B12    Follow-up   -Labs only in 3 months for CBC, CMP, Iron Profile, Ferritin  - The patient will follow up in 6 months.   -Contact the office for any acute needs.      Continue current medications, treatment plans and follow up with PCP and any " other providers.  Care discussed with patient.  Understanding expressed.  Patient agreeable with plan.      Enriqueta Rao, BRYANT  07/02/2025

## 2025-07-03 ENCOUNTER — OFFICE VISIT (OUTPATIENT)
Dept: FAMILY MEDICINE CLINIC | Facility: CLINIC | Age: 85
End: 2025-07-03
Payer: MEDICARE

## 2025-07-03 VITALS
RESPIRATION RATE: 12 BRPM | TEMPERATURE: 98 F | BODY MASS INDEX: 24.77 KG/M2 | HEART RATE: 73 BPM | WEIGHT: 173 LBS | OXYGEN SATURATION: 99 % | DIASTOLIC BLOOD PRESSURE: 62 MMHG | HEIGHT: 70 IN | SYSTOLIC BLOOD PRESSURE: 100 MMHG

## 2025-07-03 DIAGNOSIS — I48.0 PAF (PAROXYSMAL ATRIAL FIBRILLATION): ICD-10-CM

## 2025-07-03 DIAGNOSIS — H65.23 BILATERAL CHRONIC SEROUS OTITIS MEDIA: ICD-10-CM

## 2025-07-03 DIAGNOSIS — Z09 ENCOUNTER FOR FOLLOW-UP EXAMINATION AFTER COMPLETED TREATMENT FOR CONDITIONS OTHER THAN MALIGNANT NEOPLASM: ICD-10-CM

## 2025-07-03 DIAGNOSIS — M85.80 DECREASED BONE DENSITY: ICD-10-CM

## 2025-07-03 DIAGNOSIS — Z09 HOSPITAL DISCHARGE FOLLOW-UP: Primary | ICD-10-CM

## 2025-07-03 PROCEDURE — 99495 TRANSJ CARE MGMT MOD F2F 14D: CPT | Performed by: STUDENT IN AN ORGANIZED HEALTH CARE EDUCATION/TRAINING PROGRAM

## 2025-07-03 PROCEDURE — 1111F DSCHRG MED/CURRENT MED MERGE: CPT | Performed by: STUDENT IN AN ORGANIZED HEALTH CARE EDUCATION/TRAINING PROGRAM

## 2025-07-03 PROCEDURE — 3078F DIAST BP <80 MM HG: CPT | Performed by: STUDENT IN AN ORGANIZED HEALTH CARE EDUCATION/TRAINING PROGRAM

## 2025-07-03 PROCEDURE — 1126F AMNT PAIN NOTED NONE PRSNT: CPT | Performed by: STUDENT IN AN ORGANIZED HEALTH CARE EDUCATION/TRAINING PROGRAM

## 2025-07-03 PROCEDURE — 3074F SYST BP LT 130 MM HG: CPT | Performed by: STUDENT IN AN ORGANIZED HEALTH CARE EDUCATION/TRAINING PROGRAM

## 2025-07-03 RX ORDER — FLUTICASONE PROPIONATE 50 MCG
2 SPRAY, SUSPENSION (ML) NASAL DAILY
Qty: 16 G | Refills: 1 | Status: SHIPPED | OUTPATIENT
Start: 2025-07-03

## 2025-07-03 NOTE — PROGRESS NOTES
Transitional Care Follow Up Visit  Subjective     Jeff Alatorre is a 85 y.o. male who presents for a transitional care management visit.    Within 48 business hours after discharge our office contacted him via telephone to coordinate his care and needs.      I reviewed and discussed the details of that call along with the discharge summary, hospital problems, inpatient lab results, inpatient diagnostic studies, and consultation reports with Jeff.     Current outpatient and discharge medications have been reconciled for the patient.  Reviewed by: Jimmy Curtis MD          6/25/2025    10:14 PM   Date of TCM Phone Call   [facility]   Date of Admission 6/24/2025   Date of Discharge 6/25/2025   Discharge Disposition Home or Self Care     Risk for Readmission (LACE) Score: 9 (6/25/2025  5:00 AM)      History of Present Illness   Course During Hospital Stay:      Hospital Course  Patient is a 85 y.o. male presented yesterday and underwent successful Watchman device implantation per Dr. Contreras without complication.  Today he states he feels well.  He denies any chest pain, shortness of breath or palpitations.  He is maintaining sinus rhythm on telemetry.  He did have oxygen desaturation during sleep-87%.  He states he does have untreated sleep apnea due to inability to tolerate CPAP and also nasal cannula.  He is oxygenating well on room air during waking hours.  He is felt to be stable for discharge on the medical therapy listed below.  He will remain off of Eliquis.  He will continue aspirin 81 mg daily indefinitely without interruption.  He will continue Plavix 75 mg daily without interruption for 6 months.  He will need SBE prophylaxis for 6 months.  He will have a MICAELA with Dr. Contreras in approximately 6 weeks.  He will follow-up with Dr. Contreras or myself in clinic in approximately 6 months.     Interval:  -Pt feeling well since leaving hospital. No further bleeding or any other symptoms. Pt  "taking asa and plavix as recommend. Pt will have fu MICAELA in 6 weeks with cardiology follow up in 6 months.     The following portions of the patient's history were reviewed and updated as appropriate: allergies, current medications, past family history, past medical history, past social history, past surgical history, and problem list.    Review of Systems  ROS negative except that which is listed in HPI    Objective   /62   Pulse 73   Temp 98 °F (36.7 °C) (Temporal)   Resp 12   Ht 177.8 cm (70\")   Wt 78.5 kg (173 lb)   SpO2 99%   BMI 24.82 kg/m²   Physical Exam  Vitals reviewed.   Constitutional:       Appearance: Normal appearance.   HENT:      Head: Normocephalic.      Nose: Nose normal.      Mouth/Throat:      Mouth: Mucous membranes are moist.   Eyes:      Extraocular Movements: Extraocular movements intact.   Cardiovascular:      Rate and Rhythm: Normal rate and regular rhythm.      Heart sounds: Normal heart sounds.   Pulmonary:      Effort: Pulmonary effort is normal.      Breath sounds: Normal breath sounds.   Musculoskeletal:         General: Normal range of motion.      Cervical back: Normal range of motion.   Skin:     General: Skin is warm and dry.   Neurological:      General: No focal deficit present.      Mental Status: He is alert and oriented to person, place, and time.   Psychiatric:         Mood and Affect: Mood normal.         Behavior: Behavior normal.       Assessment & Plan   Diagnoses and all orders for this visit:    1. Hospital discharge follow-up (Primary)    2. PAF (paroxysmal atrial fibrillation)  -Continue asa, plavix. No longer on eliquis. FU with cardiology as scheduled.    3. Bilateral chronic serous otitis media  -     fluticasone (FLONASE) 50 MCG/ACT nasal spray; Administer 2 sprays into the nostril(s) as directed by provider Daily.  Dispense: 16 g; Refill: 1    4. Decreased bone density  -     DEXA Bone Density Axial; Future    5. Encounter for follow-up examination " after completed treatment for conditions other than malignant neoplasm  -     DEXA Bone Density Axial; Future

## 2025-07-08 ENCOUNTER — READMISSION MANAGEMENT (OUTPATIENT)
Dept: CALL CENTER | Facility: HOSPITAL | Age: 85
End: 2025-07-08
Payer: MEDICARE

## 2025-07-08 NOTE — OUTREACH NOTE
Medical Week 2 Survey      Flowsheet Row Responses   [facility] patient discharged from? [place]   Does the patient have one of the following disease processes/diagnoses(primary or secondary)? Other   Week 2 attempt successful? No   Unsuccessful attempts Attempt 1   Revoke Dennis Case H - Registered Nurse

## 2025-07-11 DIAGNOSIS — E78.2 MIXED HYPERLIPIDEMIA: ICD-10-CM

## 2025-07-11 DIAGNOSIS — I25.10 CORONARY ARTERY DISEASE INVOLVING NATIVE CORONARY ARTERY OF NATIVE HEART WITHOUT ANGINA PECTORIS: ICD-10-CM

## 2025-07-11 DIAGNOSIS — M10.09 IDIOPATHIC GOUT OF MULTIPLE SITES, UNSPECIFIED CHRONICITY: ICD-10-CM

## 2025-07-11 RX ORDER — ALLOPURINOL 300 MG/1
300 TABLET ORAL DAILY
Qty: 90 TABLET | Refills: 2 | Status: SHIPPED | OUTPATIENT
Start: 2025-07-11

## 2025-07-11 RX ORDER — ATORVASTATIN CALCIUM 80 MG/1
80 TABLET, FILM COATED ORAL DAILY
Qty: 90 TABLET | Refills: 2 | Status: SHIPPED | OUTPATIENT
Start: 2025-07-11

## 2025-07-11 NOTE — TELEPHONE ENCOUNTER
Rx Refill Note  Requested Prescriptions     Pending Prescriptions Disp Refills    atorvastatin (LIPITOR) 80 MG tablet [Pharmacy Med Name: ATORVASTATIN CALCIUM 80MG TABS] 90 tablet 2     Sig: TAKE ONE TABLET BY MOUTH EVERY DAY      Last office visit with prescribing clinician: 7/3/2025   Last telemedicine visit with prescribing clinician: Visit date not found   Next office visit with prescribing clinician: 10/10/2025                         Would you like a call back once the refill request has been completed: [] Yes [] No    If the office needs to give you a call back, can they leave a voicemail: [] Yes [] No    Vandana Curtis MA  07/11/25, 12:47 CDT

## 2025-07-11 NOTE — TELEPHONE ENCOUNTER
Rx Refill Note  Requested Prescriptions     Pending Prescriptions Disp Refills    allopurinol (ZYLOPRIM) 300 MG tablet [Pharmacy Med Name: ALLOPURINOL 300MG TABS] 90 tablet 2     Sig: TAKE ONE TABLET BY MOUTH EVERY DAY      Last office visit with prescribing clinician: 7/3/2025   Last telemedicine visit with prescribing clinician: Visit date not found   Next office visit with prescribing clinician: 10/10/2025     Protocol met      Cata Mccloud MA  07/11/25, 07:34 CDT

## 2025-07-16 ENCOUNTER — TELEPHONE (OUTPATIENT)
Dept: ONCOLOGY | Facility: CLINIC | Age: 85
End: 2025-07-16

## 2025-07-17 ENCOUNTER — TELEPHONE (OUTPATIENT)
Dept: ONCOLOGY | Facility: CLINIC | Age: 85
End: 2025-07-17
Payer: MEDICARE

## 2025-07-17 NOTE — TELEPHONE ENCOUNTER
----- Message from Enriqueta Rao sent at 7/16/2025  4:08 PM CDT -----  Ask him to decrease to three times per week please   Thank you  ----- Message -----  From: Tammie Faria LPN  Sent: 7/16/2025   4:02 PM CDT  To: BRYANT Nix    Caller: Jeff Alatorre     Relationship: Self     Best call back number:   Telephone Information:  Mobile [phone]     What is the best time to reach you: ANYTIME      What was the call regarding: PT HAD LABS DONE 7/2 ADV HIS B12 IS HIGH AND WANTED TO KNOW IF HE SHOULD STOP THE B12 SUPPLEMENT. PLEASE CB TO ADVISE.     Recent B-12: 1,978    Previous reading x 6 mo ago: 1,544    Should pt d/c b-12, please advise, thanks?

## 2025-07-17 NOTE — TELEPHONE ENCOUNTER
Contacted patient Jeff Mckeoner regarding his concerns related to elevated B-12 levels, he was informed per instruction of his Provider, that he should decrease his B-12 to 3 x per week only and will recheck his labs at a regular [facility] visit. Patient v/u of instruction.,

## 2025-08-06 ENCOUNTER — HOSPITAL ENCOUNTER (OUTPATIENT)
Dept: BONE DENSITY | Facility: HOSPITAL | Age: 85
Discharge: HOME | End: 2025-08-06
Admitting: STUDENT IN AN ORGANIZED HEALTH CARE EDUCATION/TRAINING PROGRAM
Payer: MEDICARE

## 2025-08-06 DIAGNOSIS — Z09 ENCOUNTER FOR FOLLOW-UP EXAMINATION AFTER COMPLETED TREATMENT FOR CONDITIONS OTHER THAN MALIGNANT NEOPLASM: ICD-10-CM

## 2025-08-06 DIAGNOSIS — M85.80 DECREASED BONE DENSITY: ICD-10-CM

## 2025-08-06 PROCEDURE — 77080 DXA BONE DENSITY AXIAL: CPT

## 2025-08-07 ENCOUNTER — ANESTHESIA EVENT (OUTPATIENT)
Dept: CARDIOLOGY | Facility: HOSPITAL | Age: 85
End: 2025-08-07
Payer: MEDICARE

## 2025-08-07 ENCOUNTER — HOSPITAL ENCOUNTER (OUTPATIENT)
Dept: CARDIOLOGY | Facility: HOSPITAL | Age: 85
Discharge: HOME | End: 2025-08-07
Payer: MEDICARE

## 2025-08-07 ENCOUNTER — ANESTHESIA (OUTPATIENT)
Dept: CARDIOLOGY | Facility: HOSPITAL | Age: 85
End: 2025-08-07
Payer: MEDICARE

## 2025-08-07 VITALS
SYSTOLIC BLOOD PRESSURE: 135 MMHG | RESPIRATION RATE: 20 BRPM | OXYGEN SATURATION: 100 % | TEMPERATURE: 97.1 F | BODY MASS INDEX: 25.48 KG/M2 | HEIGHT: 70 IN | DIASTOLIC BLOOD PRESSURE: 97 MMHG | HEART RATE: 57 BPM | WEIGHT: 178 LBS

## 2025-08-07 DIAGNOSIS — I48.0 PAF (PAROXYSMAL ATRIAL FIBRILLATION): ICD-10-CM

## 2025-08-07 PROCEDURE — 93325 DOPPLER ECHO COLOR FLOW MAPG: CPT

## 2025-08-07 PROCEDURE — 25010000002 LIDOCAINE PF 2% 2 % SOLUTION: Performed by: NURSE ANESTHETIST, CERTIFIED REGISTERED

## 2025-08-07 PROCEDURE — 25010000002 PROPOFOL 1000 MG/100ML EMULSION: Performed by: NURSE ANESTHETIST, CERTIFIED REGISTERED

## 2025-08-07 RX ORDER — LIDOCAINE HYDROCHLORIDE 20 MG/ML
INJECTION, SOLUTION EPIDURAL; INFILTRATION; INTRACAUDAL; PERINEURAL AS NEEDED
Status: DISCONTINUED | OUTPATIENT
Start: 2025-08-07 | End: 2025-08-07 | Stop reason: SURG

## 2025-08-07 RX ORDER — PROPOFOL 10 MG/ML
INJECTION, EMULSION INTRAVENOUS AS NEEDED
Status: DISCONTINUED | OUTPATIENT
Start: 2025-08-07 | End: 2025-08-07 | Stop reason: SURG

## 2025-08-07 RX ORDER — CLOPIDOGREL BISULFATE 75 MG/1
75 TABLET ORAL DAILY
COMMUNITY

## 2025-08-07 RX ADMIN — LIDOCAINE HYDROCHLORIDE 100 MG: 20 INJECTION, SOLUTION EPIDURAL; INFILTRATION; INTRACAUDAL; PERINEURAL at 10:18

## 2025-08-07 RX ADMIN — PROPOFOL 100 MG: 10 INJECTION, EMULSION INTRAVENOUS at 10:18

## 2025-08-13 ENCOUNTER — OFFICE VISIT (OUTPATIENT)
Dept: FAMILY MEDICINE CLINIC | Facility: CLINIC | Age: 85
End: 2025-08-13
Payer: MEDICARE

## 2025-08-13 VITALS
DIASTOLIC BLOOD PRESSURE: 68 MMHG | WEIGHT: 177 LBS | BODY MASS INDEX: 25.34 KG/M2 | HEART RATE: 50 BPM | HEIGHT: 70 IN | SYSTOLIC BLOOD PRESSURE: 128 MMHG | OXYGEN SATURATION: 97 % | RESPIRATION RATE: 20 BRPM | TEMPERATURE: 97.8 F

## 2025-08-13 DIAGNOSIS — H69.93 DYSFUNCTION OF BOTH EUSTACHIAN TUBES: ICD-10-CM

## 2025-08-13 DIAGNOSIS — M81.0 AGE RELATED OSTEOPOROSIS, UNSPECIFIED PATHOLOGICAL FRACTURE PRESENCE: Primary | ICD-10-CM

## 2025-08-13 RX ORDER — AZELASTINE HCL 205.5 UG/1
2 SPRAY NASAL 2 TIMES DAILY
Qty: 30 ML | Refills: 2 | Status: SHIPPED | OUTPATIENT
Start: 2025-08-13

## 2025-08-13 RX ORDER — ALENDRONATE SODIUM 70 MG/1
70 TABLET ORAL
Qty: 12 TABLET | Refills: 5 | Status: SHIPPED | OUTPATIENT
Start: 2025-08-13

## 2025-08-18 DIAGNOSIS — H69.93 DISORDER OF BOTH EUSTACHIAN TUBES: Primary | ICD-10-CM

## (undated) DEVICE — GLV SURG BIOGEL M LTX PF 7 1/2

## (undated) DEVICE — GLIDESHEATH SLENDER STAINLESS STEEL KIT: Brand: GLIDESHEATH SLENDER

## (undated) DEVICE — ADHS LIQ MASTISOL 2/3ML

## (undated) DEVICE — SUREFIT, DUAL DISPERSIVE ELECTRODE, CONTACT QUALITY MONITOR: Brand: SUREFIT

## (undated) DEVICE — GW ZIPWIRE STD ANGL .035IN 150CM

## (undated) DEVICE — PATIENT RETURN ELECTRODE, SINGLE-USE, CONTACT QUALITY MONITORING, ADULT, WITH 9FT CORD, FOR PATIENTS WEIGING OVER 33LBS. (15KG): Brand: MEGADYNE

## (undated) DEVICE — KT NDL GUIDE STRL 18GA

## (undated) DEVICE — DRSNG TELFA PAD NONADH STR 1S 3X8IN

## (undated) DEVICE — ROTATING SURGICAL PUNCHES, 1 PER POUCH: Brand: A&E MEDICAL / ROTATING SURGICAL PUNCHES

## (undated) DEVICE — COVER,MAYO STAND,STERILE: Brand: MEDLINE

## (undated) DEVICE — CORD,CAUTERY,BIPOLAR,STERILE: Brand: MEDLINE

## (undated) DEVICE — THE CHANNEL CLEANING BRUSH IS A NYLON FLEXI BRUSH ATTACHED TO A FLEXIBLE PLASTIC SHEATH DESIGNED TO SAFELY REMOVE DEBRIS FROM FLEXIBLE ENDOSCOPES.

## (undated) DEVICE — Device

## (undated) DEVICE — LIMB HOLDER, WRIST/ANKLE: Brand: DEROYAL

## (undated) DEVICE — PAD, DEFIB, ADULT, RADIOTRANS, PHYSIO: Brand: MEDLINE

## (undated) DEVICE — TBG SMPL FLTR LINE NASL 02/C02 A/ BX/100

## (undated) DEVICE — ACCESS SHEATH WITH DILATOR: Brand: WATCHMAN TRUSTEER™ ACCESS SYSTEM

## (undated) DEVICE — KIT,ANTI FOG,W/SPONGE & FLUID,SOFT PACK: Brand: MEDLINE

## (undated) DEVICE — SYR LL TP 10ML STRL

## (undated) DEVICE — PK OPN HEART 30

## (undated) DEVICE — GLV SURG BIOGEL LTX PF 6 1/2

## (undated) DEVICE — TRAP FLD MINIVAC MEGADYNE 100ML

## (undated) DEVICE — BG OR ZSUT SADDLE 20IN CLR STRL

## (undated) DEVICE — GLOVE SURG SZ 8 L11.77IN FNGR THK9.8MIL STRW LTX POLYMER

## (undated) DEVICE — YANKAUER,BULB TIP WITH VENT: Brand: ARGYLE

## (undated) DEVICE — SOL IRR NACL 0.9PCT BO 1000ML

## (undated) DEVICE — PINNACLE INTRODUCER SHEATH: Brand: PINNACLE

## (undated) DEVICE — LAAC ACCESS SOLUTION: Brand: VERSACROSS CONNECT™ LAAC ACCESS SOLUTION

## (undated) DEVICE — PK CATH CARD 30 CA/4

## (undated) DEVICE — PAD MAJOR: Brand: MEDLINE INDUSTRIES, INC.

## (undated) DEVICE — PREP RAZOR: Brand: DEROYAL

## (undated) DEVICE — 1/2 FORCE SURGICAL SPRING CLIP: Brand: STEALTH® SPRING CLIP

## (undated) DEVICE — GLV SURG DERMASSURE GRN LF PF 8.0

## (undated) DEVICE — PREVENA INCISION MANAGEMENT SYSTEM- PEEL & PLACE DRESSING: Brand: PREVENA™ PEEL & PLACE™

## (undated) DEVICE — APPL CHLORAPREP HI/LITE 26ML ORNG

## (undated) DEVICE — CATH F6INF PIG 145 110CM 6SH: Brand: INFINITI

## (undated) DEVICE — CANN NASL ETCO2 LO/FLO A/

## (undated) DEVICE — SUT SILK 2/0 FS BLK 18IN 685G

## (undated) DEVICE — STRIP,CLOSURE,WOUND,MEDI-STRIP,1/2X4: Brand: MEDLINE

## (undated) DEVICE — CATH F6INF TL JR 5 100CM: Brand: INFINITI

## (undated) DEVICE — Device: Brand: MEDEX

## (undated) DEVICE — OASIS DRAIN, SINGLE, INLINE & ATS COMPATIBLE: Brand: OASIS

## (undated) DEVICE — GLOVE SURG SZ 6 CRM LTX FREE POLYISOPRENE POLYMER BEAD ANTI

## (undated) DEVICE — DRSNG SURESITE123 6X8IN

## (undated) DEVICE — BLD SCLPL BEAVR MINI STR 2BVL 180D LF

## (undated) DEVICE — SUT MNCRYL 3/0 PS2 18IN MCP497G

## (undated) DEVICE — MASK,OXYGEN,MED CONC,ADLT,7' TUB, UC: Brand: PENDING

## (undated) DEVICE — RESERVOIR,SUCTION,100CC,SILICONE: Brand: MEDLINE

## (undated) DEVICE — BANDAGE,GAUZE,BULKEE II,4.5"X4.1YD,STRL: Brand: MEDLINE

## (undated) DEVICE — TR BAND RADIAL ARTERY COMPRESSION DEVICE: Brand: TR BAND

## (undated) DEVICE — TOTAL TRAY, 16FR 10ML SIL FOLEY, URN: Brand: MEDLINE

## (undated) DEVICE — TB SXN SURG DLP MALL/CARD SFT/TP 6F 6IN

## (undated) DEVICE — BAPTIST TURNOVER KIT: Brand: MEDLINE INDUSTRIES, INC.

## (undated) DEVICE — Device: Brand: DEFENDO AIR/WATER/SUCTION AND BIOPSY VALVE

## (undated) DEVICE — DRAPE,ANGIO,BRACH,STERILE,38X44: Brand: MEDLINE

## (undated) DEVICE — PK SET UP ANES OPN HEART 30

## (undated) DEVICE — SOL IRR NACL 0.9PCT BT 1000ML

## (undated) DEVICE — BLAKE SILICONE DRAIN, 19 FR ROUND, HUBLESS WITH 1/4" BENDABLE TROCAR: Brand: BLAKE

## (undated) DEVICE — CATH DIAG IMPULSE PIG145 5F 110CM

## (undated) DEVICE — STERNUM BLADE, OFFSET (32.0 X 0.8 X 6.3MM)

## (undated) DEVICE — SENSR O2 OXIMAX FNGR A/ 18IN NONSTR

## (undated) DEVICE — RADIFOCUS OPTITORQUE ANGIOGRAPHIC CATHETER: Brand: OPTITORQUE

## (undated) DEVICE — E-PACK PROCEDURE KIT: Brand: E-PACK

## (undated) DEVICE — RETR DEEP SCROTAL SKW

## (undated) DEVICE — GLV SURG BIOGEL LTX PF 7 1/2

## (undated) DEVICE — CLTH CLENS READYCLEANSE PERI CARE PK/5

## (undated) DEVICE — CATH F6 ST+ MP A1 100CM: Brand: SUPER TORQUE

## (undated) DEVICE — SOLIDIFIER LIQUI LOC PLUS 2000CC

## (undated) DEVICE — SUTURE VICRYL + SZ 5-0 L18IN ABSRB UD P-3 3/8 CIR REV CUT NDL VCP493G

## (undated) DEVICE — SOLIDIFIER LIQ LIQUILOC/PLUS W/TREAT 2000CC

## (undated) DEVICE — PLUG,CATHETER,DRAINAGE PROTECTOR,TUBE: Brand: MEDLINE

## (undated) DEVICE — GW STARTER FXD CORE J .035 3X260CM 3MM

## (undated) DEVICE — DRSNG SURESITE WNDW 4X4.5

## (undated) DEVICE — ANTIBACTERIAL UNDYED BRAIDED (POLYGLACTIN 910), SYNTHETIC ABSORBABLE SUTURE: Brand: COATED VICRYL

## (undated) DEVICE — BLADE OPHTH ORNG GRINDLESS SMALLER ALTERNATIVE TO NO15 GEN

## (undated) DEVICE — SYS DISTNTION VEIN BONCHEK 300MMHG

## (undated) DEVICE — CATH F6 INF TL JL 3.5 100 CM: Brand: INFINITI

## (undated) DEVICE — SUT PDS 2/0 CT2 27IN VIL PDP333H

## (undated) DEVICE — SUP ARMBRD ART/LINE BLU

## (undated) DEVICE — SCANLAN® SURG-I-PAW® INSTRUMENT COVERS - RED, 1/10" X 5"/ 3 MMX13 CM (2 - 5" PCS /PKG): Brand: SCANLAN® SURG-I-PAW® INSTRUMENT COVERS

## (undated) DEVICE — SYR LUERLOK 50ML

## (undated) DEVICE — BLAKE SILICONE DRAINS CARDIO CONNECTOR 2:1: Brand: BLAKE

## (undated) DEVICE — SYS VASOVIEW HEMOPRO ENDOSCOPIC HARVST VESL

## (undated) DEVICE — DRP SLUSH MACH OM-ORS-320

## (undated) DEVICE — SUTURE ETHILON SZ 5-0 L18IN NONABSORBABLE BLK P-3 L13MM 3/8 698G